# Patient Record
Sex: FEMALE | Race: WHITE | Employment: OTHER | URBAN - METROPOLITAN AREA
[De-identification: names, ages, dates, MRNs, and addresses within clinical notes are randomized per-mention and may not be internally consistent; named-entity substitution may affect disease eponyms.]

---

## 2018-01-02 ENCOUNTER — TELEPHONE (OUTPATIENT)
Dept: FAMILY MEDICINE CLINIC | Age: 68
End: 2018-01-02

## 2018-01-04 ENCOUNTER — OFFICE VISIT (OUTPATIENT)
Dept: FAMILY MEDICINE CLINIC | Age: 68
End: 2018-01-04

## 2018-01-04 VITALS
OXYGEN SATURATION: 97 % | BODY MASS INDEX: 29.66 KG/M2 | SYSTOLIC BLOOD PRESSURE: 148 MMHG | WEIGHT: 167.4 LBS | HEIGHT: 63 IN | TEMPERATURE: 98.1 F | DIASTOLIC BLOOD PRESSURE: 78 MMHG | RESPIRATION RATE: 18 BRPM | HEART RATE: 74 BPM

## 2018-01-04 DIAGNOSIS — L85.3 DRY SKIN DERMATITIS: ICD-10-CM

## 2018-01-04 DIAGNOSIS — H66.93 ACUTE OTITIS MEDIA, BILATERAL: ICD-10-CM

## 2018-01-04 DIAGNOSIS — M25.561 CHRONIC PAIN OF BOTH KNEES: ICD-10-CM

## 2018-01-04 DIAGNOSIS — E78.2 MIXED HYPERLIPIDEMIA: ICD-10-CM

## 2018-01-04 DIAGNOSIS — F41.9 ANXIETY: Primary | ICD-10-CM

## 2018-01-04 DIAGNOSIS — G47.30 SLEEP APNEA, UNSPECIFIED TYPE: ICD-10-CM

## 2018-01-04 DIAGNOSIS — G89.29 CHRONIC PAIN OF BOTH KNEES: ICD-10-CM

## 2018-01-04 DIAGNOSIS — M25.562 CHRONIC PAIN OF BOTH KNEES: ICD-10-CM

## 2018-01-04 PROCEDURE — 99203 OFFICE O/P NEW LOW 30 MIN: CPT | Performed by: NURSE PRACTITIONER

## 2018-01-04 RX ORDER — MELOXICAM 15 MG/1
TABLET ORAL DAILY
COMMUNITY
Start: 2017-12-04 | End: 2018-01-04 | Stop reason: SDUPTHER

## 2018-01-04 RX ORDER — MELOXICAM 15 MG/1
15 TABLET ORAL DAILY
Qty: 90 TABLET | Refills: 0 | Status: SHIPPED | OUTPATIENT
Start: 2018-01-04 | End: 2018-04-28 | Stop reason: SDUPTHER

## 2018-01-04 RX ORDER — ROSUVASTATIN CALCIUM 10 MG/1
10 TABLET, COATED ORAL DAILY
Qty: 90 TABLET | Refills: 0 | Status: SHIPPED | OUTPATIENT
Start: 2018-01-04 | End: 2018-04-20 | Stop reason: SDUPTHER

## 2018-01-04 RX ORDER — CITALOPRAM 20 MG/1
20 TABLET ORAL DAILY
Qty: 90 TABLET | Refills: 0 | Status: SHIPPED | OUTPATIENT
Start: 2018-01-04 | End: 2018-04-20 | Stop reason: SDUPTHER

## 2018-01-04 RX ORDER — AMOXICILLIN AND CLAVULANATE POTASSIUM 875; 125 MG/1; MG/1
1 TABLET, FILM COATED ORAL EVERY 12 HOURS
Qty: 20 TABLET | Refills: 0 | Status: SHIPPED | OUTPATIENT
Start: 2018-01-04 | End: 2018-01-05 | Stop reason: SDUPTHER

## 2018-01-04 RX ORDER — CITALOPRAM 20 MG/1
TABLET ORAL DAILY
COMMUNITY
Start: 2017-11-08 | End: 2018-01-04 | Stop reason: SDUPTHER

## 2018-01-04 RX ORDER — ROSUVASTATIN CALCIUM 10 MG/1
TABLET, COATED ORAL DAILY
COMMUNITY
Start: 2017-11-08 | End: 2018-01-04 | Stop reason: SDUPTHER

## 2018-01-04 ASSESSMENT — PATIENT HEALTH QUESTIONNAIRE - PHQ9
SUM OF ALL RESPONSES TO PHQ QUESTIONS 1-9: 0
2. FEELING DOWN, DEPRESSED OR HOPELESS: 0
SUM OF ALL RESPONSES TO PHQ9 QUESTIONS 1 & 2: 0
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
## 2018-01-05 RX ORDER — AMOXICILLIN AND CLAVULANATE POTASSIUM 875; 125 MG/1; MG/1
1 TABLET, FILM COATED ORAL EVERY 12 HOURS
Qty: 20 TABLET | Refills: 0 | Status: SHIPPED | OUTPATIENT
Start: 2018-01-05 | End: 2018-01-15

## 2018-02-02 ENCOUNTER — HOSPITAL ENCOUNTER (OUTPATIENT)
Dept: LAB | Age: 68
Discharge: HOME | End: 2018-02-02
Payer: MEDICARE

## 2018-02-02 DIAGNOSIS — E78.2 MIXED HYPERLIPIDEMIA: ICD-10-CM

## 2018-02-02 LAB
ALBUMIN SERPL-MCNC: 4.2 G/DL (ref 3.9–4.9)
ALP BLD-CCNC: 73 U/L (ref 40–130)
ALT SERPL-CCNC: 22 U/L (ref 0–33)
ANION GAP SERPL CALCULATED.3IONS-SCNC: 12 MEQ/L (ref 7–13)
AST SERPL-CCNC: 22 U/L (ref 0–35)
BASOPHILS ABSOLUTE: 0 K/UL (ref 0–0.2)
BASOPHILS RELATIVE PERCENT: 0.6 %
BILIRUB SERPL-MCNC: 0.3 MG/DL (ref 0–1.2)
BUN BLDV-MCNC: 13 MG/DL (ref 8–23)
CALCIUM SERPL-MCNC: 8.8 MG/DL (ref 8.6–10.2)
CHLORIDE BLD-SCNC: 102 MEQ/L (ref 98–107)
CHOLESTEROL, TOTAL: 139 MG/DL (ref 0–199)
CO2: 26 MEQ/L (ref 22–29)
CREAT SERPL-MCNC: 0.78 MG/DL (ref 0.5–0.9)
EOSINOPHILS ABSOLUTE: 0.2 K/UL (ref 0–0.7)
EOSINOPHILS RELATIVE PERCENT: 3.1 %
GFR AFRICAN AMERICAN: >60
GFR NON-AFRICAN AMERICAN: >60
GLOBULIN: 2.5 G/DL (ref 2.3–3.5)
GLUCOSE BLD-MCNC: 110 MG/DL (ref 74–109)
HCT VFR BLD CALC: 36.4 % (ref 37–47)
HDLC SERPL-MCNC: 52 MG/DL (ref 40–59)
HEMOGLOBIN: 12.4 G/DL (ref 12–16)
LDL CHOLESTEROL CALCULATED: 62 MG/DL (ref 0–129)
LYMPHOCYTES ABSOLUTE: 1.1 K/UL (ref 1–4.8)
LYMPHOCYTES RELATIVE PERCENT: 21.5 %
MCH RBC QN AUTO: 32.6 PG (ref 27–31.3)
MCHC RBC AUTO-ENTMCNC: 34.2 % (ref 33–37)
MCV RBC AUTO: 95.2 FL (ref 82–100)
MONOCYTES ABSOLUTE: 0.8 K/UL (ref 0.2–0.8)
MONOCYTES RELATIVE PERCENT: 15.3 %
NEUTROPHILS ABSOLUTE: 3.1 K/UL (ref 1.4–6.5)
NEUTROPHILS RELATIVE PERCENT: 59.5 %
PDW BLD-RTO: 14.1 % (ref 11.5–14.5)
PLATELET # BLD: 190 K/UL (ref 130–400)
POTASSIUM SERPL-SCNC: 4.4 MEQ/L (ref 3.5–5.1)
RBC # BLD: 3.82 M/UL (ref 4.2–5.4)
SODIUM BLD-SCNC: 140 MEQ/L (ref 132–144)
TOTAL PROTEIN: 6.7 G/DL (ref 6.4–8.1)
TRIGL SERPL-MCNC: 124 MG/DL (ref 0–200)
WBC # BLD: 5.2 K/UL (ref 4.8–10.8)

## 2018-02-02 PROCEDURE — 80053 COMPREHEN METABOLIC PANEL: CPT

## 2018-02-02 PROCEDURE — 36415 COLL VENOUS BLD VENIPUNCTURE: CPT

## 2018-02-02 PROCEDURE — 85025 COMPLETE CBC W/AUTO DIFF WBC: CPT

## 2018-02-02 PROCEDURE — 80061 LIPID PANEL: CPT

## 2018-02-13 ENCOUNTER — OFFICE VISIT (OUTPATIENT)
Dept: FAMILY MEDICINE CLINIC | Age: 68
End: 2018-02-13
Payer: MEDICARE

## 2018-02-13 VITALS
DIASTOLIC BLOOD PRESSURE: 74 MMHG | BODY MASS INDEX: 30.83 KG/M2 | SYSTOLIC BLOOD PRESSURE: 134 MMHG | TEMPERATURE: 97.9 F | RESPIRATION RATE: 14 BRPM | WEIGHT: 174 LBS | HEART RATE: 75 BPM | HEIGHT: 63 IN | OXYGEN SATURATION: 96 %

## 2018-02-13 DIAGNOSIS — Z83.3 FAMILY HISTORY OF DIABETES MELLITUS: ICD-10-CM

## 2018-02-13 DIAGNOSIS — Z72.0 CURRENT OCCASIONAL SMOKER: ICD-10-CM

## 2018-02-13 DIAGNOSIS — H66.93 BILATERAL OTITIS MEDIA, UNSPECIFIED OTITIS MEDIA TYPE: ICD-10-CM

## 2018-02-13 DIAGNOSIS — E78.2 MIXED HYPERLIPIDEMIA: Primary | ICD-10-CM

## 2018-02-13 DIAGNOSIS — R03.0 BLOOD PRESSURE ELEVATED WITHOUT HISTORY OF HTN: ICD-10-CM

## 2018-02-13 DIAGNOSIS — R73.09 ELEVATED GLUCOSE: ICD-10-CM

## 2018-02-13 PROCEDURE — 99214 OFFICE O/P EST MOD 30 MIN: CPT | Performed by: NURSE PRACTITIONER

## 2018-02-13 RX ORDER — DOXYCYCLINE HYCLATE 100 MG
100 TABLET ORAL 2 TIMES DAILY
Qty: 20 TABLET | Refills: 0 | Status: SHIPPED | OUTPATIENT
Start: 2018-02-13 | End: 2018-02-27

## 2018-02-13 RX ORDER — DOXYCYCLINE HYCLATE 100 MG
100 TABLET ORAL 2 TIMES DAILY
Qty: 20 TABLET | Refills: 0 | Status: SHIPPED | OUTPATIENT
Start: 2018-02-13 | End: 2018-02-13 | Stop reason: SDUPTHER

## 2018-02-13 ASSESSMENT — PATIENT HEALTH QUESTIONNAIRE - PHQ9
SUM OF ALL RESPONSES TO PHQ9 QUESTIONS 1 & 2: 0
2. FEELING DOWN, DEPRESSED OR HOPELESS: 0
SUM OF ALL RESPONSES TO PHQ QUESTIONS 1-9: 0
1. LITTLE INTEREST OR PLEASURE IN DOING THINGS: 0

## 2018-02-13 NOTE — PROGRESS NOTES
or sinus tenderness. Mouth/Throat:  Mucosa moist.  No lesions. Pharynx without erythema, edema or exudate. Neck:  neck- supple, no mass, non-tender and no bruits  Lungs:  Normal expansion. Clear to auscultation. No rales, rhonchi, or wheezing., No chest wall tenderness. Heart:  Heart sounds are normal.  Regular rate and rhythm without murmur, gallop or rub. DIAGNOSIS:   1. Mixed hyperlipidemia  CBC Auto Differential    Comprehensive Metabolic Panel    Lipid Panel   2. Current occasional smoker     3. Elevated glucose  Hemoglobin A1C   4. Blood pressure elevated without history of HTN     5. Family history of diabetes mellitus  Hemoglobin A1C   6. Bilateral otitis media, unspecified otitis media type  DISCONTINUED: doxycycline hyclate (VIBRA-TABS) 100 MG tablet       Plan:  Continue current medicines and dosages. Continue diet and exercise programs, improving where possible. Follow up in 4 months with lab work one week prior. For further details see orders placed. 1. Lipid panel is stable on statin. No side effects reported. Continue the same. 2. Not ready for smoking cessation. 3. 5.Will check hemoglobin A1c with next labs. She has a strong family history of diabetes. 4. Recommend decreased salt intake in diet. Will recheck at next follow up. Antibiotic ordered for ear infection. The patient is instructed to take Probiotic tablets twice a day for the duration of antibiotic therapy and for 4 days after completion of antibiotics. This will help restore the good bacteria to your colon and prevent side effects of antibiotic therapy such as cramping and diarrhea. Probiotic tablets can be found at your local pharmacy over the counter. Ask your pharmacist if you need help finding tablets.        Electronically signed by Arianna Parr, 5:50 PM 2/13/18

## 2018-02-27 ENCOUNTER — OFFICE VISIT (OUTPATIENT)
Dept: PULMONOLOGY | Age: 68
End: 2018-02-27
Payer: MEDICARE

## 2018-02-27 VITALS
WEIGHT: 172.4 LBS | TEMPERATURE: 97.4 F | HEIGHT: 62 IN | BODY MASS INDEX: 31.73 KG/M2 | DIASTOLIC BLOOD PRESSURE: 86 MMHG | SYSTOLIC BLOOD PRESSURE: 130 MMHG | OXYGEN SATURATION: 95 % | HEART RATE: 90 BPM

## 2018-02-27 DIAGNOSIS — J41.0 SIMPLE CHRONIC BRONCHITIS (HCC): ICD-10-CM

## 2018-02-27 DIAGNOSIS — Z99.89 OSA ON CPAP: Primary | ICD-10-CM

## 2018-02-27 DIAGNOSIS — G47.33 OSA ON CPAP: Primary | ICD-10-CM

## 2018-02-27 PROCEDURE — 99203 OFFICE O/P NEW LOW 30 MIN: CPT | Performed by: INTERNAL MEDICINE

## 2018-02-27 ASSESSMENT — ENCOUNTER SYMPTOMS
SHORTNESS OF BREATH: 0
CHEST TIGHTNESS: 0
ABDOMINAL PAIN: 0
VOMITING: 0
RHINORRHEA: 0
WHEEZING: 0
DIARRHEA: 0
NAUSEA: 0
COUGH: 1
SINUS PRESSURE: 0
SORE THROAT: 0

## 2018-02-27 NOTE — PROGRESS NOTES
She has a normal mood and affect. Assessment:     1. AMANDA on CPAP     2. Simple chronic bronchitis ([address]       Patient was advised to continue current treatment with current CPAP settings as she is doing well prescription for new supplies were sent. She was encouraged to use Mucinex if needed to help mobilizing secretions and observe for now as far as her cough. Patient has no evidence of obstructive lung disease otherwise, asthma, or lower respiratory tract infection at this point      Plan:     No orders of the defined types were placed in this encounter. Orders Placed This Encounter   Medications    Respiratory Therapy Supplies [facility]     Sig: New CPAP mask and supplies     Dispense:  1 Device     Refill:  0           Return in about 1 year (around 2/27/2019) for re-evaluation.       Rafiq Angel MD

## 2018-03-06 ENCOUNTER — TELEPHONE (OUTPATIENT)
Dept: PULMONOLOGY | Age: 68
End: 2018-03-06

## 2018-03-06 ENCOUNTER — OFFICE VISIT (OUTPATIENT)
Dept: FAMILY MEDICINE CLINIC | Age: 68
End: 2018-03-06
Payer: MEDICARE

## 2018-03-06 VITALS
HEART RATE: 73 BPM | SYSTOLIC BLOOD PRESSURE: 120 MMHG | DIASTOLIC BLOOD PRESSURE: 70 MMHG | OXYGEN SATURATION: 96 % | BODY MASS INDEX: 31.47 KG/M2 | WEIGHT: 171 LBS | RESPIRATION RATE: 14 BRPM | TEMPERATURE: 97.6 F | HEIGHT: 62 IN

## 2018-03-06 DIAGNOSIS — Z12.31 ENCOUNTER FOR SCREENING MAMMOGRAM FOR BREAST CANCER: ICD-10-CM

## 2018-03-06 DIAGNOSIS — Z01.419 ENCOUNTER FOR GYNECOLOGICAL EXAMINATION WITHOUT ABNORMAL FINDING: ICD-10-CM

## 2018-03-06 DIAGNOSIS — L85.3 DRY SKIN DERMATITIS: ICD-10-CM

## 2018-03-06 DIAGNOSIS — F17.200 CURRENT SMOKER: ICD-10-CM

## 2018-03-06 DIAGNOSIS — B36.9 FUNGAL DERMATOSIS: Primary | ICD-10-CM

## 2018-03-06 PROCEDURE — 99214 OFFICE O/P EST MOD 30 MIN: CPT | Performed by: NURSE PRACTITIONER

## 2018-03-06 RX ORDER — AMMONIUM LACTATE 12 G/100G
LOTION TOPICAL
Qty: 1 BOTTLE | Refills: 0 | Status: SHIPPED | OUTPATIENT
Start: 2018-03-06 | End: 2020-01-10

## 2018-03-06 NOTE — PROGRESS NOTES
Annual Physical    Luz Jacobsen  presents for annual physical exam.  She has no complaints today. Health Maintenance: PAP test done  Mammograms discussed  Colonoscopy  discussed  Tetanus shot discussed    Past Medical History:   Diagnosis Date    Allergic rhinitis     Anxiety     Hyperlipidemia     Osteoarthritis     Sleep apnea      Past Surgical History:   Procedure Laterality Date     SECTION      pt has had 3      family history includes Allergy (Severe) in her sister; Depression in her sister, sister, sister, and sister; Diabetes in her brother, sister, and sister; Heart Disease in her mother; High Blood Pressure in her mother; Obesity in her sister; Vision Loss in her mother. Social History   Substance Use Topics    Smoking status: Light Tobacco Smoker    Smokeless tobacco: Never Used    Alcohol use Yes       REVIEW OF SYSTEMS:  The patient reports no problems with hearing or headaches. She reports no problems with vision. She is advised to have a yearly eye examination. She has no chest pains or pressures, or shortness of breath. She has no indigestion, heartburn, nausea, or vomiting. She has no constipation, diarrhea, or  black, bloody, mucousy, or tarry stool. She has no trouble passing urine or losing urine. Libido seems to be normal.  She reports no musculoskeletal aches or pains. She states that [facility] would not cover the lac-hyrdin. She would still like to try it   Also, she noticed a small red spot on her left cheek yesterday. Not really itchy and not painful. She has not had a anne there before. Upon further questioning, she does where her CPAP mask over that area every night. PHYSICAL EXAMINATION:  EXAM:  Constitutional Blood pressure 120/70, pulse 73, temperature 97.6 °F (36.4 °C), temperature source Temporal, resp.  rate 14, height 5' 2\" (1.575 m), weight 171 lb (77.6 kg), last menstrual period 2008, SpO2 96 %, not currently

## 2018-03-08 ENCOUNTER — HOSPITAL ENCOUNTER (OUTPATIENT)
Dept: WOMENS IMAGING | Age: 68
Discharge: HOME | End: 2018-03-10
Payer: MEDICARE

## 2018-03-08 DIAGNOSIS — Z12.31 ENCOUNTER FOR SCREENING MAMMOGRAM FOR BREAST CANCER: ICD-10-CM

## 2018-03-08 PROCEDURE — 77067 SCR MAMMO BI INCL CAD: CPT

## 2018-04-20 DIAGNOSIS — E78.2 MIXED HYPERLIPIDEMIA: ICD-10-CM

## 2018-04-20 DIAGNOSIS — F41.9 ANXIETY: ICD-10-CM

## 2018-04-20 RX ORDER — ROSUVASTATIN CALCIUM 10 MG/1
TABLET, COATED ORAL
Qty: 90 TABLET | Refills: 1 | Status: SHIPPED | OUTPATIENT
Start: 2018-04-20 | End: 2018-10-17 | Stop reason: SDUPTHER

## 2018-04-20 RX ORDER — CITALOPRAM 20 MG/1
TABLET ORAL
Qty: 90 TABLET | Refills: 1 | Status: SHIPPED | OUTPATIENT
Start: 2018-04-20 | End: 2018-10-17 | Stop reason: SDUPTHER

## 2018-04-30 RX ORDER — MELOXICAM 15 MG/1
TABLET ORAL
Qty: 90 TABLET | Refills: 0 | Status: SHIPPED | OUTPATIENT
Start: 2018-04-30 | End: 2018-07-29 | Stop reason: SDUPTHER

## 2018-05-18 ENCOUNTER — HOSPITAL ENCOUNTER (EMERGENCY)
Age: 68
Discharge: HOME | End: 2018-05-18
Attending: EMERGENCY MEDICINE
Payer: MEDICARE

## 2018-05-18 ENCOUNTER — APPOINTMENT (OUTPATIENT)
Dept: GENERAL RADIOLOGY | Age: 68
End: 2018-05-18
Payer: MEDICARE

## 2018-05-18 VITALS
HEIGHT: 62 IN | SYSTOLIC BLOOD PRESSURE: 164 MMHG | OXYGEN SATURATION: 97 % | BODY MASS INDEX: 29.44 KG/M2 | WEIGHT: 160 LBS | RESPIRATION RATE: 18 BRPM | DIASTOLIC BLOOD PRESSURE: 77 MMHG | TEMPERATURE: 98.4 F | HEART RATE: 71 BPM

## 2018-05-18 DIAGNOSIS — S46.812A: ICD-10-CM

## 2018-05-18 DIAGNOSIS — S76.011A STRAIN OF GLUTEUS MEDIUS OF RIGHT LOWER EXTREMITY, INITIAL ENCOUNTER: ICD-10-CM

## 2018-05-18 DIAGNOSIS — S70.01XD CONTUSION OF RIGHT HIP, SUBSEQUENT ENCOUNTER: Primary | ICD-10-CM

## 2018-05-18 LAB
BACTERIA: NORMAL /HPF
BILIRUBIN URINE: NEGATIVE
BLOOD, URINE: ABNORMAL
CLARITY: CLEAR
COLOR: YELLOW
EPITHELIAL CELLS, UA: NORMAL /HPF
GLUCOSE URINE: NEGATIVE MG/DL
KETONES, URINE: NEGATIVE MG/DL
LEUKOCYTE ESTERASE, URINE: NEGATIVE
NITRITE, URINE: NEGATIVE
PH UA: 6 (ref 5–9)
PROTEIN UA: NEGATIVE MG/DL
RBC UA: NORMAL /HPF (ref 0–2)
SPECIFIC GRAVITY UA: 1.01 (ref 1–1.03)
URINE REFLEX TO CULTURE: YES
UROBILINOGEN, URINE: 0.2 E.U./DL
WBC UA: NORMAL /HPF (ref 0–5)

## 2018-05-18 PROCEDURE — 81001 URINALYSIS AUTO W/SCOPE: CPT

## 2018-05-18 PROCEDURE — 6360000002 HC RX W HCPCS: Performed by: EMERGENCY MEDICINE

## 2018-05-18 PROCEDURE — 6370000000 HC RX 637 (ALT 250 FOR IP): Performed by: EMERGENCY MEDICINE

## 2018-05-18 PROCEDURE — 87086 URINE CULTURE/COLONY COUNT: CPT

## 2018-05-18 PROCEDURE — 99283 EMERGENCY DEPT VISIT LOW MDM: CPT

## 2018-05-18 PROCEDURE — 73502 X-RAY EXAM HIP UNI 2-3 VIEWS: CPT

## 2018-05-18 RX ORDER — TRAMADOL HYDROCHLORIDE 50 MG/1
50 TABLET ORAL ONCE
Status: COMPLETED | OUTPATIENT
Start: 2018-05-18 | End: 2018-05-18

## 2018-05-18 RX ORDER — DEXAMETHASONE 4 MG/1
4 TABLET ORAL ONCE
Status: COMPLETED | OUTPATIENT
Start: 2018-05-18 | End: 2018-05-18

## 2018-05-18 RX ORDER — TRAMADOL HYDROCHLORIDE 50 MG/1
50 TABLET ORAL EVERY 8 HOURS PRN
Qty: 20 TABLET | Refills: 0 | Status: SHIPPED | OUTPATIENT
Start: 2018-05-18 | End: 2018-05-28

## 2018-05-18 RX ADMIN — DEXAMETHASONE 4 MG: 4 TABLET ORAL at 09:12

## 2018-05-18 RX ADMIN — TRAMADOL HYDROCHLORIDE 50 MG: 50 TABLET, FILM COATED ORAL at 09:12

## 2018-05-18 ASSESSMENT — ENCOUNTER SYMPTOMS
BLOOD IN STOOL: 0
ABDOMINAL PAIN: 0
EYE PAIN: 0
DIARRHEA: 0
STRIDOR: 0
COUGH: 0
VOICE CHANGE: 0
SHORTNESS OF BREATH: 0
CHEST TIGHTNESS: 0
CHOKING: 0
SINUS PRESSURE: 0
BACK PAIN: 0
SORE THROAT: 0
EYE REDNESS: 0
TROUBLE SWALLOWING: 0
CONSTIPATION: 0
WHEEZING: 0
EYE DISCHARGE: 0
FACIAL SWELLING: 0
VOMITING: 0

## 2018-05-18 ASSESSMENT — PAIN DESCRIPTION - DESCRIPTORS: DESCRIPTORS: ACHING

## 2018-05-18 ASSESSMENT — PAIN DESCRIPTION - LOCATION: LOCATION: BUTTOCKS

## 2018-05-18 ASSESSMENT — PAIN SCALES - GENERAL: PAINLEVEL_OUTOF10: 8

## 2018-05-18 ASSESSMENT — PAIN DESCRIPTION - ORIENTATION: ORIENTATION: RIGHT

## 2018-05-20 LAB — URINE CULTURE, ROUTINE: NORMAL

## 2018-05-31 ENCOUNTER — OFFICE VISIT (OUTPATIENT)
Dept: FAMILY MEDICINE CLINIC | Age: 68
End: 2018-05-31
Payer: MEDICARE

## 2018-05-31 VITALS
SYSTOLIC BLOOD PRESSURE: 138 MMHG | WEIGHT: 168 LBS | BODY MASS INDEX: 30.91 KG/M2 | DIASTOLIC BLOOD PRESSURE: 80 MMHG | RESPIRATION RATE: 12 BRPM | TEMPERATURE: 96.1 F | OXYGEN SATURATION: 97 % | HEIGHT: 62 IN | HEART RATE: 81 BPM

## 2018-05-31 DIAGNOSIS — T14.8XXA PUNCTURE WOUND: Primary | ICD-10-CM

## 2018-05-31 PROCEDURE — 90471 IMMUNIZATION ADMIN: CPT | Performed by: NURSE PRACTITIONER

## 2018-05-31 PROCEDURE — 99213 OFFICE O/P EST LOW 20 MIN: CPT | Performed by: NURSE PRACTITIONER

## 2018-05-31 PROCEDURE — 90715 TDAP VACCINE 7 YRS/> IM: CPT | Performed by: NURSE PRACTITIONER

## 2018-05-31 RX ORDER — DOXYCYCLINE HYCLATE 100 MG
100 TABLET ORAL 2 TIMES DAILY
Qty: 20 TABLET | Refills: 0 | Status: SHIPPED | OUTPATIENT
Start: 2018-05-31 | End: 2018-06-10

## 2018-05-31 RX ORDER — CEPHALEXIN 500 MG/1
500 CAPSULE ORAL 4 TIMES DAILY
Qty: 40 CAPSULE | Refills: 0 | Status: SHIPPED | OUTPATIENT
Start: 2018-05-31 | End: 2018-06-10

## 2018-05-31 ASSESSMENT — ENCOUNTER SYMPTOMS
NAUSEA: 0
VOMITING: 0
COLOR CHANGE: 1
SHORTNESS OF BREATH: 0

## 2018-07-30 RX ORDER — MELOXICAM 15 MG/1
TABLET ORAL
Qty: 90 TABLET | Refills: 0 | Status: SHIPPED | OUTPATIENT
Start: 2018-07-30 | End: 2018-10-28 | Stop reason: SDUPTHER

## 2018-08-29 ENCOUNTER — HOSPITAL ENCOUNTER (OUTPATIENT)
Dept: LAB | Age: 68
Discharge: HOME | End: 2018-08-29
Payer: MEDICARE

## 2018-08-29 DIAGNOSIS — Z83.3 FAMILY HISTORY OF DIABETES MELLITUS: ICD-10-CM

## 2018-08-29 DIAGNOSIS — R73.09 ELEVATED GLUCOSE: ICD-10-CM

## 2018-08-29 DIAGNOSIS — E78.2 MIXED HYPERLIPIDEMIA: ICD-10-CM

## 2018-08-29 LAB
ALBUMIN SERPL-MCNC: 4.4 G/DL (ref 3.9–4.9)
ALP BLD-CCNC: 81 U/L (ref 40–130)
ALT SERPL-CCNC: 20 U/L (ref 0–33)
ANION GAP SERPL CALCULATED.3IONS-SCNC: 15 MEQ/L (ref 7–13)
AST SERPL-CCNC: 22 U/L (ref 0–35)
BASOPHILS ABSOLUTE: 0.1 K/UL (ref 0–0.2)
BASOPHILS RELATIVE PERCENT: 0.7 %
BILIRUB SERPL-MCNC: 0.5 MG/DL (ref 0–1.2)
BUN BLDV-MCNC: 13 MG/DL (ref 8–23)
CALCIUM SERPL-MCNC: 9.1 MG/DL (ref 8.6–10.2)
CHLORIDE BLD-SCNC: 102 MEQ/L (ref 98–107)
CHOLESTEROL, TOTAL: 141 MG/DL (ref 0–199)
CO2: 23 MEQ/L (ref 22–29)
CREAT SERPL-MCNC: 0.78 MG/DL (ref 0.5–0.9)
EOSINOPHILS ABSOLUTE: 0.3 K/UL (ref 0–0.7)
EOSINOPHILS RELATIVE PERCENT: 4.1 %
GFR AFRICAN AMERICAN: >60
GFR NON-AFRICAN AMERICAN: >60
GLOBULIN: 3 G/DL (ref 2.3–3.5)
GLUCOSE BLD-MCNC: 108 MG/DL (ref 74–109)
HBA1C MFR BLD: 6 % (ref 4.8–5.9)
HCT VFR BLD CALC: 41 % (ref 37–47)
HDLC SERPL-MCNC: 51 MG/DL (ref 40–59)
HEMOGLOBIN: 13.9 G/DL (ref 12–16)
LDL CHOLESTEROL CALCULATED: 63 MG/DL (ref 0–129)
LYMPHOCYTES ABSOLUTE: 1.8 K/UL (ref 1–4.8)
LYMPHOCYTES RELATIVE PERCENT: 26.5 %
MCH RBC QN AUTO: 33 PG (ref 27–31.3)
MCHC RBC AUTO-ENTMCNC: 33.8 % (ref 33–37)
MCV RBC AUTO: 97.4 FL (ref 82–100)
MONOCYTES ABSOLUTE: 0.5 K/UL (ref 0.2–0.8)
MONOCYTES RELATIVE PERCENT: 7.5 %
NEUTROPHILS ABSOLUTE: 4.1 K/UL (ref 1.4–6.5)
NEUTROPHILS RELATIVE PERCENT: 61.2 %
PDW BLD-RTO: 14.6 % (ref 11.5–14.5)
PLATELET # BLD: 222 K/UL (ref 130–400)
POTASSIUM SERPL-SCNC: 4.6 MEQ/L (ref 3.5–5.1)
RBC # BLD: 4.21 M/UL (ref 4.2–5.4)
SODIUM BLD-SCNC: 140 MEQ/L (ref 132–144)
TOTAL PROTEIN: 7.4 G/DL (ref 6.4–8.1)
TRIGL SERPL-MCNC: 135 MG/DL (ref 0–200)
WBC # BLD: 6.8 K/UL (ref 4.8–10.8)

## 2018-08-29 PROCEDURE — 80061 LIPID PANEL: CPT

## 2018-08-29 PROCEDURE — 83036 HEMOGLOBIN GLYCOSYLATED A1C: CPT

## 2018-08-29 PROCEDURE — 85025 COMPLETE CBC W/AUTO DIFF WBC: CPT

## 2018-08-29 PROCEDURE — 36415 COLL VENOUS BLD VENIPUNCTURE: CPT

## 2018-08-29 PROCEDURE — 80053 COMPREHEN METABOLIC PANEL: CPT

## 2018-10-17 DIAGNOSIS — F41.9 ANXIETY: ICD-10-CM

## 2018-10-17 DIAGNOSIS — E78.2 MIXED HYPERLIPIDEMIA: ICD-10-CM

## 2018-10-18 RX ORDER — CITALOPRAM 20 MG/1
TABLET ORAL
Qty: 90 TABLET | Refills: 1 | Status: SHIPPED | OUTPATIENT
Start: 2018-10-18 | End: 2019-04-16 | Stop reason: SDUPTHER

## 2018-10-18 RX ORDER — ROSUVASTATIN CALCIUM 10 MG/1
TABLET, COATED ORAL
Qty: 90 TABLET | Refills: 1 | Status: SHIPPED | OUTPATIENT
Start: 2018-10-18 | End: 2019-04-16 | Stop reason: SDUPTHER

## 2018-10-28 NOTE — TELEPHONE ENCOUNTER
Pharmacy  requesting medication refill.  Please approve or deny this request.    Rx requested:  Requested Prescriptions     Pending Prescriptions Disp Refills    meloxicam (MOBIC) 15 MG tablet [Pharmacy Med Name: MELOXICAM TABS 15MG] 90 tablet 0     Sig: TAKE 1 TABLET DAILY       Last Office Visit:   3/6/18    Last Labs:  8/29/18    Next Visit Date:  Future Appointments  Date Time Provider Iain Medina   1/18/2019 1:00 PM [address], MD [place] [facility]   1/29/2019 9:00 AM SHANICE BARRIGA  [place] [address] [place] [address]

## 2018-10-29 RX ORDER — MELOXICAM 15 MG/1
TABLET ORAL
Qty: 90 TABLET | Refills: 0 | Status: SHIPPED | OUTPATIENT
Start: 2018-10-29 | End: 2019-01-27 | Stop reason: SDUPTHER

## 2019-01-22 ENCOUNTER — HOSPITAL ENCOUNTER (OUTPATIENT)
Dept: PREADMISSION TESTING | Age: 69
Discharge: HOME | End: 2019-01-26
Payer: MEDICARE

## 2019-01-22 VITALS
BODY MASS INDEX: 32.02 KG/M2 | HEART RATE: 80 BPM | HEIGHT: 62 IN | TEMPERATURE: 97.7 F | SYSTOLIC BLOOD PRESSURE: 154 MMHG | RESPIRATION RATE: 16 BRPM | WEIGHT: 174 LBS | DIASTOLIC BLOOD PRESSURE: 83 MMHG | OXYGEN SATURATION: 98 %

## 2019-01-22 DIAGNOSIS — R55 VAGAL REACTION: Chronic | ICD-10-CM

## 2019-01-22 PROBLEM — M17.12 LEFT KNEE DJD: Status: ACTIVE | Noted: 2019-01-22

## 2019-01-22 LAB
ANION GAP SERPL CALCULATED.3IONS-SCNC: 11 MEQ/L (ref 7–13)
BILIRUBIN URINE: NEGATIVE
BLOOD, URINE: NEGATIVE
BUN BLDV-MCNC: 11 MG/DL (ref 8–23)
CALCIUM SERPL-MCNC: 9.3 MG/DL (ref 8.6–10.2)
CHLORIDE BLD-SCNC: 102 MEQ/L (ref 98–107)
CLARITY: CLEAR
CO2: 26 MEQ/L (ref 22–29)
COLOR: YELLOW
CREAT SERPL-MCNC: 0.71 MG/DL (ref 0.5–0.9)
EKG ATRIAL RATE: 70 BPM
EKG P AXIS: 54 DEGREES
EKG P-R INTERVAL: 146 MS
EKG Q-T INTERVAL: 426 MS
EKG QRS DURATION: 78 MS
EKG QTC CALCULATION (BAZETT): 460 MS
EKG R AXIS: 13 DEGREES
EKG T AXIS: 20 DEGREES
EKG VENTRICULAR RATE: 70 BPM
GFR AFRICAN AMERICAN: >60
GFR NON-AFRICAN AMERICAN: >60
GLUCOSE BLD-MCNC: 111 MG/DL (ref 74–109)
GLUCOSE URINE: NEGATIVE MG/DL
HCT VFR BLD CALC: 34.5 % (ref 37–47)
HEMOGLOBIN: 12 G/DL (ref 12–16)
INR BLD: 1
KETONES, URINE: NEGATIVE MG/DL
LEUKOCYTE ESTERASE, URINE: NEGATIVE
MCH RBC QN AUTO: 33.3 PG (ref 27–31.3)
MCHC RBC AUTO-ENTMCNC: 34.8 % (ref 33–37)
MCV RBC AUTO: 95.9 FL (ref 82–100)
NITRITE, URINE: NEGATIVE
PDW BLD-RTO: 13.9 % (ref 11.5–14.5)
PH UA: 6.5 (ref 5–9)
PLATELET # BLD: 171 K/UL (ref 130–400)
POTASSIUM SERPL-SCNC: 4.1 MEQ/L (ref 3.5–5.1)
PROTEIN UA: NEGATIVE MG/DL
PROTHROMBIN TIME: 10.2 SEC (ref 9–11.5)
RBC # BLD: 3.6 M/UL (ref 4.2–5.4)
SODIUM BLD-SCNC: 139 MEQ/L (ref 132–144)
SPECIFIC GRAVITY UA: 1.01 (ref 1–1.03)
URINE REFLEX TO CULTURE: NORMAL
UROBILINOGEN, URINE: 0.2 E.U./DL
WBC # BLD: 4.7 K/UL (ref 4.8–10.8)

## 2019-01-22 PROCEDURE — 81003 URINALYSIS AUTO W/O SCOPE: CPT

## 2019-01-22 PROCEDURE — 85610 PROTHROMBIN TIME: CPT

## 2019-01-22 PROCEDURE — 85027 COMPLETE CBC AUTOMATED: CPT

## 2019-01-22 PROCEDURE — 80048 BASIC METABOLIC PNL TOTAL CA: CPT

## 2019-01-22 PROCEDURE — 93005 ELECTROCARDIOGRAM TRACING: CPT

## 2019-01-22 RX ORDER — FEXOFENADINE HCL 180 MG/1
180 TABLET ORAL DAILY
COMMUNITY

## 2019-01-22 RX ORDER — SODIUM CHLORIDE, SODIUM LACTATE, POTASSIUM CHLORIDE, CALCIUM CHLORIDE 600; 310; 30; 20 MG/100ML; MG/100ML; MG/100ML; MG/100ML
INJECTION, SOLUTION INTRAVENOUS CONTINUOUS
Status: CANCELLED | OUTPATIENT
Start: 2019-01-22

## 2019-01-22 RX ORDER — SODIUM CHLORIDE 0.9 % (FLUSH) 0.9 %
10 SYRINGE (ML) INJECTION EVERY 12 HOURS SCHEDULED
Status: CANCELLED | OUTPATIENT
Start: 2019-01-22

## 2019-01-22 RX ORDER — LIDOCAINE HYDROCHLORIDE 10 MG/ML
1 INJECTION, SOLUTION EPIDURAL; INFILTRATION; INTRACAUDAL; PERINEURAL
Status: CANCELLED | OUTPATIENT
Start: 2019-01-22 | End: 2019-01-22

## 2019-01-22 RX ORDER — ALBUTEROL SULFATE 2.5 MG/3ML
3 SOLUTION RESPIRATORY (INHALATION) PRN
COMMUNITY
End: 2019-01-22

## 2019-01-22 RX ORDER — SODIUM CHLORIDE 0.9 % (FLUSH) 0.9 %
10 SYRINGE (ML) INJECTION PRN
Status: CANCELLED | OUTPATIENT
Start: 2019-01-22

## 2019-01-22 RX ORDER — ALBUTEROL SULFATE 2.5 MG/3ML
2.5 SOLUTION RESPIRATORY (INHALATION) EVERY 6 HOURS PRN
COMMUNITY
End: 2019-01-23 | Stop reason: ALTCHOICE

## 2019-01-22 RX ORDER — CEFAZOLIN SODIUM 2 G/50ML
2 SOLUTION INTRAVENOUS ONCE
Status: CANCELLED | OUTPATIENT
Start: 2019-02-08

## 2019-01-23 ENCOUNTER — OFFICE VISIT (OUTPATIENT)
Dept: FAMILY MEDICINE CLINIC | Age: 69
End: 2019-01-23
Payer: MEDICARE

## 2019-01-23 VITALS
OXYGEN SATURATION: 98 % | RESPIRATION RATE: 14 BRPM | HEART RATE: 79 BPM | DIASTOLIC BLOOD PRESSURE: 64 MMHG | SYSTOLIC BLOOD PRESSURE: 122 MMHG | BODY MASS INDEX: 33.49 KG/M2 | WEIGHT: 182 LBS | TEMPERATURE: 97.7 F | HEIGHT: 62 IN

## 2019-01-23 DIAGNOSIS — M17.12 PRIMARY OSTEOARTHRITIS OF LEFT KNEE: ICD-10-CM

## 2019-01-23 DIAGNOSIS — Z01.818 ENCOUNTER FOR PREADMISSION TESTING: Primary | ICD-10-CM

## 2019-01-23 PROCEDURE — 99214 OFFICE O/P EST MOD 30 MIN: CPT | Performed by: FAMILY MEDICINE

## 2019-01-23 PROCEDURE — 93010 ELECTROCARDIOGRAM REPORT: CPT | Performed by: INTERNAL MEDICINE

## 2019-01-23 ASSESSMENT — ENCOUNTER SYMPTOMS
SINUS PRESSURE: 0
EYE REDNESS: 0
APNEA: 0
WHEEZING: 0
CONSTIPATION: 0
EYE PAIN: 0
COUGH: 0
CHEST TIGHTNESS: 0
PHOTOPHOBIA: 0
EYE ITCHING: 0
RHINORRHEA: 0
NAUSEA: 0
FACIAL SWELLING: 0
CHOKING: 0
EYE DISCHARGE: 0
DIARRHEA: 0
SHORTNESS OF BREATH: 0
COLOR CHANGE: 0
ANAL BLEEDING: 0
ABDOMINAL PAIN: 0
TROUBLE SWALLOWING: 0
BACK PAIN: 0
ABDOMINAL DISTENTION: 0
BLOOD IN STOOL: 0

## 2019-01-28 RX ORDER — MELOXICAM 15 MG/1
TABLET ORAL
Qty: 90 TABLET | Refills: 0 | Status: ON HOLD | OUTPATIENT
Start: 2019-01-28 | End: 2019-02-19 | Stop reason: HOSPADM

## 2019-02-07 ENCOUNTER — ANESTHESIA EVENT (OUTPATIENT)
Dept: OPERATING ROOM | Age: 69
DRG: 470 | End: 2019-02-07
Payer: MEDICARE

## 2019-02-08 ENCOUNTER — HOSPITAL ENCOUNTER (INPATIENT)
Age: 69
LOS: 4 days | Discharge: NURSING, REHAB OR LONG-TERM CARE | DRG: 470 | End: 2019-02-12
Attending: ORTHOPAEDIC SURGERY | Admitting: ORTHOPAEDIC SURGERY
Payer: MEDICARE

## 2019-02-08 ENCOUNTER — APPOINTMENT (OUTPATIENT)
Dept: GENERAL RADIOLOGY | Age: 69
DRG: 470 | End: 2019-02-08
Attending: ORTHOPAEDIC SURGERY
Payer: MEDICARE

## 2019-02-08 ENCOUNTER — ANESTHESIA (OUTPATIENT)
Dept: OPERATING ROOM | Age: 69
DRG: 470 | End: 2019-02-08
Payer: MEDICARE

## 2019-02-08 VITALS
TEMPERATURE: 96.6 F | OXYGEN SATURATION: 100 % | RESPIRATION RATE: 14 BRPM | SYSTOLIC BLOOD PRESSURE: 111 MMHG | DIASTOLIC BLOOD PRESSURE: 61 MMHG

## 2019-02-08 PROBLEM — Z96.652 STATUS POST TOTAL KNEE REPLACEMENT, LEFT: Status: ACTIVE | Noted: 2019-02-08

## 2019-02-08 LAB
ABO/RH: NORMAL
ANTIBODY SCREEN: NORMAL

## 2019-02-08 PROCEDURE — 6370000000 HC RX 637 (ALT 250 FOR IP): Performed by: INTERNAL MEDICINE

## 2019-02-08 PROCEDURE — C1776 JOINT DEVICE (IMPLANTABLE): HCPCS | Performed by: ORTHOPAEDIC SURGERY

## 2019-02-08 PROCEDURE — 3600000004 HC SURGERY LEVEL 4 BASE: Performed by: ORTHOPAEDIC SURGERY

## 2019-02-08 PROCEDURE — 99406 BEHAV CHNG SMOKING 3-10 MIN: CPT

## 2019-02-08 PROCEDURE — 64447 NJX AA&/STRD FEMORAL NRV IMG: CPT | Performed by: ANESTHESIOLOGY

## 2019-02-08 PROCEDURE — 6360000002 HC RX W HCPCS: Performed by: NURSE PRACTITIONER

## 2019-02-08 PROCEDURE — 3700000000 HC ANESTHESIA ATTENDED CARE: Performed by: ORTHOPAEDIC SURGERY

## 2019-02-08 PROCEDURE — 73560 X-RAY EXAM OF KNEE 1 OR 2: CPT

## 2019-02-08 PROCEDURE — 86900 BLOOD TYPING SEROLOGIC ABO: CPT

## 2019-02-08 PROCEDURE — 7100000001 HC PACU RECOVERY - ADDTL 15 MIN: Performed by: ORTHOPAEDIC SURGERY

## 2019-02-08 PROCEDURE — 6370000000 HC RX 637 (ALT 250 FOR IP): Performed by: ORTHOPAEDIC SURGERY

## 2019-02-08 PROCEDURE — 2709999900 HC NON-CHARGEABLE SUPPLY: Performed by: ORTHOPAEDIC SURGERY

## 2019-02-08 PROCEDURE — 97530 THERAPEUTIC ACTIVITIES: CPT

## 2019-02-08 PROCEDURE — 2580000003 HC RX 258: Performed by: ORTHOPAEDIC SURGERY

## 2019-02-08 PROCEDURE — 7100000000 HC PACU RECOVERY - FIRST 15 MIN: Performed by: ORTHOPAEDIC SURGERY

## 2019-02-08 PROCEDURE — 97162 PT EVAL MOD COMPLEX 30 MIN: CPT

## 2019-02-08 PROCEDURE — 6360000002 HC RX W HCPCS: Performed by: ORTHOPAEDIC SURGERY

## 2019-02-08 PROCEDURE — 3700000001 HC ADD 15 MINUTES (ANESTHESIA): Performed by: ORTHOPAEDIC SURGERY

## 2019-02-08 PROCEDURE — 97110 THERAPEUTIC EXERCISES: CPT

## 2019-02-08 PROCEDURE — 6360000002 HC RX W HCPCS: Performed by: NURSE ANESTHETIST, CERTIFIED REGISTERED

## 2019-02-08 PROCEDURE — 3600000014 HC SURGERY LEVEL 4 ADDTL 15MIN: Performed by: ORTHOPAEDIC SURGERY

## 2019-02-08 PROCEDURE — 2580000003 HC RX 258: Performed by: ANESTHESIOLOGY

## 2019-02-08 PROCEDURE — 2500000003 HC RX 250 WO HCPCS: Performed by: NURSE ANESTHETIST, CERTIFIED REGISTERED

## 2019-02-08 PROCEDURE — 86901 BLOOD TYPING SEROLOGIC RH(D): CPT

## 2019-02-08 PROCEDURE — 86850 RBC ANTIBODY SCREEN: CPT

## 2019-02-08 PROCEDURE — 2580000003 HC RX 258: Performed by: NURSE PRACTITIONER

## 2019-02-08 PROCEDURE — 2720000010 HC SURG SUPPLY STERILE: Performed by: ORTHOPAEDIC SURGERY

## 2019-02-08 PROCEDURE — 6360000002 HC RX W HCPCS: Performed by: ANESTHESIOLOGY

## 2019-02-08 PROCEDURE — 0SRD0J9 REPLACEMENT OF LEFT KNEE JOINT WITH SYNTHETIC SUBSTITUTE, CEMENTED, OPEN APPROACH: ICD-10-PCS | Performed by: ORTHOPAEDIC SURGERY

## 2019-02-08 PROCEDURE — 1210000000 HC MED SURG R&B

## 2019-02-08 DEVICE — COMPONENT ARTC SURF PS 6-9 EF 11 MM LT TIB FIX BEAR: Type: IMPLANTABLE DEVICE | Site: KNEE | Status: FUNCTIONAL

## 2019-02-08 DEVICE — SYSTEM TOT KNEE CEM FEM TIB COMP STD TIB INSRT STD PAT: Type: IMPLANTABLE DEVICE | Site: KNEE | Status: FUNCTIONAL

## 2019-02-08 DEVICE — CEMENT BNE RADIOPAQUE FAST SET ACRYL RESIN HI VISC SIMPLEXHV: Type: IMPLANTABLE DEVICE | Site: KNEE | Status: FUNCTIONAL

## 2019-02-08 DEVICE — COMPONENT FEM SZ 8 NAR L KNEE CO CHROM CEM POST STBL COR: Type: IMPLANTABLE DEVICE | Site: KNEE | Status: FUNCTIONAL

## 2019-02-08 DEVICE — Z DUP USE 2207257 PSN TIB STM 5 DEG SZ E L: Type: IMPLANTABLE DEVICE | Site: KNEE | Status: FUNCTIONAL

## 2019-02-08 DEVICE — COMPONENT PAT DIA35MM THK9MM KNEE POLY CEM CONVENTIONAL: Type: IMPLANTABLE DEVICE | Site: KNEE | Status: FUNCTIONAL

## 2019-02-08 RX ORDER — EPHEDRINE SULFATE 50 MG/ML
INJECTION, SOLUTION INTRAVENOUS PRN
Status: DISCONTINUED | OUTPATIENT
Start: 2019-02-08 | End: 2019-02-08 | Stop reason: SDUPTHER

## 2019-02-08 RX ORDER — TRANEXAMIC ACID 650 1/1
1300 TABLET ORAL EVERY 8 HOURS
Status: COMPLETED | OUTPATIENT
Start: 2019-02-08 | End: 2019-02-08

## 2019-02-08 RX ORDER — MEPERIDINE HYDROCHLORIDE 50 MG/ML
12.5 INJECTION INTRAMUSCULAR; INTRAVENOUS; SUBCUTANEOUS EVERY 5 MIN PRN
Status: DISCONTINUED | OUTPATIENT
Start: 2019-02-08 | End: 2019-02-08 | Stop reason: HOSPADM

## 2019-02-08 RX ORDER — SODIUM CHLORIDE 9 MG/ML
INJECTION, SOLUTION INTRAVENOUS CONTINUOUS
Status: DISCONTINUED | OUTPATIENT
Start: 2019-02-08 | End: 2019-02-09

## 2019-02-08 RX ORDER — SODIUM CHLORIDE 0.9 % (FLUSH) 0.9 %
10 SYRINGE (ML) INJECTION PRN
Status: DISCONTINUED | OUTPATIENT
Start: 2019-02-08 | End: 2019-02-08 | Stop reason: HOSPADM

## 2019-02-08 RX ORDER — SENNA AND DOCUSATE SODIUM 50; 8.6 MG/1; MG/1
2 TABLET, FILM COATED ORAL 2 TIMES DAILY
Status: DISCONTINUED | OUTPATIENT
Start: 2019-02-08 | End: 2019-02-12 | Stop reason: HOSPADM

## 2019-02-08 RX ORDER — OXYCODONE HYDROCHLORIDE AND ACETAMINOPHEN 5; 325 MG/1; MG/1
1 TABLET ORAL EVERY 4 HOURS PRN
Status: DISCONTINUED | OUTPATIENT
Start: 2019-02-08 | End: 2019-02-12 | Stop reason: HOSPADM

## 2019-02-08 RX ORDER — BUPIVACAINE HYDROCHLORIDE 5 MG/ML
INJECTION, SOLUTION EPIDURAL; INTRACAUDAL
Status: DISPENSED
Start: 2019-02-08 | End: 2019-02-08

## 2019-02-08 RX ORDER — BISACODYL 10 MG
10 SUPPOSITORY, RECTAL RECTAL DAILY PRN
Status: DISCONTINUED | OUTPATIENT
Start: 2019-02-08 | End: 2019-02-12 | Stop reason: HOSPADM

## 2019-02-08 RX ORDER — TRAMADOL HYDROCHLORIDE 50 MG/1
50 TABLET ORAL EVERY 6 HOURS PRN
Status: DISCONTINUED | OUTPATIENT
Start: 2019-02-08 | End: 2019-02-12 | Stop reason: HOSPADM

## 2019-02-08 RX ORDER — SODIUM CHLORIDE 0.9 % (FLUSH) 0.9 %
10 SYRINGE (ML) INJECTION PRN
Status: DISCONTINUED | OUTPATIENT
Start: 2019-02-08 | End: 2019-02-12 | Stop reason: HOSPADM

## 2019-02-08 RX ORDER — HYDROMORPHONE HCL 110MG/55ML
0.5 PATIENT CONTROLLED ANALGESIA SYRINGE INTRAVENOUS EVERY 10 MIN PRN
Status: DISCONTINUED | OUTPATIENT
Start: 2019-02-08 | End: 2019-02-08 | Stop reason: HOSPADM

## 2019-02-08 RX ORDER — GUAIFENESIN 400 MG/1
TABLET ORAL 2 TIMES DAILY
Status: ON HOLD | COMMUNITY
End: 2019-02-19 | Stop reason: HOSPADM

## 2019-02-08 RX ORDER — ALBUTEROL SULFATE 90 UG/1
2 AEROSOL, METERED RESPIRATORY (INHALATION) EVERY 6 HOURS PRN
COMMUNITY
End: 2020-01-10

## 2019-02-08 RX ORDER — SODIUM CHLORIDE 0.9 % (FLUSH) 0.9 %
10 SYRINGE (ML) INJECTION EVERY 12 HOURS SCHEDULED
Status: DISCONTINUED | OUTPATIENT
Start: 2019-02-08 | End: 2019-02-08 | Stop reason: HOSPADM

## 2019-02-08 RX ORDER — OXYCODONE HYDROCHLORIDE 5 MG/1
5 TABLET ORAL EVERY 4 HOURS PRN
Status: DISCONTINUED | OUTPATIENT
Start: 2019-02-08 | End: 2019-02-12 | Stop reason: HOSPADM

## 2019-02-08 RX ORDER — MORPHINE SULFATE 4 MG/ML
4 INJECTION, SOLUTION INTRAMUSCULAR; INTRAVENOUS
Status: DISCONTINUED | OUTPATIENT
Start: 2019-02-08 | End: 2019-02-12 | Stop reason: HOSPADM

## 2019-02-08 RX ORDER — CITALOPRAM 20 MG/1
20 TABLET ORAL DAILY
Status: DISCONTINUED | OUTPATIENT
Start: 2019-02-08 | End: 2019-02-12 | Stop reason: HOSPADM

## 2019-02-08 RX ORDER — HYDROCODONE BITARTRATE AND ACETAMINOPHEN 5; 325 MG/1; MG/1
2 TABLET ORAL PRN
Status: DISCONTINUED | OUTPATIENT
Start: 2019-02-08 | End: 2019-02-08 | Stop reason: HOSPADM

## 2019-02-08 RX ORDER — FENTANYL CITRATE 50 UG/ML
50 INJECTION, SOLUTION INTRAMUSCULAR; INTRAVENOUS EVERY 10 MIN PRN
Status: DISCONTINUED | OUTPATIENT
Start: 2019-02-08 | End: 2019-02-08 | Stop reason: HOSPADM

## 2019-02-08 RX ORDER — MIDAZOLAM HYDROCHLORIDE 1 MG/ML
INJECTION INTRAMUSCULAR; INTRAVENOUS
Status: COMPLETED
Start: 2019-02-08 | End: 2019-02-08

## 2019-02-08 RX ORDER — MORPHINE SULFATE 2 MG/ML
2 INJECTION, SOLUTION INTRAMUSCULAR; INTRAVENOUS
Status: DISCONTINUED | OUTPATIENT
Start: 2019-02-08 | End: 2019-02-12 | Stop reason: HOSPADM

## 2019-02-08 RX ORDER — SODIUM CHLORIDE, SODIUM LACTATE, POTASSIUM CHLORIDE, CALCIUM CHLORIDE 600; 310; 30; 20 MG/100ML; MG/100ML; MG/100ML; MG/100ML
INJECTION, SOLUTION INTRAVENOUS CONTINUOUS
Status: DISCONTINUED | OUTPATIENT
Start: 2019-02-08 | End: 2019-02-08

## 2019-02-08 RX ORDER — MIDAZOLAM HYDROCHLORIDE 1 MG/ML
INJECTION INTRAMUSCULAR; INTRAVENOUS PRN
Status: DISCONTINUED | OUTPATIENT
Start: 2019-02-08 | End: 2019-02-08 | Stop reason: SDUPTHER

## 2019-02-08 RX ORDER — FLUTICASONE PROPIONATE 50 MCG
1 SPRAY, SUSPENSION (ML) NASAL DAILY
Status: DISCONTINUED | OUTPATIENT
Start: 2019-02-08 | End: 2019-02-12 | Stop reason: HOSPADM

## 2019-02-08 RX ORDER — ONDANSETRON 2 MG/ML
INJECTION INTRAMUSCULAR; INTRAVENOUS PRN
Status: DISCONTINUED | OUTPATIENT
Start: 2019-02-08 | End: 2019-02-08 | Stop reason: SDUPTHER

## 2019-02-08 RX ORDER — DEXAMETHASONE SODIUM PHOSPHATE 10 MG/ML
INJECTION INTRAMUSCULAR; INTRAVENOUS PRN
Status: DISCONTINUED | OUTPATIENT
Start: 2019-02-08 | End: 2019-02-08 | Stop reason: SDUPTHER

## 2019-02-08 RX ORDER — ACETAMINOPHEN 325 MG/1
650 TABLET ORAL EVERY 6 HOURS
Status: DISCONTINUED | OUTPATIENT
Start: 2019-02-08 | End: 2019-02-10

## 2019-02-08 RX ORDER — MAGNESIUM HYDROXIDE 1200 MG/15ML
LIQUID ORAL CONTINUOUS PRN
Status: COMPLETED | OUTPATIENT
Start: 2019-02-08 | End: 2019-02-08

## 2019-02-08 RX ORDER — FEXOFENADINE HCL 180 MG/1
180 TABLET ORAL DAILY
Status: DISCONTINUED | OUTPATIENT
Start: 2019-02-08 | End: 2019-02-08

## 2019-02-08 RX ORDER — ROPIVACAINE HYDROCHLORIDE 5 MG/ML
INJECTION, SOLUTION EPIDURAL; INFILTRATION; PERINEURAL
Status: DISPENSED
Start: 2019-02-08 | End: 2019-02-08

## 2019-02-08 RX ORDER — PROPOFOL 10 MG/ML
INJECTION, EMULSION INTRAVENOUS PRN
Status: DISCONTINUED | OUTPATIENT
Start: 2019-02-08 | End: 2019-02-08 | Stop reason: SDUPTHER

## 2019-02-08 RX ORDER — ROSUVASTATIN CALCIUM 5 MG/1
10 TABLET, COATED ORAL DAILY
Status: DISCONTINUED | OUTPATIENT
Start: 2019-02-08 | End: 2019-02-12 | Stop reason: HOSPADM

## 2019-02-08 RX ORDER — ONDANSETRON 2 MG/ML
4 INJECTION INTRAMUSCULAR; INTRAVENOUS EVERY 6 HOURS PRN
Status: DISCONTINUED | OUTPATIENT
Start: 2019-02-08 | End: 2019-02-12 | Stop reason: HOSPADM

## 2019-02-08 RX ORDER — FENTANYL CITRATE 50 UG/ML
INJECTION, SOLUTION INTRAMUSCULAR; INTRAVENOUS
Status: COMPLETED
Start: 2019-02-08 | End: 2019-02-08

## 2019-02-08 RX ORDER — CEFAZOLIN SODIUM 2 G/50ML
2 SOLUTION INTRAVENOUS EVERY 8 HOURS
Status: COMPLETED | OUTPATIENT
Start: 2019-02-08 | End: 2019-02-08

## 2019-02-08 RX ORDER — SODIUM CHLORIDE 0.9 % (FLUSH) 0.9 %
10 SYRINGE (ML) INJECTION EVERY 12 HOURS SCHEDULED
Status: DISCONTINUED | OUTPATIENT
Start: 2019-02-08 | End: 2019-02-11

## 2019-02-08 RX ORDER — LIDOCAINE HYDROCHLORIDE 10 MG/ML
INJECTION, SOLUTION INFILTRATION; PERINEURAL PRN
Status: DISCONTINUED | OUTPATIENT
Start: 2019-02-08 | End: 2019-02-08 | Stop reason: SDUPTHER

## 2019-02-08 RX ORDER — FLUTICASONE PROPIONATE 50 MCG
1 SPRAY, SUSPENSION (ML) NASAL DAILY
COMMUNITY

## 2019-02-08 RX ORDER — LIDOCAINE HYDROCHLORIDE 10 MG/ML
1 INJECTION, SOLUTION EPIDURAL; INFILTRATION; INTRACAUDAL; PERINEURAL
Status: DISCONTINUED | OUTPATIENT
Start: 2019-02-08 | End: 2019-02-08 | Stop reason: HOSPADM

## 2019-02-08 RX ORDER — AMMONIUM LACTATE 12 G/100G
LOTION TOPICAL PRN
Status: DISCONTINUED | OUTPATIENT
Start: 2019-02-08 | End: 2019-02-12 | Stop reason: HOSPADM

## 2019-02-08 RX ORDER — KETOROLAC TROMETHAMINE 30 MG/ML
30 INJECTION, SOLUTION INTRAMUSCULAR; INTRAVENOUS EVERY 6 HOURS
Status: COMPLETED | OUTPATIENT
Start: 2019-02-08 | End: 2019-02-08

## 2019-02-08 RX ORDER — DIPHENHYDRAMINE HYDROCHLORIDE 50 MG/ML
12.5 INJECTION INTRAMUSCULAR; INTRAVENOUS
Status: DISCONTINUED | OUTPATIENT
Start: 2019-02-08 | End: 2019-02-08 | Stop reason: HOSPADM

## 2019-02-08 RX ORDER — HYDROCODONE BITARTRATE AND ACETAMINOPHEN 5; 325 MG/1; MG/1
1 TABLET ORAL PRN
Status: DISCONTINUED | OUTPATIENT
Start: 2019-02-08 | End: 2019-02-08 | Stop reason: HOSPADM

## 2019-02-08 RX ORDER — CEFAZOLIN SODIUM 2 G/50ML
2 SOLUTION INTRAVENOUS ONCE
Status: COMPLETED | OUTPATIENT
Start: 2019-02-08 | End: 2019-02-08

## 2019-02-08 RX ORDER — ROPIVACAINE HYDROCHLORIDE 5 MG/ML
INJECTION, SOLUTION EPIDURAL; INFILTRATION; PERINEURAL PRN
Status: DISCONTINUED | OUTPATIENT
Start: 2019-02-08 | End: 2019-02-08 | Stop reason: SDUPTHER

## 2019-02-08 RX ORDER — OXYCODONE HYDROCHLORIDE AND ACETAMINOPHEN 5; 325 MG/1; MG/1
2 TABLET ORAL EVERY 4 HOURS PRN
Status: DISCONTINUED | OUTPATIENT
Start: 2019-02-08 | End: 2019-02-12 | Stop reason: HOSPADM

## 2019-02-08 RX ORDER — PROPOFOL 10 MG/ML
INJECTION, EMULSION INTRAVENOUS CONTINUOUS PRN
Status: DISCONTINUED | OUTPATIENT
Start: 2019-02-08 | End: 2019-02-08 | Stop reason: SDUPTHER

## 2019-02-08 RX ORDER — FENTANYL CITRATE 50 UG/ML
INJECTION, SOLUTION INTRAMUSCULAR; INTRAVENOUS PRN
Status: DISCONTINUED | OUTPATIENT
Start: 2019-02-08 | End: 2019-02-08 | Stop reason: SDUPTHER

## 2019-02-08 RX ORDER — ONDANSETRON 2 MG/ML
4 INJECTION INTRAMUSCULAR; INTRAVENOUS
Status: DISCONTINUED | OUTPATIENT
Start: 2019-02-08 | End: 2019-02-08 | Stop reason: HOSPADM

## 2019-02-08 RX ORDER — CETIRIZINE HYDROCHLORIDE 10 MG/1
10 TABLET ORAL DAILY
Status: DISCONTINUED | OUTPATIENT
Start: 2019-02-08 | End: 2019-02-12 | Stop reason: HOSPADM

## 2019-02-08 RX ORDER — METOCLOPRAMIDE HYDROCHLORIDE 5 MG/ML
10 INJECTION INTRAMUSCULAR; INTRAVENOUS
Status: DISCONTINUED | OUTPATIENT
Start: 2019-02-08 | End: 2019-02-08 | Stop reason: HOSPADM

## 2019-02-08 RX ORDER — ASPIRIN 81 MG/1
81 TABLET ORAL 2 TIMES DAILY
Status: DISCONTINUED | OUTPATIENT
Start: 2019-02-09 | End: 2019-02-12 | Stop reason: HOSPADM

## 2019-02-08 RX ADMIN — PHENYLEPHRINE HYDROCHLORIDE 100 MCG: 10 INJECTION INTRAVENOUS at 07:56

## 2019-02-08 RX ADMIN — LIDOCAINE HYDROCHLORIDE 40 MG: 10 INJECTION, SOLUTION INFILTRATION; PERINEURAL at 07:44

## 2019-02-08 RX ADMIN — PHENYLEPHRINE HYDROCHLORIDE 100 MCG: 10 INJECTION INTRAVENOUS at 08:11

## 2019-02-08 RX ADMIN — CEFAZOLIN SODIUM 2 G: 2 SOLUTION INTRAVENOUS at 07:35

## 2019-02-08 RX ADMIN — FENTANYL CITRATE 100 MCG: 50 INJECTION, SOLUTION INTRAMUSCULAR; INTRAVENOUS at 07:00

## 2019-02-08 RX ADMIN — PHENYLEPHRINE HYDROCHLORIDE 100 MCG: 10 INJECTION INTRAVENOUS at 07:30

## 2019-02-08 RX ADMIN — PROPOFOL 40 MG: 10 INJECTION, EMULSION INTRAVENOUS at 07:42

## 2019-02-08 RX ADMIN — PHENYLEPHRINE HYDROCHLORIDE 100 MCG: 10 INJECTION INTRAVENOUS at 08:04

## 2019-02-08 RX ADMIN — ROSUVASTATIN CALCIUM 10 MG: 5 TABLET, FILM COATED ORAL at 14:53

## 2019-02-08 RX ADMIN — PHENYLEPHRINE HYDROCHLORIDE 100 MCG: 10 INJECTION INTRAVENOUS at 08:06

## 2019-02-08 RX ADMIN — SODIUM CHLORIDE, POTASSIUM CHLORIDE, SODIUM LACTATE AND CALCIUM CHLORIDE: 600; 310; 30; 20 INJECTION, SOLUTION INTRAVENOUS at 06:09

## 2019-02-08 RX ADMIN — SODIUM CHLORIDE: 9 INJECTION, SOLUTION INTRAVENOUS at 11:27

## 2019-02-08 RX ADMIN — CITALOPRAM HYDROBROMIDE 20 MG: 20 TABLET ORAL at 11:34

## 2019-02-08 RX ADMIN — EPHEDRINE SULFATE 5 MG: 50 INJECTION INTRAMUSCULAR; INTRAVENOUS; SUBCUTANEOUS at 09:34

## 2019-02-08 RX ADMIN — PROPOFOL 120 MCG/KG/MIN: 10 INJECTION, EMULSION INTRAVENOUS at 07:44

## 2019-02-08 RX ADMIN — TRANEXAMIC ACID 1300 MG: 650 TABLET ORAL at 06:46

## 2019-02-08 RX ADMIN — TRANEXAMIC ACID 1300 MG: 650 TABLET ORAL at 14:54

## 2019-02-08 RX ADMIN — ROPIVACAINE HYDROCHLORIDE 30 ML: 5 INJECTION, SOLUTION EPIDURAL; INFILTRATION; PERINEURAL at 07:10

## 2019-02-08 RX ADMIN — PHENYLEPHRINE HYDROCHLORIDE 100 MCG: 10 INJECTION INTRAVENOUS at 08:00

## 2019-02-08 RX ADMIN — ACETAMINOPHEN 650 MG: 325 TABLET ORAL at 22:59

## 2019-02-08 RX ADMIN — SENNOSIDES AND DOCUSATE SODIUM 2 TABLET: 8.6; 5 TABLET ORAL at 11:34

## 2019-02-08 RX ADMIN — PHENYLEPHRINE HYDROCHLORIDE 100 MCG: 10 INJECTION INTRAVENOUS at 08:07

## 2019-02-08 RX ADMIN — PHENYLEPHRINE HYDROCHLORIDE 100 MCG: 10 INJECTION INTRAVENOUS at 07:55

## 2019-02-08 RX ADMIN — PHENYLEPHRINE HYDROCHLORIDE 100 MCG: 10 INJECTION INTRAVENOUS at 07:50

## 2019-02-08 RX ADMIN — ACETAMINOPHEN 650 MG: 325 TABLET ORAL at 11:34

## 2019-02-08 RX ADMIN — MIDAZOLAM HYDROCHLORIDE 2 MG: 2 INJECTION, SOLUTION INTRAMUSCULAR; INTRAVENOUS at 07:00

## 2019-02-08 RX ADMIN — EPHEDRINE SULFATE 25 MG: 50 INJECTION INTRAMUSCULAR; INTRAVENOUS; SUBCUTANEOUS at 08:13

## 2019-02-08 RX ADMIN — OXYCODONE HYDROCHLORIDE 5 MG: 5 TABLET ORAL at 23:00

## 2019-02-08 RX ADMIN — CEFAZOLIN SODIUM 2 G: 2 SOLUTION INTRAVENOUS at 22:59

## 2019-02-08 RX ADMIN — Medication 10 ML: at 11:28

## 2019-02-08 RX ADMIN — ONDANSETRON 4 MG: 2 INJECTION INTRAMUSCULAR; INTRAVENOUS at 08:52

## 2019-02-08 RX ADMIN — PHENYLEPHRINE HYDROCHLORIDE 100 MCG: 10 INJECTION INTRAVENOUS at 08:10

## 2019-02-08 RX ADMIN — SODIUM CHLORIDE, POTASSIUM CHLORIDE, SODIUM LACTATE AND CALCIUM CHLORIDE: 600; 310; 30; 20 INJECTION, SOLUTION INTRAVENOUS at 07:05

## 2019-02-08 RX ADMIN — PHENYLEPHRINE HYDROCHLORIDE 200 MCG: 10 INJECTION INTRAVENOUS at 08:13

## 2019-02-08 RX ADMIN — KETOROLAC TROMETHAMINE 30 MG: 30 INJECTION, SOLUTION INTRAMUSCULAR at 17:03

## 2019-02-08 RX ADMIN — SODIUM CHLORIDE, POTASSIUM CHLORIDE, SODIUM LACTATE AND CALCIUM CHLORIDE: 600; 310; 30; 20 INJECTION, SOLUTION INTRAVENOUS at 09:08

## 2019-02-08 RX ADMIN — DEXAMETHASONE SODIUM PHOSPHATE 10 MG: 10 INJECTION INTRAMUSCULAR; INTRAVENOUS at 07:56

## 2019-02-08 RX ADMIN — SENNOSIDES AND DOCUSATE SODIUM 2 TABLET: 8.6; 5 TABLET ORAL at 22:59

## 2019-02-08 RX ADMIN — SODIUM CHLORIDE, POTASSIUM CHLORIDE, SODIUM LACTATE AND CALCIUM CHLORIDE: 600; 310; 30; 20 INJECTION, SOLUTION INTRAVENOUS at 07:44

## 2019-02-08 RX ADMIN — CEFAZOLIN SODIUM 2 G: 2 SOLUTION INTRAVENOUS at 15:00

## 2019-02-08 RX ADMIN — ACETAMINOPHEN 650 MG: 325 TABLET ORAL at 17:03

## 2019-02-08 RX ADMIN — KETOROLAC TROMETHAMINE 30 MG: 30 INJECTION, SOLUTION INTRAMUSCULAR at 11:33

## 2019-02-08 RX ADMIN — EPHEDRINE SULFATE 5 MG: 50 INJECTION INTRAMUSCULAR; INTRAVENOUS; SUBCUTANEOUS at 08:20

## 2019-02-08 RX ADMIN — PHENYLEPHRINE HYDROCHLORIDE 100 MCG: 10 INJECTION INTRAVENOUS at 08:03

## 2019-02-08 ASSESSMENT — PULMONARY FUNCTION TESTS
PIF_VALUE: 1
PIF_VALUE: 0
PIF_VALUE: 1
PIF_VALUE: 0
PIF_VALUE: 1
PIF_VALUE: 0
PIF_VALUE: 1
PIF_VALUE: 2
PIF_VALUE: 1
PIF_VALUE: 0
PIF_VALUE: 1

## 2019-02-08 ASSESSMENT — PAIN DESCRIPTION - DESCRIPTORS
DESCRIPTORS: NUMBNESS;ACHING
DESCRIPTORS: ACHING

## 2019-02-08 ASSESSMENT — PAIN DESCRIPTION - ORIENTATION: ORIENTATION: LEFT

## 2019-02-08 ASSESSMENT — PAIN SCALES - GENERAL
PAINLEVEL_OUTOF10: 0
PAINLEVEL_OUTOF10: 7
PAINLEVEL_OUTOF10: 0
PAINLEVEL_OUTOF10: 0
PAINLEVEL_OUTOF10: 1
PAINLEVEL_OUTOF10: 0

## 2019-02-08 ASSESSMENT — PAIN DESCRIPTION - LOCATION: LOCATION: KNEE

## 2019-02-08 ASSESSMENT — LIFESTYLE VARIABLES: SMOKING_STATUS: 1

## 2019-02-08 ASSESSMENT — PAIN - FUNCTIONAL ASSESSMENT: PAIN_FUNCTIONAL_ASSESSMENT: 0-10

## 2019-02-08 ASSESSMENT — PAIN DESCRIPTION - PAIN TYPE: TYPE: SURGICAL PAIN

## 2019-02-09 LAB
ANION GAP SERPL CALCULATED.3IONS-SCNC: 11 MEQ/L (ref 9–15)
BUN BLDV-MCNC: 20 MG/DL (ref 8–23)
CALCIUM SERPL-MCNC: 8.9 MG/DL (ref 8.5–9.9)
CHLORIDE BLD-SCNC: 101 MEQ/L (ref 95–107)
CO2: 26 MEQ/L (ref 20–31)
CREAT SERPL-MCNC: 0.94 MG/DL (ref 0.5–0.9)
GFR AFRICAN AMERICAN: >60
GFR NON-AFRICAN AMERICAN: 59.4
GLUCOSE BLD-MCNC: 151 MG/DL (ref 70–99)
HCT VFR BLD CALC: 29 % (ref 37–47)
HEMOGLOBIN: 9.6 G/DL (ref 12–16)
MCH RBC QN AUTO: 31.9 PG (ref 27–31.3)
MCHC RBC AUTO-ENTMCNC: 33.3 % (ref 33–37)
MCV RBC AUTO: 95.7 FL (ref 82–100)
PDW BLD-RTO: 13.6 % (ref 11.5–14.5)
PLATELET # BLD: 204 K/UL (ref 130–400)
POTASSIUM REFLEX MAGNESIUM: 4.6 MEQ/L (ref 3.4–4.9)
RBC # BLD: 3.03 M/UL (ref 4.2–5.4)
SODIUM BLD-SCNC: 138 MEQ/L (ref 135–144)
WBC # BLD: 10.1 K/UL (ref 4.8–10.8)

## 2019-02-09 PROCEDURE — 97116 GAIT TRAINING THERAPY: CPT | Performed by: PHYSICAL THERAPIST

## 2019-02-09 PROCEDURE — 6370000000 HC RX 637 (ALT 250 FOR IP): Performed by: ORTHOPAEDIC SURGERY

## 2019-02-09 PROCEDURE — 97165 OT EVAL LOW COMPLEX 30 MIN: CPT

## 2019-02-09 PROCEDURE — 80048 BASIC METABOLIC PNL TOTAL CA: CPT

## 2019-02-09 PROCEDURE — 97530 THERAPEUTIC ACTIVITIES: CPT | Performed by: PHYSICAL THERAPIST

## 2019-02-09 PROCEDURE — 97535 SELF CARE MNGMENT TRAINING: CPT

## 2019-02-09 PROCEDURE — 97110 THERAPEUTIC EXERCISES: CPT | Performed by: PHYSICAL THERAPIST

## 2019-02-09 PROCEDURE — 6370000000 HC RX 637 (ALT 250 FOR IP): Performed by: INTERNAL MEDICINE

## 2019-02-09 PROCEDURE — 85027 COMPLETE CBC AUTOMATED: CPT

## 2019-02-09 PROCEDURE — 36415 COLL VENOUS BLD VENIPUNCTURE: CPT

## 2019-02-09 PROCEDURE — 2580000003 HC RX 258: Performed by: ORTHOPAEDIC SURGERY

## 2019-02-09 PROCEDURE — 1210000000 HC MED SURG R&B

## 2019-02-09 RX ADMIN — Medication 10 ML: at 20:49

## 2019-02-09 RX ADMIN — ASPIRIN 81 MG: 81 TABLET ORAL at 20:49

## 2019-02-09 RX ADMIN — ACETAMINOPHEN 650 MG: 325 TABLET ORAL at 16:51

## 2019-02-09 RX ADMIN — OXYCODONE HYDROCHLORIDE 5 MG: 5 TABLET ORAL at 11:36

## 2019-02-09 RX ADMIN — CITALOPRAM HYDROBROMIDE 20 MG: 20 TABLET ORAL at 08:18

## 2019-02-09 RX ADMIN — ACETAMINOPHEN 650 MG: 325 TABLET ORAL at 11:36

## 2019-02-09 RX ADMIN — ACETAMINOPHEN 650 MG: 325 TABLET ORAL at 22:44

## 2019-02-09 RX ADMIN — OXYCODONE HYDROCHLORIDE AND ACETAMINOPHEN 2 TABLET: 5; 325 TABLET ORAL at 06:26

## 2019-02-09 RX ADMIN — ROSUVASTATIN CALCIUM 10 MG: 5 TABLET, FILM COATED ORAL at 08:18

## 2019-02-09 RX ADMIN — OXYCODONE HYDROCHLORIDE 5 MG: 5 TABLET ORAL at 16:50

## 2019-02-09 RX ADMIN — OXYCODONE HYDROCHLORIDE AND ACETAMINOPHEN 2 TABLET: 5; 325 TABLET ORAL at 20:49

## 2019-02-09 RX ADMIN — SENNOSIDES AND DOCUSATE SODIUM 2 TABLET: 8.6; 5 TABLET ORAL at 20:49

## 2019-02-09 RX ADMIN — ASPIRIN 81 MG: 81 TABLET ORAL at 08:17

## 2019-02-09 RX ADMIN — FLUTICASONE PROPIONATE 1 SPRAY: 50 SPRAY, METERED NASAL at 08:18

## 2019-02-09 RX ADMIN — SENNOSIDES AND DOCUSATE SODIUM 2 TABLET: 8.6; 5 TABLET ORAL at 08:18

## 2019-02-09 ASSESSMENT — ENCOUNTER SYMPTOMS
APNEA: 0
BACK PAIN: 0
ABDOMINAL DISTENTION: 0
EYE DISCHARGE: 0

## 2019-02-09 ASSESSMENT — PAIN DESCRIPTION - ORIENTATION
ORIENTATION: LEFT
ORIENTATION: INNER;LEFT
ORIENTATION: LEFT
ORIENTATION: LEFT

## 2019-02-09 ASSESSMENT — PAIN SCALES - GENERAL
PAINLEVEL_OUTOF10: 0
PAINLEVEL_OUTOF10: 6
PAINLEVEL_OUTOF10: 8
PAINLEVEL_OUTOF10: 7
PAINLEVEL_OUTOF10: 3
PAINLEVEL_OUTOF10: 3
PAINLEVEL_OUTOF10: 7
PAINLEVEL_OUTOF10: 3
PAINLEVEL_OUTOF10: 7
PAINLEVEL_OUTOF10: 6
PAINLEVEL_OUTOF10: 7

## 2019-02-09 ASSESSMENT — PAIN DESCRIPTION - LOCATION
LOCATION: KNEE

## 2019-02-09 ASSESSMENT — PAIN DESCRIPTION - PAIN TYPE
TYPE: SURGICAL PAIN

## 2019-02-10 LAB
HCT VFR BLD CALC: 28.1 % (ref 37–47)
HEMOGLOBIN: 9.6 G/DL (ref 12–16)
MCH RBC QN AUTO: 32.5 PG (ref 27–31.3)
MCHC RBC AUTO-ENTMCNC: 34.1 % (ref 33–37)
MCV RBC AUTO: 95.2 FL (ref 82–100)
PDW BLD-RTO: 13.7 % (ref 11.5–14.5)
PLATELET # BLD: 237 K/UL (ref 130–400)
RBC # BLD: 2.95 M/UL (ref 4.2–5.4)
WBC # BLD: 9.4 K/UL (ref 4.8–10.8)

## 2019-02-10 PROCEDURE — 6370000000 HC RX 637 (ALT 250 FOR IP): Performed by: INTERNAL MEDICINE

## 2019-02-10 PROCEDURE — 85027 COMPLETE CBC AUTOMATED: CPT

## 2019-02-10 PROCEDURE — 36415 COLL VENOUS BLD VENIPUNCTURE: CPT

## 2019-02-10 PROCEDURE — 97530 THERAPEUTIC ACTIVITIES: CPT

## 2019-02-10 PROCEDURE — 94761 N-INVAS EAR/PLS OXIMETRY MLT: CPT

## 2019-02-10 PROCEDURE — 6370000000 HC RX 637 (ALT 250 FOR IP): Performed by: ORTHOPAEDIC SURGERY

## 2019-02-10 PROCEDURE — 97110 THERAPEUTIC EXERCISES: CPT

## 2019-02-10 PROCEDURE — 2700000000 HC OXYGEN THERAPY PER DAY

## 2019-02-10 PROCEDURE — 1210000000 HC MED SURG R&B

## 2019-02-10 PROCEDURE — 97116 GAIT TRAINING THERAPY: CPT

## 2019-02-10 RX ORDER — ACETAMINOPHEN 325 MG/1
650 TABLET ORAL EVERY 6 HOURS PRN
Status: DISCONTINUED | OUTPATIENT
Start: 2019-02-10 | End: 2019-02-12 | Stop reason: HOSPADM

## 2019-02-10 RX ADMIN — ACETAMINOPHEN 325 MG: 325 TABLET ORAL at 10:51

## 2019-02-10 RX ADMIN — OXYCODONE HYDROCHLORIDE AND ACETAMINOPHEN 2 TABLET: 5; 325 TABLET ORAL at 21:03

## 2019-02-10 RX ADMIN — OXYCODONE HYDROCHLORIDE AND ACETAMINOPHEN 2 TABLET: 5; 325 TABLET ORAL at 01:08

## 2019-02-10 RX ADMIN — ACETAMINOPHEN 650 MG: 325 TABLET ORAL at 04:45

## 2019-02-10 RX ADMIN — ROSUVASTATIN CALCIUM 10 MG: 5 TABLET, FILM COATED ORAL at 08:50

## 2019-02-10 RX ADMIN — ASPIRIN 81 MG: 81 TABLET ORAL at 08:50

## 2019-02-10 RX ADMIN — OXYCODONE HYDROCHLORIDE 5 MG: 5 TABLET ORAL at 08:50

## 2019-02-10 RX ADMIN — FLUTICASONE PROPIONATE 1 SPRAY: 50 SPRAY, METERED NASAL at 08:51

## 2019-02-10 RX ADMIN — OXYCODONE HYDROCHLORIDE 5 MG: 5 TABLET ORAL at 00:11

## 2019-02-10 RX ADMIN — TRAMADOL HYDROCHLORIDE 50 MG: 50 TABLET, FILM COATED ORAL at 12:48

## 2019-02-10 RX ADMIN — ASPIRIN 81 MG: 81 TABLET ORAL at 21:04

## 2019-02-10 RX ADMIN — CITALOPRAM HYDROBROMIDE 20 MG: 20 TABLET ORAL at 08:50

## 2019-02-10 RX ADMIN — OXYCODONE HYDROCHLORIDE 5 MG: 5 TABLET ORAL at 04:45

## 2019-02-10 ASSESSMENT — PAIN SCALES - GENERAL
PAINLEVEL_OUTOF10: 9
PAINLEVEL_OUTOF10: 7
PAINLEVEL_OUTOF10: 8
PAINLEVEL_OUTOF10: 8
PAINLEVEL_OUTOF10: 6
PAINLEVEL_OUTOF10: 7
PAINLEVEL_OUTOF10: 0
PAINLEVEL_OUTOF10: 6
PAINLEVEL_OUTOF10: 4

## 2019-02-10 ASSESSMENT — PAIN DESCRIPTION - PAIN TYPE
TYPE: SURGICAL PAIN

## 2019-02-10 ASSESSMENT — ENCOUNTER SYMPTOMS
COUGH: 0
VOMITING: 0
SHORTNESS OF BREATH: 0
DIARRHEA: 0
NAUSEA: 0

## 2019-02-10 ASSESSMENT — PAIN DESCRIPTION - LOCATION
LOCATION: KNEE

## 2019-02-10 ASSESSMENT — PAIN DESCRIPTION - ORIENTATION
ORIENTATION: LEFT

## 2019-02-11 LAB
HCT VFR BLD CALC: 30.6 % (ref 37–47)
HEMOGLOBIN: 10.1 G/DL (ref 12–16)
MCH RBC QN AUTO: 31.3 PG (ref 27–31.3)
MCHC RBC AUTO-ENTMCNC: 33.2 % (ref 33–37)
MCV RBC AUTO: 94.4 FL (ref 82–100)
PDW BLD-RTO: 13.8 % (ref 11.5–14.5)
PLATELET # BLD: 262 K/UL (ref 130–400)
RBC # BLD: 3.24 M/UL (ref 4.2–5.4)
WBC # BLD: 9.2 K/UL (ref 4.8–10.8)

## 2019-02-11 PROCEDURE — 6370000000 HC RX 637 (ALT 250 FOR IP): Performed by: INTERNAL MEDICINE

## 2019-02-11 PROCEDURE — 97116 GAIT TRAINING THERAPY: CPT

## 2019-02-11 PROCEDURE — 36415 COLL VENOUS BLD VENIPUNCTURE: CPT

## 2019-02-11 PROCEDURE — 97535 SELF CARE MNGMENT TRAINING: CPT

## 2019-02-11 PROCEDURE — 1210000000 HC MED SURG R&B

## 2019-02-11 PROCEDURE — 6370000000 HC RX 637 (ALT 250 FOR IP): Performed by: ORTHOPAEDIC SURGERY

## 2019-02-11 PROCEDURE — 85027 COMPLETE CBC AUTOMATED: CPT

## 2019-02-11 PROCEDURE — 97530 THERAPEUTIC ACTIVITIES: CPT

## 2019-02-11 PROCEDURE — 97110 THERAPEUTIC EXERCISES: CPT

## 2019-02-11 RX ORDER — FLUTICASONE PROPIONATE 50 MCG
1 SPRAY, SUSPENSION (ML) NASAL DAILY
Status: CANCELLED | OUTPATIENT
Start: 2019-02-12

## 2019-02-11 RX ORDER — OXYCODONE HYDROCHLORIDE AND ACETAMINOPHEN 5; 325 MG/1; MG/1
1 TABLET ORAL EVERY 4 HOURS PRN
Status: CANCELLED | OUTPATIENT
Start: 2019-02-11

## 2019-02-11 RX ORDER — CETIRIZINE HYDROCHLORIDE 10 MG/1
10 TABLET ORAL DAILY
Status: CANCELLED | OUTPATIENT
Start: 2019-02-12

## 2019-02-11 RX ORDER — SODIUM CHLORIDE 0.9 % (FLUSH) 0.9 %
10 SYRINGE (ML) INJECTION PRN
Status: CANCELLED | OUTPATIENT
Start: 2019-02-11

## 2019-02-11 RX ORDER — CITALOPRAM 20 MG/1
20 TABLET ORAL DAILY
Status: CANCELLED | OUTPATIENT
Start: 2019-02-12

## 2019-02-11 RX ORDER — ROSUVASTATIN CALCIUM 5 MG/1
10 TABLET, COATED ORAL DAILY
Status: CANCELLED | OUTPATIENT
Start: 2019-02-12

## 2019-02-11 RX ORDER — ACETAMINOPHEN 325 MG/1
650 TABLET ORAL EVERY 6 HOURS PRN
Status: CANCELLED | OUTPATIENT
Start: 2019-02-11

## 2019-02-11 RX ORDER — AMMONIUM LACTATE 12 G/100G
LOTION TOPICAL PRN
Status: CANCELLED | OUTPATIENT
Start: 2019-02-11

## 2019-02-11 RX ORDER — BISACODYL 10 MG
10 SUPPOSITORY, RECTAL RECTAL DAILY PRN
Status: CANCELLED | OUTPATIENT
Start: 2019-02-11

## 2019-02-11 RX ORDER — OXYCODONE HYDROCHLORIDE 5 MG/1
5 TABLET ORAL EVERY 4 HOURS PRN
Status: CANCELLED | OUTPATIENT
Start: 2019-02-11

## 2019-02-11 RX ORDER — SENNA AND DOCUSATE SODIUM 50; 8.6 MG/1; MG/1
2 TABLET, FILM COATED ORAL 2 TIMES DAILY
Status: CANCELLED | OUTPATIENT
Start: 2019-02-11

## 2019-02-11 RX ORDER — OXYCODONE HYDROCHLORIDE AND ACETAMINOPHEN 5; 325 MG/1; MG/1
2 TABLET ORAL EVERY 4 HOURS PRN
Status: CANCELLED | OUTPATIENT
Start: 2019-02-11

## 2019-02-11 RX ORDER — ASPIRIN 81 MG/1
81 TABLET ORAL 2 TIMES DAILY
Status: CANCELLED | OUTPATIENT
Start: 2019-02-11

## 2019-02-11 RX ORDER — TRAMADOL HYDROCHLORIDE 50 MG/1
50 TABLET ORAL EVERY 6 HOURS PRN
Status: CANCELLED | OUTPATIENT
Start: 2019-02-11

## 2019-02-11 RX ADMIN — OXYCODONE HYDROCHLORIDE AND ACETAMINOPHEN 2 TABLET: 5; 325 TABLET ORAL at 02:44

## 2019-02-11 RX ADMIN — TRAMADOL HYDROCHLORIDE 50 MG: 50 TABLET, FILM COATED ORAL at 20:58

## 2019-02-11 RX ADMIN — FLUTICASONE PROPIONATE 1 SPRAY: 50 SPRAY, METERED NASAL at 08:42

## 2019-02-11 RX ADMIN — CITALOPRAM HYDROBROMIDE 20 MG: 20 TABLET ORAL at 08:40

## 2019-02-11 RX ADMIN — OXYCODONE HYDROCHLORIDE AND ACETAMINOPHEN 2 TABLET: 5; 325 TABLET ORAL at 07:11

## 2019-02-11 RX ADMIN — ASPIRIN 81 MG: 81 TABLET ORAL at 08:40

## 2019-02-11 RX ADMIN — OXYCODONE HYDROCHLORIDE AND ACETAMINOPHEN 2 TABLET: 5; 325 TABLET ORAL at 18:08

## 2019-02-11 RX ADMIN — OXYCODONE HYDROCHLORIDE 5 MG: 5 TABLET ORAL at 13:05

## 2019-02-11 RX ADMIN — OXYCODONE HYDROCHLORIDE AND ACETAMINOPHEN 2 TABLET: 5; 325 TABLET ORAL at 22:19

## 2019-02-11 RX ADMIN — OXYCODONE HYDROCHLORIDE 5 MG: 5 TABLET ORAL at 09:04

## 2019-02-11 RX ADMIN — ASPIRIN 81 MG: 81 TABLET ORAL at 20:58

## 2019-02-11 RX ADMIN — ROSUVASTATIN CALCIUM 10 MG: 5 TABLET, FILM COATED ORAL at 08:40

## 2019-02-11 ASSESSMENT — PAIN SCALES - GENERAL
PAINLEVEL_OUTOF10: 0
PAINLEVEL_OUTOF10: 6
PAINLEVEL_OUTOF10: 6
PAINLEVEL_OUTOF10: 7
PAINLEVEL_OUTOF10: 7
PAINLEVEL_OUTOF10: 6

## 2019-02-11 ASSESSMENT — PAIN DESCRIPTION - ORIENTATION
ORIENTATION: LEFT
ORIENTATION: LEFT

## 2019-02-11 ASSESSMENT — PAIN DESCRIPTION - LOCATION
LOCATION: KNEE
LOCATION: KNEE

## 2019-02-11 ASSESSMENT — PAIN DESCRIPTION - PAIN TYPE
TYPE: SURGICAL PAIN
TYPE: ACUTE PAIN;SURGICAL PAIN

## 2019-02-12 ENCOUNTER — HOSPITAL ENCOUNTER (INPATIENT)
Age: 69
LOS: 7 days | Discharge: HOME HEALTH | DRG: 561 | End: 2019-02-19
Attending: INTERNAL MEDICINE | Admitting: INTERNAL MEDICINE
Payer: MEDICARE

## 2019-02-12 VITALS
TEMPERATURE: 97.9 F | DIASTOLIC BLOOD PRESSURE: 60 MMHG | HEART RATE: 77 BPM | WEIGHT: 173.4 LBS | SYSTOLIC BLOOD PRESSURE: 132 MMHG | BODY MASS INDEX: 31.91 KG/M2 | OXYGEN SATURATION: 95 % | HEIGHT: 62 IN | RESPIRATION RATE: 17 BRPM

## 2019-02-12 PROCEDURE — 6370000000 HC RX 637 (ALT 250 FOR IP): Performed by: INTERNAL MEDICINE

## 2019-02-12 PROCEDURE — 97110 THERAPEUTIC EXERCISES: CPT

## 2019-02-12 PROCEDURE — 6370000000 HC RX 637 (ALT 250 FOR IP): Performed by: ORTHOPAEDIC SURGERY

## 2019-02-12 PROCEDURE — 97116 GAIT TRAINING THERAPY: CPT

## 2019-02-12 PROCEDURE — 97530 THERAPEUTIC ACTIVITIES: CPT

## 2019-02-12 PROCEDURE — 1200000000 HC SEMI PRIVATE

## 2019-02-12 PROCEDURE — 94761 N-INVAS EAR/PLS OXIMETRY MLT: CPT

## 2019-02-12 RX ORDER — SENNA AND DOCUSATE SODIUM 50; 8.6 MG/1; MG/1
2 TABLET, FILM COATED ORAL 2 TIMES DAILY
Status: DISCONTINUED | OUTPATIENT
Start: 2019-02-12 | End: 2019-02-19 | Stop reason: HOSPADM

## 2019-02-12 RX ORDER — AMMONIUM LACTATE 12 G/100G
LOTION TOPICAL PRN
Status: DISCONTINUED | OUTPATIENT
Start: 2019-02-12 | End: 2019-02-19 | Stop reason: HOSPADM

## 2019-02-12 RX ORDER — OXYCODONE HYDROCHLORIDE 5 MG/1
5 TABLET ORAL EVERY 4 HOURS PRN
Status: DISCONTINUED | OUTPATIENT
Start: 2019-02-12 | End: 2019-02-19 | Stop reason: HOSPADM

## 2019-02-12 RX ORDER — ROSUVASTATIN CALCIUM 5 MG/1
10 TABLET, COATED ORAL DAILY
Status: DISCONTINUED | OUTPATIENT
Start: 2019-02-12 | End: 2019-02-19 | Stop reason: HOSPADM

## 2019-02-12 RX ORDER — FLUTICASONE PROPIONATE 50 MCG
1 SPRAY, SUSPENSION (ML) NASAL DAILY
Status: DISCONTINUED | OUTPATIENT
Start: 2019-02-12 | End: 2019-02-19 | Stop reason: HOSPADM

## 2019-02-12 RX ORDER — CITALOPRAM 20 MG/1
20 TABLET ORAL DAILY
Status: DISCONTINUED | OUTPATIENT
Start: 2019-02-12 | End: 2019-02-19 | Stop reason: HOSPADM

## 2019-02-12 RX ORDER — SODIUM CHLORIDE 0.9 % (FLUSH) 0.9 %
10 SYRINGE (ML) INJECTION PRN
Status: DISCONTINUED | OUTPATIENT
Start: 2019-02-12 | End: 2019-02-19 | Stop reason: HOSPADM

## 2019-02-12 RX ORDER — CETIRIZINE HYDROCHLORIDE 10 MG/1
10 TABLET ORAL DAILY
Status: DISCONTINUED | OUTPATIENT
Start: 2019-02-12 | End: 2019-02-13

## 2019-02-12 RX ORDER — OXYCODONE HYDROCHLORIDE AND ACETAMINOPHEN 5; 325 MG/1; MG/1
1 TABLET ORAL EVERY 4 HOURS PRN
Status: DISCONTINUED | OUTPATIENT
Start: 2019-02-12 | End: 2019-02-19 | Stop reason: HOSPADM

## 2019-02-12 RX ORDER — OXYCODONE HYDROCHLORIDE AND ACETAMINOPHEN 5; 325 MG/1; MG/1
2 TABLET ORAL EVERY 4 HOURS PRN
Status: DISCONTINUED | OUTPATIENT
Start: 2019-02-12 | End: 2019-02-19 | Stop reason: HOSPADM

## 2019-02-12 RX ORDER — BISACODYL 10 MG
10 SUPPOSITORY, RECTAL RECTAL DAILY PRN
Status: DISCONTINUED | OUTPATIENT
Start: 2019-02-12 | End: 2019-02-19 | Stop reason: HOSPADM

## 2019-02-12 RX ORDER — ACETAMINOPHEN 325 MG/1
650 TABLET ORAL EVERY 6 HOURS PRN
Status: DISCONTINUED | OUTPATIENT
Start: 2019-02-12 | End: 2019-02-19 | Stop reason: HOSPADM

## 2019-02-12 RX ORDER — TRAMADOL HYDROCHLORIDE 50 MG/1
50 TABLET ORAL EVERY 6 HOURS PRN
Status: DISCONTINUED | OUTPATIENT
Start: 2019-02-12 | End: 2019-02-19 | Stop reason: HOSPADM

## 2019-02-12 RX ORDER — ASPIRIN 81 MG/1
81 TABLET ORAL 2 TIMES DAILY
Status: DISCONTINUED | OUTPATIENT
Start: 2019-02-12 | End: 2019-02-19 | Stop reason: HOSPADM

## 2019-02-12 RX ADMIN — SENNOSIDES AND DOCUSATE SODIUM 2 TABLET: 8.6; 5 TABLET ORAL at 21:35

## 2019-02-12 RX ADMIN — OXYCODONE HYDROCHLORIDE AND ACETAMINOPHEN 2 TABLET: 5; 325 TABLET ORAL at 12:49

## 2019-02-12 RX ADMIN — CITALOPRAM HYDROBROMIDE 20 MG: 20 TABLET ORAL at 07:51

## 2019-02-12 RX ADMIN — TRAMADOL HYDROCHLORIDE 50 MG: 50 TABLET, FILM COATED ORAL at 07:50

## 2019-02-12 RX ADMIN — ROSUVASTATIN CALCIUM 10 MG: 5 TABLET, FILM COATED ORAL at 07:50

## 2019-02-12 RX ADMIN — OXYCODONE AND ACETAMINOPHEN 2 TABLET: 5; 325 TABLET ORAL at 19:06

## 2019-02-12 RX ADMIN — TRAMADOL HYDROCHLORIDE 50 MG: 50 TABLET, FILM COATED ORAL at 15:26

## 2019-02-12 RX ADMIN — ASPIRIN 81 MG: 81 TABLET ORAL at 21:39

## 2019-02-12 RX ADMIN — FLUTICASONE PROPIONATE 1 SPRAY: 50 SPRAY, METERED NASAL at 07:52

## 2019-02-12 RX ADMIN — ASPIRIN 81 MG: 81 TABLET ORAL at 07:51

## 2019-02-12 RX ADMIN — OXYCODONE HYDROCHLORIDE AND ACETAMINOPHEN 2 TABLET: 5; 325 TABLET ORAL at 04:07

## 2019-02-12 ASSESSMENT — PAIN DESCRIPTION - PAIN TYPE
TYPE: SURGICAL PAIN
TYPE: SURGICAL PAIN;ACUTE PAIN

## 2019-02-12 ASSESSMENT — PAIN DESCRIPTION - DESCRIPTORS
DESCRIPTORS: ACHING
DESCRIPTORS: SPASM
DESCRIPTORS: ACHING;SPASM
DESCRIPTORS: ACHING
DESCRIPTORS: ACHING

## 2019-02-12 ASSESSMENT — PAIN - FUNCTIONAL ASSESSMENT
PAIN_FUNCTIONAL_ASSESSMENT: ACTIVITIES ARE NOT PREVENTED
PAIN_FUNCTIONAL_ASSESSMENT: ACTIVITIES ARE NOT PREVENTED
PAIN_FUNCTIONAL_ASSESSMENT: PREVENTS OR INTERFERES SOME ACTIVE ACTIVITIES AND ADLS
PAIN_FUNCTIONAL_ASSESSMENT: ACTIVITIES ARE NOT PREVENTED

## 2019-02-12 ASSESSMENT — PAIN SCALES - GENERAL
PAINLEVEL_OUTOF10: 4
PAINLEVEL_OUTOF10: 3
PAINLEVEL_OUTOF10: 6
PAINLEVEL_OUTOF10: 4
PAINLEVEL_OUTOF10: 7
PAINLEVEL_OUTOF10: 4
PAINLEVEL_OUTOF10: 5
PAINLEVEL_OUTOF10: 4

## 2019-02-12 ASSESSMENT — PAIN DESCRIPTION - PROGRESSION
CLINICAL_PROGRESSION: GRADUALLY IMPROVING
CLINICAL_PROGRESSION: GRADUALLY IMPROVING

## 2019-02-12 ASSESSMENT — PAIN DESCRIPTION - ONSET: ONSET: ON-GOING

## 2019-02-12 ASSESSMENT — PAIN DESCRIPTION - ORIENTATION
ORIENTATION: LEFT

## 2019-02-12 ASSESSMENT — PAIN DESCRIPTION - LOCATION
LOCATION: KNEE

## 2019-02-12 ASSESSMENT — PAIN DESCRIPTION - FREQUENCY: FREQUENCY: CONTINUOUS

## 2019-02-13 PROBLEM — Z96.652 STATUS POST LEFT KNEE REPLACEMENT: Status: ACTIVE | Noted: 2019-02-13

## 2019-02-13 PROCEDURE — 97116 GAIT TRAINING THERAPY: CPT

## 2019-02-13 PROCEDURE — 1200000000 HC SEMI PRIVATE

## 2019-02-13 PROCEDURE — 97166 OT EVAL MOD COMPLEX 45 MIN: CPT

## 2019-02-13 PROCEDURE — 6370000000 HC RX 637 (ALT 250 FOR IP): Performed by: INTERNAL MEDICINE

## 2019-02-13 PROCEDURE — 97530 THERAPEUTIC ACTIVITIES: CPT

## 2019-02-13 PROCEDURE — 97110 THERAPEUTIC EXERCISES: CPT

## 2019-02-13 PROCEDURE — 97162 PT EVAL MOD COMPLEX 30 MIN: CPT

## 2019-02-13 RX ADMIN — OXYCODONE AND ACETAMINOPHEN 2 TABLET: 5; 325 TABLET ORAL at 16:11

## 2019-02-13 RX ADMIN — OXYCODONE AND ACETAMINOPHEN 2 TABLET: 5; 325 TABLET ORAL at 04:31

## 2019-02-13 RX ADMIN — ASPIRIN 81 MG: 81 TABLET ORAL at 09:20

## 2019-02-13 RX ADMIN — OXYCODONE AND ACETAMINOPHEN 2 TABLET: 5; 325 TABLET ORAL at 00:40

## 2019-02-13 RX ADMIN — OXYCODONE AND ACETAMINOPHEN 2 TABLET: 5; 325 TABLET ORAL at 09:18

## 2019-02-13 RX ADMIN — SENNOSIDES AND DOCUSATE SODIUM 2 TABLET: 8.6; 5 TABLET ORAL at 09:22

## 2019-02-13 RX ADMIN — SENNOSIDES AND DOCUSATE SODIUM 2 TABLET: 8.6; 5 TABLET ORAL at 20:21

## 2019-02-13 RX ADMIN — ASPIRIN 81 MG: 81 TABLET ORAL at 20:21

## 2019-02-13 RX ADMIN — FLUTICASONE PROPIONATE 1 SPRAY: 50 SPRAY, METERED NASAL at 09:21

## 2019-02-13 RX ADMIN — ROSUVASTATIN CALCIUM 10 MG: 5 TABLET, FILM COATED ORAL at 09:21

## 2019-02-13 RX ADMIN — OXYCODONE AND ACETAMINOPHEN 2 TABLET: 5; 325 TABLET ORAL at 20:20

## 2019-02-13 RX ADMIN — TRAMADOL HYDROCHLORIDE 50 MG: 50 TABLET, FILM COATED ORAL at 16:12

## 2019-02-13 RX ADMIN — CITALOPRAM HYDROBROMIDE 20 MG: 20 TABLET ORAL at 09:20

## 2019-02-13 RX ADMIN — TRAMADOL HYDROCHLORIDE 50 MG: 50 TABLET, FILM COATED ORAL at 09:18

## 2019-02-13 ASSESSMENT — PAIN DESCRIPTION - DESCRIPTORS
DESCRIPTORS: SHARP;ACHING;SORE
DESCRIPTORS: SHARP;THROBBING;STABBING
DESCRIPTORS: SHARP;SHOOTING
DESCRIPTORS: ACHING;SORE
DESCRIPTORS: SHARP;SHOOTING
DESCRIPTORS: SHARP;THROBBING;STABBING

## 2019-02-13 ASSESSMENT — PAIN DESCRIPTION - LOCATION
LOCATION: KNEE
LOCATION: KNEE;BACK
LOCATION: KNEE

## 2019-02-13 ASSESSMENT — PAIN SCALES - GENERAL
PAINLEVEL_OUTOF10: 6
PAINLEVEL_OUTOF10: 2
PAINLEVEL_OUTOF10: 7
PAINLEVEL_OUTOF10: 6
PAINLEVEL_OUTOF10: 4
PAINLEVEL_OUTOF10: 7
PAINLEVEL_OUTOF10: 7
PAINLEVEL_OUTOF10: 6
PAINLEVEL_OUTOF10: 4
PAINLEVEL_OUTOF10: 8

## 2019-02-13 ASSESSMENT — PAIN DESCRIPTION - ORIENTATION
ORIENTATION: LEFT

## 2019-02-13 ASSESSMENT — PAIN DESCRIPTION - FREQUENCY
FREQUENCY: CONTINUOUS

## 2019-02-13 ASSESSMENT — PAIN DESCRIPTION - PAIN TYPE
TYPE: SURGICAL PAIN

## 2019-02-13 ASSESSMENT — PAIN DESCRIPTION - ONSET
ONSET: ON-GOING

## 2019-02-13 ASSESSMENT — PAIN DESCRIPTION - PROGRESSION
CLINICAL_PROGRESSION: GRADUALLY IMPROVING
CLINICAL_PROGRESSION: GRADUALLY IMPROVING
CLINICAL_PROGRESSION: GRADUALLY WORSENING
CLINICAL_PROGRESSION: GRADUALLY IMPROVING

## 2019-02-13 ASSESSMENT — PAIN - FUNCTIONAL ASSESSMENT
PAIN_FUNCTIONAL_ASSESSMENT: ACTIVITIES ARE NOT PREVENTED

## 2019-02-14 ENCOUNTER — TELEPHONE (OUTPATIENT)
Dept: FAMILY MEDICINE CLINIC | Age: 69
End: 2019-02-14

## 2019-02-14 LAB
ANION GAP SERPL CALCULATED.3IONS-SCNC: 9 MEQ/L (ref 9–15)
BASOPHILS ABSOLUTE: 0 K/UL (ref 0–0.2)
BASOPHILS RELATIVE PERCENT: 0.5 %
BUN BLDV-MCNC: 14 MG/DL (ref 8–23)
CALCIUM SERPL-MCNC: 9.1 MG/DL (ref 8.5–9.9)
CHLORIDE BLD-SCNC: 99 MEQ/L (ref 95–107)
CO2: 31 MEQ/L (ref 20–31)
CREAT SERPL-MCNC: 0.75 MG/DL (ref 0.5–0.9)
EOSINOPHILS ABSOLUTE: 0.3 K/UL (ref 0–0.7)
EOSINOPHILS RELATIVE PERCENT: 3.6 %
GFR AFRICAN AMERICAN: >60
GFR NON-AFRICAN AMERICAN: >60
GLUCOSE BLD-MCNC: 112 MG/DL (ref 70–99)
HCT VFR BLD CALC: 29.1 % (ref 37–47)
HEMOGLOBIN: 10 G/DL (ref 12–16)
LYMPHOCYTES ABSOLUTE: 1.6 K/UL (ref 1–4.8)
LYMPHOCYTES RELATIVE PERCENT: 19.2 %
MCH RBC QN AUTO: 32.2 PG (ref 27–31.3)
MCHC RBC AUTO-ENTMCNC: 34.4 % (ref 33–37)
MCV RBC AUTO: 93.5 FL (ref 82–100)
MONOCYTES ABSOLUTE: 0.7 K/UL (ref 0.2–0.8)
MONOCYTES RELATIVE PERCENT: 8.4 %
NEUTROPHILS ABSOLUTE: 5.7 K/UL (ref 1.4–6.5)
NEUTROPHILS RELATIVE PERCENT: 68.3 %
PDW BLD-RTO: 13.4 % (ref 11.5–14.5)
PLATELET # BLD: 326 K/UL (ref 130–400)
POTASSIUM SERPL-SCNC: 4.7 MEQ/L (ref 3.4–4.9)
RBC # BLD: 3.11 M/UL (ref 4.2–5.4)
SODIUM BLD-SCNC: 139 MEQ/L (ref 135–144)
WBC # BLD: 8.4 K/UL (ref 4.8–10.8)

## 2019-02-14 PROCEDURE — 97116 GAIT TRAINING THERAPY: CPT

## 2019-02-14 PROCEDURE — 97530 THERAPEUTIC ACTIVITIES: CPT

## 2019-02-14 PROCEDURE — 97535 SELF CARE MNGMENT TRAINING: CPT

## 2019-02-14 PROCEDURE — 97110 THERAPEUTIC EXERCISES: CPT

## 2019-02-14 PROCEDURE — 36415 COLL VENOUS BLD VENIPUNCTURE: CPT

## 2019-02-14 PROCEDURE — 85025 COMPLETE CBC W/AUTO DIFF WBC: CPT

## 2019-02-14 PROCEDURE — 1200000000 HC SEMI PRIVATE

## 2019-02-14 PROCEDURE — 80048 BASIC METABOLIC PNL TOTAL CA: CPT

## 2019-02-14 PROCEDURE — 6370000000 HC RX 637 (ALT 250 FOR IP): Performed by: INTERNAL MEDICINE

## 2019-02-14 PROCEDURE — 94761 N-INVAS EAR/PLS OXIMETRY MLT: CPT

## 2019-02-14 RX ADMIN — TRAMADOL HYDROCHLORIDE 50 MG: 50 TABLET, FILM COATED ORAL at 18:37

## 2019-02-14 RX ADMIN — TRAMADOL HYDROCHLORIDE 50 MG: 50 TABLET, FILM COATED ORAL at 06:49

## 2019-02-14 RX ADMIN — OXYCODONE AND ACETAMINOPHEN 2 TABLET: 5; 325 TABLET ORAL at 04:18

## 2019-02-14 RX ADMIN — ASPIRIN 81 MG: 81 TABLET ORAL at 20:44

## 2019-02-14 RX ADMIN — OXYCODONE AND ACETAMINOPHEN 2 TABLET: 5; 325 TABLET ORAL at 00:24

## 2019-02-14 RX ADMIN — OXYCODONE AND ACETAMINOPHEN 2 TABLET: 5; 325 TABLET ORAL at 13:22

## 2019-02-14 RX ADMIN — CITALOPRAM HYDROBROMIDE 20 MG: 20 TABLET ORAL at 09:02

## 2019-02-14 RX ADMIN — ASPIRIN 81 MG: 81 TABLET ORAL at 09:01

## 2019-02-14 RX ADMIN — FLUTICASONE PROPIONATE 1 SPRAY: 50 SPRAY, METERED NASAL at 09:02

## 2019-02-14 RX ADMIN — OXYCODONE AND ACETAMINOPHEN 2 TABLET: 5; 325 TABLET ORAL at 09:01

## 2019-02-14 RX ADMIN — ROSUVASTATIN CALCIUM 10 MG: 5 TABLET, FILM COATED ORAL at 09:01

## 2019-02-14 RX ADMIN — SENNOSIDES AND DOCUSATE SODIUM 2 TABLET: 8.6; 5 TABLET ORAL at 09:03

## 2019-02-14 RX ADMIN — OXYCODONE AND ACETAMINOPHEN 2 TABLET: 5; 325 TABLET ORAL at 20:44

## 2019-02-14 ASSESSMENT — PAIN DESCRIPTION - LOCATION
LOCATION: KNEE

## 2019-02-14 ASSESSMENT — PAIN SCALES - GENERAL
PAINLEVEL_OUTOF10: 6
PAINLEVEL_OUTOF10: 8
PAINLEVEL_OUTOF10: 2
PAINLEVEL_OUTOF10: 7
PAINLEVEL_OUTOF10: 7
PAINLEVEL_OUTOF10: 4
PAINLEVEL_OUTOF10: 8
PAINLEVEL_OUTOF10: 2
PAINLEVEL_OUTOF10: 6
PAINLEVEL_OUTOF10: 8
PAINLEVEL_OUTOF10: 6

## 2019-02-14 ASSESSMENT — PAIN DESCRIPTION - ONSET
ONSET: ON-GOING
ONSET: ON-GOING

## 2019-02-14 ASSESSMENT — PAIN DESCRIPTION - PROGRESSION
CLINICAL_PROGRESSION: GRADUALLY IMPROVING
CLINICAL_PROGRESSION: GRADUALLY WORSENING
CLINICAL_PROGRESSION: GRADUALLY WORSENING

## 2019-02-14 ASSESSMENT — PAIN DESCRIPTION - ORIENTATION
ORIENTATION: LEFT

## 2019-02-14 ASSESSMENT — PAIN DESCRIPTION - FREQUENCY
FREQUENCY: INTERMITTENT
FREQUENCY: CONTINUOUS

## 2019-02-14 ASSESSMENT — PAIN - FUNCTIONAL ASSESSMENT: PAIN_FUNCTIONAL_ASSESSMENT: ACTIVITIES ARE NOT PREVENTED

## 2019-02-14 ASSESSMENT — PAIN DESCRIPTION - PAIN TYPE
TYPE: ACUTE PAIN
TYPE: SURGICAL PAIN

## 2019-02-14 ASSESSMENT — PAIN DESCRIPTION - DESCRIPTORS
DESCRIPTORS: ACHING;SHARP
DESCRIPTORS: ACHING;SHARP;SHOOTING

## 2019-02-15 PROCEDURE — 6370000000 HC RX 637 (ALT 250 FOR IP): Performed by: INTERNAL MEDICINE

## 2019-02-15 PROCEDURE — 97530 THERAPEUTIC ACTIVITIES: CPT

## 2019-02-15 PROCEDURE — 97110 THERAPEUTIC EXERCISES: CPT

## 2019-02-15 PROCEDURE — 1200000000 HC SEMI PRIVATE

## 2019-02-15 PROCEDURE — 94761 N-INVAS EAR/PLS OXIMETRY MLT: CPT

## 2019-02-15 PROCEDURE — 97116 GAIT TRAINING THERAPY: CPT

## 2019-02-15 RX ADMIN — TRAMADOL HYDROCHLORIDE 50 MG: 50 TABLET, FILM COATED ORAL at 18:05

## 2019-02-15 RX ADMIN — ROSUVASTATIN CALCIUM 10 MG: 5 TABLET, FILM COATED ORAL at 07:58

## 2019-02-15 RX ADMIN — CITALOPRAM HYDROBROMIDE 20 MG: 20 TABLET ORAL at 07:58

## 2019-02-15 RX ADMIN — ASPIRIN 81 MG: 81 TABLET ORAL at 21:02

## 2019-02-15 RX ADMIN — OXYCODONE AND ACETAMINOPHEN 2 TABLET: 5; 325 TABLET ORAL at 00:49

## 2019-02-15 RX ADMIN — TRAMADOL HYDROCHLORIDE 50 MG: 50 TABLET, FILM COATED ORAL at 04:20

## 2019-02-15 RX ADMIN — OXYCODONE AND ACETAMINOPHEN 2 TABLET: 5; 325 TABLET ORAL at 15:43

## 2019-02-15 RX ADMIN — SENNOSIDES AND DOCUSATE SODIUM 2 TABLET: 8.6; 5 TABLET ORAL at 21:02

## 2019-02-15 RX ADMIN — OXYCODONE AND ACETAMINOPHEN 2 TABLET: 5; 325 TABLET ORAL at 21:04

## 2019-02-15 RX ADMIN — ASPIRIN 81 MG: 81 TABLET ORAL at 07:58

## 2019-02-15 RX ADMIN — OXYCODONE AND ACETAMINOPHEN 2 TABLET: 5; 325 TABLET ORAL at 07:58

## 2019-02-15 RX ADMIN — TRAMADOL HYDROCHLORIDE 50 MG: 50 TABLET, FILM COATED ORAL at 11:21

## 2019-02-15 ASSESSMENT — PAIN SCALES - GENERAL
PAINLEVEL_OUTOF10: 7
PAINLEVEL_OUTOF10: 6
PAINLEVEL_OUTOF10: 4
PAINLEVEL_OUTOF10: 4
PAINLEVEL_OUTOF10: 6
PAINLEVEL_OUTOF10: 6
PAINLEVEL_OUTOF10: 8
PAINLEVEL_OUTOF10: 7
PAINLEVEL_OUTOF10: 4

## 2019-02-15 ASSESSMENT — PAIN DESCRIPTION - PAIN TYPE
TYPE: ACUTE PAIN
TYPE: SURGICAL PAIN

## 2019-02-15 ASSESSMENT — PAIN DESCRIPTION - ONSET
ONSET: ON-GOING
ONSET: ON-GOING

## 2019-02-15 ASSESSMENT — PAIN DESCRIPTION - LOCATION
LOCATION: KNEE
LOCATION: KNEE

## 2019-02-15 ASSESSMENT — PAIN DESCRIPTION - ORIENTATION
ORIENTATION: LEFT
ORIENTATION: LEFT

## 2019-02-15 ASSESSMENT — PAIN DESCRIPTION - FREQUENCY: FREQUENCY: INTERMITTENT

## 2019-02-15 ASSESSMENT — PAIN DESCRIPTION - DESCRIPTORS
DESCRIPTORS: ACHING;SHARP
DESCRIPTORS: ACHING

## 2019-02-15 ASSESSMENT — PAIN DESCRIPTION - PROGRESSION: CLINICAL_PROGRESSION: GRADUALLY IMPROVING

## 2019-02-16 PROCEDURE — 6370000000 HC RX 637 (ALT 250 FOR IP): Performed by: INTERNAL MEDICINE

## 2019-02-16 PROCEDURE — 97110 THERAPEUTIC EXERCISES: CPT

## 2019-02-16 PROCEDURE — 97112 NEUROMUSCULAR REEDUCATION: CPT

## 2019-02-16 PROCEDURE — 1200000000 HC SEMI PRIVATE

## 2019-02-16 PROCEDURE — 97116 GAIT TRAINING THERAPY: CPT

## 2019-02-16 RX ORDER — POLYETHYLENE GLYCOL 3350 17 G/17G
17 POWDER, FOR SOLUTION ORAL DAILY
Status: DISCONTINUED | OUTPATIENT
Start: 2019-02-16 | End: 2019-02-19 | Stop reason: HOSPADM

## 2019-02-16 RX ADMIN — CITALOPRAM HYDROBROMIDE 20 MG: 20 TABLET ORAL at 09:24

## 2019-02-16 RX ADMIN — POLYETHYLENE GLYCOL 3350 17 G: 17 POWDER, FOR SOLUTION ORAL at 12:40

## 2019-02-16 RX ADMIN — OXYCODONE AND ACETAMINOPHEN 2 TABLET: 5; 325 TABLET ORAL at 05:21

## 2019-02-16 RX ADMIN — ASPIRIN 81 MG: 81 TABLET ORAL at 20:26

## 2019-02-16 RX ADMIN — ROSUVASTATIN CALCIUM 10 MG: 5 TABLET, FILM COATED ORAL at 09:25

## 2019-02-16 RX ADMIN — ASPIRIN 81 MG: 81 TABLET ORAL at 09:24

## 2019-02-16 RX ADMIN — OXYCODONE AND ACETAMINOPHEN 2 TABLET: 5; 325 TABLET ORAL at 17:30

## 2019-02-16 RX ADMIN — SENNOSIDES AND DOCUSATE SODIUM 2 TABLET: 8.6; 5 TABLET ORAL at 09:28

## 2019-02-16 RX ADMIN — TRAMADOL HYDROCHLORIDE 50 MG: 50 TABLET, FILM COATED ORAL at 12:40

## 2019-02-16 RX ADMIN — OXYCODONE AND ACETAMINOPHEN 2 TABLET: 5; 325 TABLET ORAL at 09:24

## 2019-02-16 RX ADMIN — SENNOSIDES AND DOCUSATE SODIUM 2 TABLET: 8.6; 5 TABLET ORAL at 20:26

## 2019-02-16 RX ADMIN — FLUTICASONE PROPIONATE 1 SPRAY: 50 SPRAY, METERED NASAL at 09:26

## 2019-02-16 RX ADMIN — TRAMADOL HYDROCHLORIDE 50 MG: 50 TABLET, FILM COATED ORAL at 03:17

## 2019-02-16 RX ADMIN — TRAMADOL HYDROCHLORIDE 50 MG: 50 TABLET, FILM COATED ORAL at 20:30

## 2019-02-16 RX ADMIN — OXYCODONE AND ACETAMINOPHEN 2 TABLET: 5; 325 TABLET ORAL at 01:12

## 2019-02-16 RX ADMIN — OXYCODONE AND ACETAMINOPHEN 2 TABLET: 5; 325 TABLET ORAL at 22:45

## 2019-02-16 ASSESSMENT — PAIN SCALES - GENERAL
PAINLEVEL_OUTOF10: 3
PAINLEVEL_OUTOF10: 6
PAINLEVEL_OUTOF10: 4
PAINLEVEL_OUTOF10: 6
PAINLEVEL_OUTOF10: 6
PAINLEVEL_OUTOF10: 5
PAINLEVEL_OUTOF10: 6
PAINLEVEL_OUTOF10: 6
PAINLEVEL_OUTOF10: 8
PAINLEVEL_OUTOF10: 6
PAINLEVEL_OUTOF10: 6
PAINLEVEL_OUTOF10: 3
PAINLEVEL_OUTOF10: 5
PAINLEVEL_OUTOF10: 6
PAINLEVEL_OUTOF10: 6

## 2019-02-16 ASSESSMENT — PAIN DESCRIPTION - PAIN TYPE
TYPE: SURGICAL PAIN
TYPE: ACUTE PAIN

## 2019-02-16 ASSESSMENT — PAIN DESCRIPTION - DESCRIPTORS
DESCRIPTORS: CONSTANT
DESCRIPTORS: ACHING;SHARP

## 2019-02-16 ASSESSMENT — PAIN DESCRIPTION - FREQUENCY: FREQUENCY: CONTINUOUS

## 2019-02-16 ASSESSMENT — PAIN DESCRIPTION - LOCATION
LOCATION: KNEE
LOCATION: KNEE

## 2019-02-16 ASSESSMENT — PAIN DESCRIPTION - ORIENTATION: ORIENTATION: LEFT

## 2019-02-16 ASSESSMENT — PAIN DESCRIPTION - ONSET: ONSET: ON-GOING

## 2019-02-16 ASSESSMENT — PAIN DESCRIPTION - PROGRESSION: CLINICAL_PROGRESSION: GRADUALLY IMPROVING

## 2019-02-17 PROCEDURE — 97116 GAIT TRAINING THERAPY: CPT

## 2019-02-17 PROCEDURE — 1200000000 HC SEMI PRIVATE

## 2019-02-17 PROCEDURE — 97530 THERAPEUTIC ACTIVITIES: CPT

## 2019-02-17 PROCEDURE — 6370000000 HC RX 637 (ALT 250 FOR IP): Performed by: INTERNAL MEDICINE

## 2019-02-17 PROCEDURE — 97110 THERAPEUTIC EXERCISES: CPT

## 2019-02-17 RX ADMIN — ROSUVASTATIN CALCIUM 10 MG: 5 TABLET, FILM COATED ORAL at 07:59

## 2019-02-17 RX ADMIN — OXYCODONE AND ACETAMINOPHEN 2 TABLET: 5; 325 TABLET ORAL at 14:24

## 2019-02-17 RX ADMIN — ASPIRIN 81 MG: 81 TABLET ORAL at 07:59

## 2019-02-17 RX ADMIN — FLUTICASONE PROPIONATE 1 SPRAY: 50 SPRAY, METERED NASAL at 08:00

## 2019-02-17 RX ADMIN — SENNOSIDES AND DOCUSATE SODIUM 2 TABLET: 8.6; 5 TABLET ORAL at 07:59

## 2019-02-17 RX ADMIN — OXYCODONE AND ACETAMINOPHEN 2 TABLET: 5; 325 TABLET ORAL at 03:31

## 2019-02-17 RX ADMIN — OXYCODONE AND ACETAMINOPHEN 2 TABLET: 5; 325 TABLET ORAL at 07:59

## 2019-02-17 RX ADMIN — TRAMADOL HYDROCHLORIDE 50 MG: 50 TABLET, FILM COATED ORAL at 18:31

## 2019-02-17 RX ADMIN — CITALOPRAM HYDROBROMIDE 20 MG: 20 TABLET ORAL at 07:59

## 2019-02-17 RX ADMIN — POLYETHYLENE GLYCOL 3350 17 G: 17 POWDER, FOR SOLUTION ORAL at 07:58

## 2019-02-17 RX ADMIN — ASPIRIN 81 MG: 81 TABLET ORAL at 21:02

## 2019-02-17 RX ADMIN — OXYCODONE AND ACETAMINOPHEN 2 TABLET: 5; 325 TABLET ORAL at 21:03

## 2019-02-17 RX ADMIN — SENNOSIDES AND DOCUSATE SODIUM 2 TABLET: 8.6; 5 TABLET ORAL at 21:02

## 2019-02-17 ASSESSMENT — PAIN DESCRIPTION - PROGRESSION: CLINICAL_PROGRESSION: GRADUALLY IMPROVING

## 2019-02-17 ASSESSMENT — PAIN DESCRIPTION - ONSET: ONSET: ON-GOING

## 2019-02-17 ASSESSMENT — PAIN SCALES - GENERAL
PAINLEVEL_OUTOF10: 6
PAINLEVEL_OUTOF10: 5
PAINLEVEL_OUTOF10: 6
PAINLEVEL_OUTOF10: 6
PAINLEVEL_OUTOF10: 4
PAINLEVEL_OUTOF10: 7
PAINLEVEL_OUTOF10: 4
PAINLEVEL_OUTOF10: 4
PAINLEVEL_OUTOF10: 6

## 2019-02-17 ASSESSMENT — PAIN DESCRIPTION - PAIN TYPE: TYPE: SURGICAL PAIN

## 2019-02-17 ASSESSMENT — PAIN DESCRIPTION - DESCRIPTORS: DESCRIPTORS: ACHING

## 2019-02-17 ASSESSMENT — PAIN DESCRIPTION - ORIENTATION: ORIENTATION: LEFT

## 2019-02-17 ASSESSMENT — PAIN DESCRIPTION - LOCATION: LOCATION: KNEE

## 2019-02-17 ASSESSMENT — PAIN DESCRIPTION - FREQUENCY: FREQUENCY: CONTINUOUS

## 2019-02-18 LAB
ANION GAP SERPL CALCULATED.3IONS-SCNC: 11 MEQ/L (ref 9–15)
BASOPHILS ABSOLUTE: 0 K/UL (ref 0–0.2)
BASOPHILS RELATIVE PERCENT: 0.4 %
BUN BLDV-MCNC: 10 MG/DL (ref 8–23)
CALCIUM SERPL-MCNC: 9 MG/DL (ref 8.5–9.9)
CHLORIDE BLD-SCNC: 100 MEQ/L (ref 95–107)
CO2: 28 MEQ/L (ref 20–31)
CREAT SERPL-MCNC: 0.68 MG/DL (ref 0.5–0.9)
EOSINOPHILS ABSOLUTE: 0.2 K/UL (ref 0–0.7)
EOSINOPHILS RELATIVE PERCENT: 2.8 %
GFR AFRICAN AMERICAN: >60
GFR NON-AFRICAN AMERICAN: >60
GLUCOSE BLD-MCNC: 120 MG/DL (ref 70–99)
HCT VFR BLD CALC: 28.6 % (ref 37–47)
HEMOGLOBIN: 9.6 G/DL (ref 12–16)
LYMPHOCYTES ABSOLUTE: 1.4 K/UL (ref 1–4.8)
LYMPHOCYTES RELATIVE PERCENT: 19.6 %
MCH RBC QN AUTO: 31.7 PG (ref 27–31.3)
MCHC RBC AUTO-ENTMCNC: 33.6 % (ref 33–37)
MCV RBC AUTO: 94.2 FL (ref 82–100)
MONOCYTES ABSOLUTE: 0.7 K/UL (ref 0.2–0.8)
MONOCYTES RELATIVE PERCENT: 9.7 %
NEUTROPHILS ABSOLUTE: 4.8 K/UL (ref 1.4–6.5)
NEUTROPHILS RELATIVE PERCENT: 67.5 %
PDW BLD-RTO: 14.3 % (ref 11.5–14.5)
PLATELET # BLD: 397 K/UL (ref 130–400)
POTASSIUM SERPL-SCNC: 4.4 MEQ/L (ref 3.4–4.9)
RBC # BLD: 3.04 M/UL (ref 4.2–5.4)
SODIUM BLD-SCNC: 139 MEQ/L (ref 135–144)
WBC # BLD: 7.2 K/UL (ref 4.8–10.8)

## 2019-02-18 PROCEDURE — 36415 COLL VENOUS BLD VENIPUNCTURE: CPT

## 2019-02-18 PROCEDURE — 97530 THERAPEUTIC ACTIVITIES: CPT

## 2019-02-18 PROCEDURE — 85025 COMPLETE CBC W/AUTO DIFF WBC: CPT

## 2019-02-18 PROCEDURE — 6370000000 HC RX 637 (ALT 250 FOR IP): Performed by: INTERNAL MEDICINE

## 2019-02-18 PROCEDURE — 97110 THERAPEUTIC EXERCISES: CPT

## 2019-02-18 PROCEDURE — 80048 BASIC METABOLIC PNL TOTAL CA: CPT

## 2019-02-18 PROCEDURE — 97116 GAIT TRAINING THERAPY: CPT

## 2019-02-18 PROCEDURE — 1200000000 HC SEMI PRIVATE

## 2019-02-18 PROCEDURE — 97535 SELF CARE MNGMENT TRAINING: CPT

## 2019-02-18 RX ADMIN — SENNOSIDES AND DOCUSATE SODIUM 2 TABLET: 8.6; 5 TABLET ORAL at 23:03

## 2019-02-18 RX ADMIN — OXYCODONE AND ACETAMINOPHEN 2 TABLET: 5; 325 TABLET ORAL at 18:49

## 2019-02-18 RX ADMIN — TRAMADOL HYDROCHLORIDE 50 MG: 50 TABLET, FILM COATED ORAL at 00:33

## 2019-02-18 RX ADMIN — POLYETHYLENE GLYCOL 3350 17 G: 17 POWDER, FOR SOLUTION ORAL at 08:36

## 2019-02-18 RX ADMIN — ASPIRIN 81 MG: 81 TABLET ORAL at 08:37

## 2019-02-18 RX ADMIN — MAGNESIUM HYDROXIDE 30 ML: 400 SUSPENSION ORAL at 16:05

## 2019-02-18 RX ADMIN — OXYCODONE AND ACETAMINOPHEN 2 TABLET: 5; 325 TABLET ORAL at 03:33

## 2019-02-18 RX ADMIN — OXYCODONE AND ACETAMINOPHEN 2 TABLET: 5; 325 TABLET ORAL at 23:03

## 2019-02-18 RX ADMIN — FLUTICASONE PROPIONATE 1 SPRAY: 50 SPRAY, METERED NASAL at 08:38

## 2019-02-18 RX ADMIN — ASPIRIN 81 MG: 81 TABLET ORAL at 23:03

## 2019-02-18 RX ADMIN — OXYCODONE AND ACETAMINOPHEN 2 TABLET: 5; 325 TABLET ORAL at 08:37

## 2019-02-18 RX ADMIN — SENNOSIDES AND DOCUSATE SODIUM 2 TABLET: 8.6; 5 TABLET ORAL at 08:36

## 2019-02-18 RX ADMIN — OXYCODONE AND ACETAMINOPHEN 2 TABLET: 5; 325 TABLET ORAL at 12:58

## 2019-02-18 RX ADMIN — CITALOPRAM HYDROBROMIDE 20 MG: 20 TABLET ORAL at 08:37

## 2019-02-18 RX ADMIN — TRAMADOL HYDROCHLORIDE 50 MG: 50 TABLET, FILM COATED ORAL at 15:53

## 2019-02-18 RX ADMIN — TRAMADOL HYDROCHLORIDE 50 MG: 50 TABLET, FILM COATED ORAL at 08:37

## 2019-02-18 RX ADMIN — ROSUVASTATIN CALCIUM 10 MG: 5 TABLET, FILM COATED ORAL at 08:36

## 2019-02-18 ASSESSMENT — PAIN DESCRIPTION - LOCATION
LOCATION: KNEE
LOCATION: KNEE

## 2019-02-18 ASSESSMENT — PAIN DESCRIPTION - PAIN TYPE
TYPE: SURGICAL PAIN
TYPE: SURGICAL PAIN

## 2019-02-18 ASSESSMENT — PAIN SCALES - GENERAL
PAINLEVEL_OUTOF10: 6
PAINLEVEL_OUTOF10: 5
PAINLEVEL_OUTOF10: 6

## 2019-02-18 ASSESSMENT — PAIN SCALES - WONG BAKER
WONGBAKER_NUMERICALRESPONSE: 2

## 2019-02-18 ASSESSMENT — PAIN DESCRIPTION - FREQUENCY: FREQUENCY: CONTINUOUS

## 2019-02-18 ASSESSMENT — PAIN DESCRIPTION - ORIENTATION
ORIENTATION: LEFT
ORIENTATION: LEFT

## 2019-02-18 ASSESSMENT — PAIN DESCRIPTION - PROGRESSION: CLINICAL_PROGRESSION: GRADUALLY IMPROVING

## 2019-02-18 ASSESSMENT — PAIN DESCRIPTION - ONSET
ONSET: ON-GOING
ONSET: ON-GOING

## 2019-02-18 ASSESSMENT — PAIN DESCRIPTION - DESCRIPTORS: DESCRIPTORS: ACHING

## 2019-02-19 VITALS
TEMPERATURE: 97.9 F | WEIGHT: 172 LBS | HEART RATE: 70 BPM | HEIGHT: 62 IN | OXYGEN SATURATION: 96 % | RESPIRATION RATE: 20 BRPM | SYSTOLIC BLOOD PRESSURE: 112 MMHG | BODY MASS INDEX: 31.65 KG/M2 | DIASTOLIC BLOOD PRESSURE: 60 MMHG

## 2019-02-19 PROCEDURE — 6370000000 HC RX 637 (ALT 250 FOR IP): Performed by: INTERNAL MEDICINE

## 2019-02-19 PROCEDURE — 97535 SELF CARE MNGMENT TRAINING: CPT

## 2019-02-19 PROCEDURE — 97110 THERAPEUTIC EXERCISES: CPT

## 2019-02-19 PROCEDURE — 97116 GAIT TRAINING THERAPY: CPT

## 2019-02-19 PROCEDURE — 97530 THERAPEUTIC ACTIVITIES: CPT

## 2019-02-19 RX ORDER — ASPIRIN 81 MG/1
81 TABLET ORAL 2 TIMES DAILY
Qty: 60 TABLET | Refills: 0 | Status: SHIPPED | OUTPATIENT
Start: 2019-02-19 | End: 2020-01-10 | Stop reason: ALTCHOICE

## 2019-02-19 RX ADMIN — OXYCODONE AND ACETAMINOPHEN 2 TABLET: 5; 325 TABLET ORAL at 03:04

## 2019-02-19 RX ADMIN — ASPIRIN 81 MG: 81 TABLET ORAL at 08:14

## 2019-02-19 RX ADMIN — FLUTICASONE PROPIONATE 1 SPRAY: 50 SPRAY, METERED NASAL at 08:14

## 2019-02-19 RX ADMIN — POLYETHYLENE GLYCOL 3350 17 G: 17 POWDER, FOR SOLUTION ORAL at 08:13

## 2019-02-19 RX ADMIN — ROSUVASTATIN CALCIUM 10 MG: 5 TABLET, FILM COATED ORAL at 08:14

## 2019-02-19 RX ADMIN — CITALOPRAM HYDROBROMIDE 20 MG: 20 TABLET ORAL at 08:14

## 2019-02-19 RX ADMIN — SENNOSIDES AND DOCUSATE SODIUM 2 TABLET: 8.6; 5 TABLET ORAL at 08:15

## 2019-02-19 RX ADMIN — OXYCODONE AND ACETAMINOPHEN 2 TABLET: 5; 325 TABLET ORAL at 12:31

## 2019-02-19 RX ADMIN — OXYCODONE AND ACETAMINOPHEN 2 TABLET: 5; 325 TABLET ORAL at 08:14

## 2019-02-19 ASSESSMENT — PAIN SCALES - GENERAL
PAINLEVEL_OUTOF10: 6
PAINLEVEL_OUTOF10: 7
PAINLEVEL_OUTOF10: 0
PAINLEVEL_OUTOF10: 8
PAINLEVEL_OUTOF10: 0

## 2019-02-20 ENCOUNTER — HOSPITAL ENCOUNTER (OUTPATIENT)
Dept: ORTHOPEDIC SURGERY | Age: 69
Discharge: HOME | End: 2019-02-22
Payer: MEDICARE

## 2019-02-20 DIAGNOSIS — Z96.659 STATUS POST KNEE REPLACEMENT, UNSPECIFIED LATERALITY: ICD-10-CM

## 2019-02-20 PROCEDURE — 73560 X-RAY EXAM OF KNEE 1 OR 2: CPT

## 2019-02-21 ENCOUNTER — TELEPHONE (OUTPATIENT)
Dept: PHARMACY | Facility: CLINIC | Age: 69
End: 2019-02-21

## 2019-02-21 DIAGNOSIS — Z96.652 STATUS POST LEFT KNEE REPLACEMENT: Primary | ICD-10-CM

## 2019-02-21 PROCEDURE — 1111F DSCHRG MED/CURRENT MED MERGE: CPT | Performed by: PHARMACIST

## 2019-02-21 RX ORDER — OXYCODONE HYDROCHLORIDE AND ACETAMINOPHEN 5; 325 MG/1; MG/1
1 TABLET ORAL EVERY 6 HOURS PRN
COMMUNITY
Start: 2019-02-06 | End: 2019-02-26

## 2019-02-21 RX ORDER — DOCUSATE SODIUM 100 MG/1
100 CAPSULE, LIQUID FILLED ORAL 2 TIMES DAILY PRN
COMMUNITY
End: 2020-01-10 | Stop reason: ALTCHOICE

## 2019-02-26 ENCOUNTER — OFFICE VISIT (OUTPATIENT)
Dept: FAMILY MEDICINE CLINIC | Age: 69
End: 2019-02-26
Payer: MEDICARE

## 2019-02-26 VITALS
BODY MASS INDEX: 31.68 KG/M2 | HEART RATE: 83 BPM | TEMPERATURE: 97.7 F | OXYGEN SATURATION: 99 % | HEIGHT: 62 IN | SYSTOLIC BLOOD PRESSURE: 130 MMHG | DIASTOLIC BLOOD PRESSURE: 72 MMHG | WEIGHT: 172.19 LBS | RESPIRATION RATE: 16 BRPM

## 2019-02-26 DIAGNOSIS — Z96.652 S/P TOTAL KNEE ARTHROPLASTY, LEFT: Primary | ICD-10-CM

## 2019-02-26 PROCEDURE — 99495 TRANSJ CARE MGMT MOD F2F 14D: CPT | Performed by: FAMILY MEDICINE

## 2019-02-26 PROCEDURE — 1111F DSCHRG MED/CURRENT MED MERGE: CPT | Performed by: FAMILY MEDICINE

## 2019-02-26 ASSESSMENT — PATIENT HEALTH QUESTIONNAIRE - PHQ9
SUM OF ALL RESPONSES TO PHQ QUESTIONS 1-9: 0
1. LITTLE INTEREST OR PLEASURE IN DOING THINGS: 0
SUM OF ALL RESPONSES TO PHQ QUESTIONS 1-9: 0
2. FEELING DOWN, DEPRESSED OR HOPELESS: 0
SUM OF ALL RESPONSES TO PHQ9 QUESTIONS 1 & 2: 0

## 2019-03-06 ASSESSMENT — ENCOUNTER SYMPTOMS
APNEA: 0
COUGH: 0
DIARRHEA: 0
SHORTNESS OF BREATH: 0
ABDOMINAL PAIN: 0
NAUSEA: 0
VOMITING: 0
CHEST TIGHTNESS: 0
BLOOD IN STOOL: 0
CONSTIPATION: 0

## 2019-03-12 ENCOUNTER — HOSPITAL ENCOUNTER (OUTPATIENT)
Dept: PHYSICAL THERAPY | Age: 69
Setting detail: THERAPIES SERIES
Discharge: HOME | End: 2019-03-12
Payer: MEDICARE

## 2019-03-12 PROCEDURE — 97110 THERAPEUTIC EXERCISES: CPT

## 2019-03-12 PROCEDURE — 97162 PT EVAL MOD COMPLEX 30 MIN: CPT

## 2019-03-12 ASSESSMENT — PAIN DESCRIPTION - LOCATION: LOCATION: KNEE

## 2019-03-12 ASSESSMENT — PAIN DESCRIPTION - ONSET: ONSET: GRADUAL

## 2019-03-12 ASSESSMENT — PAIN DESCRIPTION - DESCRIPTORS: DESCRIPTORS: ACHING;DULL;TIGHTNESS

## 2019-03-12 ASSESSMENT — PAIN DESCRIPTION - ORIENTATION: ORIENTATION: LEFT;PROXIMAL

## 2019-03-12 ASSESSMENT — PAIN DESCRIPTION - PROGRESSION: CLINICAL_PROGRESSION: GRADUALLY IMPROVING

## 2019-03-12 ASSESSMENT — PAIN DESCRIPTION - PAIN TYPE: TYPE: SURGICAL PAIN

## 2019-03-12 ASSESSMENT — PAIN DESCRIPTION - FREQUENCY: FREQUENCY: INTERMITTENT

## 2019-03-12 ASSESSMENT — PAIN SCALES - GENERAL: PAINLEVEL_OUTOF10: 2

## 2019-03-14 ENCOUNTER — HOSPITAL ENCOUNTER (OUTPATIENT)
Dept: PHYSICAL THERAPY | Age: 69
Setting detail: THERAPIES SERIES
Discharge: HOME | End: 2019-03-14
Payer: MEDICARE

## 2019-03-14 PROCEDURE — 97140 MANUAL THERAPY 1/> REGIONS: CPT

## 2019-03-14 PROCEDURE — 97110 THERAPEUTIC EXERCISES: CPT

## 2019-03-14 PROCEDURE — 97530 THERAPEUTIC ACTIVITIES: CPT

## 2019-03-14 ASSESSMENT — PAIN SCALES - GENERAL: PAINLEVEL_OUTOF10: 2

## 2019-03-14 ASSESSMENT — PAIN DESCRIPTION - ORIENTATION: ORIENTATION: LEFT

## 2019-03-14 ASSESSMENT — PAIN DESCRIPTION - LOCATION: LOCATION: KNEE

## 2019-03-19 ENCOUNTER — HOSPITAL ENCOUNTER (OUTPATIENT)
Dept: PHYSICAL THERAPY | Age: 69
Setting detail: THERAPIES SERIES
Discharge: HOME | End: 2019-03-19
Payer: MEDICARE

## 2019-03-19 PROCEDURE — 97140 MANUAL THERAPY 1/> REGIONS: CPT

## 2019-03-19 PROCEDURE — 97110 THERAPEUTIC EXERCISES: CPT

## 2019-03-20 ENCOUNTER — HOSPITAL ENCOUNTER (OUTPATIENT)
Dept: GENERAL RADIOLOGY | Age: 69
Discharge: HOME | End: 2019-03-22
Payer: MEDICARE

## 2019-03-20 DIAGNOSIS — M17.12 ARTHRITIS OF KNEE, LEFT: ICD-10-CM

## 2019-03-20 PROCEDURE — 73560 X-RAY EXAM OF KNEE 1 OR 2: CPT

## 2019-03-21 ENCOUNTER — HOSPITAL ENCOUNTER (OUTPATIENT)
Dept: PHYSICAL THERAPY | Age: 69
Setting detail: THERAPIES SERIES
Discharge: HOME | End: 2019-03-21
Payer: MEDICARE

## 2019-03-21 PROCEDURE — 97140 MANUAL THERAPY 1/> REGIONS: CPT

## 2019-03-21 PROCEDURE — 97110 THERAPEUTIC EXERCISES: CPT

## 2019-03-21 ASSESSMENT — PAIN DESCRIPTION - DESCRIPTORS: DESCRIPTORS: TIGHTNESS

## 2019-03-21 ASSESSMENT — PAIN DESCRIPTION - ORIENTATION: ORIENTATION: LEFT

## 2019-03-21 ASSESSMENT — PAIN DESCRIPTION - LOCATION: LOCATION: KNEE

## 2019-03-21 ASSESSMENT — PAIN SCALES - GENERAL: PAINLEVEL_OUTOF10: 3

## 2019-03-26 ENCOUNTER — HOSPITAL ENCOUNTER (OUTPATIENT)
Dept: PHYSICAL THERAPY | Age: 69
Setting detail: THERAPIES SERIES
Discharge: HOME | End: 2019-03-26
Payer: MEDICARE

## 2019-03-26 PROCEDURE — 97110 THERAPEUTIC EXERCISES: CPT

## 2019-03-26 PROCEDURE — 97140 MANUAL THERAPY 1/> REGIONS: CPT

## 2019-03-26 ASSESSMENT — PAIN SCALES - GENERAL: PAINLEVEL_OUTOF10: 2

## 2019-03-26 ASSESSMENT — PAIN DESCRIPTION - DESCRIPTORS: DESCRIPTORS: TIGHTNESS

## 2019-03-26 ASSESSMENT — PAIN DESCRIPTION - LOCATION: LOCATION: KNEE

## 2019-03-26 ASSESSMENT — PAIN DESCRIPTION - ORIENTATION: ORIENTATION: LEFT

## 2019-03-28 ENCOUNTER — HOSPITAL ENCOUNTER (OUTPATIENT)
Dept: PHYSICAL THERAPY | Age: 69
Setting detail: THERAPIES SERIES
Discharge: HOME | End: 2019-03-28
Payer: MEDICARE

## 2019-03-28 PROCEDURE — 97140 MANUAL THERAPY 1/> REGIONS: CPT

## 2019-03-28 PROCEDURE — 97110 THERAPEUTIC EXERCISES: CPT

## 2019-03-28 ASSESSMENT — PAIN DESCRIPTION - ORIENTATION: ORIENTATION: LEFT

## 2019-03-28 ASSESSMENT — PAIN DESCRIPTION - LOCATION: LOCATION: KNEE

## 2019-03-28 ASSESSMENT — PAIN DESCRIPTION - DESCRIPTORS: DESCRIPTORS: TIGHTNESS

## 2019-03-28 ASSESSMENT — PAIN SCALES - GENERAL: PAINLEVEL_OUTOF10: 2

## 2019-04-02 ENCOUNTER — HOSPITAL ENCOUNTER (OUTPATIENT)
Dept: PHYSICAL THERAPY | Age: 69
Setting detail: THERAPIES SERIES
Discharge: HOME | End: 2019-04-02
Payer: MEDICARE

## 2019-04-02 PROCEDURE — 97110 THERAPEUTIC EXERCISES: CPT

## 2019-04-02 PROCEDURE — 97140 MANUAL THERAPY 1/> REGIONS: CPT

## 2019-04-02 ASSESSMENT — PAIN DESCRIPTION - ORIENTATION: ORIENTATION: LEFT

## 2019-04-02 ASSESSMENT — PAIN DESCRIPTION - LOCATION: LOCATION: KNEE

## 2019-04-02 ASSESSMENT — PAIN DESCRIPTION - DESCRIPTORS: DESCRIPTORS: TIGHTNESS

## 2019-04-02 ASSESSMENT — PAIN SCALES - GENERAL: PAINLEVEL_OUTOF10: 2

## 2019-04-02 NOTE — PROGRESS NOTES
Physical Therapy  Daily Treatment Note  Date: 2019  Patient Name: Talia Wade  MRN: [id number]     :   1950    Subjective:   General  Chart Reviewed: Yes  Family / Caregiver Present: No  Referring Practitioner: Everton Hernández MD  PT Visit Information  Onset Date: 19  Total # of Visits Approved: 15  Total # of Visits to Date: 7  Plan of Care/Certification Expiration Date: 19  No Show: 0  Canceled Appointment: 0  Subjective  Subjective: Pt states her current HEP is going well. She states her knee felt much better after last session. Pain Screening  Patient Currently in Pain: Yes  Pain Assessment  Pain Assessment: 0-10  Pain Level: 2  Pain Location: Knee  Pain Orientation: Left  Pain Descriptors: Tightness  Vital Signs  Patient Currently in Pain: Yes       Treatment Activities:   Manual therapy  Soft Tissue Mobalization: STM/MFR to L quads to dec tightness/pain              Ambulation  Ambulation?: Yes  Ambulation 1  Surface: level tile  Device: No Device  Assistance: Independent  Quality of Gait: larger stride, equal step length, no c/o pain  Distance: 440'           AROM LLE (degrees)  LLE General AROM: 0-122 deg        Exercises  Exercise 5: 90/90 HS stretch; H45'' x 3   Exercise 6: supine hip flexor stretch; H60'' x 3   Exercise 10: SLR BLE's 2 x 10 H5'' 1#   Exercise 11: 6inch step up and over; x 20 slow and controlled with 1 UE assist   Exercise 12: sidelying B hip adduction; H5'' 2 x 10   Exercise 15: L VMO SLR; H5'' x 10   Exercise 16: wall squat; H5 x 10   Exercise 20: Recumbent bike x10' for ROM. Modalities  Cryotherapy (Minutes\Location): ice to L knee after session to dec pain and prevent muscle fatigue. Manual therapy  Soft Tissue Mobalization: STM/MFR to L quads to dec tightness/pain                          Assessment:   Conditions Requiring Skilled Therapeutic Intervention  Body structures, Functions, Activity limitations: Decreased functional mobility ; Decreased strength;Decreased ROM; Decreased balance;Decreased endurance; Increased Pain  Assessment: Pt continues to progress with strengthening tolerating inc reps of ther ex with  no inc in pain. Performed STM/MFR to L quads to dec tightness post ther ex. Donned CP post session for pain relief and to prevent muscle fatigue. Continue to progress as mary ellen. Pain level 0/10 after session.    Treatment Diagnosis: L knee pain, weakness s/p L TKR 2/8/19  REQUIRES PT FOLLOW UP: Yes  Activity Tolerance  Activity Tolerance: Patient Tolerated treatment well      G-Code:     OutComes Score                                                  Goals:  Short term goals  Time Frame for Short term goals: 5 visits  Short term goal 1: Improve L knee ext to 0 deg and flex >120 deg actively-GOAL MET  Short term goal 2: independent with HEP for LE stretching and strengthening  Short term goal 3: decrease max pain to 2/10  Long term goals  Time Frame for Long term goals : 10 visits  Long term goal 1: L knee ROM WNL to perform transfers, gait and stairs with good symmetry and no limp  Long term goal 2: improve LLE strength to 5/5 throughout to improve gait and function  Long term goal 3: Pt. demo ability to perform stairs wtih reciprocal pattern with good control and no pain  Long term goal 4: Pt. report no pain >75 %of the time and indeepndent with modalities for pain control  Long term goal 5: Improve LEFS to <8% disabiltiy to demonstrate improved function  Patient Goals   Patient goals : No pain, walk normally    Plan:    Plan  Times per week: 2  Plan weeks: 5  Specific instructions for Next Treatment: continue LE ROM, add work on 2 or 4\" steps for closed chain strengthening  Current Treatment Recommendations: Strengthening, Gait Training, Manual Therapy - Joint Manipulation, ROM, Stair training, Equipment Evaluation, Education, & procurement, Balance Training, Neuromuscular Re-education, Pain Management, Modalities, Functional Mobility Training,

## 2019-04-04 ENCOUNTER — HOSPITAL ENCOUNTER (OUTPATIENT)
Dept: PHYSICAL THERAPY | Age: 69
Setting detail: THERAPIES SERIES
Discharge: HOME | End: 2019-04-04
Payer: MEDICARE

## 2019-04-04 PROCEDURE — 97110 THERAPEUTIC EXERCISES: CPT

## 2019-04-04 PROCEDURE — 97140 MANUAL THERAPY 1/> REGIONS: CPT

## 2019-04-04 ASSESSMENT — PAIN DESCRIPTION - DESCRIPTORS: DESCRIPTORS: TIGHTNESS

## 2019-04-04 ASSESSMENT — PAIN DESCRIPTION - LOCATION: LOCATION: KNEE

## 2019-04-04 ASSESSMENT — PAIN DESCRIPTION - ORIENTATION: ORIENTATION: LEFT

## 2019-04-04 ASSESSMENT — PAIN SCALES - GENERAL: PAINLEVEL_OUTOF10: 1

## 2019-04-04 NOTE — PROGRESS NOTES
Physical Therapy  Daily Treatment Note  Date: 2019  Patient Name: Bryce Louis  MRN: [id number]     :   1950    Subjective:   General  Chart Reviewed: Yes  Family / Caregiver Present: No  Referring Practitioner: Osiris Escoto MD  PT Visit Information  Onset Date: 19  Total # of Visits Approved: 15  Total # of Visits to Date: 8  Plan of Care/Certification Expiration Date: 19  No Show: 0  Canceled Appointment: 0  Subjective  Subjective: Pt. states her knee is doing pretty well. Reports 1/10 pain. \"I just know it's there\". Pain Screening  Patient Currently in Pain: Yes  Pain Assessment  Pain Assessment: 0-10  Pain Level: 1  Pain Location: Knee  Pain Orientation: Left  Pain Descriptors: Tightness  Vital Signs  Patient Currently in Pain: Yes       Treatment Activities:   Manual therapy  Joint mobilization: Patellar mobs to decrease tightness   Soft Tissue Mobalization: STM/MFR to L quads to dec tightness/pain              Ambulation  Ambulation?: Yes  Ambulation 1  Surface: level tile  Device: No Device  Assistance: Independent  Quality of Gait: larger stride, equal step length, no c/o pain  Distance: 440'           AROM LLE (degrees)  LLE General AROM: 0-118 supine L knee AROM        Exercises  Exercise 5: 90/90 HS stretch; H45'' x 3   Exercise 6: supine hip flexor stretch; H60'' x 2  Exercise 7: heelslides x10 hold 5 sec due to stiffness post standing therex. Exercise 10: SLR BLE's 2 x 10 H5'' 1#   Exercise 15: L VMO SLR; H5'' x 10 1#  Exercise 16: wall squat; H5 x 10   Exercise 17: standing heel/toe raises x10 hold 3 sec   Exercise 18: Standing march hold 3 sec   Exercise 19: Standing Alternating hip flex/ext/abd x10 hold 3 sec ea alternating   Exercise 20: Recumbent bike x10' for ROM.        Modalities  Cryotherapy (Minutes\Location): Pt. to ice at home   Manual therapy  Joint mobilization: Patellar mobs to decrease tightness   Soft Tissue Mobalization: STM/MFR to L quads to dec tightness/pain                          Assessment:   Conditions Requiring Skilled Therapeutic Intervention  Body structures, Functions, Activity limitations: Decreased functional mobility ; Decreased strength;Decreased ROM; Decreased balance;Decreased endurance; Increased Pain  Assessment: Performed standing therex this date to improve endurance and stability. Alternating legs for now with good form. Pt. tolerates well pain 2/10 post standing therex. Continued with MFR to quads post therex to decrease tightness. Pt. with 2/10 post.  Pt. will ice at home.   Treatment Diagnosis: L knee pain, weakness s/p L TKR 2/8/19  REQUIRES PT FOLLOW UP: Yes  Activity Tolerance  Activity Tolerance: Patient Tolerated treatment well      G-Code:     OutComes Score                                                  Goals:  Short term goals  Time Frame for Short term goals: 5 visits  Short term goal 1: Improve L knee ext to 0 deg and flex >120 deg actively-GOAL MET  Short term goal 2: independent with HEP for LE stretching and strengthening  Short term goal 3: decrease max pain to 2/10  Long term goals  Time Frame for Long term goals : 10 visits  Long term goal 1: L knee ROM WNL to perform transfers, gait and stairs with good symmetry and no limp  Long term goal 2: improve LLE strength to 5/5 throughout to improve gait and function  Long term goal 3: Pt. demo ability to perform stairs wtih reciprocal pattern with good control and no pain  Long term goal 4: Pt. report no pain >75 %of the time and indeepndent with modalities for pain control  Long term goal 5: Improve LEFS to <8% disabiltiy to demonstrate improved function  Patient Goals   Patient goals : No pain, walk normally    Plan:       Frequency and duration of tx  Days: 2  Weeks: 5     Therapy Time   Individual Concurrent Group Co-treatment   Time In  1000         Time Out  1100         Minutes  [address] [place] [address]  License and Documentation [address] Number: [id number]

## 2019-04-09 ENCOUNTER — HOSPITAL ENCOUNTER (OUTPATIENT)
Dept: PHYSICAL THERAPY | Age: 69
Setting detail: THERAPIES SERIES
Discharge: HOME | End: 2019-04-09
Payer: MEDICARE

## 2019-04-09 PROCEDURE — 97112 NEUROMUSCULAR REEDUCATION: CPT

## 2019-04-09 PROCEDURE — 97110 THERAPEUTIC EXERCISES: CPT

## 2019-04-09 PROCEDURE — 97530 THERAPEUTIC ACTIVITIES: CPT

## 2019-04-09 ASSESSMENT — PAIN SCALES - GENERAL: PAINLEVEL_OUTOF10: 1

## 2019-04-09 NOTE — PROGRESS NOTES
Assessment:   Conditions Requiring Skilled Therapeutic Intervention  Body structures, Functions, Activity limitations: Decreased functional mobility ; Decreased strength;Decreased ROM; Decreased balance;Decreased endurance; Increased Pain  Assessment: Pt. able to increase distance with gait training and showing improved control on stairs, able to ascend and descend without the rail. pain more isolated to medial knee, but improved with ice massage. Highly mtoivated and making good progress.   Treatment Diagnosis: L knee pain, weakness s/p L TKR 2/8/19  Patient Education: how to make ice cups, perfomr ice massage  REQUIRES PT FOLLOW UP: Yes  Activity Tolerance  Activity Tolerance: Patient Tolerated treatment well  Comments: reports improved pain after session      G-Code:     OutComes Score                                                  Goals:  Short term goals  Time Frame for Short term goals: 5 visits  Short term goal 1: Improve L knee ext to 0 deg and flex >120 deg actively-GOAL MET  Short term goal 2: independent with HEP for LE stretching and strengthening  Short term goal 3: decrease max pain to 2/10  Long term goals  Time Frame for Long term goals : 10 visits  Long term goal 1: L knee ROM WNL to perform transfers, gait and stairs with good symmetry and no limp  Long term goal 2: improve LLE strength to 5/5 throughout to improve gait and function  Long term goal 3: Pt. demo ability to perform stairs wtih reciprocal pattern with good control and no pain  Long term goal 4: Pt. report no pain >75 %of the time and indeepndent with modalities for pain control  Long term goal 5: Improve LEFS to <8% disabiltiy to demonstrate improved function  Patient Goals   Patient goals : No pain, walk normally    Plan:  conitnue per plan; reassess next viist           Therapy Time   Individual Concurrent Group Co-treatment   Time In 1000         Time Out 1105         Minutes [address], PT

## 2019-04-11 ENCOUNTER — HOSPITAL ENCOUNTER (OUTPATIENT)
Dept: PHYSICAL THERAPY | Age: 69
Setting detail: THERAPIES SERIES
Discharge: HOME | End: 2019-04-11
Payer: MEDICARE

## 2019-04-11 PROCEDURE — 97110 THERAPEUTIC EXERCISES: CPT

## 2019-04-11 PROCEDURE — 97140 MANUAL THERAPY 1/> REGIONS: CPT

## 2019-04-11 ASSESSMENT — PAIN DESCRIPTION - ORIENTATION: ORIENTATION: LEFT

## 2019-04-11 ASSESSMENT — PAIN SCALES - GENERAL: PAINLEVEL_OUTOF10: 1

## 2019-04-11 ASSESSMENT — PAIN DESCRIPTION - LOCATION: LOCATION: KNEE

## 2019-04-11 ASSESSMENT — PAIN DESCRIPTION - DESCRIPTORS: DESCRIPTORS: TIGHTNESS

## 2019-04-11 NOTE — PROGRESS NOTES
Physical Therapy  Daily Treatment Note- Reassessment  Date: 2019  Patient Name: Betsy Oneill  MRN: [id number]     :   1950    Subjective:   General  Chart Reviewed: Yes  Family / Caregiver Present: No  Referring Practitioner: Fidelia Olivo MD  PT Visit Information  Onset Date: 19  Total # of Visits Approved: 15  Total # of Visits to Date: 10  Plan of Care/Certification Expiration Date: 05/10/19  No Show: 0  Subjective  Subjective: Pt. states she feels she is doing much better since onset. \"It's slow process, but I can basically do everything now! \" Doing her exercises, tried the ice massage and gives good, temporary pain relief. General Comment  Comments: still has stiffness before she gets moving. Denies any aggravation from the new activities last visit. Pain Screening  Patient Currently in Pain: Yes  Pain Assessment  Pain Assessment: 0-10  Pain Level: 1  Pain Location: Knee  Pain Orientation: Left(medial)  Pain Descriptors: Tightness  Vital Signs  Patient Currently in Pain: Yes       Treatment Activities:                              PROM LLE (degrees)  L Knee Flexion 0-145: 0-125  L Knee Extension 0: -5 from neutral  L Ankle Dorsiflexion 0-20: +12  Strength LLE  L Hip Flexion: 5/5  L Hip Extension: 5/5  L Hip ABduction: 5/5  L Hip ADduction: 5/5  L Knee Flexion: 4+/5  L Knee Extension: 5/5  L Ankle Dorsiflexion: 5/5    Exercises  Exercise 1: 6\" step ups L stance x 15  Exercise 2: 6\" lateral step ups L stance x 15  Exercise 3: 6\" tap downs, L stance x 16  Exercise 4: 6\" step up and overs LLE stance x 20  Exercise 7: standing 4 way SLR with green band, 15x LLE (flex, ext, abd, add)  Exercise 8: seated ham curls, green band x 15  Exercise 16: wall squat; H3 x 10   Exercise 18: Resisted walking at [employer] column, 40#, 10 ft. distances 5 laps ea- facing in, out and L and R sidestepping, unsupported, CG to CS  Exercise 20: Recumbent bike x10' for ROM. Assessment:   Conditions Requiring Skilled Therapeutic Intervention  Body structures, Functions, Activity limitations: Decreased functional mobility ; Decreased strength;Decreased ROM; Decreased balance;Decreased endurance; Increased Pain  Assessment: Pt. is a 80 yo female 2 months s/p L TKR due to OA. Has completed 10 outpatient PT and showing good irmpovements in ROM, strength, pain and moblity. CLose to meeting all goals, but still has mild weakness primarily in the hamstring and mild pain in the medial knee. Will benefit from 2-3 additoinal visits to focus on updating and progrssing HEP, LLE strength and pain. in order to maximize funciton and mobility.    Treatment Diagnosis: L knee pain, weakness s/p L TKR 2/8/19  Patient Education: Updated HEP, issued handout and green band: 4-way SLR standing with band, seated ham curls with band, wall slides; continue stretches (supine heelslide and prone quad wtih strap)  REQUIRES PT FOLLOW UP: Yes  Activity Tolerance  Activity Tolerance: Patient Tolerated treatment well      G-Code:  PT G-Codes  Functional Assessment Tool Used: LEFS  Score: 51/64= 80%= 20% impairment  OutComes Score                                                  Goals:  Short term goals  Time Frame for Short term goals: 5 visits  Short term goal 1: Improve L knee ext to 0 deg and flex >120 deg actively-GOAL MET  Short term goal 2: independent with HEP for LE stretching and strengthening-MET  Short term goal 3: decrease max pain to 2/10-MET  Long term goals  Time Frame for Long term goals : 10 visits- REVISE to 12-13 visits  Long term goal 1: L knee ROM WNL to perform transfers, gait and stairs with good symmetry and no limp-partially  met, extension still limited  Long term goal 2: improve LLE strength to 5/5 throughout to improve gait and function-partially met, progressing  Long term goal 3: Pt. demo ability to perform stairs wtih reciprocal pattern with good control and no pain-MET  Long term goal 4: Pt. report no pain >75 %of the time and indeepndent with modalities for pain control  Long term goal 5: Improve LEFS to <8% disabiltiy to demonstrate improved function  Patient Goals   Patient goals : No pain, walk normally    Plan:             Therapy Time   Individual Concurrent Group Co-treatment   Time In 1150         Time Out 1250         Minutes [address], PT

## 2019-04-16 DIAGNOSIS — E78.2 MIXED HYPERLIPIDEMIA: ICD-10-CM

## 2019-04-16 DIAGNOSIS — F41.9 ANXIETY: ICD-10-CM

## 2019-04-16 RX ORDER — ROSUVASTATIN CALCIUM 10 MG/1
TABLET, COATED ORAL
Qty: 90 TABLET | Refills: 1 | Status: SHIPPED | OUTPATIENT
Start: 2019-04-16 | End: 2019-10-13 | Stop reason: SDUPTHER

## 2019-04-16 RX ORDER — CITALOPRAM 20 MG/1
TABLET ORAL
Qty: 90 TABLET | Refills: 1 | Status: SHIPPED | OUTPATIENT
Start: 2019-04-16 | End: 2019-10-13 | Stop reason: SDUPTHER

## 2019-04-23 ENCOUNTER — HOSPITAL ENCOUNTER (OUTPATIENT)
Dept: PHYSICAL THERAPY | Age: 69
Setting detail: THERAPIES SERIES
Discharge: HOME | End: 2019-04-23
Payer: MEDICARE

## 2019-04-23 PROCEDURE — 97110 THERAPEUTIC EXERCISES: CPT

## 2019-04-23 NOTE — PROGRESS NOTES
Physical Therapy  Discharge   Date: 2019  Patient Name: Lisa Barraza  MRN: [id number]     :   1950    Subjective:   General  Chart Reviewed: Yes  Family / Caregiver Present: No  Referring Practitioner: Nash Lujan MD  PT Visit Information  Onset Date: 19  Total # of Visits Approved: 13  Total # of Visits to Date: 10  Plan of Care/Certification Expiration Date: 05/10/19  No Show: 0  Canceled Appointment: 0  Subjective  Subjective: Pt. denies pain at the moment but reports feels tight. Pt. states she would like today to be her last session. Pain Screening  Patient Currently in Pain: Denies  Vital Signs  Patient Currently in Pain: Denies       Treatment Activities:       PROM LLE (degrees)  L Knee Flexion 0-145: 0-123  L Knee Extension 0: -2 from neutral post prone knee hang        Exercises  Exercise 3: 6\" tap downs, L stance x 15  Exercise 4: 6\" step up and overs LLE stance x 20  Exercise 7: standing 4 way SLR with green band, 2 x10 hold 3 sec BLE  (flex, ext, abd, add)  Exercise 11: prone knee hang to improve extension  x 3 min 4#   Exercise 16: wall squat; H3 x 20   Exercise 18: Resisted walking at [employer] column, 40#, 10 ft. distances 5 laps ea- facing in, out and L and R sidestepping, unsupported   Exercise 20: Recumbent bike x10'             Assessment:   Conditions Requiring Skilled Therapeutic Intervention  Body structures, Functions, Activity limitations: Decreased functional mobility ; Decreased strength;Decreased ROM; Decreased balance;Decreased endurance; Increased Pain  Assessment: PtCarey jain good knowledge of HEP. Reviewed HEP for questions educated set up [employer] for resisted walking, as patient signed up for wellness program.   Added Prone knee hang to improve ext. Post patient lacking only 2 deg from neutral.  Pt. would like this  session to be D/C from PT to HEP. Treatment Diagnosis: L knee pain, weakness s/p L TKR 19  Patient Education: Added prone Knee hang. Discussed progrssion of HEP to advance strength and 3 x per week for maintenance. REQUIRES PT FOLLOW UP: No  Activity Tolerance  Activity Tolerance: Patient Tolerated treatment well      Goals:  Short term goals  Time Frame for Short term goals: 5 visits  Short term goal 1: Improve L knee ext to 0 deg and flex >120 deg actively-GOAL MET  Short term goal 2: independent with HEP for LE stretching and strengthening-MET  Short term goal 3: decrease max pain to 2/10-MET  Long term goals  Time Frame for Long term goals : 10 visits  Long term goal 1: L knee ROM WNL to perform transfers, gait and stairs with good symmetry and no limp-partially  met, extension still limited, Met 4/23/19  Long term goal 2: improve LLE strength to 5/5 throughout to improve gait and function-partially met, not formally tested for discharge as pt did not have formal recheck/ discharge with PT  Long term goal 3: Pt. demo ability to perform stairs wtih reciprocal pattern with good control and no pain-MET  Long term goal 4: Pt. report no pain >75 %of the time and indeepndent with modalities for pain control- Met per pt report 4/23/19  Long term goal 5: Improve LEFS to <8% disabiltiy to demonstrate improved function, not tested as pt did not have formal discharge with PT  Patient Goals   Patient goals : No pain, walk normally    Plan:     Discharge to [facility] , no formal discharge with PT as pt request today be last session.         Therapy Time   Individual Concurrent Group Co-treatment   Time In  1000         Time Out  124 NCarey Piedra PTA  License and [place] [address] Number: [id number]

## 2019-04-25 ENCOUNTER — TELEPHONE (OUTPATIENT)
Dept: ADMINISTRATIVE | Age: 69
End: 2019-04-25

## 2019-04-25 ENCOUNTER — APPOINTMENT (OUTPATIENT)
Dept: PHYSICAL THERAPY | Age: 69
End: 2019-04-25
Payer: MEDICARE

## 2019-04-28 NOTE — TELEPHONE ENCOUNTER
pharmacy requesting medication refill. Please approve or deny this request.    Rx requested:  Requested Prescriptions     Pending Prescriptions Disp Refills    meloxicam (MOBIC) 15 MG tablet [Pharmacy Med Name: MELOXICAM TABS 15MG] 90 tablet 0     Sig: TAKE 1 TABLET DAILY         Last Office Visit:   3/6/2018      Next Visit Date:  No future appointments.

## 2019-04-29 RX ORDER — MELOXICAM 15 MG/1
TABLET ORAL
Qty: 90 TABLET | Refills: 0 | Status: SHIPPED | OUTPATIENT
Start: 2019-04-29 | End: 2019-07-28 | Stop reason: SDUPTHER

## 2019-05-01 ENCOUNTER — HOSPITAL ENCOUNTER (OUTPATIENT)
Dept: GENERAL RADIOLOGY | Age: 69
Discharge: HOME | End: 2019-05-03
Payer: MEDICARE

## 2019-05-01 DIAGNOSIS — M17.12 PRIMARY LOCALIZED OSTEOARTHROSIS OF LEFT LOWER LEG: ICD-10-CM

## 2019-05-01 DIAGNOSIS — M17.11 PRIMARY LOCALIZED OSTEOARTHROSIS OF RIGHT LOWER LEG: ICD-10-CM

## 2019-05-01 PROCEDURE — 73560 X-RAY EXAM OF KNEE 1 OR 2: CPT

## 2019-07-28 NOTE — TELEPHONE ENCOUNTER
Pharmacy is requesting medication refill. Please approve or deny this request.    Rx requested:  Requested Prescriptions     Pending Prescriptions Disp Refills    meloxicam (MOBIC) 15 MG tablet [Pharmacy Med Name: MELOXICAM TABS 15MG] 90 tablet 0     Sig: Take 1 tablet by mouth daily         Last Office Visit:   3/6/2018      Next Visit Date:  No future appointments.

## 2019-07-29 RX ORDER — MELOXICAM 15 MG/1
15 TABLET ORAL DAILY
Qty: 90 TABLET | Refills: 0 | Status: SHIPPED | OUTPATIENT
Start: 2019-07-29 | End: 2019-10-27 | Stop reason: SDUPTHER

## 2019-08-30 ENCOUNTER — HOSPITAL ENCOUNTER (EMERGENCY)
Age: 69
Discharge: HOME | End: 2019-08-30
Attending: EMERGENCY MEDICINE
Payer: MEDICARE

## 2019-08-30 VITALS
OXYGEN SATURATION: 97 % | HEIGHT: 62 IN | HEART RATE: 86 BPM | RESPIRATION RATE: 18 BRPM | WEIGHT: 165 LBS | BODY MASS INDEX: 30.36 KG/M2 | SYSTOLIC BLOOD PRESSURE: 161 MMHG | DIASTOLIC BLOOD PRESSURE: 87 MMHG | TEMPERATURE: 98.7 F

## 2019-08-30 DIAGNOSIS — S61.512A WRIST LACERATION, LEFT, INITIAL ENCOUNTER: Primary | ICD-10-CM

## 2019-08-30 PROCEDURE — 99282 EMERGENCY DEPT VISIT SF MDM: CPT

## 2019-08-30 PROCEDURE — 12002 RPR S/N/AX/GEN/TRNK2.6-7.5CM: CPT

## 2019-08-30 PROCEDURE — 2580000003 HC RX 258: Performed by: EMERGENCY MEDICINE

## 2019-08-30 PROCEDURE — 6370000000 HC RX 637 (ALT 250 FOR IP): Performed by: EMERGENCY MEDICINE

## 2019-08-30 RX ORDER — DIAPER,BRIEF,INFANT-TODD,DISP
EACH MISCELLANEOUS 2 TIMES DAILY
Status: DISCONTINUED | OUTPATIENT
Start: 2019-08-30 | End: 2019-08-30 | Stop reason: HOSPADM

## 2019-08-30 RX ORDER — MAGNESIUM HYDROXIDE 1200 MG/15ML
250 LIQUID ORAL CONTINUOUS
Status: DISCONTINUED | OUTPATIENT
Start: 2019-08-30 | End: 2019-08-30 | Stop reason: HOSPADM

## 2019-08-30 RX ADMIN — SODIUM CHLORIDE 250 ML: 900 IRRIGANT IRRIGATION at 12:03

## 2019-08-30 RX ADMIN — BACITRACIN: 500 OINTMENT TOPICAL at 12:03

## 2019-08-30 ASSESSMENT — PAIN DESCRIPTION - LOCATION: LOCATION: WRIST

## 2019-08-30 ASSESSMENT — ENCOUNTER SYMPTOMS
EYE REDNESS: 0
FACIAL SWELLING: 0
COUGH: 0
VOMITING: 0
CONSTIPATION: 0
BLOOD IN STOOL: 0
EYE DISCHARGE: 0
SINUS PRESSURE: 0
CHOKING: 0
DIARRHEA: 0
STRIDOR: 0
VOICE CHANGE: 0
BACK PAIN: 0
SHORTNESS OF BREATH: 0
SORE THROAT: 0
EYE PAIN: 0
CHEST TIGHTNESS: 0
TROUBLE SWALLOWING: 0
ABDOMINAL PAIN: 0
WHEEZING: 0

## 2019-08-30 ASSESSMENT — PAIN DESCRIPTION - FREQUENCY: FREQUENCY: CONTINUOUS

## 2019-08-30 ASSESSMENT — PAIN - FUNCTIONAL ASSESSMENT: PAIN_FUNCTIONAL_ASSESSMENT: ACTIVITIES ARE NOT PREVENTED

## 2019-08-30 ASSESSMENT — PAIN DESCRIPTION - ORIENTATION: ORIENTATION: LEFT

## 2019-08-30 ASSESSMENT — PAIN DESCRIPTION - PROGRESSION: CLINICAL_PROGRESSION: NOT CHANGED

## 2019-08-30 ASSESSMENT — PAIN DESCRIPTION - PAIN TYPE: TYPE: ACUTE PAIN

## 2019-08-30 ASSESSMENT — PAIN SCALES - GENERAL
PAINLEVEL_OUTOF10: 3
PAINLEVEL_OUTOF10: 0

## 2019-08-30 ASSESSMENT — PAIN DESCRIPTION - ONSET: ONSET: SUDDEN

## 2019-08-30 NOTE — ED NOTES
Cleansed laceration and applied bacitrican, covered with band aide.       Maria Isabel Ricketts, NILA  08/30/19 7410

## 2019-08-30 NOTE — ED PROVIDER NOTES
[address] [facility] [address] ED  eMERGENCY dEPARTMENT eNCOUnter      Pt Name: Severo Plump  MRN: [id number]  [place] 1950  Date of evaluation: 8/30/2019  Provider: Rajesh Singh MD    [address] [place] [address]       Chief Complaint   Patient presents with    Laceration     laceration to the left wrist, cut herself on her car. Bleeding controlled. TD upto date       HISTORY OF PRESENT ILLNESS   (Location/Symptom, Timing/Onset,Context/Setting, Quality, Duration, Modifying Factors, Severity)  Note limiting factors. Severo Plump is a 81 y.o. female who presents to the emergency department patient extremely cut her left wrist, sharp knife of bleeding controlled and here for further evaluation no numbness tingling to the fingertips patient no other injuries last tetanus immunization one year ago    HPI    NursingNotes were reviewed. REVIEW OF SYSTEMS    (2-9 systems for level 4, 10 or more for level 5)     Review of Systems   Constitutional: Negative. Negative for activity change and fever. HENT: Negative for congestion, drooling, facial swelling, mouth sores, nosebleeds, sinus pressure, sore throat, trouble swallowing and voice change. Eyes: Negative for pain, discharge, redness and visual disturbance. Respiratory: Negative for cough, choking, chest tightness, shortness of breath, wheezing and stridor. Cardiovascular: Negative for chest pain, palpitations and leg swelling. Gastrointestinal: Negative for abdominal pain, blood in stool, constipation, diarrhea and vomiting. Endocrine: Negative for cold intolerance, polyphagia and polyuria. Genitourinary: Negative for dysuria, flank pain, frequency, genital sores and urgency. Musculoskeletal: Negative for back pain, joint swelling, neck pain and neck stiffness. Skin: Positive for wound. Negative for pallor and rash. Neurological: Negative for tremors, seizures, syncope, weakness, numbness and headaches. Hematological: Negative for adenopathy. of Onset    Heart Disease Mother     High Blood Pressure Mother     Vision Loss Mother     Other Mother         leiden factor 5    Diabetes Father     Heart Disease Father     Depression Sister     Diabetes Sister     Obesity Sister     Diabetes Brother     Arthritis Brother     Other Brother         leiden factor 5    Allergy (Severe) Sister     Arthritis Sister     Depression Sister     Arthritis Sister     Depression Sister     Diabetes Sister     Arthritis Sister     Depression Sister     Arthritis Sister     Arthritis Sister     Arthritis Sister     Arthritis Brother     Arthritis Brother     Arthritis Brother     Thyroid Disease Daughter     No Known Problems Son           SOCIAL HISTORY       Social History     Socioeconomic History    Marital status: Single     Spouse name: None    Number of children: None    Years of education: None    Highest education level: None   Occupational History    None   Social Needs    Financial resource strain: None    Food insecurity:     Worry: None     Inability: None    Transportation needs:     Medical: None     Non-medical: None   Tobacco Use    Smoking status: Heavy Tobacco Smoker     Packs/day: 0.50     Years: 30.00     Pack years: 15.00    Smokeless tobacco: Never Used   Substance and Sexual Activity    Alcohol use:  Yes    Drug use: No    Sexual activity: Never   Lifestyle    Physical activity:     Days per week: None     Minutes per session: None    Stress: None   Relationships    Social connections:     Talks on phone: None     Gets together: None     Attends Sikh service: None     Active member of club or organization: None     Attends meetings of clubs or organizations: None     Relationship status: None    Intimate partner violence:     Fear of current or ex partner: None     Emotionally abused: None     Physically abused: None     Forced sexual activity: None   Other Topics Concern    None   Social History

## 2019-10-13 DIAGNOSIS — F41.9 ANXIETY: ICD-10-CM

## 2019-10-13 DIAGNOSIS — E78.2 MIXED HYPERLIPIDEMIA: ICD-10-CM

## 2019-10-14 RX ORDER — CITALOPRAM 20 MG/1
TABLET ORAL
Qty: 90 TABLET | Refills: 4 | Status: SHIPPED | OUTPATIENT
Start: 2019-10-14 | End: 2021-10-11 | Stop reason: SDUPTHER

## 2019-10-14 RX ORDER — ROSUVASTATIN CALCIUM 10 MG/1
TABLET, COATED ORAL
Qty: 90 TABLET | Refills: 4 | Status: SHIPPED | OUTPATIENT
Start: 2019-10-14 | End: 2021-06-23 | Stop reason: SDUPTHER

## 2019-10-30 ENCOUNTER — OFFICE VISIT (OUTPATIENT)
Dept: PULMONOLOGY | Age: 69
End: 2019-10-30
Payer: MEDICARE

## 2019-10-30 VITALS
BODY MASS INDEX: 30.84 KG/M2 | SYSTOLIC BLOOD PRESSURE: 138 MMHG | HEIGHT: 62 IN | HEART RATE: 73 BPM | RESPIRATION RATE: 16 BRPM | DIASTOLIC BLOOD PRESSURE: 70 MMHG | OXYGEN SATURATION: 100 % | TEMPERATURE: 97.5 F | WEIGHT: 167.6 LBS

## 2019-10-30 DIAGNOSIS — G47.33 OSA ON CPAP: Primary | ICD-10-CM

## 2019-10-30 DIAGNOSIS — Z72.0 TOBACCO USE: ICD-10-CM

## 2019-10-30 DIAGNOSIS — Z99.89 OSA ON CPAP: Primary | ICD-10-CM

## 2019-10-30 PROCEDURE — 99213 OFFICE O/P EST LOW 20 MIN: CPT | Performed by: INTERNAL MEDICINE

## 2019-10-30 ASSESSMENT — ENCOUNTER SYMPTOMS
SHORTNESS OF BREATH: 0
ABDOMINAL PAIN: 0
SINUS PRESSURE: 0
WHEEZING: 0
CHEST TIGHTNESS: 0
DIARRHEA: 0
SORE THROAT: 0
VOMITING: 0
NAUSEA: 0
RHINORRHEA: 0
COUGH: 0

## 2019-12-24 ENCOUNTER — CARE COORDINATION (OUTPATIENT)
Dept: CARE COORDINATION | Age: 69
End: 2019-12-24

## 2019-12-31 ENCOUNTER — TELEPHONE (OUTPATIENT)
Dept: PHARMACY | Facility: CLINIC | Age: 69
End: 2019-12-31

## 2019-12-31 ENCOUNTER — CARE COORDINATION (OUTPATIENT)
Dept: CARE COORDINATION | Age: 69
End: 2019-12-31

## 2019-12-31 NOTE — LETTER
[id number] [address] [place] [address]           01/02/20     Dear Allie Steward are eligible for a complete medication therapy review performed by a [address] [place] [address],[address] licensed clinical pharmacist. This review helps ensure that you are getting the most benefit from the medications you receive and includes the following:  ? Review of your medications, including over-the-counter and herbal medications. ? Answering questions about your medications and how to get the most benefit from them. ? Identifying and helping to prevent potential drug interactions or side effects. ? Possibly identifying less costly alternatives that are equally effective. Under this program, [employer] will work with you and your doctor to manage your drug therapy. Please contact the [address] [place] [address] office to set up a time for your medication review with one of our clinical pharmacists. To contact us call [phone] or [phone], and select Option 7. This will be a phone consult and therefore will not require a trip to the medical office. Please note: This is an optional program.  It is a free service provided to help ensure that your medicines are safe, necessary, and effective. Your participation is encouraged, but not required.     Sincerely,  Johnie R DAJUAN Nuñez   Department, toll free: [phone], option 7

## 2019-12-31 NOTE — TELEPHONE ENCOUNTER
Received a referral:  from Care Coordinator to review patients medications. Called patient to schedule a time to speak with a pharmacist over the telephone. No answer left VM: Please contact us at [phone] Option #7 to schedule this appointment. Pharmacists are available Monday thru Friday 7:30 AM till 5:30 PM.    Susana Christianson CP.   [facility]  Department, toll free: [phone], option 7

## 2020-01-07 ENCOUNTER — CARE COORDINATION (OUTPATIENT)
Dept: CARE COORDINATION | Age: 70
End: 2020-01-07

## 2020-01-09 NOTE — TELEPHONE ENCOUNTER
=======================================================    CLINICAL PHARMACY NOTE - Medication Review  Patient outreach to review medications - Spoke with patient. SUBJECTIVE/OBJECTIVE:   Meron Moreno is a 81 y.o. female referred to a clinical pharmacy specialist by care coordination for a medication review. Patient's goal for this appointment is to go through her medications to assure that she is taking what she is supposed to and to talk about drug interactions. Medications:  Current Outpatient Medications   Medication Sig Dispense Refill    meloxicam (MOBIC) 15 MG tablet Take 1 tablet by mouth daily 90 tablet 0    rosuvastatin (CRESTOR) 10 MG tablet TAKE 1 TABLET DAILY 90 tablet 4    citalopram (CELEXA) 20 MG tablet TAKE 1 TABLET DAILY 90 tablet 4    docusate sodium (COLACE) 100 MG capsule    ** Patient not taking**   Take 100 mg by mouth 2 times daily as needed for Constipation      aspirin 81 MG EC tablet    **Patient not taking** Take 1 tablet by mouth 2 times daily (Patient not taking: Reported on 10/30/2019) 60 tablet 0    fluticasone (FLONASE) 50 MCG/ACT nasal spray    **Patient not taking**   1 spray by Each Nare route daily      albuterol sulfate  (90 Base) MCG/ACT inhaler    **Patient not taking**   Inhale 2 puffs into the lungs every 6 hours as needed for Wheezing      CPAP Machine MISC by Does not apply route      fexofenadine (ALLEGRA) 180 MG tablet Take 180 mg by mouth daily      ammonium lactate (LAC-HYDRIN) 12 % lotion    **Patient not taking**   Apply topically daily.  (Patient not taking: Reported on 10/30/2019) 1 Bottle 0    Respiratory Therapy Supplies [facility] New CPAP mask and supplies 1 Device 0       Medications to be added:  · Aller-García 50 (fluticasone propionate) mcg/spray - use once every morning in both nostrils  · Women's multivitamin - take 1 tablet by mouth daily  · Clotrimazole 1% antifungal cream - apply a thin layer over affected area twice daily (morning and night)    Other:  · Patient has received Kenalog and Lidocaine injections from [facility] in the past. Since she is undergoing right total knee arthroplasty in March 2020, she will not be getting any more of these injections    Allergies:    Allergies   Allergen Reactions    Influenza Vaccines Other (See Comments)     Severe migraine    Seasonal Other (See Comments)     Tree pollen, mold, dander causes sinus congestion       Pertinent Labs/Vitals:  BP Readings from Last 3 Encounters:   10/30/19 138/70   08/30/19 (!) 161/87   02/26/19 130/72     No results found for: Carmen Osorio [id number]  Lab Results   Component Value Date    LABA1C 6.0 (H) 08/29/2018     Lab Results   Component Value Date    CHOL 141 08/29/2018    TRIG 135 08/29/2018    HDL 51 08/29/2018    LDLCALC 63 08/29/2018     ALT   Date Value Ref Range Status   08/29/2018 20 0 - 33 U/L Final     AST   Date Value Ref Range Status   08/29/2018 22 0 - 35 U/L Final     The 10-year ASCVD risk score (Severo Kea., et al., 2013) is: 12.1%    Values used to calculate the score:      Age: 81 years      Sex: Female      Is Non- : No      Diabetic: No      Tobacco smoker: Yes      Systolic Blood Pressure: 223 mmHg      Is BP treated: No      HDL Cholesterol: 51 mg/dL      Total Cholesterol: 141 mg/dL     Lab Results   Component Value Date    CREATININE 0.68 02/18/2019       CrCL ~76.8 mL/min (calculated using sCr 0.68 mg/dL, Ht 62\", AdjBW 60.6 kg, using C-G equation)     Social History:   Social History     Tobacco Use    Smoking status: Heavy Tobacco Smoker     Packs/day: 0.50     Years: 30.00     Pack years: 15.00    Smokeless tobacco: Never Used   Substance Use Topics    Alcohol use: Yes       Immunizations:   Most Recent Immunizations   Administered Date(s) Administered    Tdap (Boostrix, Adacel) 05/31/2018         ASSESSMENT/PLAN:   - General Assessment:   · Adherence: no issues - uses pill boxes and puts near her coffee machine in the morning to remind her  · Cost: no issues  · Drug interactions: The following clinically significant interactions were identified via [employer] Interaction Analysis as category D or higher:  · Meloxicam/aspirin 81 mg/citalopram - increased risk of bleeding  · Not taking the aspirin  · Counseled patient regarding this interaction - patient said that she has not had any issues with this  · Patient is undergoing right total knee arthroplasty in March 2020 and has undergone left total knee arthroplasty ~ 1 year ago   · Immunizations: Counseled regarding receiving Shingrix and PPSV23 - Patient is hesitant to receive additional vaccinations due to getting a bad migraine after receiving the flu vaccine. Counseled the patient on the importance of receiving the vaccines, especially since she is a current smoker. · Smoking status: Current smoker - as of right now, she is not ready to quit. Advised that she can call us or her doctor's office when she is ready.   · Cholesterol: (8/29/19) - TC = 141, TG = 135, HDL = 51, LDL = 63      - Upcoming appointments:   Future Appointments   Date Time Provider Iain Medina   1/10/2020  7:30 AM SCHEDULE, S CLINICAL PHARMACY S Clin Rx None   2/19/2020  8:00 AM [address] [place] [address] 140MD Chasidy Taye 94   2/27/2020 10:00 AM MLOZ PAT RM 3 MLOZ PAT 10 [facility]   11/4/2020 10:00 AM Amarjit Martinez MD [address] [place] [address], PharmD Candidate [id number]  [facility]  Dept: [phone], option 7

## 2020-01-10 ENCOUNTER — SCHEDULED TELEPHONE ENCOUNTER (OUTPATIENT)
Dept: PHARMACY | Facility: CLINIC | Age: 70
End: 2020-01-10

## 2020-01-10 RX ORDER — CLOTRIMAZOLE 1 %
CREAM (GRAM) TOPICAL
Status: ON HOLD | COMMUNITY
End: 2020-03-13 | Stop reason: ALTCHOICE

## 2020-01-10 NOTE — TELEPHONE ENCOUNTER
Reviewed and agree with PharmD candidate below. · Medication list updated (removed: aspirin, docusate, albuterol, ammonium lactate; added: multivitamin, clotrimazole)    Leticia Taylor PharmD, [facility]  Direct: [phone]  Department, toll free: [phone], option 7     =======================================================   For Pharmacy Admin Tracking Only    PHSO: Yes  Total # of Interventions Recommended: 3  - Updated Order #: 1 Updated Order Reason(s):  Other  Recommended intervention potential cost savings: 1  Total Interventions Accepted: 1  Time Spent (min): 30

## 2020-01-14 ENCOUNTER — CARE COORDINATION (OUTPATIENT)
Dept: CARE COORDINATION | Age: 70
End: 2020-01-14

## 2020-01-14 SDOH — HEALTH STABILITY: MENTAL HEALTH
STRESS IS WHEN SOMEONE FEELS TENSE, NERVOUS, ANXIOUS, OR CAN'T SLEEP AT NIGHT BECAUSE THEIR MIND IS TROUBLED. HOW STRESSED ARE YOU?: ONLY A LITTLE

## 2020-01-14 SDOH — ECONOMIC STABILITY: FOOD INSECURITY: WITHIN THE PAST 12 MONTHS, THE FOOD YOU BOUGHT JUST DIDN'T LAST AND YOU DIDN'T HAVE MONEY TO GET MORE.: NEVER TRUE

## 2020-01-14 SDOH — ECONOMIC STABILITY: TRANSPORTATION INSECURITY
IN THE PAST 12 MONTHS, HAS THE LACK OF TRANSPORTATION KEPT YOU FROM MEDICAL APPOINTMENTS OR FROM GETTING MEDICATIONS?: NO

## 2020-01-14 SDOH — ECONOMIC STABILITY: INCOME INSECURITY: HOW HARD IS IT FOR YOU TO PAY FOR THE VERY BASICS LIKE FOOD, HOUSING, MEDICAL CARE, AND HEATING?: NOT HARD AT ALL

## 2020-01-14 SDOH — SOCIAL STABILITY: SOCIAL NETWORK
DO YOU BELONG TO ANY CLUBS OR ORGANIZATIONS SUCH AS CHURCH GROUPS UNIONS, FRATERNAL OR ATHLETIC GROUPS, OR SCHOOL GROUPS?: NO

## 2020-01-14 SDOH — SOCIAL STABILITY: SOCIAL NETWORK: HOW OFTEN DO YOU ATTEND CHURCH OR RELIGIOUS SERVICES?: MORE THAN 4 TIMES PER YEAR

## 2020-01-14 SDOH — SOCIAL STABILITY: SOCIAL NETWORK: HOW OFTEN DO YOU GET TOGETHER WITH FRIENDS OR RELATIVES?: TWICE A WEEK

## 2020-01-14 SDOH — HEALTH STABILITY: PHYSICAL HEALTH: ON AVERAGE, HOW MANY MINUTES DO YOU ENGAGE IN EXERCISE AT THIS LEVEL?: 0 MIN

## 2020-01-14 SDOH — ECONOMIC STABILITY: FOOD INSECURITY: WITHIN THE PAST 12 MONTHS, YOU WORRIED THAT YOUR FOOD WOULD RUN OUT BEFORE YOU GOT MONEY TO BUY MORE.: NEVER TRUE

## 2020-01-14 SDOH — SOCIAL STABILITY: SOCIAL NETWORK: HOW OFTEN DO YOU ATTENT MEETINGS OF THE CLUB OR ORGANIZATION YOU BELONG TO?: NEVER

## 2020-01-14 SDOH — SOCIAL STABILITY: SOCIAL NETWORK: ARE YOU MARRIED, WIDOWED, DIVORCED, SEPARATED, NEVER MARRIED, OR LIVING WITH A PARTNER?: DIVORCED

## 2020-01-14 SDOH — SOCIAL STABILITY: SOCIAL NETWORK
IN A TYPICAL WEEK, HOW MANY TIMES DO YOU TALK ON THE PHONE WITH FAMILY, FRIENDS, OR NEIGHBORS?: MORE THAN THREE TIMES A WEEK

## 2020-01-14 SDOH — ECONOMIC STABILITY: TRANSPORTATION INSECURITY
IN THE PAST 12 MONTHS, HAS LACK OF TRANSPORTATION KEPT YOU FROM MEETINGS, WORK, OR FROM GETTING THINGS NEEDED FOR DAILY LIVING?: NO

## 2020-01-14 SDOH — HEALTH STABILITY: PHYSICAL HEALTH: ON AVERAGE, HOW MANY DAYS PER WEEK DO YOU ENGAGE IN MODERATE TO STRENUOUS EXERCISE (LIKE A BRISK WALK)?: 0 DAYS

## 2020-01-14 ASSESSMENT — PATIENT HEALTH QUESTIONNAIRE - PHQ9: SUM OF ALL RESPONSES TO PHQ QUESTIONS 1-9: 2

## 2020-01-14 NOTE — CARE COORDINATION
Ambulatory Care Coordination Note  1/14/2020  CM Risk Score: 4  Charlson 10 Year Mortality Risk Score: 10%     ACC: Boni Brannon, RN    CC Week # 2    Summary Note:    Jess Essex and I continue to work through care coordination assessment. We reviewed CC protocol, depression screening, SDOH screening. Jess Essex tells me that she has a goal and she would like to go for a long hike. She knows that her knee is a barrier for long walks especially with various elevations  She will be following up with Dr Oniel Edwards to see if he is able to help with her mobility  She states that she continues to add walking distance to her daily routine, she will park her car as far away as possible. She does add weight to her walking routine. She also walks short distances in the [place]    Program Enrollment:  Complex Care  Referral from Primary Care Provider:  No  Suggested Interventions and [address]: In Process  Pharmacist:  In Process  Other Services or Interventions:  pharmacy referral initiated, Walk in Clinic usage and hours discussed. Behavioral health services discussed         Goals Addressed                 This Visit's Progress     Wellness Goal        Patient Self-Management Goal for Health Maintenance  Goal: I will increase my physical activity level, as follows: I will work on increasing my strength related to my arthritis through regular exercise. Long term plan is to hike regularly through the [place]. I will exercise for 30 minutes 3-5 days per week. I will follow a healthy diet as discussed by my provider. I will schedule a yearly preventative care visit. Barriers: fear of failure, lack of support and Lack of knowledge regarding area, programs, and exercise groups.   Plan for overcoming my barriers: I will work with my ACM to increase my knowledge and improve my self management skills   Confidence: 8/10  Anticipated Goal Completion

## 2020-01-21 ENCOUNTER — CARE COORDINATION (OUTPATIENT)
Dept: CARE COORDINATION | Age: 70
End: 2020-01-21

## 2020-01-27 RX ORDER — MELOXICAM 15 MG/1
15 TABLET ORAL DAILY
Qty: 90 TABLET | Refills: 0 | Status: ON HOLD | OUTPATIENT
Start: 2020-01-27 | End: 2020-03-27 | Stop reason: HOSPADM

## 2020-01-28 ENCOUNTER — CARE COORDINATION (OUTPATIENT)
Dept: CARE COORDINATION | Age: 70
End: 2020-01-28

## 2020-02-03 ENCOUNTER — OFFICE VISIT (OUTPATIENT)
Dept: FAMILY MEDICINE CLINIC | Age: 70
End: 2020-02-03
Payer: MEDICARE

## 2020-02-03 VITALS
RESPIRATION RATE: 14 BRPM | OXYGEN SATURATION: 97 % | DIASTOLIC BLOOD PRESSURE: 70 MMHG | HEIGHT: 62 IN | TEMPERATURE: 98.1 F | HEART RATE: 111 BPM | SYSTOLIC BLOOD PRESSURE: 126 MMHG | BODY MASS INDEX: 31.1 KG/M2 | WEIGHT: 169 LBS

## 2020-02-03 PROCEDURE — 99213 OFFICE O/P EST LOW 20 MIN: CPT | Performed by: FAMILY MEDICINE

## 2020-02-03 RX ORDER — PREDNISONE 10 MG/1
TABLET ORAL
Qty: 45 TABLET | Refills: 0 | Status: SHIPPED | OUTPATIENT
Start: 2020-02-03 | End: 2020-02-19 | Stop reason: ALTCHOICE

## 2020-02-03 RX ORDER — AMOXICILLIN 500 MG/1
CAPSULE ORAL
COMMUNITY
Start: 2019-10-28 | End: 2020-02-19 | Stop reason: ALTCHOICE

## 2020-02-03 ASSESSMENT — ENCOUNTER SYMPTOMS
WHEEZING: 0
SINUS PAIN: 0
SINUS PRESSURE: 0
VOMITING: 0
COUGH: 0
VOICE CHANGE: 0
SHORTNESS OF BREATH: 0
CHEST TIGHTNESS: 0
FACIAL SWELLING: 0
DIARRHEA: 0
TROUBLE SWALLOWING: 0
ABDOMINAL PAIN: 0
RHINORRHEA: 0
SORE THROAT: 0
NAUSEA: 0

## 2020-02-03 NOTE — PROGRESS NOTES
Subjective:      Patient ID: Letty Coburn is a 81 y.o. female who presents today for:     Chief Complaint   Patient presents with    Rash     Presents today for evaluation of a rash that came on Friday. It is all over her body. The site is red, itchy and raised. Assoicated SX include chills and fatigue. Reports her brother has stage 4 mantacell lymphoma and he started off with a rash like this so she is concerned       HPI     Patient presents for acute visit regarding rash. She reports a 3-day history of erythematous, pruritic rash that developed over chest, abdomen, and back/buttock. She denies any weeping/bleeding areas, she denies any pain over affected areas. She denies any new soaps, lotions, detergents, perfumes, foods, or recent travel. She does report taking a hiking trip 3-4 days ago prior to the rash developing. She denies any similar rash in the past and denies any close contacts with similar rash. She denies any associated facial swelling, dysphagia/difficulty swallowing, throat fullness, cough, wheezing, dyspnea, chest tightness, F/C/S, abdominal pain, N/V, or diarrhea. She has not tried any relieving measures at home.      Past Medical History:   Diagnosis Date    Allergic rhinitis     Anxiety     History of blood transfusion     blood transfusions with c section x 3    Hyperlipidemia     meds > 2 yrs    Osteoarthritis     Sleep apnea      Past Surgical History:   Procedure Laterality Date    ANKLE SURGERY Right     tendon repair   [facility]    pt has had 3     COLONOSCOPY      ENDOSCOPY, COLON, DIAGNOSTIC      TOTAL KNEE ARTHROPLASTY Left 2019    LEFT TOTAL KNEE ARTHROPLASTY, SUPINE, 23 HR OBS, IRAJ CEMENTED PERSONA performed by Vero Morejon MD at [facility]     Family History   Problem Relation Age of Onset    Heart Disease Mother     High Blood Pressure Mother     Vision Loss Mother     Other Mother         leiden factor 5    Diabetes Father     Heart Disease Father     Depression Sister     Diabetes Sister     Obesity Sister     Diabetes Brother     Arthritis Brother     Other Brother         leiden factor 5    Allergy (Severe) Sister     Arthritis Sister     Depression Sister     Arthritis Sister     Depression Sister     Diabetes Sister     Arthritis Sister     Depression Sister     Arthritis Sister     Arthritis Sister     Arthritis Sister     Arthritis Brother     Arthritis Brother     Arthritis Brother     Thyroid Disease Daughter     No Known Problems Son      Social History     Socioeconomic History    Marital status:      Spouse name: Not on file    Number of children: 3    Years of education: 25    Highest education level: Master's degree (e.g., MA, MS, Sybil, MEd, MSW, MIRTA)   Occupational History    Occupation: Teacher    Social Needs    Financial resource strain: Not hard at all   Knottykart insecurity:     Worry: Never true     Inability: Never true    Transportation needs:     Medical: No     Non-medical: No   Tobacco Use    Smoking status: Heavy Tobacco Smoker     Packs/day: 0.50     Years: 30.00     Pack years: 15.00    Smokeless tobacco: Never Used   Substance and Sexual Activity    Alcohol use: Yes     Alcohol/week: 1.0 standard drinks     Types: 1 Shots of liquor per week     Comment: Once per month    Drug use: No    Sexual activity: Not Currently     Partners: Male   Lifestyle    Physical activity:     Days per week: 0 days     Minutes per session: 0 min    Stress:  Only a little   Relationships    Social connections:     Talks on phone: More than three times a week     Gets together: Twice a week     Attends Sabianist service: More than 4 times per year     Active member of club or organization: No     Attends meetings of clubs or organizations: Never     Relationship status:     Intimate partner violence:     Fear of current or ex partner: Not on file     Emotionally headaches. Objective:     /70 (Site: Left Upper Arm, Position: Sitting, Cuff Size: Small Adult)   Pulse 111   Temp 98.1 °F (36.7 °C) (Temporal)   Resp 14   Ht 5' 2\" (1.575 m)   Wt 169 lb (76.7 kg)   LMP 01/16/2008   SpO2 97%   Breastfeeding No   BMI 30.91 kg/m²     Physical Exam  Vitals signs reviewed. Constitutional:       General: She is not in acute distress. Appearance: She is not ill-appearing, toxic-appearing or diaphoretic. HENT:      Head: Normocephalic and atraumatic. Right Ear: External ear normal.      Left Ear: External ear normal.      Nose: No nasal deformity, congestion or rhinorrhea. Mouth/Throat:      Lips: Pink. No lesions. Mouth: Mucous membranes are moist. No oral lesions. Tongue: No lesions. Palate: No lesions. Pharynx: Oropharynx is clear. Uvula midline. No pharyngeal swelling, oropharyngeal exudate, posterior oropharyngeal erythema or uvula swelling. Eyes:      General:         Right eye: No discharge. Left eye: No discharge. Conjunctiva/sclera: Conjunctivae normal.   Neck:      Musculoskeletal: Neck supple. Cardiovascular:      Rate and Rhythm: Normal rate and regular rhythm. Pulmonary:      Effort: Pulmonary effort is normal. No respiratory distress. Breath sounds: Normal breath sounds. No wheezing, rhonchi or rales. Abdominal:      General: There is no distension. Palpations: Abdomen is soft. Tenderness: There is no abdominal tenderness. Musculoskeletal:         General: No swelling. Right lower leg: No edema. Left lower leg: No edema. Lymphadenopathy:      Cervical: No cervical adenopathy. Skin:     Findings: Erythema and rash present. Rash is not macular or urticarial.             Comments: Erythematous, macular rash with scattered urticaria over chest, abdomen, back, and buttocks. Blanchable with noted scattered excoriations. No weeping or bleeding areas, no scabbed areas. Neurological:      Mental Status: She is alert. Ortho Exam (If Applicable)      Assessment & Plan:      1. Rash and nonspecific skin eruption  Patient with rash that appears to be dermatitis/allergic reaction based on appearance. We will treat empirically with a tapered course of prednisone due to size of skin area affected. Patient was instructed to take her fexofenadine daily. She was instructed to return to the office if rash and symptoms were not resolved over the next 2 weeks despite management. The next up would be referral to a specialist should rash persist.  She was instructed to go to the emergency room/call 911 immediately for any dyspnea, throat fullness, difficulty swallowing, chest discomfort, or persistent abdominal pain with intractable nausea/vomiting.    - predniSONE (DELTASONE) 10 MG tablet; Take 5 tabs daily x 3 days, 4 tabs daily x 3 days, 3 tabs daily x 3 days, 2 tabs daily x 3 days, 1 tab daily x 3 days  Dispense: 45 tablet; Refill: 0      Modified Medications    No medications on file          New Prescriptions    PREDNISONE (DELTASONE) 10 MG TABLET    Take 5 tabs daily x 3 days, 4 tabs daily x 3 days, 3 tabs daily x 3 days, 2 tabs daily x 3 days, 1 tab daily x 3 days        There are no discontinued medications. Return if symptoms worsen or fail to improve.     Chantal Chase MD

## 2020-02-04 ENCOUNTER — CARE COORDINATION (OUTPATIENT)
Dept: CARE COORDINATION | Age: 70
End: 2020-02-04

## 2020-02-04 NOTE — CARE COORDINATION
I had enrolled Peg into care coordination 12/3119. I have had only one other phone encounter since that time. Several calls with messages left without a return call. I have left message regarding discharge from the program.  I have also asked Peg to call me with any questions or concerns.

## 2020-02-06 ENCOUNTER — OFFICE VISIT (OUTPATIENT)
Dept: FAMILY MEDICINE CLINIC | Age: 70
End: 2020-02-06
Payer: MEDICARE

## 2020-02-06 VITALS
HEIGHT: 62 IN | HEART RATE: 72 BPM | TEMPERATURE: 97.8 F | DIASTOLIC BLOOD PRESSURE: 60 MMHG | BODY MASS INDEX: 31.65 KG/M2 | WEIGHT: 172 LBS | OXYGEN SATURATION: 96 % | SYSTOLIC BLOOD PRESSURE: 128 MMHG | RESPIRATION RATE: 16 BRPM

## 2020-02-06 PROCEDURE — 99213 OFFICE O/P EST LOW 20 MIN: CPT | Performed by: NURSE PRACTITIONER

## 2020-02-06 RX ORDER — AMOXICILLIN AND CLAVULANATE POTASSIUM 875; 125 MG/1; MG/1
1 TABLET, FILM COATED ORAL 2 TIMES DAILY
Qty: 20 TABLET | Refills: 0 | Status: SHIPPED | OUTPATIENT
Start: 2020-02-06 | End: 2020-02-16

## 2020-02-06 ASSESSMENT — ENCOUNTER SYMPTOMS
WHEEZING: 0
SINUS PAIN: 1
COUGH: 1
SINUS PRESSURE: 1
RHINORRHEA: 1
SHORTNESS OF BREATH: 0

## 2020-02-06 NOTE — PROGRESS NOTES
Subjective  Joon Stevens, 81 y.o. female presents today with:  Chief Complaint   Patient presents with    URI     C/O cough, runny nose x few days. tried robitussin w/ some relief     HPI   Sinus Pain  Gold Morales presents to [facility] for evaluation of sinus pain. Onset was within the last 7 days--> gradually worsening. Symptoms include: sinus pressure, headaches, nasal congestion vs purulent drainage, post nasal drainage, frequent throat clearing. Associated symptoms include: productive cough w/o hemoptysis. Pt denies: fever, chills, sweats, ear pain, shortness of breath, dizziness/ lightheadedness, N/V/D. Care prior to arrival consisted of: hot tea, Robitussin, daily allegra and flonase w/ some relief. Pt was seen by another provider 3d ago for hypersensitivity reaction to unknown trigger, for which she is currently taking prednisone 50 mg/ day w/ a 2- week taper. Hx of chronic sinusitis, chronic bronchitis, allergic rhinitis reported. Current daily smoker. No flu vaccination 2/2 allergy. Patient's medications, allergies, past medical, surgical, social and family histories were reviewed and updated as appropriate. Review of Systems   Constitutional: Positive for fatigue. Negative for chills and fever. HENT: Positive for congestion, postnasal drip, rhinorrhea, sinus pressure and sinus pain. Negative for ear pain and sore throat. Eyes: Negative for photophobia. Respiratory: Positive for cough. Negative for chest tightness, shortness of breath and wheezing. Cardiovascular: Negative for chest pain, palpitations and leg swelling. Gastrointestinal: Negative for abdominal pain, diarrhea, nausea and vomiting. Musculoskeletal: Negative for myalgias. Skin: Negative for rash (resolved). Neurological: Positive for headaches. Negative for dizziness and light-headedness.      Objective  Vitals:    02/06/20 1331   BP: 128/60   Site: Right Upper Arm   Position: Sitting   Cuff Size: Large Adult   Pulse: 72   Resp: 16   Temp: 97.8 °F (36.6 °C)   TempSrc: Oral   SpO2: 96%   Weight: 172 lb (78 kg)   Height: 5' 2\" (1.575 m)     Physical Exam  Vitals signs reviewed. Constitutional:       Appearance: Normal appearance. She is well-groomed. She is not toxic-appearing. Comments: + hyponasal voice   HENT:      Head: Normocephalic and atraumatic. Jaw: There is normal jaw occlusion. Right Ear: Ear canal and external ear normal. No tenderness. A middle ear effusion is present. Tympanic membrane is not erythematous or bulging. Left Ear: Tympanic membrane, ear canal and external ear normal. No tenderness. Nose: Mucosal edema and congestion present. Right Sinus: Maxillary sinus tenderness and frontal sinus tenderness present. Left Sinus: Frontal sinus tenderness present. Mouth/Throat:      Lips: Pink. Mouth: Mucous membranes are moist.      Pharynx: Uvula midline. Posterior oropharyngeal erythema (+ cobblestoned) present. No oropharyngeal exudate. Tonsils: No tonsillar exudate. Swellin+ on the right. 1+ on the left. Eyes:      General: Lids are normal. Gaze aligned appropriately. Extraocular Movements: Extraocular movements intact. Conjunctiva/sclera: Conjunctivae normal.      Pupils: Pupils are equal, round, and reactive to light. Comments: + glasses   Neck:      Musculoskeletal: Normal range of motion and neck supple. Vascular: No JVD. Trachea: Trachea and phonation normal.   Cardiovascular:      Rate and Rhythm: Normal rate and regular rhythm. Pulses: Normal pulses. Heart sounds: S1 normal and S2 normal. No friction rub. No gallop. Pulmonary:      Effort: Pulmonary effort is normal. No tachypnea. Breath sounds: Normal air entry. Examination of the right-lower field reveals decreased breath sounds. Examination of the left-lower field reveals decreased breath sounds.  Decreased breath sounds and rhonchi (scattered bilateral upper lobes, posteriorly) present. No wheezing or rales. Musculoskeletal: Normal range of motion. Right lower leg: No edema. Left lower leg: No edema. Lymphadenopathy:      Cervical: No cervical adenopathy. Skin:     General: Skin is warm and dry. Capillary Refill: Capillary refill takes less than 2 seconds. Findings: No rash. Neurological:      General: No focal deficit present. Mental Status: She is alert and oriented to person, place, and time. Mental status is at baseline. Motor: Motor function is intact. Gait: Gait is intact. Psychiatric:         Mood and Affect: Mood and affect normal.         Speech: Speech normal.         Behavior: Behavior normal. Behavior is cooperative. POC Testing Today: None    Assessment & Plan   81 y.o. female w/ hx of smoking and chronic bronchitis presenting w/ one week of sinus tenderness w/ cough and congestion. Pt is generally well-appearing, w/o dyspnea or significant hypoxia. Antibiotic prescribed. Advised close follow-up w/ PCP if symptoms do not improve or worsen. Diagnoses and all orders for this visit:    Acute rhinosinusitis  -     amoxicillin-clavulanate (AUGMENTIN) 875-125 MG per tablet; Take 1 tablet by mouth 2 times daily for 10 days  - OTC analgesics/ antipyretics prn  - Push fluids and rest, humidification, saline nasal spray, heat application to sinuses prn  - Continue prednisone, Flonase, allegra  - Return/ follow-up w/ PCP if symptoms do not improve in the next 3 to 4 days as anticipated or worsen in any way    Antibiotic Instructions: Complete the full course of antibiotics as ordered. Take each dose with a small snack or meal to lessen potential GI upset. To prevent antibiotic resistance, please take medication as ordered and for the full duration even if you start to feel better.  Consider intake of yogurt or probiotic during antibiotic use and for a few days after to help reduce the risk of developing a secondary infection. Separate the yogurt and antibiotic by at least 1 hour. Avoid alcohol while taking antibiotics. Return for follow-up with your Primary Care Provider. Side effects and adverse effects of any medication prescribed today, as well as treatment plan/rationale, follow-up care, and result expectations have been discussed with the patient. Roslyn Delgadosson expresses understanding and wishes to proceed with the plan. Discussed signs and symptoms which require immediate follow-up in ED/call to 911. Understanding verbalized. I have reviewed and updated the electronic medical record.     Edin Howard, APRN - CNP

## 2020-02-06 NOTE — PATIENT INSTRUCTIONS
Antibiotic Instructions: Complete the full course of antibiotics as ordered. Take each dose with a small snack or meal to lessen potential GI upset. To prevent antibiotic resistance, please take medication as ordered and for the full duration even if you start to feel better. Consider intake of yogurt or probiotic during antibiotic use and for a few days after to help reduce the risk of developing a secondary infection. Separate the yogurt and antibiotic by at least 1 hour. Avoid alcohol while taking antibiotics. Push fluids and rest  Continue prednisone  Continue flonase and allegra  Heat application to sinuses and/or saline nasal spray may be beneficial for congestion    Return or follow-up w/ primary care provider if symptoms do not improve in the next 3 to 4 days as anticipated or worsen in any way    Excessive mucus is often the source of your misery, particularly the pain and pressure, so most recommendations will focus on keeping mucus fluid so you can remove it. Try some of these treatments:  · Breathing warm, moist (wet) air helps loosen the sticky mucus that may make you feel like you are choking  · Fill a humidifier with warm water and breathe in the warm mist  · Take a couple of deep breaths 2 or 3 times every hour.  Deep breaths will help open up your lungs  · Saline nose spray helps keep nasal passages moist and helps remove mucus  · Over the counter pain medications --> ibuprofen (aka Motrin/ Advil) or acetaminophen (aka Tylenol) as needed for pain or fever  · Guaifenesin (in Robitussin and others) can thin secretions and help things clear more quickly    Drink plenty of liquids to help keep mucus thin  · Try to drink at least 6 to 10 cups (1.5 to 2.5 liters) a day  · Water is best. May also consider juice diluted w/ water, or warm tea w/ honey to soothe sore throat  · Avoid orange juice, grapefruit juice, lemonade, or other acidic beverages, which can irritate a sore throat    Prevention measures grocery store or drugstore. Or you can make your own at home by adding 1 teaspoon of salt and 1 teaspoon of baking soda to 2 cups of distilled water. If you make your own, fill a bulb syringe with the solution, insert the tip into your nostril, and squeeze gently. Therisa Crate your nose. · Put a hot, wet towel or a warm gel pack on your face 3 or 4 times a day for 5 to 10 minutes each time. · Try a decongestant nasal spray like oxymetazoline (Afrin). Do not use it for more than 3 days in a row. Using it for more than 3 days can make your congestion worse. When should you call for help? Call your doctor now or seek immediate medical care if:    · You have new or worse swelling or redness in your face or around your eyes. · You have a new or higher fever. Watch closely for changes in your health, and be sure to contact your doctor if:    · You have new or worse facial pain. · The mucus from your nose becomes thicker (like pus) or has new blood in it. · You are not getting better as expected. Where can you learn more? Go to https://[employer]. org and sign in to your appssavvy account. Enter B271 in the [facility] box to learn more about \"Sinusitis: Care Instructions. \"     If you do not have an account, please click on the \"Sign Up Now\" link. Current as of: October 21, 2018  Content Version: 12.1  © 4800-5691 [employer]. Care instructions adapted under license by [facility] ([facility]). If you have questions about a medical condition or this instruction, always ask your healthcare professional. [place] [address] any warranty or liability for your use of this information.

## 2020-02-11 ENCOUNTER — OFFICE VISIT (OUTPATIENT)
Dept: FAMILY MEDICINE CLINIC | Age: 70
End: 2020-02-11
Payer: MEDICARE

## 2020-02-11 VITALS
SYSTOLIC BLOOD PRESSURE: 134 MMHG | WEIGHT: 167 LBS | HEIGHT: 62 IN | HEART RATE: 73 BPM | RESPIRATION RATE: 18 BRPM | BODY MASS INDEX: 30.73 KG/M2 | DIASTOLIC BLOOD PRESSURE: 70 MMHG | TEMPERATURE: 97.4 F | OXYGEN SATURATION: 97 %

## 2020-02-11 PROBLEM — J41.0 SIMPLE CHRONIC BRONCHITIS (HCC): Status: ACTIVE | Noted: 2020-02-11

## 2020-02-11 PROCEDURE — 99214 OFFICE O/P EST MOD 30 MIN: CPT | Performed by: NURSE PRACTITIONER

## 2020-02-11 RX ORDER — AZITHROMYCIN 250 MG/1
TABLET, FILM COATED ORAL
Qty: 6 TABLET | Refills: 0 | Status: SHIPPED | OUTPATIENT
Start: 2020-02-11 | End: 2020-02-19 | Stop reason: ALTCHOICE

## 2020-02-11 ASSESSMENT — PATIENT HEALTH QUESTIONNAIRE - PHQ9
SUM OF ALL RESPONSES TO PHQ9 QUESTIONS 1 & 2: 0
SUM OF ALL RESPONSES TO PHQ QUESTIONS 1-9: 0
SUM OF ALL RESPONSES TO PHQ QUESTIONS 1-9: 0
1. LITTLE INTEREST OR PLEASURE IN DOING THINGS: 0
2. FEELING DOWN, DEPRESSED OR HOPELESS: 0

## 2020-02-11 NOTE — PROGRESS NOTES
tolerating Crestor with no side effects. 7.  Mood has been stable on Celexa with no side effects reported. Continue the same. 8.  Recommend bone density scan. Order given. 9.  We will check for hemoglobin A1c with next lab as well. She does have a strong family history of diabetes. 10.  Order given for CT cardiac calcium scoring to be done at [facility]. She has a strong family history of coronary artery disease and would like to know a better assessment of her risk factors and would be interested in seeing an interventional cardiologist if her risk is high. 11.  12. She is scheduled for total knee replacement of the right side next month. She had a left total knee replacement last year and surgery went well. 13.  Discussed recommendation for colorectal cancer screening and order given for gastroenterology. Instructions: The patient is instructed to take Probiotic tablets twice a day for the duration of antibiotic therapy and for 4 days after completion of antibiotics. This will help restore the good bacteria to your colon and prevent side effects of antibiotic therapy such as cramping and diarrhea. Probiotic tablets can be found at your local pharmacy over the counter. Ask your pharmacist if you need help finding tablets. Please note this report has been partially produced using speech recognition software and may cause contain errors related to that system including grammar, punctuation and spelling as well as words and phrases that may seem inappropriate. If there are questions or concerns please feel free to contact me to clarify.         Electronically signed by Flavia Falk, 5:39 PM 2/11/20

## 2020-02-17 ASSESSMENT — ENCOUNTER SYMPTOMS
CHEST TIGHTNESS: 0
SORE THROAT: 0
VOMITING: 0
PHOTOPHOBIA: 0
DIARRHEA: 0
NAUSEA: 0
ABDOMINAL PAIN: 0

## 2020-02-18 ENCOUNTER — HOSPITAL ENCOUNTER (OUTPATIENT)
Dept: LAB | Age: 70
Discharge: HOME | End: 2020-02-18
Payer: MEDICARE

## 2020-02-18 LAB
ALBUMIN SERPL-MCNC: 4 G/DL (ref 3.5–4.6)
ALP BLD-CCNC: 60 U/L (ref 40–130)
ALT SERPL-CCNC: 18 U/L (ref 0–33)
ANION GAP SERPL CALCULATED.3IONS-SCNC: 14 MEQ/L (ref 9–15)
AST SERPL-CCNC: 18 U/L (ref 0–35)
BASOPHILS ABSOLUTE: 0 K/UL (ref 0–0.2)
BASOPHILS RELATIVE PERCENT: 0.5 %
BILIRUB SERPL-MCNC: 0.5 MG/DL (ref 0.2–0.7)
BUN BLDV-MCNC: 11 MG/DL (ref 8–23)
CALCIUM SERPL-MCNC: 9.2 MG/DL (ref 8.5–9.9)
CHLORIDE BLD-SCNC: 102 MEQ/L (ref 95–107)
CHOLESTEROL, TOTAL: 133 MG/DL (ref 0–199)
CO2: 24 MEQ/L (ref 20–31)
CREAT SERPL-MCNC: 0.86 MG/DL (ref 0.5–0.9)
EOSINOPHILS ABSOLUTE: 0.1 K/UL (ref 0–0.7)
EOSINOPHILS RELATIVE PERCENT: 1.2 %
GFR AFRICAN AMERICAN: >60
GFR NON-AFRICAN AMERICAN: >60
GLOBULIN: 2.8 G/DL (ref 2.3–3.5)
GLUCOSE BLD-MCNC: 125 MG/DL (ref 70–99)
HBA1C MFR BLD: 6.7 % (ref 4.8–5.9)
HCT VFR BLD CALC: 40.1 % (ref 37–47)
HDLC SERPL-MCNC: 53 MG/DL (ref 40–59)
HEMOGLOBIN: 13.4 G/DL (ref 12–16)
LDL CHOLESTEROL CALCULATED: 50 MG/DL (ref 0–129)
LYMPHOCYTES ABSOLUTE: 1.7 K/UL (ref 1–4.8)
LYMPHOCYTES RELATIVE PERCENT: 25.4 %
MCH RBC QN AUTO: 32.5 PG (ref 27–31.3)
MCHC RBC AUTO-ENTMCNC: 33.5 % (ref 33–37)
MCV RBC AUTO: 97 FL (ref 82–100)
MONOCYTES ABSOLUTE: 0.6 K/UL (ref 0.2–0.8)
MONOCYTES RELATIVE PERCENT: 8.4 %
NEUTROPHILS ABSOLUTE: 4.4 K/UL (ref 1.4–6.5)
NEUTROPHILS RELATIVE PERCENT: 64.5 %
PDW BLD-RTO: 14.1 % (ref 11.5–14.5)
PLATELET # BLD: 239 K/UL (ref 130–400)
POTASSIUM SERPL-SCNC: 4.5 MEQ/L (ref 3.4–4.9)
RBC # BLD: 4.13 M/UL (ref 4.2–5.4)
SODIUM BLD-SCNC: 140 MEQ/L (ref 135–144)
TOTAL PROTEIN: 6.8 G/DL (ref 6.3–8)
TRIGL SERPL-MCNC: 152 MG/DL (ref 0–150)
WBC # BLD: 6.8 K/UL (ref 4.8–10.8)

## 2020-02-18 PROCEDURE — 80061 LIPID PANEL: CPT

## 2020-02-18 PROCEDURE — 36415 COLL VENOUS BLD VENIPUNCTURE: CPT

## 2020-02-18 PROCEDURE — 83036 HEMOGLOBIN GLYCOSYLATED A1C: CPT

## 2020-02-18 PROCEDURE — 80053 COMPREHEN METABOLIC PANEL: CPT

## 2020-02-18 PROCEDURE — 85025 COMPLETE CBC W/AUTO DIFF WBC: CPT

## 2020-02-18 NOTE — RESULT ENCOUNTER NOTE
Please notify Irma Shirley that lab results are normal other than more elevation in her glucose levels. Fasting glucose is 125 and hemoglobin A1c is 6.7. Glyceride levels are elevated as well which typically occurs with elevated glucose. This is likely secondary to recent prednisone. With her family history of diabetes, this level should be repeated after surgery and I recommend a follow-up to discuss recommended dietary and lifestyle changes to help reduce glucose levels in the future. For now I recommend reducing simple sugars and starches in the diet and increasing physical activity as tolerated and we can discuss further at her next appointment.

## 2020-02-19 ENCOUNTER — OFFICE VISIT (OUTPATIENT)
Dept: FAMILY MEDICINE CLINIC | Age: 70
End: 2020-02-19
Payer: MEDICARE

## 2020-02-19 VITALS
HEART RATE: 86 BPM | WEIGHT: 165.2 LBS | HEIGHT: 62 IN | BODY MASS INDEX: 30.4 KG/M2 | SYSTOLIC BLOOD PRESSURE: 126 MMHG | TEMPERATURE: 98.6 F | DIASTOLIC BLOOD PRESSURE: 70 MMHG | OXYGEN SATURATION: 97 % | RESPIRATION RATE: 16 BRPM

## 2020-02-19 PROCEDURE — 99214 OFFICE O/P EST MOD 30 MIN: CPT | Performed by: FAMILY MEDICINE

## 2020-02-19 ASSESSMENT — ENCOUNTER SYMPTOMS
BLOOD IN STOOL: 0
VOMITING: 0
COUGH: 0
SHORTNESS OF BREATH: 0
NAUSEA: 0
CONSTIPATION: 0
CHEST TIGHTNESS: 0
APNEA: 0
DIARRHEA: 0
ABDOMINAL PAIN: 0
BACK PAIN: 0

## 2020-02-19 NOTE — PROGRESS NOTES
Subjective:      Patient ID: Dori Emmanuel is a 81 y.o. female who presents today for:     Chief Complaint   Patient presents with    Pre-op Exam     Patient presents with medical clearance for right knee replacement with Dr. Shira Denise on 2020 @ [address] [facility]. Patient states she had lab testing done on 2020 and will have testing done next week as well. HPI  Patient 79-year-old female presents today to follow-up for pre-operative assessment for an upcoming right knee total arthroplasty on 3/13/2020. Preadmission testing has yet to be performed the patient had lab work done yesterday. Patient has failed conservative management including anti-inflammatories, steroid injections and wishes to proceed with surgery. She had a left total knee arthroplasty last year and tolerated the procedure well with out any complications or anesthesia concerns. She remains active and walks 3-4 times per week at the [facility] and is able to tolerate between 4 and 10 METs without any significant difficulty; her only limitation is pain from her right knee. She denies any chest pain, shortness of breath or lower extremity edema. Of note, recent lab work showed a hemoglobin A1c of 6.7 which confirms a new diagnosis of diabetes. Patient denies any polyuria polydipsia or polyphagia, numbness or tingling.   She Wishes to proceed with dietary modification instead of medication at this time  Past Medical History:   Diagnosis Date    Allergic rhinitis     Anxiety     History of blood transfusion     blood transfusions with c section x 3    Hyperlipidemia     meds > 2 yrs    Osteoarthritis     Sleep apnea      Past Surgical History:   Procedure Laterality Date    ANKLE SURGERY Right 2000    tendon repair     [address]    pt has had 3     COLONOSCOPY      ENDOSCOPY, COLON, DIAGNOSTIC      TOTAL KNEE ARTHROPLASTY Left 2019    LEFT TOTAL KNEE ARTHROPLASTY, SUPINE, 23 HR OBS, Abhi Edmar 126/70 (Site: Right Upper Arm, Position: Sitting, Cuff Size: Large Adult)   Pulse 86   Temp 98.6 °F (37 °C) (Temporal)   Resp 16   Ht 5' 2\" (1.575 m)   Wt 165 lb 3.2 oz (74.9 kg)   LMP 01/16/2008   SpO2 97%   BMI 30.22 kg/m²     Physical Exam  Vitals signs reviewed. Constitutional:       General: She is not in acute distress. Appearance: Normal appearance. She is well-developed and normal weight. She is not diaphoretic. HENT:      Head: Normocephalic and atraumatic. Right Ear: Tympanic membrane, ear canal and external ear normal.      Left Ear: Tympanic membrane, ear canal and external ear normal.      Nose: Nose normal.      Mouth/Throat:      Mouth: Mucous membranes are moist.      Pharynx: Oropharynx is clear. No oropharyngeal exudate. Eyes:      General: No scleral icterus. Right eye: No discharge. Left eye: No discharge. Extraocular Movements: Extraocular movements intact. Conjunctiva/sclera: Conjunctivae normal.      Pupils: Pupils are equal, round, and reactive to light. Neck:      Musculoskeletal: Normal range of motion and neck supple. Thyroid: No thyromegaly. Vascular: No carotid bruit or JVD. Cardiovascular:      Rate and Rhythm: Normal rate and regular rhythm. Pulses: Normal pulses. Heart sounds: Normal heart sounds. No murmur. No friction rub. No gallop. Pulmonary:      Effort: Pulmonary effort is normal. No respiratory distress. Breath sounds: Normal breath sounds. No stridor. No wheezing or rales. Chest:      Chest wall: No tenderness. Abdominal:      General: Abdomen is flat. Bowel sounds are normal. There is no distension. Palpations: Abdomen is soft. There is no mass. Tenderness: There is no abdominal tenderness. There is no right CVA tenderness, left CVA tenderness, guarding or rebound. Musculoskeletal:         General: No swelling or deformity. Right knee: She exhibits decreased range of motion. She exhibits no swelling, no effusion, no ecchymosis, no laceration and no erythema. Tenderness found. Medial joint line tenderness noted. Lymphadenopathy:      Cervical: No cervical adenopathy. Skin:     General: Skin is warm and dry. Coloration: Skin is not pale. Findings: No erythema or rash. Neurological:      General: No focal deficit present. Mental Status: She is alert and oriented to person, place, and time. Mental status is at baseline. Cranial Nerves: No cranial nerve deficit. Motor: No abnormal muscle tone. Coordination: Coordination normal.      Deep Tendon Reflexes: Reflexes are normal and symmetric. Reflexes normal.   Psychiatric:         Behavior: Behavior normal.         Thought Content: Thought content normal.         Judgment: Judgment normal.         Assessment & Plan:     1. Preoperative examination  The review of lab work and EKG. Patient is an acceptable risk     2. Type 2 diabetes mellitus without complication, without long-term current use of insulin ([address]  Patient would like to try dietary modification. Given resources to diabetes. org as well as a handout  reCommend follow-up in 3 months for repeat hemoglobin A1c      Return in about 3 months (around 5/19/2020), or if symptoms worsen or fail to improve.     Samuel Up MD

## 2020-02-20 ENCOUNTER — TELEPHONE (OUTPATIENT)
Dept: FAMILY MEDICINE CLINIC | Age: 70
End: 2020-02-20

## 2020-02-20 NOTE — TELEPHONE ENCOUNTER
----- Message from Nikki Siu sent at 2/19/2020 11:42 AM EST -----  Patient seen [facility] today. . stated no fever has been present. Cough has gotten better but she would still like x-ray.

## 2020-02-24 ENCOUNTER — HOSPITAL ENCOUNTER (OUTPATIENT)
Dept: GENERAL RADIOLOGY | Age: 70
Discharge: HOME | End: 2020-02-26
Payer: MEDICARE

## 2020-02-24 ENCOUNTER — HOSPITAL ENCOUNTER (OUTPATIENT)
Age: 70
Discharge: HOME | End: 2020-02-26
Payer: MEDICARE

## 2020-02-24 PROCEDURE — 71046 X-RAY EXAM CHEST 2 VIEWS: CPT

## 2020-02-26 ENCOUNTER — HOSPITAL ENCOUNTER (OUTPATIENT)
Dept: WOMENS IMAGING | Age: 70
Discharge: HOME | End: 2020-02-28
Payer: MEDICARE

## 2020-02-26 PROCEDURE — 77080 DXA BONE DENSITY AXIAL: CPT

## 2020-02-27 ENCOUNTER — HOSPITAL ENCOUNTER (OUTPATIENT)
Dept: PREADMISSION TESTING | Age: 70
Discharge: HOME | End: 2020-03-02
Payer: MEDICARE

## 2020-02-27 ENCOUNTER — TELEPHONE (OUTPATIENT)
Dept: FAMILY MEDICINE CLINIC | Age: 70
End: 2020-02-27

## 2020-02-27 VITALS
BODY MASS INDEX: 29.38 KG/M2 | WEIGHT: 165.8 LBS | DIASTOLIC BLOOD PRESSURE: 78 MMHG | SYSTOLIC BLOOD PRESSURE: 143 MMHG | TEMPERATURE: 97.1 F | HEART RATE: 73 BPM | OXYGEN SATURATION: 99 % | RESPIRATION RATE: 16 BRPM | HEIGHT: 63 IN

## 2020-02-27 LAB
BACTERIA: NEGATIVE /HPF
BILIRUBIN URINE: NEGATIVE
BLOOD, URINE: ABNORMAL
CLARITY: CLEAR
COLOR: YELLOW
EKG ATRIAL RATE: 72 BPM
EKG P AXIS: 33 DEGREES
EKG P-R INTERVAL: 130 MS
EKG Q-T INTERVAL: 386 MS
EKG QRS DURATION: 76 MS
EKG QTC CALCULATION (BAZETT): 422 MS
EKG R AXIS: 24 DEGREES
EKG T AXIS: 14 DEGREES
EKG VENTRICULAR RATE: 72 BPM
EPITHELIAL CELLS, UA: NORMAL /HPF (ref 0–5)
GLUCOSE URINE: NEGATIVE MG/DL
HYALINE CASTS: NORMAL /HPF (ref 0–5)
KETONES, URINE: NEGATIVE MG/DL
LEUKOCYTE ESTERASE, URINE: NEGATIVE
NITRITE, URINE: NEGATIVE
PH UA: 6.5 (ref 5–9)
PROTEIN UA: NEGATIVE MG/DL
RBC UA: NORMAL /HPF (ref 0–5)
SPECIFIC GRAVITY UA: 1.01 (ref 1–1.03)
URINE REFLEX TO CULTURE: YES
UROBILINOGEN, URINE: 0.2 E.U./DL
WBC UA: NORMAL /HPF (ref 0–5)

## 2020-02-27 PROCEDURE — 81001 URINALYSIS AUTO W/SCOPE: CPT

## 2020-02-27 PROCEDURE — 93005 ELECTROCARDIOGRAM TRACING: CPT | Performed by: ORTHOPAEDIC SURGERY

## 2020-02-27 PROCEDURE — 87086 URINE CULTURE/COLONY COUNT: CPT

## 2020-02-27 RX ORDER — CEFAZOLIN SODIUM 2 G/50ML
2 SOLUTION INTRAVENOUS ONCE
Status: CANCELLED | OUTPATIENT
Start: 2020-03-13

## 2020-02-27 NOTE — TELEPHONE ENCOUNTER
1st attempt to reach patient. Called patient @ [phone]    and left message on machine for patient to return call during normal business hours of 8:30 AM and 5 PM @ [phone] option 2.

## 2020-02-27 NOTE — RESULT ENCOUNTER NOTE
Please notify Winifred Mercado that bone density scan results are normal. Follow up as scheduled and as needed.

## 2020-02-27 NOTE — TELEPHONE ENCOUNTER
Generally it is recommended to stop taking her anti-inflammatory medication, Mobic at least 7 days prior to surgery but I would recommend that she clarify this with her orthopedic surgeon's office. She should also avoid any other known blood thinning medication like fish oil or vitamin E supplement for at least 7 days.

## 2020-02-27 NOTE — TELEPHONE ENCOUNTER
Patient called and wanted to know when she needed to stop taking her medications for her knee surgery on 3/13/2020.

## 2020-02-28 PROCEDURE — 93010 ELECTROCARDIOGRAM REPORT: CPT | Performed by: INTERNAL MEDICINE

## 2020-02-29 LAB — URINE CULTURE, ROUTINE: NORMAL

## 2020-02-29 RX ORDER — SODIUM CHLORIDE 0.9 % (FLUSH) 0.9 %
10 SYRINGE (ML) INJECTION PRN
Status: CANCELLED | OUTPATIENT
Start: 2020-03-13

## 2020-02-29 RX ORDER — SODIUM CHLORIDE, SODIUM LACTATE, POTASSIUM CHLORIDE, CALCIUM CHLORIDE 600; 310; 30; 20 MG/100ML; MG/100ML; MG/100ML; MG/100ML
INJECTION, SOLUTION INTRAVENOUS CONTINUOUS
Status: CANCELLED | OUTPATIENT
Start: 2020-03-13

## 2020-02-29 RX ORDER — LIDOCAINE HYDROCHLORIDE 10 MG/ML
1 INJECTION, SOLUTION EPIDURAL; INFILTRATION; INTRACAUDAL; PERINEURAL
Status: CANCELLED | OUTPATIENT
Start: 2020-03-13 | End: 2020-03-13

## 2020-02-29 RX ORDER — SODIUM CHLORIDE 0.9 % (FLUSH) 0.9 %
10 SYRINGE (ML) INJECTION EVERY 12 HOURS SCHEDULED
Status: CANCELLED | OUTPATIENT
Start: 2020-03-13

## 2020-03-10 ENCOUNTER — OFFICE VISIT (OUTPATIENT)
Dept: FAMILY MEDICINE CLINIC | Age: 70
End: 2020-03-10
Payer: MEDICARE

## 2020-03-10 VITALS
WEIGHT: 161.6 LBS | RESPIRATION RATE: 14 BRPM | HEART RATE: 68 BPM | TEMPERATURE: 96.5 F | HEIGHT: 63 IN | BODY MASS INDEX: 28.63 KG/M2 | SYSTOLIC BLOOD PRESSURE: 124 MMHG | DIASTOLIC BLOOD PRESSURE: 68 MMHG | OXYGEN SATURATION: 96 %

## 2020-03-10 PROCEDURE — 99214 OFFICE O/P EST MOD 30 MIN: CPT | Performed by: FAMILY MEDICINE

## 2020-03-10 RX ORDER — METFORMIN HYDROCHLORIDE 500 MG/1
500 TABLET, EXTENDED RELEASE ORAL
Qty: 90 TABLET | Refills: 0 | Status: SHIPPED | OUTPATIENT
Start: 2020-03-10 | End: 2020-07-10 | Stop reason: SDUPTHER

## 2020-03-10 RX ORDER — GLUCOSAMINE HCL/CHONDROITIN SU 500-400 MG
CAPSULE ORAL
Qty: 100 STRIP | Refills: 5 | Status: SHIPPED | OUTPATIENT
Start: 2020-03-10 | End: 2021-08-05 | Stop reason: SDUPTHER

## 2020-03-10 RX ORDER — BLOOD PRESSURE TEST KIT
KIT MISCELLANEOUS
Qty: 1 EACH | Refills: 5 | Status: SHIPPED | OUTPATIENT
Start: 2020-03-10 | End: 2021-08-05 | Stop reason: SDUPTHER

## 2020-03-10 RX ORDER — LANCETS 30 GAUGE
EACH MISCELLANEOUS
Qty: 1 EACH | Refills: 5 | Status: SHIPPED | OUTPATIENT
Start: 2020-03-10 | End: 2021-08-05 | Stop reason: SDUPTHER

## 2020-03-12 ENCOUNTER — ANESTHESIA EVENT (OUTPATIENT)
Dept: OPERATING ROOM | Age: 70
DRG: 470 | End: 2020-03-12
Payer: MEDICARE

## 2020-03-12 ASSESSMENT — LIFESTYLE VARIABLES: SMOKING_STATUS: 1

## 2020-03-12 NOTE — ANESTHESIA PRE PROCEDURE
Department of Anesthesiology  Preprocedure Note       Name:  Partha More   Age:  81 y.o.  :  1950                                          MRN:  [id number]         Date:  3/12/2020      Surgeon: Vanessa Ruano):  Tigre Clark MD    Procedure: RIGHT TOTAL KNEE  ARTHROPLASTY, SUPINE, IRAJ 8N-[id number] POLY-35 (Right )    Medications prior to admission:   Prior to Admission medications    Medication Sig Start Date End Date Taking? Authorizing Provider   metFORMIN (GLUCOPHAGE-XR) 500 MG extended release tablet Take 1 tablet by mouth daily (with breakfast) 3/10/20   Vernon Rodriguez MD   Alcohol Swabs PADS DX: diabetes mellitus. Test 1-3 time(s) daily - Ok to substitute per insurance (1 each = 1 box) 3/10/20   Vernon Rodriguez MD   blood glucose monitor kit and supplies DX: diabetes mellitus. Test 1-3 time(s) daily - Ok to substitute per insurance 3/10/20   Vernon Rodriguez MD   blood glucose monitor strips DX: diabetes mellitus. Use 1-3 time(s) daily - Ok to substitute per insurance 3/10/20   Vernon Rodriguez MD   Lancets MISC DX: diabetes mellitus. Use 1-3 time(s) daily - Ok to substitute per insurance (1 each = 1 box) 3/10/20   Vernon Rodriguez MD   meloxicam (MOBIC) 15 MG tablet Take 1 tablet by mouth daily 20   RAOUL Rutledge CNP   Multiple Vitamins-Minerals (WOMENS MULTIVITAMIN PO) Take 1 tablet by mouth daily    Historical Provider, MD   clotrimazole (LOTRIMIN) 1 % cream Apply thin layer topically 2 times daily.     Historical Provider, MD   rosuvastatin (CRESTOR) 10 MG tablet TAKE 1 TABLET DAILY 10/14/19   RAOUL Rutledge CNP   citalopram (CELEXA) 20 MG tablet TAKE 1 TABLET DAILY 10/14/19   RAOUL Rutledge CNP   fluticasone (FLONASE) 50 MCG/ACT nasal spray 1 spray by Each Nare route daily    Historical Provider, MD   CPAP Machine MISC by Does not apply route    Historical Provider, MD   fexofenadine (ALLEGRA) 180 MG tablet Take 180 mg by mouth daily    Historical Provider, MD   Respiratory Therapy Supplies [facility] New CPAP mask and supplies 2/27/18   Aimee Ruano MD       Current medications:    No current facility-administered medications for this encounter. Current Outpatient Medications   Medication Sig Dispense Refill    metFORMIN (GLUCOPHAGE-XR) 500 MG extended release tablet Take 1 tablet by mouth daily (with breakfast) 90 tablet 0    Alcohol Swabs PADS DX: diabetes mellitus. Test 1-3 time(s) daily - Ok to substitute per insurance (1 each = 1 box) 1 each 5    blood glucose monitor kit and supplies DX: diabetes mellitus. Test 1-3 time(s) daily - Ok to substitute per insurance 1 kit 0    blood glucose monitor strips DX: diabetes mellitus. Use 1-3 time(s) daily - Ok to substitute per insurance 100 strip 5    Lancets MISC DX: diabetes mellitus. Use 1-3 time(s) daily - Ok to substitute per insurance (1 each = 1 box) 1 each 5    meloxicam (MOBIC) 15 MG tablet Take 1 tablet by mouth daily 90 tablet 0    Multiple Vitamins-Minerals (WOMENS MULTIVITAMIN PO) Take 1 tablet by mouth daily      clotrimazole (LOTRIMIN) 1 % cream Apply thin layer topically 2 times daily.  rosuvastatin (CRESTOR) 10 MG tablet TAKE 1 TABLET DAILY 90 tablet 4    citalopram (CELEXA) 20 MG tablet TAKE 1 TABLET DAILY 90 tablet 4    fluticasone (FLONASE) 50 MCG/ACT nasal spray 1 spray by Each Nare route daily      CPAP Machine MISC by Does not apply route      fexofenadine (ALLEGRA) 180 MG tablet Take 180 mg by mouth daily      Respiratory Therapy Supplies [facility] New CPAP mask and supplies 1 Device 0       Allergies:     Allergies   Allergen Reactions    Other Itching and Swelling     [employer] laundry detergent    Influenza Vaccines Other (See Comments)     Severe migraine    Seasonal Other (See Comments)     Tree pollen, mold, dander causes sinus congestion       Problem List:    Patient Active Problem List   Diagnosis Code    Hyperlipidemia E78.5    Sleep apnea G47.30    Anxiety F41.9    Chronic pain of both

## 2020-03-12 NOTE — H&P
and rhythm. No murmurs, rubs, or gallops  appreciated. ABDOMEN:  Soft, nontender. Normoactive bowel sounds x4 quadrants. EXTREMITIES:  Right knee, the patient has positive Ladi, medial  joint line tenderness, slight varus deformity. Current range of motion  is from 0 to 135 degrees. NEUROLOGIC:  CN II through XII grossly intact. ASSESSMENT:  Right knee degenerative joint disease. PLAN:  Right total knee replacement. This will be performed on  03/13/2020 at [facility].         Jemma Sotelo    D: 03/12/2020 10:24:31       T: 03/12/2020 11:00:35     [email or web address]  Job#: [id number]     Doc#: [id number]    CC:

## 2020-03-13 ENCOUNTER — APPOINTMENT (OUTPATIENT)
Dept: GENERAL RADIOLOGY | Age: 70
DRG: 470 | End: 2020-03-13
Attending: ORTHOPAEDIC SURGERY
Payer: MEDICARE

## 2020-03-13 ENCOUNTER — HOSPITAL ENCOUNTER (INPATIENT)
Age: 70
LOS: 2 days | Discharge: NURSING, REHAB OR LONG-TERM CARE | DRG: 470 | End: 2020-03-15
Attending: ORTHOPAEDIC SURGERY | Admitting: ORTHOPAEDIC SURGERY
Payer: MEDICARE

## 2020-03-13 ENCOUNTER — ANESTHESIA (OUTPATIENT)
Dept: OPERATING ROOM | Age: 70
DRG: 470 | End: 2020-03-13
Payer: MEDICARE

## 2020-03-13 VITALS — SYSTOLIC BLOOD PRESSURE: 80 MMHG | TEMPERATURE: 95.5 F | DIASTOLIC BLOOD PRESSURE: 48 MMHG | OXYGEN SATURATION: 97 %

## 2020-03-13 PROBLEM — Z96.651 STATUS POST TOTAL KNEE REPLACEMENT, RIGHT: Status: ACTIVE | Noted: 2020-03-13

## 2020-03-13 LAB
ABO/RH: NORMAL
ABO/RH: NORMAL
ANTIBODY SCREEN: NORMAL
ANTIBODY SCREEN: NORMAL
GLUCOSE BLD-MCNC: 129 MG/DL (ref 60–115)
GLUCOSE BLD-MCNC: 99 MG/DL (ref 60–115)
PERFORMED ON: ABNORMAL
PERFORMED ON: NORMAL

## 2020-03-13 PROCEDURE — 6360000002 HC RX W HCPCS: Performed by: ORTHOPAEDIC SURGERY

## 2020-03-13 PROCEDURE — 97162 PT EVAL MOD COMPLEX 30 MIN: CPT

## 2020-03-13 PROCEDURE — 6370000000 HC RX 637 (ALT 250 FOR IP): Performed by: ORTHOPAEDIC SURGERY

## 2020-03-13 PROCEDURE — 3700000000 HC ANESTHESIA ATTENDED CARE: Performed by: ORTHOPAEDIC SURGERY

## 2020-03-13 PROCEDURE — 3600000014 HC SURGERY LEVEL 4 ADDTL 15MIN: Performed by: ORTHOPAEDIC SURGERY

## 2020-03-13 PROCEDURE — 97166 OT EVAL MOD COMPLEX 45 MIN: CPT

## 2020-03-13 PROCEDURE — 2580000003 HC RX 258: Performed by: ORTHOPAEDIC SURGERY

## 2020-03-13 PROCEDURE — 6360000002 HC RX W HCPCS: Performed by: ANESTHESIOLOGY

## 2020-03-13 PROCEDURE — 7100000000 HC PACU RECOVERY - FIRST 15 MIN: Performed by: ORTHOPAEDIC SURGERY

## 2020-03-13 PROCEDURE — 97535 SELF CARE MNGMENT TRAINING: CPT

## 2020-03-13 PROCEDURE — 64447 NJX AA&/STRD FEMORAL NRV IMG: CPT | Performed by: NURSE ANESTHETIST, CERTIFIED REGISTERED

## 2020-03-13 PROCEDURE — 3700000001 HC ADD 15 MINUTES (ANESTHESIA): Performed by: ORTHOPAEDIC SURGERY

## 2020-03-13 PROCEDURE — 6360000002 HC RX W HCPCS: Performed by: NURSE ANESTHETIST, CERTIFIED REGISTERED

## 2020-03-13 PROCEDURE — 86850 RBC ANTIBODY SCREEN: CPT

## 2020-03-13 PROCEDURE — 1210000000 HC MED SURG R&B

## 2020-03-13 PROCEDURE — 2500000003 HC RX 250 WO HCPCS: Performed by: NURSE ANESTHETIST, CERTIFIED REGISTERED

## 2020-03-13 PROCEDURE — 0SRC0J9 REPLACEMENT OF RIGHT KNEE JOINT WITH SYNTHETIC SUBSTITUTE, CEMENTED, OPEN APPROACH: ICD-10-PCS | Performed by: ORTHOPAEDIC SURGERY

## 2020-03-13 PROCEDURE — 3600000004 HC SURGERY LEVEL 4 BASE: Performed by: ORTHOPAEDIC SURGERY

## 2020-03-13 PROCEDURE — C1776 JOINT DEVICE (IMPLANTABLE): HCPCS | Performed by: ORTHOPAEDIC SURGERY

## 2020-03-13 PROCEDURE — 97530 THERAPEUTIC ACTIVITIES: CPT

## 2020-03-13 PROCEDURE — C1713 ANCHOR/SCREW BN/BN,TIS/BN: HCPCS | Performed by: ORTHOPAEDIC SURGERY

## 2020-03-13 PROCEDURE — 2580000003 HC RX 258: Performed by: NURSE PRACTITIONER

## 2020-03-13 PROCEDURE — 86900 BLOOD TYPING SEROLOGIC ABO: CPT

## 2020-03-13 PROCEDURE — 2709999900 HC NON-CHARGEABLE SUPPLY: Performed by: ORTHOPAEDIC SURGERY

## 2020-03-13 PROCEDURE — 36415 COLL VENOUS BLD VENIPUNCTURE: CPT

## 2020-03-13 PROCEDURE — 73560 X-RAY EXAM OF KNEE 1 OR 2: CPT

## 2020-03-13 PROCEDURE — 86901 BLOOD TYPING SEROLOGIC RH(D): CPT

## 2020-03-13 PROCEDURE — 7100000001 HC PACU RECOVERY - ADDTL 15 MIN: Performed by: ORTHOPAEDIC SURGERY

## 2020-03-13 DEVICE — COMPONENT FEM SZ 7 R KNEE CO CHROM NAR POST STBL CEM: Type: IMPLANTABLE DEVICE | Site: KNEE | Status: FUNCTIONAL

## 2020-03-13 DEVICE — IMPLANTABLE DEVICE: Type: IMPLANTABLE DEVICE | Site: KNEE | Status: FUNCTIONAL

## 2020-03-13 DEVICE — PSN TIB STM 5 DEG SZ E R: Type: IMPLANTABLE DEVICE | Site: KNEE | Status: FUNCTIONAL

## 2020-03-13 DEVICE — SYSTEM TOT KNEE CEM FEM TIB COMP STD TIB INSRT STD PAT: Type: IMPLANTABLE DEVICE | Site: KNEE | Status: FUNCTIONAL

## 2020-03-13 DEVICE — CEMENT BNE 40GM HI VISC RADPQ FOR REV SURG: Type: IMPLANTABLE DEVICE | Site: KNEE | Status: FUNCTIONAL

## 2020-03-13 DEVICE — DUP USE 333543 IMPL KNEE PSN ALL POLY PAT PLY 35MM: Type: IMPLANTABLE DEVICE | Site: KNEE | Status: FUNCTIONAL

## 2020-03-13 RX ORDER — HYDROCODONE BITARTRATE AND ACETAMINOPHEN 5; 325 MG/1; MG/1
1 TABLET ORAL PRN
Status: DISCONTINUED | OUTPATIENT
Start: 2020-03-13 | End: 2020-03-13 | Stop reason: HOSPADM

## 2020-03-13 RX ORDER — PROPOFOL 10 MG/ML
INJECTION, EMULSION INTRAVENOUS CONTINUOUS PRN
Status: DISCONTINUED | OUTPATIENT
Start: 2020-03-13 | End: 2020-03-13 | Stop reason: SDUPTHER

## 2020-03-13 RX ORDER — TRANEXAMIC ACID 650 1/1
1950 TABLET ORAL ONCE
Status: COMPLETED | OUTPATIENT
Start: 2020-03-14 | End: 2020-03-14

## 2020-03-13 RX ORDER — ONDANSETRON 4 MG/1
4 TABLET, ORALLY DISINTEGRATING ORAL EVERY 8 HOURS PRN
Status: DISCONTINUED | OUTPATIENT
Start: 2020-03-13 | End: 2020-03-15 | Stop reason: HOSPADM

## 2020-03-13 RX ORDER — TRANEXAMIC ACID 650 1/1
1950 TABLET ORAL EVERY 8 HOURS
Status: COMPLETED | OUTPATIENT
Start: 2020-03-13 | End: 2020-03-13

## 2020-03-13 RX ORDER — LIDOCAINE HYDROCHLORIDE 10 MG/ML
1 INJECTION, SOLUTION EPIDURAL; INFILTRATION; INTRACAUDAL; PERINEURAL
Status: DISCONTINUED | OUTPATIENT
Start: 2020-03-13 | End: 2020-03-13 | Stop reason: HOSPADM

## 2020-03-13 RX ORDER — ACETAMINOPHEN 325 MG/1
650 TABLET ORAL EVERY 6 HOURS
Status: DISCONTINUED | OUTPATIENT
Start: 2020-03-13 | End: 2020-03-15 | Stop reason: HOSPADM

## 2020-03-13 RX ORDER — HYDROMORPHONE HCL 110MG/55ML
0.5 PATIENT CONTROLLED ANALGESIA SYRINGE INTRAVENOUS EVERY 10 MIN PRN
Status: DISCONTINUED | OUTPATIENT
Start: 2020-03-13 | End: 2020-03-13 | Stop reason: HOSPADM

## 2020-03-13 RX ORDER — MIDAZOLAM HYDROCHLORIDE 1 MG/ML
INJECTION INTRAMUSCULAR; INTRAVENOUS PRN
Status: DISCONTINUED | OUTPATIENT
Start: 2020-03-13 | End: 2020-03-13 | Stop reason: SDUPTHER

## 2020-03-13 RX ORDER — METOCLOPRAMIDE HYDROCHLORIDE 5 MG/ML
10 INJECTION INTRAMUSCULAR; INTRAVENOUS
Status: DISCONTINUED | OUTPATIENT
Start: 2020-03-13 | End: 2020-03-13 | Stop reason: HOSPADM

## 2020-03-13 RX ORDER — SODIUM CHLORIDE 0.9 % (FLUSH) 0.9 %
10 SYRINGE (ML) INJECTION PRN
Status: DISCONTINUED | OUTPATIENT
Start: 2020-03-13 | End: 2020-03-15 | Stop reason: HOSPADM

## 2020-03-13 RX ORDER — MORPHINE SULFATE 2 MG/ML
2 INJECTION, SOLUTION INTRAMUSCULAR; INTRAVENOUS
Status: DISCONTINUED | OUTPATIENT
Start: 2020-03-13 | End: 2020-03-15 | Stop reason: HOSPADM

## 2020-03-13 RX ORDER — MEPERIDINE HYDROCHLORIDE 50 MG/ML
12.5 INJECTION INTRAMUSCULAR; INTRAVENOUS; SUBCUTANEOUS EVERY 5 MIN PRN
Status: DISCONTINUED | OUTPATIENT
Start: 2020-03-13 | End: 2020-03-13 | Stop reason: HOSPADM

## 2020-03-13 RX ORDER — DEXAMETHASONE SODIUM PHOSPHATE 10 MG/ML
INJECTION INTRAMUSCULAR; INTRAVENOUS PRN
Status: DISCONTINUED | OUTPATIENT
Start: 2020-03-13 | End: 2020-03-13 | Stop reason: SDUPTHER

## 2020-03-13 RX ORDER — ONDANSETRON 2 MG/ML
INJECTION INTRAMUSCULAR; INTRAVENOUS PRN
Status: DISCONTINUED | OUTPATIENT
Start: 2020-03-13 | End: 2020-03-13 | Stop reason: SDUPTHER

## 2020-03-13 RX ORDER — ROPIVACAINE HYDROCHLORIDE 5 MG/ML
INJECTION, SOLUTION EPIDURAL; INFILTRATION; PERINEURAL PRN
Status: DISCONTINUED | OUTPATIENT
Start: 2020-03-13 | End: 2020-03-13 | Stop reason: SDUPTHER

## 2020-03-13 RX ORDER — ONDANSETRON 2 MG/ML
4 INJECTION INTRAMUSCULAR; INTRAVENOUS
Status: DISCONTINUED | OUTPATIENT
Start: 2020-03-13 | End: 2020-03-13 | Stop reason: HOSPADM

## 2020-03-13 RX ORDER — ASPIRIN 81 MG/1
81 TABLET ORAL 2 TIMES DAILY
Status: DISCONTINUED | OUTPATIENT
Start: 2020-03-13 | End: 2020-03-15 | Stop reason: HOSPADM

## 2020-03-13 RX ORDER — SODIUM CHLORIDE 0.9 % (FLUSH) 0.9 %
10 SYRINGE (ML) INJECTION PRN
Status: DISCONTINUED | OUTPATIENT
Start: 2020-03-13 | End: 2020-03-13 | Stop reason: HOSPADM

## 2020-03-13 RX ORDER — MAGNESIUM HYDROXIDE 1200 MG/15ML
LIQUID ORAL CONTINUOUS PRN
Status: COMPLETED | OUTPATIENT
Start: 2020-03-13 | End: 2020-03-13

## 2020-03-13 RX ORDER — MIDAZOLAM HYDROCHLORIDE 5 MG/ML
INJECTION, SOLUTION INTRAMUSCULAR; INTRAVENOUS
Status: DISPENSED
Start: 2020-03-13 | End: 2020-03-13

## 2020-03-13 RX ORDER — SODIUM CHLORIDE, SODIUM LACTATE, POTASSIUM CHLORIDE, CALCIUM CHLORIDE 600; 310; 30; 20 MG/100ML; MG/100ML; MG/100ML; MG/100ML
INJECTION, SOLUTION INTRAVENOUS CONTINUOUS
Status: DISCONTINUED | OUTPATIENT
Start: 2020-03-13 | End: 2020-03-14

## 2020-03-13 RX ORDER — OXYCODONE HYDROCHLORIDE 5 MG/1
5 TABLET ORAL EVERY 4 HOURS PRN
Status: DISCONTINUED | OUTPATIENT
Start: 2020-03-13 | End: 2020-03-15 | Stop reason: HOSPADM

## 2020-03-13 RX ORDER — ONDANSETRON 2 MG/ML
4 INJECTION INTRAMUSCULAR; INTRAVENOUS EVERY 6 HOURS PRN
Status: DISCONTINUED | OUTPATIENT
Start: 2020-03-13 | End: 2020-03-15 | Stop reason: HOSPADM

## 2020-03-13 RX ORDER — FENTANYL CITRATE 50 UG/ML
50 INJECTION, SOLUTION INTRAMUSCULAR; INTRAVENOUS EVERY 10 MIN PRN
Status: DISCONTINUED | OUTPATIENT
Start: 2020-03-13 | End: 2020-03-13 | Stop reason: HOSPADM

## 2020-03-13 RX ORDER — SODIUM CHLORIDE 0.9 % (FLUSH) 0.9 %
10 SYRINGE (ML) INJECTION EVERY 12 HOURS SCHEDULED
Status: DISCONTINUED | OUTPATIENT
Start: 2020-03-13 | End: 2020-03-15 | Stop reason: HOSPADM

## 2020-03-13 RX ORDER — BUPIVACAINE HYDROCHLORIDE 7.5 MG/ML
INJECTION, SOLUTION INTRASPINAL PRN
Status: DISCONTINUED | OUTPATIENT
Start: 2020-03-13 | End: 2020-03-13 | Stop reason: SDUPTHER

## 2020-03-13 RX ORDER — OXYCODONE HYDROCHLORIDE 5 MG/1
10 TABLET ORAL EVERY 4 HOURS PRN
Status: DISCONTINUED | OUTPATIENT
Start: 2020-03-13 | End: 2020-03-15 | Stop reason: HOSPADM

## 2020-03-13 RX ORDER — SODIUM CHLORIDE, SODIUM LACTATE, POTASSIUM CHLORIDE, CALCIUM CHLORIDE 600; 310; 30; 20 MG/100ML; MG/100ML; MG/100ML; MG/100ML
INJECTION, SOLUTION INTRAVENOUS CONTINUOUS
Status: DISCONTINUED | OUTPATIENT
Start: 2020-03-13 | End: 2020-03-13

## 2020-03-13 RX ORDER — SODIUM CHLORIDE 0.9 % (FLUSH) 0.9 %
10 SYRINGE (ML) INJECTION EVERY 12 HOURS SCHEDULED
Status: DISCONTINUED | OUTPATIENT
Start: 2020-03-13 | End: 2020-03-13 | Stop reason: HOSPADM

## 2020-03-13 RX ORDER — CEFAZOLIN SODIUM 2 G/50ML
2 SOLUTION INTRAVENOUS ONCE
Status: COMPLETED | OUTPATIENT
Start: 2020-03-13 | End: 2020-03-13

## 2020-03-13 RX ORDER — DIPHENHYDRAMINE HYDROCHLORIDE 50 MG/ML
12.5 INJECTION INTRAMUSCULAR; INTRAVENOUS
Status: DISCONTINUED | OUTPATIENT
Start: 2020-03-13 | End: 2020-03-13 | Stop reason: HOSPADM

## 2020-03-13 RX ORDER — SENNA AND DOCUSATE SODIUM 50; 8.6 MG/1; MG/1
1 TABLET, FILM COATED ORAL 2 TIMES DAILY
Status: DISCONTINUED | OUTPATIENT
Start: 2020-03-13 | End: 2020-03-15 | Stop reason: HOSPADM

## 2020-03-13 RX ORDER — CEFAZOLIN SODIUM 2 G/50ML
2 SOLUTION INTRAVENOUS EVERY 8 HOURS
Status: COMPLETED | OUTPATIENT
Start: 2020-03-13 | End: 2020-03-14

## 2020-03-13 RX ORDER — DEXAMETHASONE SODIUM PHOSPHATE 10 MG/ML
INJECTION, SOLUTION INTRAMUSCULAR; INTRAVENOUS
Status: DISPENSED
Start: 2020-03-13 | End: 2020-03-13

## 2020-03-13 RX ORDER — HYDROCODONE BITARTRATE AND ACETAMINOPHEN 5; 325 MG/1; MG/1
2 TABLET ORAL PRN
Status: DISCONTINUED | OUTPATIENT
Start: 2020-03-13 | End: 2020-03-13 | Stop reason: HOSPADM

## 2020-03-13 RX ORDER — ROPIVACAINE HYDROCHLORIDE 5 MG/ML
INJECTION, SOLUTION EPIDURAL; INFILTRATION; PERINEURAL
Status: COMPLETED
Start: 2020-03-13 | End: 2020-03-13

## 2020-03-13 RX ORDER — TRANEXAMIC ACID 650 1/1
1950 TABLET ORAL ONCE
Status: COMPLETED | OUTPATIENT
Start: 2020-03-13 | End: 2020-03-13

## 2020-03-13 RX ORDER — LIDOCAINE HYDROCHLORIDE 20 MG/ML
INJECTION, SOLUTION EPIDURAL; INFILTRATION; INTRACAUDAL; PERINEURAL PRN
Status: DISCONTINUED | OUTPATIENT
Start: 2020-03-13 | End: 2020-03-13 | Stop reason: SDUPTHER

## 2020-03-13 RX ADMIN — ACETAMINOPHEN 650 MG: 325 TABLET ORAL at 10:51

## 2020-03-13 RX ADMIN — PROPOFOL 25 MCG/KG/MIN: 10 INJECTION, EMULSION INTRAVENOUS at 07:43

## 2020-03-13 RX ADMIN — CEFAZOLIN SODIUM 2 G: 2 SOLUTION INTRAVENOUS at 15:56

## 2020-03-13 RX ADMIN — SODIUM CHLORIDE, POTASSIUM CHLORIDE, SODIUM LACTATE AND CALCIUM CHLORIDE: 600; 310; 30; 20 INJECTION, SOLUTION INTRAVENOUS at 10:34

## 2020-03-13 RX ADMIN — ASPIRIN 81 MG: 81 TABLET, COATED ORAL at 19:51

## 2020-03-13 RX ADMIN — TRANEXAMIC ACID 1950 MG: 650 TABLET ORAL at 06:33

## 2020-03-13 RX ADMIN — ONDANSETRON 4 MG: 2 INJECTION INTRAMUSCULAR; INTRAVENOUS at 08:26

## 2020-03-13 RX ADMIN — ASPIRIN 81 MG: 81 TABLET, COATED ORAL at 10:51

## 2020-03-13 RX ADMIN — TRANEXAMIC ACID 1950 MG: 650 TABLET ORAL at 14:23

## 2020-03-13 RX ADMIN — MIDAZOLAM HYDROCHLORIDE 5 MG: 2 INJECTION, SOLUTION INTRAMUSCULAR; INTRAVENOUS at 07:17

## 2020-03-13 RX ADMIN — SENNOSIDES AND DOCUSATE SODIUM 1 TABLET: 8.6; 5 TABLET ORAL at 19:50

## 2020-03-13 RX ADMIN — LIDOCAINE HYDROCHLORIDE 2.5 ML: 20 INJECTION, SOLUTION EPIDURAL; INFILTRATION; INTRACAUDAL; PERINEURAL at 07:43

## 2020-03-13 RX ADMIN — SODIUM CHLORIDE, POTASSIUM CHLORIDE, SODIUM LACTATE AND CALCIUM CHLORIDE: 600; 310; 30; 20 INJECTION, SOLUTION INTRAVENOUS at 08:47

## 2020-03-13 RX ADMIN — DEXAMETHASONE SODIUM PHOSPHATE 10 MG: 10 INJECTION INTRAMUSCULAR; INTRAVENOUS at 07:25

## 2020-03-13 RX ADMIN — ACETAMINOPHEN 650 MG: 325 TABLET ORAL at 23:01

## 2020-03-13 RX ADMIN — ROPIVACAINE HYDROCHLORIDE 20 ML: 5 INJECTION, SOLUTION EPIDURAL; INFILTRATION; PERINEURAL at 07:25

## 2020-03-13 RX ADMIN — ACETAMINOPHEN 650 MG: 325 TABLET ORAL at 17:06

## 2020-03-13 RX ADMIN — CEFAZOLIN SODIUM 2 G: 2 SOLUTION INTRAVENOUS at 07:30

## 2020-03-13 RX ADMIN — SODIUM CHLORIDE, POTASSIUM CHLORIDE, SODIUM LACTATE AND CALCIUM CHLORIDE: 600; 310; 30; 20 INJECTION, SOLUTION INTRAVENOUS at 07:03

## 2020-03-13 RX ADMIN — SENNOSIDES AND DOCUSATE SODIUM 1 TABLET: 8.6; 5 TABLET ORAL at 10:51

## 2020-03-13 RX ADMIN — BUPIVACAINE HYDROCHLORIDE IN DEXTROSE 1.6 ML: 7.5 INJECTION, SOLUTION SUBARACHNOID at 07:40

## 2020-03-13 RX ADMIN — SODIUM CHLORIDE, POTASSIUM CHLORIDE, SODIUM LACTATE AND CALCIUM CHLORIDE: 600; 310; 30; 20 INJECTION, SOLUTION INTRAVENOUS at 08:11

## 2020-03-13 RX ADMIN — SODIUM CHLORIDE, POTASSIUM CHLORIDE, SODIUM LACTATE AND CALCIUM CHLORIDE: 600; 310; 30; 20 INJECTION, SOLUTION INTRAVENOUS at 20:25

## 2020-03-13 ASSESSMENT — PULMONARY FUNCTION TESTS
PIF_VALUE: 15
PIF_VALUE: 16
PIF_VALUE: 0
PIF_VALUE: 16
PIF_VALUE: 16
PIF_VALUE: 15
PIF_VALUE: 16
PIF_VALUE: 16
PIF_VALUE: 1
PIF_VALUE: 2
PIF_VALUE: 16
PIF_VALUE: 16
PIF_VALUE: 15
PIF_VALUE: 16
PIF_VALUE: 15
PIF_VALUE: 15
PIF_VALUE: 16
PIF_VALUE: 16
PIF_VALUE: 17
PIF_VALUE: 16
PIF_VALUE: 0
PIF_VALUE: 0
PIF_VALUE: 15
PIF_VALUE: 15
PIF_VALUE: 16
PIF_VALUE: 16
PIF_VALUE: 1
PIF_VALUE: 15
PIF_VALUE: 15
PIF_VALUE: 16
PIF_VALUE: 15
PIF_VALUE: 16
PIF_VALUE: 15
PIF_VALUE: 16
PIF_VALUE: 15
PIF_VALUE: 0
PIF_VALUE: 15
PIF_VALUE: 16
PIF_VALUE: 15
PIF_VALUE: 18
PIF_VALUE: 16
PIF_VALUE: 15
PIF_VALUE: 15
PIF_VALUE: 16
PIF_VALUE: 16
PIF_VALUE: 17
PIF_VALUE: 15
PIF_VALUE: 16
PIF_VALUE: 15
PIF_VALUE: 16
PIF_VALUE: 15
PIF_VALUE: 1
PIF_VALUE: 2
PIF_VALUE: 1
PIF_VALUE: 16
PIF_VALUE: 15
PIF_VALUE: 16
PIF_VALUE: 15
PIF_VALUE: 16
PIF_VALUE: 9
PIF_VALUE: 0
PIF_VALUE: 0
PIF_VALUE: 16
PIF_VALUE: 16
PIF_VALUE: 15
PIF_VALUE: 16
PIF_VALUE: 15
PIF_VALUE: 16
PIF_VALUE: 0
PIF_VALUE: 16
PIF_VALUE: 1
PIF_VALUE: 15
PIF_VALUE: 1
PIF_VALUE: 17
PIF_VALUE: 16
PIF_VALUE: 15
PIF_VALUE: 0
PIF_VALUE: 16
PIF_VALUE: 15
PIF_VALUE: 16
PIF_VALUE: 0
PIF_VALUE: 15
PIF_VALUE: 16
PIF_VALUE: 2
PIF_VALUE: 15
PIF_VALUE: 16
PIF_VALUE: 0
PIF_VALUE: 16

## 2020-03-13 ASSESSMENT — PAIN - FUNCTIONAL ASSESSMENT
PAIN_FUNCTIONAL_ASSESSMENT: 0-10
PAIN_FUNCTIONAL_ASSESSMENT: PREVENTS OR INTERFERES SOME ACTIVE ACTIVITIES AND ADLS

## 2020-03-13 ASSESSMENT — PAIN SCALES - GENERAL
PAINLEVEL_OUTOF10: 0
PAINLEVEL_OUTOF10: 2
PAINLEVEL_OUTOF10: 1
PAINLEVEL_OUTOF10: 0
PAINLEVEL_OUTOF10: 1

## 2020-03-13 ASSESSMENT — PAIN DESCRIPTION - PAIN TYPE: TYPE: SURGICAL PAIN

## 2020-03-13 ASSESSMENT — PAIN DESCRIPTION - LOCATION
LOCATION: KNEE
LOCATION: KNEE

## 2020-03-13 ASSESSMENT — PAIN DESCRIPTION - ORIENTATION
ORIENTATION: RIGHT
ORIENTATION: RIGHT

## 2020-03-13 ASSESSMENT — PAIN DESCRIPTION - DESCRIPTORS: DESCRIPTORS: ACHING;CONSTANT

## 2020-03-13 NOTE — PROGRESS NOTES
Messi Williamson Initial Social Service Discharge Assessment    Met with Patient to discuss discharge plan. PCP: RAOUL Dillard CNP                                Date of Last Visit: 03/2020    If no PCP, list provided? N/A    Discharge Planning    Living Arrangements: independently at home    Who do you live with? Patient reports residing at home alone    Who helps you with your care:  self    If lives at home:     Do you have any barriers navigating in your home? yes - \" I have 20 stairs to get to my apartment. There is no elevator. \"    Patient can perform ADL? Patient reports to have been independent for ADLS prior to surgery. Current Services (outpatient and in home) :  None    Dialysis: No    Is transportation available to get to your appointments? Yes. Patient reports that she was driving self prior to surgery however family plans to assist with transportation needs upon DC     DME Equipment:  yes - walker, cane, grab bars    Respiratory equipment: C-pap    Respiratory provider:  yes      Pharmacy:  yes - [facility] in [place] and [facility] via mail     Able to afford cost of medication: Yes    Patient agreeable to [place] ? Yes, [address]      Patient agreeable to SNF/Rehab? Yes, Company Patient reports desire to be admitted to [address] [facility] unit for inpatient skilled therapies    Other discharge needs identified? N/A    Was Freedom of Choice Provided? Yes    Initial Discharge Plan? (Note: please see concurrent daily documentation for any updates after initial note). Patient reports that she has 20 stairs to get in apartment and reports desire to be admitted to [address] [facility] unit for inpatient therapy upon DC from inpatient. This  rounded with Dr. Jose Reyes whom is agreeable with patient's plan. This  notified RN House Supervisor whom reports that pre-cert was started.   It is worth noting that patient's insurance requires prior authorization before patient can receive skilled therapies.   SS to continue to follow and assist with smooth transition to [facility] unit pending insurance approval.      Electronically signed by YOSELIN Potter on 3/13/2020 at 3:00 PM

## 2020-03-13 NOTE — ADT AUTH CERT
Knee Arthroplasty, Total - Care Day 1 (3/13/2020) by Agnes Brumfield RN         Review Entered Review Status   3/13/2020 10:47 Completed       Criteria Review      Care Day: 1 Care Date: 3/13/2020 Level of Care: Inpatient Floor    Guideline Day 1    Level Of Care    (X) OR to floor    Clinical Status    (X) * Clinical Indications met [C]    3/13/2020 10:47 AM EDT by Marilyn Sin      closed suction drain    Activity    ( ) Bed rest    3/13/2020 10:47 AM EDT by Felicia Jacinto full weight bearing as tolerated  knee immobilizer  may shower  pt/ot eval and treat    Routes    ( ) IV fluids, medications    3/13/2020 10:47 AM EDT by Marilyn Sin      LR @ 50 ml/hr continuous    (X) Diet as tolerated postoperatively    3/13/2020 10:47 AM EDT by Marilyn Sin      general diet    Interventions    (X) Exercises [D]    (X) No pillow under operative knee    (X) Limb x-ray postoperatively    (X) DVT prophylaxis [E]    3/13/2020 10:47 AM EDT by Marilyn Sin      asa 81 mg po bid  radha hose    Medications    (X) Prophylactic antibiotics    3/13/2020 10:47 AM EDT by Delia Duggan 2 gm IVPB q 8hrs x 2 doses    * Milestone   Additional Notes   3/13 pre-op h&P:      CHIEF COMPLAINT:  Right knee pain.       HISTORY OF PRESENT ILLNESS:  The patient has been dealing with right   knee osteoarthritis for the last several years.  Conservative therapy   has been exhausted. Roger Jerry is no longer helping.  After risks, benefits,   and alternatives were discussed, the patient elected to proceed with   right total knee replacement.  This will be performed on 03/13/2020 at   [facility]:  Significant for osteoarthritis, elevated   cholesterol, depression.       ASSESSMENT:  Right knee degenerative joint disease.       PLAN:  Right total knee replacement.  This will be performed on   03/13/2020 at [facility].     =======================================================================   Scheduled Meds:   · dexamethasone (PF)    · sodium chloride flush 10 mL Intravenous 2 times per day   · ceFAZolin (ANCEF) IVPB 2 g Intravenous Q8H   · acetaminophen 650 mg Oral Q6H   · sennosides-docusate sodium 1 tablet Oral BID   · aspirin 81 mg Oral BID   · midazolam    · tranexamic acid 1,950 mg Oral Q8H   · [START ON 3/14/2020] tranexamic acid 1,950 mg Oral Once      Continuous Infusions:   · lactated ringers 50 mL/hr at 03/13/20 1034      PRN Meds:.sodium chloride flush, oxyCODONE **OR** oxyCODONE, morphine, magnesium hydroxide, ondansetron **OR** ondansetron, famotidine (PEPCID) injection      ===============================================================   3/13  Brief Postoperative Note      Patient: Kim Resendiz   YOB: 1950   MRN: [id number]   Date of Procedure: 3/13/2020       Pre-Op Diagnosis: RIGHT DJD KNEE       Post-Op Diagnosis: Same         Procedure(s):   RIGHT TOTAL KNEE  ARTHROPLASTY       Anesthesia: Anesthesia type not filed in the log.       Surgeon(s):   Alok Harrell MD       Assistant: Inna Johnson       Estimated Blood Loss (mL): less than 50        Complications: None       Specimens:    * No specimens in log *       Implants:   Implant Name Type Inv.  Item Serial No.  Lot No. LRB No. Used   CEMENT BONE R 1X40 US Cement CEMENT BONE R 1X40 US   [employer] [id number] Right 2   IMPL KNEE PSN ASF PS 13MM PLY R 6 9EF Knee IMPL KNEE PSN ASF PS 13MM PLY R 6 9EF   [employer] [id number] Right 1   DUP USE [id number] IMPL KNEE PSN ALL POLY PAT PLY 35MM Knee DUP USE [id number] IMPL KNEE PSN ALL POLY PAT PLY 35MM   [employer] [id number] Right 1   IMPL KNEE PSN FEM CMT SZ7 R Knee IMPL KNEE PSN FEM CMT SZ7 R   [employer] [id number] Right 1   IMPL KNEE PSN STIB STM 5DEG YOANDY R Knee IMPL KNEE PSN STIB STM 5DEG YOANDY R   [employer] [id number] Right 1            Drains: * No LDAs found *       Findings: djd rt knee

## 2020-03-13 NOTE — PROGRESS NOTES
Independent  Grooming: Independent  UE Bathing: Setup;Supervision  LE Bathing: Moderate assistance  UE Dressing: Setup;Supervision  LE Dressing: Maximum assistance  Toileting: Contact guard assistance;Minimal assistance(for clothing mgmt)  Tone RUE  RUE Tone: Normotonic  Tone LUE  LUE Tone: Normotonic  Coordination  Movements Are Fluid And Coordinated: Yes     Bed mobility  Supine to Sit: Stand by assistance(HOB elevated, use of bed rail Simultaneous filing. User may not have seen previous data.)  Transfers  Stand Step Transfers: Contact guard assistance(with RW)  Sit to stand: Contact guard assistance  Stand to sit: Contact guard assistance                       LUE AROM (degrees)  LUE AROM : WFL  Left Hand AROM (degrees)  Left Hand AROM: WFL  RUE AROM (degrees)  RUE AROM : WFL  Right Hand AROM (degrees)  Right Hand AROM: WFL  LUE Strength  Gross LUE Strength: Exceptions to [facility]  L Shoulder Flex: 4/5  RUE Strength  Gross RUE Strength: Exceptions to [facility]  R Shoulder Flex: 4/5     Hand Dominance  Hand Dominance: Right             Plan   Plan  Times per week: 3-6x/wk  Times per day: Daily  Plan weeks: <1- postop pt  Current Treatment Recommendations: Strengthening, Balance Training, Functional Mobility Training, Endurance Training, Stair training, Pain Management, Safety Education & Training, Patient/Caregiver Education & Training, Self-Care / ADL, Home Management Training         Goals  Short term goals  Time Frame for Short term goals: 3-5 days- postop pt  Short term goal 1: Tolerate 25-30min BUE ther ex/act to increase strength/end for ADL  Short term goal 2: Increase to John LB dressing and bathing  Short term goal 3: Increase to SBA toileting  Long term goals  Time Frame for Long term goals : Same as STGs  Long term goal 1: Same as STGs  Long term goal 2: Same as STGs  Patient Goals   Patient goals :  \"To strengthen my knee so I can go back to doing what I was doing\"       Therapy Time   Individual Concurrent Group Co-treatment   Time In  12:30         Time Out  1:15         Minutes  [address] [place], [place]

## 2020-03-13 NOTE — PROGRESS NOTES
Patient positioned for Right saphenous nerve block, right knee region per Dr Alexey Ramos, anesthesia. 02 via N/C maintained at 3 L, tolerated procedure well.

## 2020-03-13 NOTE — CARE COORDINATION
The beneficiary may reasonably be expected to be discharged or transferred to a hospital within 96 hours after admission.      Estimated length of stay 2

## 2020-03-13 NOTE — ADT AUTH CERT
Knee Arthroplasty, Total - Care Day 1 (3/13/2020) by Magdaleno Pringle RN         Review Status Review Entered   Completed 3/13/2020 10:47       Criteria Review      Care Day: 1 Care Date: 3/13/2020 Level of Care: Inpatient Floor    Guideline Day 1    Level Of Care    (X) OR to floor    Clinical Status    (X) * Clinical Indications met [C]    3/13/2020 10:47 AM EDT by Matthew Barton      closed suction drain    Activity    ( ) Bed rest    3/13/2020 10:47 AM EDT by Matthew Barton      full weight bearing as tolerated  knee immobilizer  may shower  pt/ot eval and treat    Routes    ( ) IV fluids, medications    3/13/2020 10:47 AM EDT by Matthew Barton      LR @ 50 ml/hr continuous    (X) Diet as tolerated postoperatively    3/13/2020 10:47 AM EDT by Matthew Barton      general diet    Interventions    (X) Exercises [D]    (X) No pillow under operative knee    (X) Limb x-ray postoperatively    (X) DVT prophylaxis [E]    3/13/2020 10:47 AM EDT by Mtathew Barton      asa 81 mg po bid  radha hose    Medications    (X) Prophylactic antibiotics    3/13/2020 10:47 AM EDT by Delia Diamond 2 gm IVPB q 8hrs x 2 doses    * Milestone   Additional Notes   3/13 pre-op h&P:      CHIEF COMPLAINT:  Right knee pain.       HISTORY OF PRESENT ILLNESS:  The patient has been dealing with right   knee osteoarthritis for the last several years.  Conservative therapy   has been exhausted. Gaby Jackson is no longer helping.  After risks, benefits,   and alternatives were discussed, the patient elected to proceed with   right total knee replacement.  This will be performed on 03/13/2020 at   [address] [place] [address]:  Significant for osteoarthritis, elevated   cholesterol, depression.       ASSESSMENT:  Right knee degenerative joint disease.       PLAN:  Right total knee replacement.  This will be performed on   03/13/2020 at [facility].     =======================================================================   Scheduled Meds:   · dexamethasone (PF)    · sodium chloride flush 10 mL Intravenous 2 times per day   · ceFAZolin (ANCEF) IVPB 2 g Intravenous Q8H   · acetaminophen 650 mg Oral Q6H   · sennosides-docusate sodium 1 tablet Oral BID   · aspirin 81 mg Oral BID   · midazolam    · tranexamic acid 1,950 mg Oral Q8H   · [START ON 3/14/2020] tranexamic acid 1,950 mg Oral Once      Continuous Infusions:   · lactated ringers 50 mL/hr at 03/13/20 1034      PRN Meds:.sodium chloride flush, oxyCODONE **OR** oxyCODONE, morphine, magnesium hydroxide, ondansetron **OR** ondansetron, famotidine (PEPCID) injection      ===============================================================   3/13  Brief Postoperative Note      Patient: Vicki Price   YOB: 1950   MRN: [id number]   Date of Procedure: 3/13/2020       Pre-Op Diagnosis: RIGHT DJD KNEE       Post-Op Diagnosis: Same         Procedure(s):   RIGHT TOTAL KNEE  ARTHROPLASTY       Anesthesia: Anesthesia type not filed in the log.       Surgeon(s):   Cherylene Mc, MD       Assistant: Josephine Atkinson       Estimated Blood Loss (mL): less than 50        Complications: None       Specimens:    * No specimens in log *       Implants:   Implant Name Type Inv.  Item Serial No.  Lot No. LRB No. Used   CEMENT BONE R 1X40 US Cement CEMENT BONE R 1X40 US   [employer] [id number] Right 2   IMPL KNEE PSN ASF PS 13MM PLY R 6 9EF Knee IMPL KNEE PSN ASF PS 13MM PLY R 6 9EF   [employer] [id number] Right 1   DUP USE [id number] IMPL KNEE PSN ALL POLY PAT PLY 35MM Knee DUP USE [id number] IMPL KNEE PSN ALL POLY PAT PLY 35MM   [employer] [id number] Right 1   IMPL KNEE PSN FEM CMT SZ7 R Knee IMPL KNEE PSN FEM CMT SZ7 R   [employer] [id number] Right 1   IMPL KNEE PSN STIB STM 5DEG YOANDY R Knee IMPL KNEE PSN STIB STM 5DEG YOANDY R   [employer] [id number] Right 1            Drains: * No LDAs found *       Findings: djd rt knee     Vero Morejon MD   Date: 3/13/2020  Time: 8:58 AM             Knee Arthroplasty, Total - Clinical Indications for Procedure by Silvia Gonsalves RN         Review Status Review Entered   Completed 3/13/2020 10:42       Criteria Review      Clinical Indications for Procedure    Most Recent : Tomás Chu Most Recent Date: 3/13/2020 10:42 AM EDT    (X) Procedure is indicated for  1 or more  of the following  (1) (2) (3):       (X) Degenerative joint disease as indicated by  ALL  of the following  (6):          (X) Presence of significant radiographic findings, including knee joint destruction, angular deformity,          or severe narrowing          (X) Optimal medical management has been tried and failed.          (X) Patient has failed or is not candidate for more conservative measures (eg, osteotomy).           (X) Treatment indicated due to  1 or more  of the following :             (X) Disabling pain

## 2020-03-13 NOTE — OP NOTE
center of the distal femur and the  intramedullary guide inserted. The cutting jig was then inserted with 3  degrees of external rotation and the distal femoral cut was made. The  jigs and katlyn were removed and the appropriate sized template inserted. The anterior and posterior condyles were cut with an oscillating saw and  chamfer cuts were made. The patella was then identified and a reamer  was used to make the appropriate cut on the patella. The proximal tibia  was sized with a heartsizing plate and a punch used to make a hole for  the prosthesis in the tibia. A drill hole was placed in the center of  the tibia. Tibial resection was performed with a 7-degree posterior slope. The  tibial trial was applied and reaming and broaching performed. Extramedullary alignment was checked. Trials were inserted, approximate  sizes were determined and the ligament balancing was performed. A batch  of cement was mixed and after lavage and careful bone drying, the  prostheses were inserted and held in full extension until the cement  hardened with a patella clamp in place. The ligaments were checked and  were stable. The wound was then closed in layers using #0 Vicryl for the fascia, #3-0  Monocryl for the subcutaneous tissues and staples for the skin. A dry  sterile bulky dressing applied. The patient tolerated the procedure  well and returned to the PACU in satisfactory condition. A postoperative x-ray was reviewed and the findings at surgery were  discussed with the family.         Antoine Frost MD    D: 03/13/2020 8:54:55       T: 03/13/2020 9:31:18     [email or web address]  Job#: [id number]     Doc#: [id number]    CC:

## 2020-03-13 NOTE — BRIEF OP NOTE
Brief Postoperative Note  ______________________________________________________________    Patient: Partha More  YOB: 1950  MRN: [id number]  Date of Procedure: 3/13/2020    Pre-Op Diagnosis: RIGHT DJD KNEE    Post-Op Diagnosis: Same       Procedure(s):  RIGHT TOTAL KNEE  ARTHROPLASTY    Anesthesia: Anesthesia type not filed in the log. Surgeon(s):  Tigre Clark MD    Assistant: Mirna Rothman    Estimated Blood Loss (mL): less than 50     Complications: None    Specimens:   * No specimens in log *    Implants:  Implant Name Type Inv.  Item Serial No.  Lot No. LRB No. Used   CEMENT BONE R 1X40 US Cement CEMENT BONE R 1X40 US  [employer] [id number] Right 2   IMPL KNEE PSN ASF PS 13MM PLY R 6 9EF Knee IMPL KNEE PSN ASF PS 13MM PLY R 6 9EF  [employer] [id number] Right 1   DUP USE 317160 IMPL KNEE PSN ALL POLY PAT PLY 35MM Knee DUP USE 148530 IMPL KNEE PSN ALL POLY PAT PLY 35MM  [employer] [id number] Right 1   IMPL KNEE PSN FEM CMT SZ7 R Knee IMPL KNEE PSN FEM CMT SZ7 R  [employer] [id number] Right 1   IMPL KNEE PSN STIB STM 5DEG YOANDY R Knee IMPL KNEE PSN STIB STM 5DEG Murtis Dice  [employer] [id number] Right 1         Drains: * No LDAs found *    Findings: djd rt knee    Tigre Clark MD  Date: 3/13/2020  Time: 8:58 AM

## 2020-03-13 NOTE — DISCHARGE INSTR - COC
replacement Z96.652    Simple chronic bronchitis (HCC) J41.0    Status post total knee replacement, right Z96.651       Isolation/Infection:   Isolation          No Isolation        Patient Infection Status     None to display          Nurse Assessment:  Last Vital Signs: BP (!) 123/59   Pulse 58   Temp 98.4 °F (36.9 °C) (Oral)   Resp 16   Ht 5' 2\" (1.575 m)   Wt 164 lb (74.4 kg)   LMP 01/16/2008   SpO2 92%   BMI 30.00 kg/m²     Last documented pain score (0-10 scale): Pain Level: 1  Last Weight:   Wt Readings from Last 1 Encounters:   03/13/20 164 lb (74.4 kg)     Mental Status:  {IP PT MENTAL STATUS:20030}    IV Access:  { MOSES IV ACCESS:645729027}    Nursing Mobility/ADLs:  Walking   {CHP DME AFTK:910116544}  Transfer  {P DME SOZZ:124586615}  Bathing  {P DME PLIY:004741006}  Dressing  {P DME USQH:671930452}  Toileting  {P DME QZJW:773539101}  Feeding  {[facility] DME TFHA:635236303}  Med Admin  {[facility] DME ITJT:967957444}  Med Delivery   { MOSES MED Delivery:412159625}    Wound Care Documentation and Therapy:        Elimination:  Continence:   · Bowel: {YES / TJ:00688}  · Bladder: {YES / VH:72296}  Urinary Catheter: {Urinary Catheter:473580860}   Colostomy/Ileostomy/Ileal Conduit: {YES / LP:90582}       Date of Last BM: ***    Intake/Output Summary (Last 24 hours) at 3/13/2020 1639  Last data filed at 3/13/2020 1638  Gross per 24 hour   Intake 2737.2 ml   Output 275 ml   Net 2462.2 ml     I/O last 3 completed shifts: In: 2737.2 [I.V.:2687.2;  IV Piggyback:50]  Out: 25 [Blood:25]    Safety Concerns:     [address] MOSES Safety Concerns:121388911}    Impairments/Disabilities:      [address] Impairments/Disabilities:144858042}    Nutrition Therapy:  Current Nutrition Therapy:   [address] Diet List:968714516}    Routes of Feeding: {CHP DME Other Feedings:223868509}  Liquids: {Slp liquid thickness:30507}  Daily Fluid Restriction: {CHP DME Yes amt example:464781681}  Last Modified Barium Swallow with Video (Video Swallowing Test): {Done Not Done [facility]:099504635}    Treatments at the Time of Hospital Discharge:   Respiratory Treatments: ***  Oxygen Therapy:  {Therapy; copd oxygen:09949}  Ventilator:    { CC Vent CUW}    Rehab Therapies: {THERAPEUTIC INTERVENTION:8691699685}  Weight Bearing Status/Restrictions: { CC Weight Bearin}  Other Medical Equipment (for information only, NOT a DME order):  {EQUIPMENT:069155945}  Other Treatments: ***    Patient's personal belongings (please select all that are sent with patient):  {P DME Belongings:803463793}    RN SIGNATURE:  {Esignature:218116844}    CASE MANAGEMENT/SOCIAL WORK SECTION    Inpatient Status Date: 3/13/20    Readmission Risk Assessment Score:  Readmission Risk              Risk of Unplanned Readmission:        5           Discharging to Facility/ Agency   · Name: [facility]  · Address: [address] [place] [address]  · Phone: [phone]    / signature: Electronically signed by YOSELIN Perdomo on 3/13/2020 at 4:43 PM      PHYSICIAN SECTION    Prognosis: {Prognosis:4316066538}    Condition at Discharge: [address] Patient Condition:824359425}    Rehab Potential (if transferring to Rehab): {Prognosis:7843797979}    Recommended Labs or Other Treatments After Discharge: ***    Physician Certification: I certify the above information and transfer of Rupal Todd  is necessary for the continuing treatment of the diagnosis listed and that she requires {Admit to Appropriate Level of Care:45640} for {GREATER/LESS:211075522} 30 days.      Update Admission H&P: {CHP DME Changes in OXGUL:065419306}    PHYSICIAN SIGNATURE:  {Esignature:929825390}

## 2020-03-13 NOTE — PROGRESS NOTES
Functional Mobility Training, Safety Education & Training, Home Exercise Program, Transfer Training, Gait Training, Manual Therapy - Soft Tissue Mobilization, Neuromuscular Re-education, Patient/Caregiver Education & Training(TKA protocol)  Safety Devices  Type of devices: Call light within reach, Chair alarm in place, Patient at risk for falls, Gait belt, Left in chair          Goals  Short term goals  Time Frame for Short term goals: 3 days  Short term goal 1: Transfers with SBA  Short term goal 2: Pt amb 70 feet with ww CGA  Short term goal 3: Incrase AROM R knee  10-65 deg  Short term goal 4:  Increase strength R LE 3-/5   Long term goals  Long term goal 1: Determine D/C plan-probable short [facility] stay  Patient Goals   Patient goals : Strengthen knee be able to walk without pain       Therapy Time   Individual Concurrent Group Co-treatment   Time In  1230         Time Out  1315         Minutes  [place] , [address] [facility], [place]

## 2020-03-14 LAB
ANION GAP SERPL CALCULATED.3IONS-SCNC: 10 MEQ/L (ref 9–15)
BUN BLDV-MCNC: 15 MG/DL (ref 8–23)
CALCIUM SERPL-MCNC: 9 MG/DL (ref 8.5–9.9)
CHLORIDE BLD-SCNC: 104 MEQ/L (ref 95–107)
CO2: 25 MEQ/L (ref 20–31)
CREAT SERPL-MCNC: 0.78 MG/DL (ref 0.5–0.9)
GFR AFRICAN AMERICAN: >60
GFR NON-AFRICAN AMERICAN: >60
GLUCOSE BLD-MCNC: 120 MG/DL (ref 60–115)
GLUCOSE BLD-MCNC: 136 MG/DL (ref 70–99)
GLUCOSE BLD-MCNC: 97 MG/DL (ref 60–115)
HCT VFR BLD CALC: 29.5 % (ref 37–47)
HEMOGLOBIN: 9.8 G/DL (ref 12–16)
MCH RBC QN AUTO: 32.2 PG (ref 27–31.3)
MCHC RBC AUTO-ENTMCNC: 33.3 % (ref 33–37)
MCV RBC AUTO: 96.7 FL (ref 82–100)
PDW BLD-RTO: 13.8 % (ref 11.5–14.5)
PERFORMED ON: ABNORMAL
PERFORMED ON: NORMAL
PLATELET # BLD: 174 K/UL (ref 130–400)
POTASSIUM REFLEX MAGNESIUM: 4.2 MEQ/L (ref 3.4–4.9)
RBC # BLD: 3.05 M/UL (ref 4.2–5.4)
SODIUM BLD-SCNC: 139 MEQ/L (ref 135–144)
WBC # BLD: 8 K/UL (ref 4.8–10.8)

## 2020-03-14 PROCEDURE — 97116 GAIT TRAINING THERAPY: CPT

## 2020-03-14 PROCEDURE — 36415 COLL VENOUS BLD VENIPUNCTURE: CPT

## 2020-03-14 PROCEDURE — 6370000000 HC RX 637 (ALT 250 FOR IP): Performed by: ORTHOPAEDIC SURGERY

## 2020-03-14 PROCEDURE — 80048 BASIC METABOLIC PNL TOTAL CA: CPT

## 2020-03-14 PROCEDURE — 85027 COMPLETE CBC AUTOMATED: CPT

## 2020-03-14 PROCEDURE — 97140 MANUAL THERAPY 1/> REGIONS: CPT

## 2020-03-14 PROCEDURE — 97110 THERAPEUTIC EXERCISES: CPT

## 2020-03-14 PROCEDURE — 6370000000 HC RX 637 (ALT 250 FOR IP): Performed by: INTERNAL MEDICINE

## 2020-03-14 PROCEDURE — 97530 THERAPEUTIC ACTIVITIES: CPT

## 2020-03-14 PROCEDURE — 97535 SELF CARE MNGMENT TRAINING: CPT

## 2020-03-14 PROCEDURE — 6360000002 HC RX W HCPCS: Performed by: ORTHOPAEDIC SURGERY

## 2020-03-14 PROCEDURE — 1210000000 HC MED SURG R&B

## 2020-03-14 RX ORDER — NICOTINE POLACRILEX 4 MG
15 LOZENGE BUCCAL PRN
Status: DISCONTINUED | OUTPATIENT
Start: 2020-03-14 | End: 2020-03-15 | Stop reason: HOSPADM

## 2020-03-14 RX ORDER — CITALOPRAM 20 MG/1
20 TABLET ORAL DAILY
Status: DISCONTINUED | OUTPATIENT
Start: 2020-03-14 | End: 2020-03-15 | Stop reason: HOSPADM

## 2020-03-14 RX ORDER — DEXTROSE MONOHYDRATE 25 G/50ML
12.5 INJECTION, SOLUTION INTRAVENOUS PRN
Status: DISCONTINUED | OUTPATIENT
Start: 2020-03-14 | End: 2020-03-15 | Stop reason: HOSPADM

## 2020-03-14 RX ORDER — METFORMIN HYDROCHLORIDE 500 MG/1
500 TABLET, EXTENDED RELEASE ORAL
Status: DISCONTINUED | OUTPATIENT
Start: 2020-03-14 | End: 2020-03-14

## 2020-03-14 RX ORDER — DEXTROSE MONOHYDRATE 50 MG/ML
100 INJECTION, SOLUTION INTRAVENOUS PRN
Status: DISCONTINUED | OUTPATIENT
Start: 2020-03-14 | End: 2020-03-15 | Stop reason: HOSPADM

## 2020-03-14 RX ORDER — ROSUVASTATIN CALCIUM 5 MG/1
10 TABLET, COATED ORAL NIGHTLY
Status: DISCONTINUED | OUTPATIENT
Start: 2020-03-14 | End: 2020-03-15 | Stop reason: HOSPADM

## 2020-03-14 RX ADMIN — SENNOSIDES AND DOCUSATE SODIUM 1 TABLET: 8.6; 5 TABLET ORAL at 19:52

## 2020-03-14 RX ADMIN — ACETAMINOPHEN 650 MG: 325 TABLET ORAL at 05:02

## 2020-03-14 RX ADMIN — OXYCODONE HYDROCHLORIDE 10 MG: 5 TABLET ORAL at 19:09

## 2020-03-14 RX ADMIN — SENNOSIDES AND DOCUSATE SODIUM 1 TABLET: 8.6; 5 TABLET ORAL at 08:23

## 2020-03-14 RX ADMIN — ACETAMINOPHEN 650 MG: 325 TABLET ORAL at 23:19

## 2020-03-14 RX ADMIN — ACETAMINOPHEN 650 MG: 325 TABLET ORAL at 17:15

## 2020-03-14 RX ADMIN — ROSUVASTATIN CALCIUM 10 MG: 5 TABLET, COATED ORAL at 19:51

## 2020-03-14 RX ADMIN — ASPIRIN 81 MG: 81 TABLET, COATED ORAL at 08:23

## 2020-03-14 RX ADMIN — OXYCODONE HYDROCHLORIDE 10 MG: 5 TABLET ORAL at 05:26

## 2020-03-14 RX ADMIN — CEFAZOLIN SODIUM 2 G: 2 SOLUTION INTRAVENOUS at 00:28

## 2020-03-14 RX ADMIN — ACETAMINOPHEN 650 MG: 325 TABLET ORAL at 09:30

## 2020-03-14 RX ADMIN — OXYCODONE HYDROCHLORIDE 10 MG: 5 TABLET ORAL at 09:30

## 2020-03-14 RX ADMIN — OXYCODONE HYDROCHLORIDE 10 MG: 5 TABLET ORAL at 14:50

## 2020-03-14 RX ADMIN — TRANEXAMIC ACID 1950 MG: 650 TABLET ORAL at 05:01

## 2020-03-14 RX ADMIN — OXYCODONE HYDROCHLORIDE 5 MG: 5 TABLET ORAL at 00:55

## 2020-03-14 RX ADMIN — ASPIRIN 81 MG: 81 TABLET, COATED ORAL at 19:51

## 2020-03-14 RX ADMIN — OXYCODONE HYDROCHLORIDE 10 MG: 5 TABLET ORAL at 23:19

## 2020-03-14 RX ADMIN — CITALOPRAM HYDROBROMIDE 20 MG: 20 TABLET ORAL at 08:22

## 2020-03-14 ASSESSMENT — PAIN SCALES - GENERAL
PAINLEVEL_OUTOF10: 7
PAINLEVEL_OUTOF10: 5
PAINLEVEL_OUTOF10: 3
PAINLEVEL_OUTOF10: 0
PAINLEVEL_OUTOF10: 7
PAINLEVEL_OUTOF10: 7
PAINLEVEL_OUTOF10: 5
PAINLEVEL_OUTOF10: 3
PAINLEVEL_OUTOF10: 7
PAINLEVEL_OUTOF10: 7
PAINLEVEL_OUTOF10: 3
PAINLEVEL_OUTOF10: 7

## 2020-03-14 ASSESSMENT — PAIN DESCRIPTION - ORIENTATION: ORIENTATION: RIGHT

## 2020-03-14 ASSESSMENT — PAIN DESCRIPTION - FREQUENCY: FREQUENCY: INTERMITTENT

## 2020-03-14 ASSESSMENT — PAIN DESCRIPTION - LOCATION: LOCATION: KNEE

## 2020-03-14 ASSESSMENT — PAIN DESCRIPTION - PAIN TYPE: TYPE: SURGICAL PAIN

## 2020-03-14 NOTE — CONSULTS
Disease Daughter     No Known Problems Son        REVIEW OF SYSTEMS:  Ten systems reviewed and negative except for stated in HPI    Physical Exam:    Vitals: /68   Pulse 68   Temp 98.4 °F (36.9 °C) (Oral)   Resp 18   Ht 5' 2\" (1.575 m)   Wt 164 lb (74.4 kg)   LMP 01/16/2008   SpO2 95%   BMI 30.00 kg/m²   General appearance: alert, appears stated age and cooperative, no acute distress  Skin: Skin color, texture, turgor normal. No rashes or lesions  HEENT: Head: Normocephalic, no lesions, without obvious abnormality. Neck: no adenopathy, no carotid bruit, no JVD, supple, symmetrical, trachea midline and thyroid not enlarged, symmetric, no tenderness/mass/nodules  Lungs: clear to auscultation bilaterally  Heart: regular rate and rhythm, S1, S2 normal, no murmur, click, rub or gallop  Abdomen: soft, non-tender; bowel sounds normal; no masses,  no organomegaly  Extremities: Evidence of and orthopedic surgical intervention on the right knee which is addressed. No bleed, erythema or bruise. Neurologic: Mental status: Alert, oriented, thought content appropriate     Recent Labs     03/14/20  0447   WBC 8.0   HGB 9.8*        Recent Labs     03/14/20  0447      K 4.2      CO2 25   BUN 15   CREATININE 0.78   GLUCOSE 136*     Troponin T: No results for input(s): TROPONINI in the last 72 hours. ABGs: No results found for: PHART, PO2ART, OSF0YLT  INR: No results for input(s): INR in the last 72 hours. URINALYSIS:No results for input(s): NITRITE, COLORU, PHUR, LABCAST, WBCUA, RBCUA, MUCUS, TRICHOMONAS, YEAST, BACTERIA, CLARITYU, SPECGRAV, LEUKOCYTESUR, UROBILINOGEN, BILIRUBINUR, BLOODU, GLUCOSEU, AMORPHOUS in the last 72 hours. Invalid input(s): Trent Luque  -----------------------------------------------------------------   No results found. Assessment and Plan     *Status post right knee replacement.   Wound care, ambulation recommendations, pain management, DVT prophylaxis to be addressed by orthopedic team.  Currently she is on aspirin twice a day for DVT prophylaxis. *Diabetes, type II. Contrary to what is listed patient is not on metformin. The plan as per her primary care doctor is to check her blood sugar 2 weeks after surgery then decide if she needs to be on metformin or any other medication. Patient is requesting to take her off metformin and change her Accu-Chek to once daily only. *Anemia, mild blood loss. No need for blood transfusion. *Tobacco addiction, counseling. Plan:  As listed above. Thank you Dr. Shelva Leyden for this consultation. Patient Active Problem List   Diagnosis Code    Hyperlipidemia E78.5    Sleep apnea G47.30    Anxiety F41.9    Chronic pain of both knees M25.561, M25.562, G89.29    Dry skin dermatitis L85.3    Current occasional smoker Z72.0    Elevated glucose R73.09    Blood pressure elevated without history of HTN R03.0    Family history of diabetes mellitus Z83.3    Left knee DJD M17.12    Vagal reaction R55    Status post total knee replacement, left B63.277    Status post left knee replacement Z96.652    Simple chronic bronchitis (HCC) J41.0    Status post total knee replacement, right Z96.651       Sai Chandler MD  Admitting Hospitalist    TTS: 85mins where I focused more than 75% of my attention on rendering care, and planning treatment course for this patient, in addition to talking to RN team, mid levels, consulting with other physicians and following up on labs and imaging. High Risk Readmission Screening Tool Score Noted.      Emergency Contact:

## 2020-03-14 NOTE — PROGRESS NOTES
Progress Note   TKA    Subjective:     Post-Operative Day: 1 Status Post right Total Knee Arthroplasty  Systemic or Specific Complaints:No Complaints and Fever    Objective:     CURRENT VITALS:  /68   Pulse 68   Temp 98.4 °F (36.9 °C) (Oral)   Resp 18   Ht 5' 2\" (1.575 m)   Wt 164 lb (74.4 kg)   LMP 01/16/2008   SpO2 95%   BMI 30.00 kg/m²     General: alert, appears stated age and cooperative   Wound: Wound clean and dry no evidence of infection. Motion: Painless Range of Motion   DVT Exam: No evidence of DVT seen on physical exam.       Knee swollen but thigh soft to palpation. Moving foot and ankle. Good distal pulses. Data Review  Recent Labs     03/14/20  0447   WBC 8.0   RBC 3.05*   HGB 9.8*   HCT 29.5*   MCV 96.7   MCH 32.2*   MCHC 33.3   RDW 13.8            Assessment:     Status Post right Total Knee Arthroplasty. Doing well postoperatively.      Plan:      1: Continues current post-op course :  2:  Continue Deep venous thrombosis prophylaxis  3:  Continue physical therapy  4:  Continue Pain Control

## 2020-03-14 NOTE — PROGRESS NOTES
Occupational Therapy  Facility/Department: [facility] MED SURG UNIT  Daily Treatment Note  NAME: Merari Miguel  : 1950  MRN: [id number]    Date of Service: 3/14/2020    Discharge Recommendations:  Subacute/Skilled Nursing Facility(Pt wishing to stay for [facility] rehab)       Assessment      REQUIRES OT FOLLOW UP: Yes     Pt. Is agreeable to a shower with OT. Pt. Tolerated shower well. Pt. Needs Min assistance for LB. Pt. Is a little impulsive and needs vc to slow down and for safety. Pt. Stated her pain is 3/10 pain when sitting and when standing a 5/10 pain in right knee. Pt. Is polite and cooperative during OT. Trained and educated pt. In AE. Patient Diagnosis(es): There were no encounter diagnoses. has a past medical history of Allergic rhinitis, Anxiety, History of blood transfusion, Hyperlipidemia, Osteoarthritis, and Sleep apnea. has a past surgical history that includes  section ( &  & ); Ankle surgery (Right, ); Colonoscopy; Endoscopy, colon, diagnostic; and Total knee arthroplasty (Left, 2019). Restrictions  Restrictions/Precautions  Restrictions/Precautions: Weight Bearing  Required Braces or Orthoses?: Yes  Implants present? : Metal implants(Prior R TKA, L TKA 3/13/2020)  Lower Extremity Weight Bearing Restrictions  Right Lower Extremity Weight Bearing: Weight Bearing As Tolerated  Required Braces or Orthoses  Right Lower Extremity Brace: Knee Immobilizer(until +SLR)  Position Activity Restriction  Other position/activity restrictions: . Subjective   General  Chart Reviewed: Yes  Patient assessed for rehabilitation services?: Yes  Family / Caregiver Present: No  Referring Practitioner: Dr. Kristofer Strange  Diagnosis: R TKA  Subjective  Subjective: Pt. is agreeable to a shower with OT.       Orientation     Objective    ADL  Grooming: Independent washing hair and face  UE Bathing: Setup;Supervision  LE Bathing: Stand by assistance;Contact guard assistance  UE Dressing: Setup;Supervision changing shirt  LE Dressing: Stand by assistance pulling up underwear and shorts ;Minimal assistance donning socks , start underwear  Toileting: Stand by assistance;Contact guard assistance  Toilet transfer-SBA/CGA with vc  Additional Comments: (shower transfer-CGA with vc)        Standing Balance  Time: (4-5 minutes with ww 2 x)            Pt. Is imulpsive and needs vc for safety and to slow down                                                           Plan   Plan  Times per week: 3-6x/wk  Times per day: Daily  Plan weeks: <1- postop pt  Current Treatment Recommendations: Strengthening, Balance Training, Functional Mobility Training, Endurance Training, Stair training, Pain Management, Safety Education & Training, Patient/Caregiver Education & Training, Self-Care / ADL, Home Management Training  G-Code     OutComes Score                                                  AM-PAC Score             Goals  Short term goals  Time Frame for Short term goals: 3-5 days- postop pt  Short term goal 1: Tolerate 25-30min BUE ther ex/act to increase strength/end for ADL  Short term goal 2: Increase to John LB dressing and bathing  Short term goal 3: Increase to SBA toileting  Long term goals  Time Frame for Long term goals : Same as STGs  Long term goal 1: Same as STGs  Long term goal 2: Same as STGs  Patient Goals   Patient goals :  \"To strengthen my knee so I can go back to doing what I was doing\"       Therapy Time   Individual Concurrent Group Co-treatment   Time In  10:00am         Time Out  11:00am         Minutes  [address] [address] Number: [id number]

## 2020-03-14 NOTE — PROGRESS NOTES
occasionally lifts walker vs. pushing rolling walker. Gait Deviations: Decreased step length;Decreased step height  Distance: 40'x2     Balance  Sitting - Static: Good  Sitting - Dynamic: Good  Standing - Static: Fair;+  Standing - Dynamic: Fair;+  Exercises  Straight Leg Raise: x20  Quad Sets: x20  Gluteal Sets: x20  Hip Abduction: x20  Ankle Pumps: x20  Comments: +SLR                        G-Code     OutComes Score                                                     AM-PAC Score             Goals  Short term goals  Time Frame for Short term goals: 3 days  Short term goal 1: Transfers with SBA  Short term goal 2: Pt amb 70 feet with ww CGA  Short term goal 3: Incrase AROM R knee  10-65 deg  Short term goal 4:  Increase strength R LE 3-/5   Long term goals  Long term goal 1: Determine D/C plan-probable short [facility] stay  Patient Goals   Patient goals : Strengthen knee be able to walk without pain    Plan    Plan  Current Treatment Recommendations: Strengthening, ROM, Endurance Training, Functional Mobility Training, Safety Education & Training, Home Exercise Program, Transfer Training, Gait Training, Manual Therapy - Soft Tissue Mobilization, Neuromuscular Re-education, Patient/Caregiver Education & Training  Safety Devices  Type of devices: Call light within reach, Chair alarm in place, Patient at risk for falls, Gait belt, Left in chair     Therapy Time   Individual Concurrent Group Co-treatment   Time In  1100         Time Out  1155         Minutes  [address], [facility]  License and [address] Number: [id number]

## 2020-03-14 NOTE — PROGRESS NOTES
Physical Therapy  Facility/Department: [facility] MED SURG UNIT  Daily Treatment Note -PM tx   NAME: Partha More  : 1950  MRN: [id number]    Date of Service: 3/14/2020    Discharge Recommendations:  Subacute/Skilled Nursing Facility        Assessment   Body structures, Functions, Activity limitations: Decreased functional mobility ; Decreased ROM; Decreased strength  Assessment: Pt did well with her transfers and her mobility. Pt reports having increased right knee pain during gait and nsg called for pain medicine. Pt able to tolerate both seated and standing ther ex but not able to tolerate standing on R LE in standing to exercise her L LE. Pt worked on her knee flexion and knee extension in sitting. Discussed pts concern about the many steps to enter her home and that she lives alone. Pt would benefit from a rehab stay so these issues can be addressed. Treatment Diagnosis: difficulty walking, weakness, S/P R TKA  Prognosis: Good  REQUIRES PT FOLLOW UP: Yes     Patient Diagnosis(es): s/p R Total knee replacement   has a past medical history of Allergic rhinitis, Anxiety, History of blood transfusion, Hyperlipidemia, Osteoarthritis, and Sleep apnea. has a past surgical history that includes  section ( &  & ); Ankle surgery (Right, ); Colonoscopy; Endoscopy, colon, diagnostic; and Total knee arthroplasty (Left, 2019). Restrictions  Restrictions/Precautions  Restrictions/Precautions: Weight Bearing  Required Braces or Orthoses?: Yes  Implants present? : Metal implants  Lower Extremity Weight Bearing Restrictions  Right Lower Extremity Weight Bearing: Weight Bearing As Tolerated  Required Braces or Orthoses  Right Lower Extremity Brace: Knee Immobilizer  Position Activity Restriction  Other position/activity restrictions: .   Subjective   General  Chart Reviewed: Yes  Family / Caregiver Present: No  Subjective  Subjective: Pt reports pain as   Pain Screening  Patient Currently in Pain: Yes  Pain Assessment  Pain Assessment: 0-10  Pain Level: 5(1-2/10 at rest and 5/10 with gait )  Pain Type: Surgical pain  Pain Location: Knee  Pain Orientation: Right  Pain Frequency: Intermittent  Vital Signs  Patient Currently in Pain: Yes       Orientation     Cognition      Objective      Transfers  Sit to Stand: Contact guard assistance  Stand to sit: Contact guard assistance  Ambulation  Ambulation?: Yes  Ambulation 1  Surface: level tile  Device: Rolling Walker  Assistance: Contact guard assistance  Quality of Gait: Pt ambulated WBAT R LE with decreased step length R LE needing vqs to bend her knee during swing phase and then focus on her heel strike. Gait Deviations: Slow Ivelisse;Decreased step length  Distance: 45' x 1; 55; x 1  Comments: Pt demonstrated a safe gait pattern and albe to follow vqs  Stairs/Curb  Stairs?: No        Exercises  Straight Leg Raise: x20 R LE sitting edge of chair   Quad Sets: x20 BLE   Heelslides: x 10 H5 R LE   Hip Abduction: x 10 BLE in sitting   Other exercises  Other exercises 1: heel raises x 10 BLE   Other exercises 2: Standing SLR x 10 R LE  Other exercises 3: Standing hip abd x 10 RLE   Other exercises 4: Standing hip adduciton x 10 RL E  Other exercises 5: Standing hip ext x 10 RLE   Exercises  Exercise 1: heel slide x 10 R LE   Exercise 2: Seated SLR x 10 BLE   Exercise 3: Seated hip abd x 10 BLE   Exercise 4: QS x 10 BLE   AROM RLE (degrees)  R Knee Flexion 0-145: AROM 87 degrees in sitting   R Knee Extension 0: AROM 10 degrees from 0 in sitting   Strength RLE  Comment: Able to do SLR + (able to do 10 reps)                  G-Code     OutComes Score                                                     AM-PAC Score             Goals  Short term goals  Time Frame for Short term goals: 3 days  Short term goal 1: Transfers with SBA  Short term goal 2: Pt amb 70 feet with ww CGA  Short term goal 3: Incrase AROM R knee  10-65 deg  Short term goal 4:  Increase strength R

## 2020-03-15 ENCOUNTER — HOSPITAL ENCOUNTER (INPATIENT)
Age: 70
LOS: 12 days | Discharge: HOME HEALTH | DRG: 561 | End: 2020-03-27
Attending: INTERNAL MEDICINE | Admitting: INTERNAL MEDICINE
Payer: MEDICARE

## 2020-03-15 VITALS
HEART RATE: 56 BPM | DIASTOLIC BLOOD PRESSURE: 68 MMHG | BODY MASS INDEX: 30.18 KG/M2 | WEIGHT: 164 LBS | HEIGHT: 62 IN | OXYGEN SATURATION: 93 % | RESPIRATION RATE: 18 BRPM | SYSTOLIC BLOOD PRESSURE: 167 MMHG | TEMPERATURE: 98.1 F

## 2020-03-15 LAB
GLUCOSE BLD-MCNC: 127 MG/DL (ref 60–115)
HCT VFR BLD CALC: 34.1 % (ref 37–47)
HEMOGLOBIN: 11.3 G/DL (ref 12–16)
MCH RBC QN AUTO: 32.1 PG (ref 27–31.3)
MCHC RBC AUTO-ENTMCNC: 33.3 % (ref 33–37)
MCV RBC AUTO: 96.5 FL (ref 82–100)
PDW BLD-RTO: 14.7 % (ref 11.5–14.5)
PERFORMED ON: ABNORMAL
PLATELET # BLD: 204 K/UL (ref 130–400)
RBC # BLD: 3.53 M/UL (ref 4.2–5.4)
WBC # BLD: 8.8 K/UL (ref 4.8–10.8)

## 2020-03-15 PROCEDURE — 97162 PT EVAL MOD COMPLEX 30 MIN: CPT

## 2020-03-15 PROCEDURE — 85027 COMPLETE CBC AUTOMATED: CPT

## 2020-03-15 PROCEDURE — 6370000000 HC RX 637 (ALT 250 FOR IP): Performed by: INTERNAL MEDICINE

## 2020-03-15 PROCEDURE — 97116 GAIT TRAINING THERAPY: CPT

## 2020-03-15 PROCEDURE — 97110 THERAPEUTIC EXERCISES: CPT

## 2020-03-15 PROCEDURE — 36415 COLL VENOUS BLD VENIPUNCTURE: CPT

## 2020-03-15 PROCEDURE — 97530 THERAPEUTIC ACTIVITIES: CPT

## 2020-03-15 PROCEDURE — 1200000002 HC SEMI PRIVATE SWING BED

## 2020-03-15 PROCEDURE — 6370000000 HC RX 637 (ALT 250 FOR IP): Performed by: ORTHOPAEDIC SURGERY

## 2020-03-15 RX ORDER — ACETAMINOPHEN 325 MG/1
650 TABLET ORAL EVERY 6 HOURS PRN
Status: DISCONTINUED | OUTPATIENT
Start: 2020-03-15 | End: 2020-03-27 | Stop reason: HOSPADM

## 2020-03-15 RX ORDER — CITALOPRAM 20 MG/1
20 TABLET ORAL DAILY
Status: CANCELLED | OUTPATIENT
Start: 2020-03-16

## 2020-03-15 RX ORDER — LISINOPRIL 10 MG/1
10 TABLET ORAL DAILY
Status: CANCELLED | OUTPATIENT
Start: 2020-03-15

## 2020-03-15 RX ORDER — ASPIRIN 81 MG/1
81 TABLET ORAL 2 TIMES DAILY
Status: CANCELLED | OUTPATIENT
Start: 2020-03-15 | End: 2020-04-12

## 2020-03-15 RX ORDER — NICOTINE POLACRILEX 4 MG
15 LOZENGE BUCCAL PRN
Status: DISCONTINUED | OUTPATIENT
Start: 2020-03-15 | End: 2020-03-27 | Stop reason: HOSPADM

## 2020-03-15 RX ORDER — CITALOPRAM 20 MG/1
20 TABLET ORAL DAILY
Status: DISCONTINUED | OUTPATIENT
Start: 2020-03-16 | End: 2020-03-27 | Stop reason: HOSPADM

## 2020-03-15 RX ORDER — ONDANSETRON 4 MG/1
4 TABLET, ORALLY DISINTEGRATING ORAL EVERY 8 HOURS PRN
Status: DISCONTINUED | OUTPATIENT
Start: 2020-03-15 | End: 2020-03-27 | Stop reason: HOSPADM

## 2020-03-15 RX ORDER — OXYCODONE HYDROCHLORIDE 5 MG/1
5 TABLET ORAL EVERY 4 HOURS PRN
Status: DISCONTINUED | OUTPATIENT
Start: 2020-03-15 | End: 2020-03-18

## 2020-03-15 RX ORDER — SENNA AND DOCUSATE SODIUM 50; 8.6 MG/1; MG/1
1 TABLET, FILM COATED ORAL 2 TIMES DAILY
Status: DISCONTINUED | OUTPATIENT
Start: 2020-03-15 | End: 2020-03-27 | Stop reason: HOSPADM

## 2020-03-15 RX ORDER — ROSUVASTATIN CALCIUM 5 MG/1
10 TABLET, COATED ORAL NIGHTLY
Status: DISCONTINUED | OUTPATIENT
Start: 2020-03-15 | End: 2020-03-27 | Stop reason: HOSPADM

## 2020-03-15 RX ORDER — ASPIRIN 81 MG/1
81 TABLET ORAL 2 TIMES DAILY
Status: DISCONTINUED | OUTPATIENT
Start: 2020-03-15 | End: 2020-03-27 | Stop reason: HOSPADM

## 2020-03-15 RX ORDER — SENNA AND DOCUSATE SODIUM 50; 8.6 MG/1; MG/1
1 TABLET, FILM COATED ORAL 2 TIMES DAILY
Status: CANCELLED | OUTPATIENT
Start: 2020-03-15

## 2020-03-15 RX ORDER — ACETAMINOPHEN 325 MG/1
650 TABLET ORAL EVERY 6 HOURS PRN
Status: CANCELLED | OUTPATIENT
Start: 2020-03-15

## 2020-03-15 RX ORDER — LISINOPRIL 10 MG/1
10 TABLET ORAL DAILY
Status: DISCONTINUED | OUTPATIENT
Start: 2020-03-15 | End: 2020-03-15 | Stop reason: HOSPADM

## 2020-03-15 RX ORDER — ROSUVASTATIN CALCIUM 5 MG/1
10 TABLET, COATED ORAL NIGHTLY
Status: CANCELLED | OUTPATIENT
Start: 2020-03-15

## 2020-03-15 RX ORDER — ONDANSETRON 4 MG/1
4 TABLET, ORALLY DISINTEGRATING ORAL EVERY 8 HOURS PRN
Status: CANCELLED | OUTPATIENT
Start: 2020-03-15

## 2020-03-15 RX ORDER — NICOTINE POLACRILEX 4 MG
15 LOZENGE BUCCAL PRN
Status: CANCELLED | OUTPATIENT
Start: 2020-03-15

## 2020-03-15 RX ORDER — LISINOPRIL 10 MG/1
10 TABLET ORAL DAILY
Status: DISCONTINUED | OUTPATIENT
Start: 2020-03-16 | End: 2020-03-16

## 2020-03-15 RX ORDER — OXYCODONE HYDROCHLORIDE 5 MG/1
5 TABLET ORAL EVERY 4 HOURS PRN
Status: CANCELLED | OUTPATIENT
Start: 2020-03-15

## 2020-03-15 RX ADMIN — ACETAMINOPHEN 650 MG: 325 TABLET ORAL at 03:14

## 2020-03-15 RX ADMIN — SENNOSIDES AND DOCUSATE SODIUM 1 TABLET: 8.6; 5 TABLET ORAL at 21:48

## 2020-03-15 RX ADMIN — ACETAMINOPHEN 650 MG: 325 TABLET ORAL at 17:12

## 2020-03-15 RX ADMIN — MAGNESIUM HYDROXIDE 30 ML: 400 SUSPENSION ORAL at 08:01

## 2020-03-15 RX ADMIN — ASPIRIN 81 MG: 81 TABLET, COATED ORAL at 07:57

## 2020-03-15 RX ADMIN — OXYCODONE 5 MG: 5 TABLET ORAL at 17:12

## 2020-03-15 RX ADMIN — ASPIRIN 81 MG: 81 TABLET, COATED ORAL at 21:48

## 2020-03-15 RX ADMIN — OXYCODONE 5 MG: 5 TABLET ORAL at 21:48

## 2020-03-15 RX ADMIN — LISINOPRIL 10 MG: 10 TABLET ORAL at 09:58

## 2020-03-15 RX ADMIN — OXYCODONE HYDROCHLORIDE 10 MG: 5 TABLET ORAL at 07:57

## 2020-03-15 RX ADMIN — SENNOSIDES AND DOCUSATE SODIUM 1 TABLET: 8.6; 5 TABLET ORAL at 07:57

## 2020-03-15 RX ADMIN — CITALOPRAM HYDROBROMIDE 20 MG: 20 TABLET ORAL at 07:57

## 2020-03-15 RX ADMIN — OXYCODONE HYDROCHLORIDE 10 MG: 5 TABLET ORAL at 03:14

## 2020-03-15 RX ADMIN — ROSUVASTATIN CALCIUM 10 MG: 5 TABLET ORAL at 21:49

## 2020-03-15 RX ADMIN — OXYCODONE HYDROCHLORIDE 10 MG: 5 TABLET ORAL at 12:23

## 2020-03-15 RX ADMIN — ACETAMINOPHEN 650 MG: 325 TABLET ORAL at 09:59

## 2020-03-15 ASSESSMENT — PAIN DESCRIPTION - PAIN TYPE
TYPE: SURGICAL PAIN

## 2020-03-15 ASSESSMENT — ENCOUNTER SYMPTOMS
CHEST TIGHTNESS: 0
ABDOMINAL PAIN: 0
BLOOD IN STOOL: 0
NAUSEA: 0
DIARRHEA: 0
APNEA: 0
SHORTNESS OF BREATH: 0
COUGH: 0
VOMITING: 0
CONSTIPATION: 0

## 2020-03-15 ASSESSMENT — PAIN DESCRIPTION - DESCRIPTORS
DESCRIPTORS: ACHING;SORE
DESCRIPTORS: ACHING
DESCRIPTORS: ACHING;SORE
DESCRIPTORS: ACHING;SORE

## 2020-03-15 ASSESSMENT — PAIN SCALES - GENERAL
PAINLEVEL_OUTOF10: 7
PAINLEVEL_OUTOF10: 4
PAINLEVEL_OUTOF10: 7
PAINLEVEL_OUTOF10: 3
PAINLEVEL_OUTOF10: 3
PAINLEVEL_OUTOF10: 7
PAINLEVEL_OUTOF10: 0
PAINLEVEL_OUTOF10: 6
PAINLEVEL_OUTOF10: 7
PAINLEVEL_OUTOF10: 0
PAINLEVEL_OUTOF10: 7
PAINLEVEL_OUTOF10: 7
PAINLEVEL_OUTOF10: 3

## 2020-03-15 ASSESSMENT — PAIN DESCRIPTION - PROGRESSION
CLINICAL_PROGRESSION: NOT CHANGED
CLINICAL_PROGRESSION: GRADUALLY IMPROVING
CLINICAL_PROGRESSION: GRADUALLY IMPROVING
CLINICAL_PROGRESSION: GRADUALLY WORSENING
CLINICAL_PROGRESSION: NOT CHANGED

## 2020-03-15 ASSESSMENT — PAIN - FUNCTIONAL ASSESSMENT
PAIN_FUNCTIONAL_ASSESSMENT: PREVENTS OR INTERFERES SOME ACTIVE ACTIVITIES AND ADLS

## 2020-03-15 ASSESSMENT — PAIN DESCRIPTION - FREQUENCY
FREQUENCY: CONTINUOUS

## 2020-03-15 ASSESSMENT — PAIN DESCRIPTION - ORIENTATION
ORIENTATION: RIGHT

## 2020-03-15 ASSESSMENT — PAIN DESCRIPTION - LOCATION
LOCATION: KNEE

## 2020-03-15 ASSESSMENT — PAIN DESCRIPTION - ONSET
ONSET: ON-GOING

## 2020-03-15 NOTE — PROGRESS NOTES
Physical Therapy    Facility/Department: [facility] MED SURG UNIT  Initial Assessment -SUSANA evaluation     NAME: Letty Coburn  : 1950  MRN: [id number]    Date of Service: 3/15/2020    Discharge Recommendations:  Home with Home health PT, Outpatient PT, Home with assist PRN        Assessment   Body structures, Functions, Activity limitations: Decreased functional mobility ; Decreased ROM; Decreased strength;Decreased endurance; Increased pain  Treatment Diagnosis: R TKA  Assessment: PT completed SUSANA evaluation this afternoon. Pt reports having more pain today and not able to do a +SLR today vs yesterday. Pt has a negative Frantz's sign R LE and was still doing well with her mobility but having more pain with WBing and with exercises. Pt was able to peformed seated ther ex and ambulation with immobilizer donned this afternoon. PT encouraged pt to continue APs, ice and elevation and to keep her pain meds on a regular schedule to keep her pain at a tolerable level. Held steps today due to pts increased pain. Discussed POC and recommending pt in SUSANA for 1-2 weeks due to pt living alone and has a full flight of stairs for entry into her home. Pt agreeable with POC. Prognosis: Good  Decision Making: Medium Complexity  PT Education: Goals;PT Role;Plan of Care;Home Exercise Program;Transfer Training;Precautions;Gait Training;Weight-bearing Education  REQUIRES PT FOLLOW UP: Yes  Activity Tolerance  Activity Tolerance: Patient limited by pain; Patient limited by endurance       Patient Diagnosis(es):s/p Right Total Knee Replacement      has a past medical history of Allergic rhinitis, Anxiety, History of blood transfusion, Hyperlipidemia, Osteoarthritis, and Sleep apnea. has a past surgical history that includes  section ( &  & ); Ankle surgery (Right, );  Colonoscopy; Endoscopy, colon, diagnostic; and Total knee arthroplasty (Left, 2/8/2019). Restrictions  Restrictions/Precautions  Restrictions/Precautions: Weight Bearing  Required Braces or Orthoses?: Yes(Right knee immobilizer with gait until +SLR)  Implants present? : Metal implants  Lower Extremity Weight Bearing Restrictions  Right Lower Extremity Weight Bearing: Weight Bearing As Tolerated  Required Braces or Orthoses  Right Lower Extremity Brace: Knee Immobilizer  Vision/Hearing        Subjective  General  Chart Reviewed: Yes  Additional Pertinent Hx: Pt has a Right Total Knee Replacement 3/13/20; Now here for [facility]   Family / Caregiver Present: No  Referring Practitioner: Dr Looney/ Dr Kathleen Robins (surgeon)  Referral Date : 03/15/20  Diagnosis: s/p Right Total Knee replacement on 3/13/20  General Comment  Comments: Pt reports more pain today vs yesterday and not able to do SLR today vs yesterday. Subjective  Subjective: Pt reports pain 3/10 at rest and 7/10 with heel slides.     Pain Screening  Patient Currently in Pain: Yes  Pain Assessment  Pain Assessment: 0-10  Pain Level: 7  Pain Type: Surgical pain  Pain Location: Knee  Pain Orientation: Right  Pain Descriptors: Aching  Vital Signs  Patient Currently in Pain: Yes       Orientation     Social/Functional History  Social/Functional History  Lives With: Alone  Type of Home: Apartment  Home Layout: One level  Home Access: Stairs to enter with rails  Entrance Stairs - Number of Steps: men[place]aca car in basement then navigates up 20 steps in order to get to the elevator  Entrance Stairs - Rails: Both  Bathroom Shower/Tub: Tub/Shower unit  Bathroom Toilet: Standard  Bathroom Equipment: Grab bars in shower  Bathroom Accessibility: Walker accessible  Home Equipment: Rolling walker, Cane  Receives Help From: Family  ADL Assistance: Independent  Homemaking Assistance: Independent  Homemaking Responsibilities: Yes  Ambulation Assistance: Independent  Transfer Assistance: Independent  Active : Yes  Mode of Transportation: Car  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[place] [address] Street: Hiking, quilting, reading, walking around town  Cognition        Objective          AROM RLE (degrees)  R Knee Flexion 0-145: in sitting AROM 78   R Knee Extension 0: AROM 9 degrees from full extension   Spine  Special Tests: Negative Frantz's sign R LE  Strength RLE  R Hip Flexion: 2+/5  R Hip ABduction: 4/5  R Hip ADduction: 4/5  R Knee Flexion: 3-/5;3/5  R Knee Extension: 2/5;2+/5  R Ankle Dorsiflexion: 4+/5  R Ankle Plantar flexion: 4+/5  Strength LLE  Comment: Pt has a left TKR Feb 2019  L Hip Flexion: 4-/5  L Hip ABduction: 4/5  L Hip ADduction: 4/5  L Knee Flexion: 4/5  L Knee Extension: 4/5  L Ankle Dorsiflexion: 4+/5  L Ankle Plantar Flexion: 4+/5           Transfers  Sit to Stand: Contact guard assistance  Stand to sit: Contact guard assistance  Ambulation  Ambulation?: Yes  Ambulation 1  Surface: level tile  Device: Rolling Walker  Other Apparatus: Right;Knee Immobilizer(Unable to do SLR this afternoon so used immobilizer with gait )  Assistance: Stand by assistance;Contact guard assistance  Quality of Gait: Pt ambulated with an antalgic gait pattern with decreased step length and stance time on R LE needing vqs to do heel-toe gait pattern. Pt tended to keep R LE slightly flexed to avoid WBAT R LE. Gait Deviations: Slow Ivelisse;Decreased step length  Distance: 65' x 2  Comments: Pt demonstated a more antalgic gait pattern to day and was more painful and swollen along the medial joint line.         Exercises  Straight Leg Raise: x 5 with assistance (pain increase this visit)  Quad Sets: x 10 B LE with 10 sec hold   Heelslides: x 10 R LE with 10 sec hold   Ankle Pumps: x 20  Comments: - SLR during her afternoon SUSANA evaluation; Provided pt with gentle edema massage of R LE; Pt reports \"That feels great\"      Plan   Plan  Times per week: 5-7 days per week   Times per day: Daily(QD to BID )  Plan weeks: Recommend 1-2 weeks of

## 2020-03-15 NOTE — PROGRESS NOTES
Physical Therapy  Facility/Department: [facility] MED SURG UNIT  Daily Treatment Note  NAME: Vicki Price  : 1950  MRN: [id number]    Date of Service: 3/15/2020    Discharge Recommendations:  Subacute/Skilled Nursing Facility        Assessment   Body structures, Functions, Activity limitations: Decreased functional mobility ; Decreased ROM; Decreased strength  Assessment: Patient demonstrated ability to bend knee in sitting similar range to yesterday ; extension slightly improved. Treatment Diagnosis: difficulty walking, weakness, S/P R TKA  Prognosis: Good  REQUIRES PT FOLLOW UP: Yes  Activity Tolerance  Activity Tolerance: Patient limited by pain; Patient limited by endurance     Patient Diagnosis(es): There were no encounter diagnoses. has a past medical history of Allergic rhinitis, Anxiety, History of blood transfusion, Hyperlipidemia, Osteoarthritis, and Sleep apnea. has a past surgical history that includes  section ( &  & ); Ankle surgery (Right, ); Colonoscopy; Endoscopy, colon, diagnostic; and Total knee arthroplasty (Left, 2019). Restrictions  Restrictions/Precautions  Restrictions/Precautions: Weight Bearing  Required Braces or Orthoses?: Yes  Implants present? : Metal implants  Lower Extremity Weight Bearing Restrictions  Right Lower Extremity Weight Bearing: Weight Bearing As Tolerated  Required Braces or Orthoses  Right Lower Extremity Brace: Knee Immobilizer  Position Activity Restriction  Other position/activity restrictions: .   Subjective   General  Chart Reviewed: Yes  Family / Caregiver Present: No  Subjective  Subjective: Patient reports that it's a marathon   Pain Assessment  Pain Assessment: 0-10  Pain Level: 7       Orientation     Cognition      Objective         Ambulation  Ambulation?: Yes  Ambulation 1  Surface: level tile  Device: Rolling Walker  Assistance: Stand by assistance;Contact guard assistance  Quality of Gait: Antalgic (R), non-fluid gait  Gait Deviations: Slow Ivelisse;Decreased step length;Decreased step height;Decreased arm swing  Distance: 50' x 2     Balance  Sitting - Static: Good  Sitting - Dynamic: Good  Standing - Static: Fair;+  Standing - Dynamic: Fair;+  Exercises  Straight Leg Raise: x 10 with light assistance (pain increase this visit)  Quad Sets: x 20  Heelslides: x 20   Hip Flexion: x 10 (bent knee tuck in seated)  Hip Abduction: x 10  Knee Long Arc Quad: x 10  Ankle Pumps: x 20   PROM RLE (degrees)  RLE General PROM: 5  AROM RLE (degrees)  R Knee Flexion 0-145: 85                    G-Code     OutComes Score                                                     AM-PAC Score             Goals  Short term goals  Time Frame for Short term goals: 3 days  Short term goal 1: Transfers with SBA  Short term goal 2: Pt amb 70 feet with ww CGA  Short term goal 3: Incrase AROM R knee  10-65 deg  Short term goal 4:  Increase strength R LE 3-/5   Long term goals  Long term goal 1: Determine D/C plan-probable short [facility] [place] [place] stay  Patient Goals   Patient goals : Strengthen knee be able to walk without pain    Plan    Plan  Current Treatment Recommendations: Strengthening, ROM, Endurance Training, Functional Mobility Training, Safety Education & Training, Home Exercise Program, Transfer Training, Gait Training, Manual Therapy - Soft Tissue Mobilization, Neuromuscular Re-education, Patient/Caregiver Education & Training  Safety Devices  Type of devices: Call light within reach, Chair alarm in place, Left in chair   Ice following session    Therapy Time   Individual Concurrent Group Co-treatment   Time In  840         Time Out  930         Minutes  Ronnie Henderson PTA  License and [address] Number: [phone]

## 2020-03-15 NOTE — PROGRESS NOTES
Progress Note   TKA    Subjective:     Post-Operative Day: 2 Status Post right Total Knee Arthroplasty  Systemic or Specific Complaints:No Complaints    Objective:     CURRENT VITALS:  BP (!) 167/68   Pulse 56   Temp 98.1 °F (36.7 °C) (Oral)   Resp 18   Ht 5' 2\" (1.575 m)   Wt 164 lb (74.4 kg)   LMP 01/16/2008   SpO2 93%   BMI 30.00 kg/m²     General: alert, appears stated age and cooperative   Wound: Wound clean and dry no evidence of infection. Motion: Painless Range of Motion   DVT Exam: No evidence of DVT seen on physical exam.       Knee swollen but thigh soft to palpation. Moving foot and ankle. Good distal pulses. Data Review  Recent Labs     03/14/20  0447 03/15/20  0640   WBC 8.0 8.8   RBC 3.05* 3.53*   HGB 9.8* 11.3*   HCT 29.5* 34.1*   MCV 96.7 96.5   MCH 32.2* 32.1*   MCHC 33.3 33.3   RDW 13.8 14.7*    204         Assessment:     Status Post right Total Knee Arthroplasty. Doing well postoperatively.      Plan:      1: Continues current post-op course :  2:  Continue Deep venous thrombosis prophylaxis  3:  Continue physical therapy  4:  Continue Pain Control

## 2020-03-16 PROBLEM — Z96.651 HISTORY OF TOTAL RIGHT KNEE REPLACEMENT: Status: ACTIVE | Noted: 2020-03-16

## 2020-03-16 LAB
ANION GAP SERPL CALCULATED.3IONS-SCNC: 13 MEQ/L (ref 9–15)
BUN BLDV-MCNC: 12 MG/DL (ref 8–23)
CALCIUM SERPL-MCNC: 9.8 MG/DL (ref 8.5–9.9)
CHLORIDE BLD-SCNC: 96 MEQ/L (ref 95–107)
CO2: 28 MEQ/L (ref 20–31)
CREAT SERPL-MCNC: 0.74 MG/DL (ref 0.5–0.9)
GFR AFRICAN AMERICAN: >60
GFR NON-AFRICAN AMERICAN: >60
GLUCOSE BLD-MCNC: 108 MG/DL (ref 60–115)
GLUCOSE BLD-MCNC: 112 MG/DL (ref 60–115)
GLUCOSE BLD-MCNC: 127 MG/DL (ref 60–115)
GLUCOSE BLD-MCNC: 145 MG/DL (ref 70–99)
HCT VFR BLD CALC: 36.3 % (ref 37–47)
HEMOGLOBIN: 11.6 G/DL (ref 12–16)
MCH RBC QN AUTO: 31.4 PG (ref 27–31.3)
MCHC RBC AUTO-ENTMCNC: 32.1 % (ref 33–37)
MCV RBC AUTO: 97.9 FL (ref 82–100)
PDW BLD-RTO: 14.4 % (ref 11.5–14.5)
PERFORMED ON: ABNORMAL
PERFORMED ON: NORMAL
PERFORMED ON: NORMAL
PLATELET # BLD: 217 K/UL (ref 130–400)
POTASSIUM SERPL-SCNC: 4.1 MEQ/L (ref 3.4–4.9)
RBC # BLD: 3.7 M/UL (ref 4.2–5.4)
SODIUM BLD-SCNC: 137 MEQ/L (ref 135–144)
WBC # BLD: 7.5 K/UL (ref 4.8–10.8)

## 2020-03-16 PROCEDURE — 1200000002 HC SEMI PRIVATE SWING BED

## 2020-03-16 PROCEDURE — 80048 BASIC METABOLIC PNL TOTAL CA: CPT

## 2020-03-16 PROCEDURE — 36415 COLL VENOUS BLD VENIPUNCTURE: CPT

## 2020-03-16 PROCEDURE — 97166 OT EVAL MOD COMPLEX 45 MIN: CPT

## 2020-03-16 PROCEDURE — 97530 THERAPEUTIC ACTIVITIES: CPT

## 2020-03-16 PROCEDURE — 97116 GAIT TRAINING THERAPY: CPT

## 2020-03-16 PROCEDURE — 97110 THERAPEUTIC EXERCISES: CPT

## 2020-03-16 PROCEDURE — 6370000000 HC RX 637 (ALT 250 FOR IP): Performed by: INTERNAL MEDICINE

## 2020-03-16 PROCEDURE — 85027 COMPLETE CBC AUTOMATED: CPT

## 2020-03-16 PROCEDURE — 97535 SELF CARE MNGMENT TRAINING: CPT

## 2020-03-16 RX ORDER — LISINOPRIL 20 MG/1
20 TABLET ORAL DAILY
Status: DISCONTINUED | OUTPATIENT
Start: 2020-03-17 | End: 2020-03-17

## 2020-03-16 RX ORDER — POLYETHYLENE GLYCOL 3350 17 G/17G
17 POWDER, FOR SOLUTION ORAL DAILY
Status: DISCONTINUED | OUTPATIENT
Start: 2020-03-16 | End: 2020-03-27 | Stop reason: HOSPADM

## 2020-03-16 RX ADMIN — SENNOSIDES AND DOCUSATE SODIUM 1 TABLET: 8.6; 5 TABLET ORAL at 08:32

## 2020-03-16 RX ADMIN — OXYCODONE 5 MG: 5 TABLET ORAL at 13:50

## 2020-03-16 RX ADMIN — CITALOPRAM HYDROBROMIDE 20 MG: 20 TABLET, FILM COATED ORAL at 08:32

## 2020-03-16 RX ADMIN — OXYCODONE 5 MG: 5 TABLET ORAL at 06:50

## 2020-03-16 RX ADMIN — SENNOSIDES AND DOCUSATE SODIUM 1 TABLET: 8.6; 5 TABLET ORAL at 20:22

## 2020-03-16 RX ADMIN — MAGNESIUM HYDROXIDE 30 ML: 400 SUSPENSION ORAL at 08:34

## 2020-03-16 RX ADMIN — ACETAMINOPHEN 650 MG: 325 TABLET ORAL at 01:04

## 2020-03-16 RX ADMIN — POLYETHYLENE GLYCOL 3350 17 G: 17 POWDER, FOR SOLUTION ORAL at 11:39

## 2020-03-16 RX ADMIN — OXYCODONE 5 MG: 5 TABLET ORAL at 01:55

## 2020-03-16 RX ADMIN — OXYCODONE 5 MG: 5 TABLET ORAL at 19:02

## 2020-03-16 RX ADMIN — ROSUVASTATIN CALCIUM 10 MG: 5 TABLET ORAL at 20:22

## 2020-03-16 RX ADMIN — LISINOPRIL 10 MG: 10 TABLET ORAL at 08:32

## 2020-03-16 RX ADMIN — ASPIRIN 81 MG: 81 TABLET, COATED ORAL at 08:32

## 2020-03-16 RX ADMIN — ASPIRIN 81 MG: 81 TABLET, COATED ORAL at 20:22

## 2020-03-16 ASSESSMENT — PAIN DESCRIPTION - PROGRESSION
CLINICAL_PROGRESSION: NOT CHANGED
CLINICAL_PROGRESSION: NOT CHANGED
CLINICAL_PROGRESSION: GRADUALLY IMPROVING
CLINICAL_PROGRESSION: NOT CHANGED
CLINICAL_PROGRESSION: GRADUALLY IMPROVING
CLINICAL_PROGRESSION: NOT CHANGED
CLINICAL_PROGRESSION: GRADUALLY WORSENING
CLINICAL_PROGRESSION: NOT CHANGED

## 2020-03-16 ASSESSMENT — PAIN DESCRIPTION - LOCATION
LOCATION: KNEE

## 2020-03-16 ASSESSMENT — PAIN DESCRIPTION - DESCRIPTORS
DESCRIPTORS: ACHING;SORE

## 2020-03-16 ASSESSMENT — PAIN SCALES - GENERAL
PAINLEVEL_OUTOF10: 0
PAINLEVEL_OUTOF10: 3
PAINLEVEL_OUTOF10: 3
PAINLEVEL_OUTOF10: 6
PAINLEVEL_OUTOF10: 7
PAINLEVEL_OUTOF10: 0
PAINLEVEL_OUTOF10: 6

## 2020-03-16 ASSESSMENT — PAIN DESCRIPTION - PAIN TYPE
TYPE: SURGICAL PAIN

## 2020-03-16 ASSESSMENT — PAIN - FUNCTIONAL ASSESSMENT
PAIN_FUNCTIONAL_ASSESSMENT: PREVENTS OR INTERFERES SOME ACTIVE ACTIVITIES AND ADLS
PAIN_FUNCTIONAL_ASSESSMENT: PREVENTS OR INTERFERES SOME ACTIVE ACTIVITIES AND ADLS

## 2020-03-16 ASSESSMENT — PAIN DESCRIPTION - ONSET
ONSET: ON-GOING
ONSET: ON-GOING

## 2020-03-16 ASSESSMENT — PAIN DESCRIPTION - FREQUENCY
FREQUENCY: CONTINUOUS
FREQUENCY: CONTINUOUS

## 2020-03-16 ASSESSMENT — PAIN DESCRIPTION - ORIENTATION
ORIENTATION: RIGHT

## 2020-03-16 NOTE — PROGRESS NOTES
Reviewed: (P) Yes  Additional Pertinent Hx: (P) Pt has a Right Total Knee Replacement 3/13/20; Now here for SUSANA   Family / Caregiver Present: (P) No  Referring Practitioner: (P) Dr Looney/ Dr Sherri Stovall (surgeon)  Subjective  Subjective: (P) Pt reports 7/10 R knee pain at rest.   General Comment  Comments: (P) \"I'm so glad it's not the stabbing pain, I finally feel like I can put some weight on it\". Pain Screening  Patient Currently in Pain: (P) Yes  Vital Signs  Patient Currently in Pain: (P) Yes       Orientation     Cognition      Objective   Bed mobility  Bridging: Contact guard assistance  Transfers  Sit to Stand: (P) Contact guard assistance;Stand by assistance  Stand to sit: (P) Stand by assistance  Bed to Chair: Contact guard assistance  Stand Pivot Transfers: (P) Stand by assistance  Comment: (P) AD for support  Ambulation  Ambulation?: (P) Yes  Ambulation 1  Surface: (P) level tile  Device: (P) Rolling Walker  Other Apparatus: (P) Right;Knee Immobilizer  Assistance: (P) Stand by assistance;Contact guard assistance  Quality of Gait: Pt ambulated with an antalgic gait pattern with decreased step length and stance time on R LE needing vqs to do heel-toe gait pattern. Pt tended to keep R LE slightly flexed to avoid WBAT R LE.   Gait Deviations: (P) Slow Ivelisse;Decreased step length  Distance: (P) 10' x 2, 55- 65' x 3 trials  Comments: (P) hiked shoulders, slow pace, even steps  Stairs/Curb  Stairs?: (P) No     Balance  Sitting - Static: (P) Good  Sitting - Dynamic: (P) Good  Standing - Static: (P) Fair;+  Standing - Dynamic: (P) Fair;+  Exercises  Straight Leg Raise: (P) x 10 Andrew, AAROM R  Quad Sets: (P) x 15 H3  Heelslides: (P) x 10 Andrew, AAROM R ( bag around R foot, pt utilizes Tband to assist)  Gluteal Sets: (P) x 15 H3    Hip Abduction: (P) 2 x 10 Andrew, AAROM R  Ankle Pumps: (P) x 20           Other Activities: (P) Other (see comment)  Comment: (P) Assisted pt with toilet transfer, SBA for safety- using FWW, grab bars for support, pt able to manage hygiene and clothing.                G-Code     OutComes Score                                                     AM-PAC Score             Goals  Short term goals  Time Frame for Short term goals: (P) 1 week SUSANA   Short term goal 1: (P) Transfers with SBA  Short term goal 2: (P) Bed mobility with supervison   Short term goal 3: (P) Pt able to ambulate with AD for 125'x 1 with improved step length   Short term goal 4: (P) Increase strength R LE 3-/5   Short term goal 5: (P) Pt able to ambulate up/down 5 steps with CGA and B HR  Long term goals  Time Frame for Long term goals : (P) 2 weeks of SUSANA   Long term goal 1: (P) Indep with bed mobility   Long term goal 2: (P) Indep with transfers   Long term goal 3: (P) Pt able to ambulate 250' x 1 or > with mod indep with AD without a rest break due to fatigue   Long term goal 4: (P) Pt able to ambulate up/down a full flight of stairs indep/safely with AD and one HR  Long term goal 5: (P) Indep with HEP   Patient Goals   Patient goals : (P) To be able to return home safely and ambulate up/down a flight of stair (to enter/exit her apartment)     Plan    Plan  Times per week: (P) 5-7 days per week   Times per day: (P) Daily(QD to BID )  Plan weeks: (P) Recommend 1-2 weeks of SUSANA & then home with [address] and then outpt  Current Treatment Recommendations: (P) Strengthening, ROM, Endurance Training, Functional Mobility Training, Safety Education & Training, Home Exercise Program, Transfer Training, Gait Training, Manual Therapy - Soft Tissue Mobilization, Neuromuscular Re-education, Patient/Caregiver Education & Training, Modalities  Safety Devices  Type of devices: (P) Left in chair, Call light within reach     Therapy Time   Individual Concurrent Group Co-treatment   Time In  8:45 am         Time Out  9:45 am         Minutes  61                 Mercedes Hammer PTA  License and Documentation Cosign  Therapy License Number: (P)

## 2020-03-16 NOTE — ADT AUTH CERT
hose  asa 81 mg po bid    Medications    (X) Prophylactic antibiotics discontinued    (X) * Epidural and PCA absent [G]    * Milestone   Additional Notes   3/14/2020 POD#1      98.2 18 66 129/61 94% ra   Pain 6/10      Roxicodone 5 mg q4 prn x1 po   Roxicodone 10 mg q4 prn x6 po (in 24 hrs)      ________________________________________________________________      Per ortho   Plan:                   1: Continues current post-op course :   2:  Continue Deep venous thrombosis prophylaxis   3:  Continue physical therapy   4:  Continue Pain Control   ________________________________________________________________      Per IM   *Status post right knee replacement.  Wound care, ambulation recommendations, pain management, DVT prophylaxis to be addressed by orthopedic team.  Currently she is on aspirin twice a day for DVT prophylaxis.       *Diabetes, type II.  Contrary to what is listed patient is not on metformin.  The plan as per her primary care doctor is to check her blood sugar 2 weeks after surgery then decide if she needs to be on metformin or any other medication.  Patient is requesting to take her off metformin and change her Accu-Chek to once daily only.       *Anemia, mild blood loss.  No need for blood transfusion.

## 2020-03-16 NOTE — PROGRESS NOTES
Extremity Weight Bearing Restrictions  Right Lower Extremity Weight Bearing: (P) Weight Bearing As Tolerated  Required Braces or Orthoses  Right Lower Extremity Brace: (P) Knee Immobilizer  Subjective   General  Chart Reviewed: (P) Yes  Additional Pertinent Hx: (P) Pt has a Right Total Knee Replacement 3/13/20; Now here for SUSANA   Family / Caregiver Present: (P) No  Referring Practitioner: (P) Dr Looney/ Dr Panfilo Rust (surgeon)  Subjective  Subjective: (P) Pt reports 6/10 R knee pain at rest.   General Comment  Comments: (P) Pt lying in bed this afternoon, agreeable to therapy. \"I'm so tired, I've been sleeping since lunch'  Pain Screening  Patient Currently in Pain: (P) Yes  Vital Signs  Patient Currently in Pain: (P) Yes       Orientation     Cognition      Objective   Bed mobility  Bridging: Contact guard assistance  Transfers  Sit to Stand: (P) Contact guard assistance;Stand by assistance  Stand to sit: (P) Stand by assistance  Bed to Chair: Contact guard assistance  Stand Pivot Transfers: (P) Stand by assistance  Comment: (P) AD for support- cues to take small steps to turn vs quickly pivoting on L LE only. Ambulation  Ambulation?: (P) Yes  Ambulation 1  Surface: (P) level tile  Device: (P) Rolling Walker  Other Apparatus: (P) Right;Knee Immobilizer  Assistance: (P) Stand by assistance;Contact guard assistance  Quality of Gait: (P) Antalgic step pattern, heavy use of UEs, decreased stance time/wt acceptance through R LE  Gait Deviations: (P) Slow Ivelisse;Decreased step length  Distance: (P) 50', 65' x 3  Comments: (P) seated rest periods in between, frequent tactile and v/c's to decrease WBing through UEs, keeping AD at a further/safer distance.    Stairs/Curb  Stairs?: (P) Yes  Stairs  # Steps : (P) 1  Stairs Height: (P) 4\"  Rails: (P) Bilateral  Device: (P) No Device  Assistance: (P) Minimal assistance  Comment: (P) pt able to tolerate 1 steps up; cues provided for sequencing and technique- discontinued d/t Devices  Type of devices: (P) Left in chair, Call light within reach     Therapy Time   Individual Concurrent Group Co-treatment   Time In  2:50 pm         Time Out  3:40 pm         Minutes  [address] PTA  License and [address] Number: [phone]

## 2020-03-16 NOTE — PROGRESS NOTES
Psychosocial Assessment     Physical Appearance: Average    Dress: Casual    Hygiene: Good    Speech: Coherent    Affect/Mood: Appropriate and Cooperative    Alert and Orientated: Person and Place and situation    Thought Process: Relevant    Behavior: Cooperative    Resides:Patient reports residing at home alone     DME: Darlene Richey and [employer]    Support System: lives alone    History of Mental Health: Patient reports history of anxiety and reports taking medication to stabilize mood     Suicidal/ Homicidal:  No    Food: Patient reports being able to afford cost of food and reports that family will be able to assist with grocery shopping needs upon DC     Medication: Patient reports being able to afford cost of medication and reports using [facility] pharmacy in [place]     Transportation:Yes- Patient reports having transportation and reports that if she is unable to drive then family will be able to assist with transportation needs upon [address] for transition of care is related to the following treatment goals: Patient reports that goal is to get stronger and be able to return home as independent as possible upon DC from [facility]    The patient was provided with choice of provider, and agrees with the discharge plan: on-going     The patient was provided with choice of all post acute providers and agrees with the discharge plan: on-going     Electronically signed by YOSELIN Reed on 3/16/2020 at 1:24 PM

## 2020-03-16 NOTE — H&P
Chung Lane MD   Alcohol Swabs PADS DX: diabetes mellitus. Test 1-3 time(s) daily - Ok to substitute per insurance (1 each = 1 box) 3/10/20   Chung Lane MD   blood glucose monitor kit and supplies DX: diabetes mellitus. Test 1-3 time(s) daily - Ok to substitute per insurance 3/10/20   Chung Lane MD   blood glucose monitor strips DX: diabetes mellitus. Use 1-3 time(s) daily - Ok to substitute per insurance 3/10/20   Chung Lane MD   Lancets MISC DX: diabetes mellitus. Use 1-3 time(s) daily - Ok to substitute per insurance (1 each = 1 box) 3/10/20   Chung Lane MD   Respiratory Therapy Supplies [facility] New CPAP mask and supplies 2/27/18   Delilah Gunderson MD       Allergies: Other; Influenza vaccines; and Seasonal    Social History:      The patient currently lives home    TOBACCO:   reports that she has been smoking. She has a 15.00 pack-year smoking history. She has never used smokeless tobacco.  ETOH:   reports current alcohol use of about 1.0 standard drinks of alcohol per week. Family History:       Reviewed in detail and negative for DM, CAD, Cancer, CVA. Positive as follows:        Problem Relation Age of Onset    Heart Disease Mother     High Blood Pressure Mother     Vision Loss Mother     Other Mother         leiden factor 5    Diabetes Father     Heart Disease Father     Depression Sister     Diabetes Sister     Obesity Sister     Diabetes Brother     Arthritis Brother     Other Brother         leiden factor 5    Allergy (Severe) Sister     Arthritis Sister     Depression Sister     Arthritis Sister     Depression Sister     Diabetes Sister     Arthritis Sister     Depression Sister     Arthritis Sister     Arthritis Sister     Arthritis Sister     Arthritis Brother     Arthritis Brother     Arthritis Brother     Thyroid Disease Daughter     No Known Problems Son        REVIEW OF SYSTEMS:   Pertinent positives as noted in the HPI.  All other systems reviewed and negative. PHYSICAL EXAM:    BP (!) 157/85   Pulse 68   Temp 98.2 °F (36.8 °C) (Oral)   Resp 18   Ht 5' 2\" (1.575 m)   Wt 164 lb (74.4 kg)   LMP 01/16/2008   SpO2 98%   BMI 30.00 kg/m²     General appearance:  No apparent distress, appears stated age and cooperative. HEENT:  Normal cephalic, atraumatic without obvious deformity. Pupils equal, round, and reactive to light. Extra ocular muscles intact. Conjunctivae/corneas clear. Neck: Supple, with full range of motion. No jugular venous distention. Trachea midline. Respiratory:  Normal respiratory effort. Clear to auscultation, bilaterally without Rales/Wheezes/Rhonchi. Cardiovascular:  Regular rate and rhythm with normal S1/S2 without murmurs, rubs or gallops. Abdomen: Soft, non-tender, non-distended with normal bowel sounds. Musculoskeletal: R knee postoperative dressing intact  Skin: Skin color, texture, turgor normal.  No rashes or lesions. Neurologic:  Neurovascularly intact without any focal sensory/motor deficits. Cranial nerves: II-XII intact, grossly non-focal.  Psychiatric:  Alert and oriented, thought content appropriate, normal insight  Capillary Refill: Brisk,< 3 seconds   Peripheral Pulses: +2 palpable, equal bilaterally       Labs:     Recent Labs     03/14/20  0447 03/15/20  0640 03/16/20  0449   WBC 8.0 8.8 7.5   HGB 9.8* 11.3* 11.6*   HCT 29.5* 34.1* 36.3*    204 217     Recent Labs     03/14/20  0447 03/16/20  0449    137   K 4.2 4.1    96   CO2 25 28   BUN 15 12   CREATININE 0.78 0.74   CALCIUM 9.0 9.8     No results for input(s): AST, ALT, BILIDIR, BILITOT, ALKPHOS in the last 72 hours. No results for input(s): INR in the last 72 hours. No results for input(s): Jeromy Bhavin in the last 72 hours.     Urinalysis:      Lab Results   Component Value Date    NITRU Negative 02/27/2020    WBCUA 0-2 02/27/2020    BACTERIA Negative 02/27/2020    RBCUA 0-2 02/27/2020    BLOODU TRACE 02/27/2020    SPECGRAV 1.009

## 2020-03-16 NOTE — PLAN OF CARE
Nutrition Problem: No nutrition diagnosis at this time  Intervention: Food and/or Nutrient Delivery: Modify current diet  Nutritional Goals: Intake > 75% of meals. Glu <180.

## 2020-03-16 NOTE — PROGRESS NOTES
Occupational Therapy   Occupational Therapy Initial Assessment- [facility]  Date: 3/16/2020   Patient Name: Kim Resendiz  MRN: [id number]     : 1950    Date of Service: 3/16/2020    Discharge Recommendations:  Home with assist PRN, Home with Home health OT(HH PT, transition to OP PT)       Assessment   Performance deficits / Impairments: Decreased functional mobility ; Decreased ADL status; Decreased strength;Decreased endurance;Decreased balance;Decreased high-level IADLs  Assessment: Pt is a 71y/o female PLOF lives home alone, was I with ADL and IADL who was receiving therapy at [facility] s/p R TKA who has flipped to [facility] for more rehab. Pt demo's the above perf def/impair and would benefit from cont'd OT skilled services to maximize strength/end and safety/I with ADL for safe d/c transition. Prognosis: Good  Decision Making: Medium Complexity  History: multi comorb  Exam: 6 perf def/impair  OT Education: OT Role;Plan of Care;Transfer Training  REQUIRES OT FOLLOW UP: Yes  Safety Devices  Safety Devices in place: Yes  Type of devices: All fall risk precautions in place           Patient Diagnosis(es): There were no encounter diagnoses. has a past medical history of Allergic rhinitis, Anxiety, History of blood transfusion, Hyperlipidemia, Osteoarthritis, and Sleep apnea. has a past surgical history that includes  section ( &  & ); Ankle surgery (Right, ); Colonoscopy; Endoscopy, colon, diagnostic; Total knee arthroplasty (Left, 2019); and Total knee arthroplasty (Right, 3/13/2020).            Restrictions  Restrictions/Precautions  Restrictions/Precautions: Weight Bearing  Required Braces or Orthoses?: Yes  Implants present? : Metal implants(prior L TKA, recent R TKA)  Lower Extremity Weight Bearing Restrictions  Right Lower Extremity Weight Bearing: Weight Bearing As Tolerated  Required Braces or Orthoses  Right Lower Extremity Brace: Knee Immobilizer(until +SLR)    Subjective General  Chart Reviewed: Yes  Patient assessed for rehabilitation services?: Yes  Family / Caregiver Present: No  Referring Practitioner: Dr. Sina York  Diagnosis: R TKA  Subjective  Subjective: Pt agreeable to OT eval and shower  Patient Currently in Pain: Yes  Pain Assessment  Pain Assessment: 0-10  Pain Level: 7(prior to shower, 4/10 post shower)  Pain Type: Surgical pain  Pain Location: Knee  Pain Orientation: Right  Pain Descriptors: Aching; Sore  Vital Signs  Level of Consciousness: Alert  Patient Currently in Pain: Yes     Social/Functional History  Social/Functional History  Lives With: Alone  Type of Home: Apartment  Home Layout: One level  Home Access: Stairs to enter with rails  Entrance Stairs - Number of Steps: [place] car in basement then navigates up 20 steps in order to get to the elevator  Entrance Stairs - Rails: Both  Bathroom Shower/Tub: Tub/Shower unit  Bathroom Toilet: Standard  Bathroom Equipment: Grab bars in shower  Bathroom Accessibility: Walker accessible  Home Equipment: Rolling walker, Cane(plans to have shower chair lended from family)  Receives Help From: Family  ADL Assistance: Independent  Homemaking Assistance: Independent  Homemaking Responsibilities: Yes  Ambulation Assistance: Independent  Transfer Assistance: Independent  Active : Yes  Mode of Transportation: Car  Occupation: Retired, Other(comment)  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[place] [address]: [address] [place] [address], [employer], reading, walking around town       Objective   Vision: Impaired  Vision Exceptions: Wears glasses at all times  Hearing: Within functional limits    Orientation  Overall Orientation Status: Within Normal Limits  Observation/Palpation  Observation: R knee aquacel dressing, RLE immobilizer  Functional Mobility  Functional - Mobility Device: Rolling Walker  Activity: To/from bathroom  Assist Level: Contact guard assistance(/SBA)  Toilet Transfers  Toilet - Technique: Ambulating  Equipment Used: Grab bars(commode used over standard toilet)  Toilet Transfer: Contact guard assistance;Stand by assistance  Shower Transfers  Shower - Transfer From: Wade Wolfe - Transfer Type: To and From  Shower - Transfer To: Shower seat with back  Shower - Technique: Sit pivot  Shower Transfers: Contact Guard;Stand by assistance  ADL  Feeding: Independent  Grooming: Independent(wash hair)  UE Bathing: Setup;Supervision  LE Bathing: Stand by assistance;Contact guard assistance  UE Dressing: Setup;Supervision  LE Dressing: Stand by assistance;Minimal assistance  Toileting: Stand by assistance;Contact guard assistance  Tone RUE  RUE Tone: Normotonic  Tone LUE  LUE Tone: Normotonic  Coordination  Movements Are Fluid And Coordinated: Yes        Transfers  Sit to stand: Stand by assistance  Stand to sit: Stand by assistance                       LUE AROM (degrees)  LUE AROM : WFL  Left Hand AROM (degrees)  Left Hand AROM: WFL  RUE AROM (degrees)  RUE AROM : WFL  Right Hand AROM (degrees)  Right Hand AROM: WFL  LUE Strength  Gross LUE Strength: WFL  L Shoulder Flex: 4/5  RUE Strength  Gross RUE Strength: WFL  R Shoulder Flex: 4/5     Hand Dominance  Hand Dominance: Right             Plan   Plan  Times per week: 3-6x/wk  Times per day: Daily  Plan weeks: 1-2 wks  Current Treatment Recommendations: Strengthening, Balance Training, Functional Mobility Training, Endurance Training, Stair training, Pain Management, Safety Education & Training, Patient/Caregiver Education & Training, Self-Care / ADL, Home Management Training       Goals  Short term goals  Time Frame for Short term goals: 1 wk  Short term goal 1: Tolerate 25-30min BUE ther ex/act to increase strength/end for ADL  Short term goal 2: Increase to CGA/SBA LB dressing  Short term goal 3:  Increase to SBA/Spv toileting  Long term goals  Time Frame for Long term goals : 2 wks  Long term goal 1: I BUE HEP  Long term goal 2: I/MI LB dressing and

## 2020-03-17 LAB
GLUCOSE BLD-MCNC: 116 MG/DL (ref 60–115)
PERFORMED ON: ABNORMAL

## 2020-03-17 PROCEDURE — 97530 THERAPEUTIC ACTIVITIES: CPT

## 2020-03-17 PROCEDURE — 6370000000 HC RX 637 (ALT 250 FOR IP): Performed by: INTERNAL MEDICINE

## 2020-03-17 PROCEDURE — 97110 THERAPEUTIC EXERCISES: CPT

## 2020-03-17 PROCEDURE — 97116 GAIT TRAINING THERAPY: CPT

## 2020-03-17 PROCEDURE — 1200000002 HC SEMI PRIVATE SWING BED

## 2020-03-17 RX ORDER — LISINOPRIL 10 MG/1
10 TABLET ORAL DAILY
Status: DISCONTINUED | OUTPATIENT
Start: 2020-03-17 | End: 2020-03-20

## 2020-03-17 RX ADMIN — OXYCODONE 5 MG: 5 TABLET ORAL at 23:30

## 2020-03-17 RX ADMIN — OXYCODONE 5 MG: 5 TABLET ORAL at 00:16

## 2020-03-17 RX ADMIN — CITALOPRAM HYDROBROMIDE 20 MG: 20 TABLET, FILM COATED ORAL at 08:28

## 2020-03-17 RX ADMIN — SENNOSIDES AND DOCUSATE SODIUM 1 TABLET: 8.6; 5 TABLET ORAL at 19:58

## 2020-03-17 RX ADMIN — SENNOSIDES AND DOCUSATE SODIUM 1 TABLET: 8.6; 5 TABLET ORAL at 08:28

## 2020-03-17 RX ADMIN — ROSUVASTATIN CALCIUM 10 MG: 5 TABLET ORAL at 19:57

## 2020-03-17 RX ADMIN — OXYCODONE 5 MG: 5 TABLET ORAL at 17:47

## 2020-03-17 RX ADMIN — OXYCODONE 5 MG: 5 TABLET ORAL at 06:28

## 2020-03-17 RX ADMIN — ASPIRIN 81 MG: 81 TABLET, COATED ORAL at 19:57

## 2020-03-17 RX ADMIN — OXYCODONE 5 MG: 5 TABLET ORAL at 12:04

## 2020-03-17 RX ADMIN — ACETAMINOPHEN 650 MG: 325 TABLET ORAL at 08:27

## 2020-03-17 RX ADMIN — ASPIRIN 81 MG: 81 TABLET, COATED ORAL at 08:28

## 2020-03-17 ASSESSMENT — PAIN DESCRIPTION - PAIN TYPE: TYPE: SURGICAL PAIN

## 2020-03-17 ASSESSMENT — PAIN SCALES - GENERAL
PAINLEVEL_OUTOF10: 7
PAINLEVEL_OUTOF10: 6
PAINLEVEL_OUTOF10: 8
PAINLEVEL_OUTOF10: 7
PAINLEVEL_OUTOF10: 9
PAINLEVEL_OUTOF10: 7

## 2020-03-17 ASSESSMENT — PAIN DESCRIPTION - ORIENTATION: ORIENTATION: RIGHT

## 2020-03-17 ASSESSMENT — PAIN DESCRIPTION - PROGRESSION
CLINICAL_PROGRESSION: NOT CHANGED

## 2020-03-17 ASSESSMENT — PAIN DESCRIPTION - LOCATION: LOCATION: KNEE

## 2020-03-17 NOTE — PROGRESS NOTES
7. 5   HGB 11.3* 11.6*   HCT 34.1* 36.3*    217     Recent Labs     03/16/20  0449      K 4.1   CL 96   CO2 28   BUN 12   CREATININE 0.74   CALCIUM 9.8     No results for input(s): AST, ALT, BILIDIR, BILITOT, ALKPHOS in the last 72 hours. No results for input(s): INR in the last 72 hours. No results for input(s): [place] Jaime in the last 72 hours.     Urinalysis:      Lab Results   Component Value Date    NITRU Negative 02/27/2020    WBCUA 0-2 02/27/2020    BACTERIA Negative 02/27/2020    RBCUA 0-2 02/27/2020    BLOODU TRACE 02/27/2020    SPECGRAV 1.009 02/27/2020    GLUCOSEU Negative 02/27/2020       Radiology:  No orders to display           Assessment/Plan:    Active Hospital Problems    Diagnosis Date Noted    History of total right knee replacement [Z96.651] 03/16/2020         DVT Prophylaxis: ASA BID  Diet: DIET GENERAL; Carb Control: 4 carb choices (60 gms)/meal  Code Status: Full Code    PT/OT Eval Status: done    Dispo - Dispo - RTKA- pain controlled, continue rehab orders  HTN-   controlled-  lisinopril back to 10 mg, follow up clinically   Constipation-resolved  DM- pt reluctant to start meds now, follow up with ACCU check, ISS  Morbid obesity due to excessive calory intake with BMI 30%- supportive care   Medically stable for skilled status at [facility]       Electronically signed by Batsheva Neves MD on 3/17/2020 at 8:31 AM

## 2020-03-17 NOTE — PROGRESS NOTES
goal 1: Transfers with SBA  Short term goal 2: Bed mobility with supervison   Short term goal 3: Pt able to ambulate with AD for 125'x 1 with improved step length   Short term goal 4: Increase strength R LE 3-/5   Short term goal 5: Pt able to ambulate up/down 5 steps with CGA and B HR  Long term goals  Time Frame for Long term goals : 2 weeks of [facility]   Long term goal 1: Indep with bed mobility   Long term goal 2: Indep with transfers   Long term goal 3: Pt able to ambulate 250' x 1 or > with mod indep with AD without a rest break due to fatigue   Long term goal 4: Pt able to ambulate up/down a full flight of stairs indep/safely with AD and one HR  Long term goal 5: Indep with HEP   Patient Goals   Patient goals :  To be able to return home safely and ambulate up/down a flight of stair (to enter/exit her apartment)     Plan    Plan  Times per week: 5-7 days per week   Times per day: Daily(QD to BID )  Plan weeks: Recommend 1-2 weeks of [facility] & then home with Chapito Britt and samir rizvi  Current Treatment Recommendations: Strengthening, ROM, Endurance Training, Functional Mobility Training, Safety Education & Training, Home Exercise Program, Transfer Training, Gait Training, Manual Therapy - Soft Tissue Mobilization, Neuromuscular Re-education, Patient/Caregiver Education & Training, Modalities  Safety Devices  Type of devices: (P) All fall risk precautions in place, Call light within reach     Therapy Time   Individual Concurrent Group Co-treatment   Time In  09:00 am         Time Out  10:00 am         Minutes  [address], PTA  License and [place] [address] Number: [phone]

## 2020-03-17 NOTE — PROGRESS NOTES
Family / Caregiver Present: (P) No  Referring Practitioner: (P) Dr Looney/ Dr Gini Han (surgeon)  Subjective  Subjective: (P) Pt reports 3/10 pain at rest.   General Comment  Comments: (P) \"I just can't wait until I'm out of pain\"  Pain Screening  Patient Currently in Pain: (P) Yes  Vital Signs  Patient Currently in Pain: (P) Yes       Orientation     Cognition      Objective   Bed mobility  Scooting: (P) Modified independent  Transfers  Sit to Stand: (P) Contact guard assistance;Stand by assistance  Stand to sit: (P) Stand by assistance  Stand Pivot Transfers: (P) Stand by assistance  Comment: (P) AD for support- cues to take small steps to turn vs quickly pivoting on L LE only.   Ambulation  Ambulation?: (P) Yes  Ambulation 1  Surface: (P) level tile  Device: (P) Rolling Walker  Other Apparatus: (P) Right;Knee Immobilizer  Assistance: (P) Stand by assistance  Quality of Gait: (P) Antalgic step pattern, heavy use of UEs, decreased stance time/wt acceptance through R LE  Gait Deviations: (P) Decreased step length;Decreased step height;Decreased head and trunk rotation  Distance: (P) 75-80' x 2 trials  Comments: (P) seated rest break in between, cues provided for improved step pattern; decreasing use of UEs, correct spacing between body and crossbar of walker     Balance  Sitting - Static: (P) Good  Sitting - Dynamic: (P) Good  Standing - Static: (P) Good;-  Standing - Dynamic: (P) Fair;+  Comments: (P) Decreased wt shift towards the R  Exercises  Straight Leg Raise: (P) x 10, AAROM initially with R LE  Quad Sets: (P) x 20 H5  Heelslides: (P) 2 x 10 H5- towel/board under R foot- AAROM  Gluteal Sets: (P) x 20 H5  Hip Abduction: (P) 2 x 10- towel/board under R foot  Ankle Pumps: (P) x 20         Other Activities: (P) Other (see comment)  Comment: (P) Assisted pt with toilet transfer; SBA for safet- pt able to manage pants, hygiene, SBA unsupported standing at sink for hand washing, decreased wt shift through R LE G-Code     OutComes Score                                                     AM-PAC Score             Goals  Short term goals  Time Frame for Short term goals: (P) 1 week [facility]   Short term goal 1: (P) Transfers with SBA  Short term goal 2: (P) Bed mobility with supervison   Short term goal 3: (P) Pt able to ambulate with AD for 125'x 1 with improved step length   Short term goal 4: (P) Increase strength R LE 3-/5   Short term goal 5: (P) Pt able to ambulate up/down 5 steps with CGA and B HR  Long term goals  Time Frame for Long term goals : (P) 2 weeks of [facility]   Long term goal 1: (P) Indep with bed mobility   Long term goal 2: (P) Indep with transfers   Long term goal 3: (P) Pt able to ambulate 250' x 1 or > with mod indep with AD without a rest break due to fatigue   Long term goal 4: (P) Pt able to ambulate up/down a full flight of stairs indep/safely with AD and one HR  Long term goal 5: (P) Indep with HEP   Patient Goals   Patient goals : (P) To be able to return home safely and ambulate up/down a flight of stair (to enter/exit her apartment)     Plan    Plan  Times per week: (P) 5-7 days per week   Times per day: (P) Daily(QD to BID )  Plan weeks: (P) Recommend 1-2 weeks of [facility] & then home with Jesusjorge Britt and then outpt  Current Treatment Recommendations: (P) Strengthening, ROM, Endurance Training, Functional Mobility Training, Safety Education & Training, Home Exercise Program, Transfer Training, Gait Training, Manual Therapy - Soft Tissue Mobilization, Neuromuscular Re-education, Patient/Caregiver Education & Training, Modalities  Safety Devices  Type of devices: (P) All fall risk precautions in place, Call light within reach     Therapy Time   Individual Concurrent Group Co-treatment   Time In  1:00 pm         Time Out  1:50 pm         Minutes  [address] PTA  License and Documentation Cosign  Therapy License Number: [id number] [phone]

## 2020-03-17 NOTE — PROGRESS NOTES
Occupational Therapy  Facility/Department: [facility] MED SURG UNIT  Daily Treatment Note  NAME: Tasha Gomez  : 1950  MRN: [id number]    Date of Service: 3/17/2020    Discharge Recommendations:  Home with assist PRN, Home with Home health OT(HH PT, transition to OP PT)       Assessment   Performance deficits / Impairments: Decreased functional mobility ; Decreased ADL status; Decreased strength;Decreased endurance;Decreased balance;Decreased high-level IADLs  Assessment: Pt reported 2/10 pain at rest in bed, increased to 8/10 with dyn stand wt shifting onto RLE, decreased to 6/10 upon sitting and resting. Completed 2 dyn stands at 4-5min each with focus on increasing wt shift and WB to RLE 2* pt compensates by placing more wt through BUE on the RW and her LLE. Pt cont's with -SLR this am, wore RLE knee immobilizer. Prognosis: Good  History: multi comorb  Exam: 6 perf def/impair  REQUIRES OT FOLLOW UP: Yes  Safety Devices  Safety Devices in place: Yes  Type of devices: All fall risk precautions in place         Patient Diagnosis(es): There were no encounter diagnoses. has a past medical history of Allergic rhinitis, Anxiety, History of blood transfusion, Hyperlipidemia, Osteoarthritis, and Sleep apnea. has a past surgical history that includes  section ( &  & ); Ankle surgery (Right, ); Colonoscopy; Endoscopy, colon, diagnostic; Total knee arthroplasty (Left, 2019); and Total knee arthroplasty (Right, 3/13/2020).     Restrictions  Restrictions/Precautions  Restrictions/Precautions: Weight Bearing  Required Braces or Orthoses?: Yes  Implants present? : Metal implants  Lower Extremity Weight Bearing Restrictions  Right Lower Extremity Weight Bearing: Weight Bearing As Tolerated  Required Braces or Orthoses  Right Lower Extremity Brace: Knee Immobilizer  Subjective   General  Chart Reviewed: Yes  Patient assessed for rehabilitation services?: Yes  Family / Caregiver Present: light homemaking tasks/IADL  Long term goal 5: Increase to 10+min stand mary ellen/end (PLOF) for increased safety and I with ADL  Patient Goals   Patient goals :  \"To strengthen my knee so I can go back to doing what I was doing\"       Therapy Time   Individual Concurrent Group Co-treatment   Time In  10:00         Time Out  11:00         Minutes  [address] [facility], [place]abel [place]

## 2020-03-18 LAB
GLUCOSE BLD-MCNC: 105 MG/DL (ref 60–115)
GLUCOSE BLD-MCNC: 119 MG/DL (ref 60–115)
GLUCOSE BLD-MCNC: 123 MG/DL (ref 60–115)
GLUCOSE BLD-MCNC: 147 MG/DL (ref 60–115)
PERFORMED ON: ABNORMAL
PERFORMED ON: NORMAL

## 2020-03-18 PROCEDURE — 6370000000 HC RX 637 (ALT 250 FOR IP): Performed by: INTERNAL MEDICINE

## 2020-03-18 PROCEDURE — 97110 THERAPEUTIC EXERCISES: CPT

## 2020-03-18 PROCEDURE — 97116 GAIT TRAINING THERAPY: CPT

## 2020-03-18 PROCEDURE — 97535 SELF CARE MNGMENT TRAINING: CPT

## 2020-03-18 PROCEDURE — 97530 THERAPEUTIC ACTIVITIES: CPT

## 2020-03-18 PROCEDURE — 1200000002 HC SEMI PRIVATE SWING BED

## 2020-03-18 RX ORDER — OXYCODONE HYDROCHLORIDE AND ACETAMINOPHEN 5; 325 MG/1; MG/1
2 TABLET ORAL EVERY 4 HOURS PRN
Status: DISCONTINUED | OUTPATIENT
Start: 2020-03-18 | End: 2020-03-27 | Stop reason: HOSPADM

## 2020-03-18 RX ORDER — TRAMADOL HYDROCHLORIDE 50 MG/1
50 TABLET ORAL EVERY 6 HOURS PRN
Status: DISCONTINUED | OUTPATIENT
Start: 2020-03-18 | End: 2020-03-27 | Stop reason: HOSPADM

## 2020-03-18 RX ORDER — OXYCODONE HYDROCHLORIDE AND ACETAMINOPHEN 5; 325 MG/1; MG/1
1 TABLET ORAL EVERY 4 HOURS PRN
Status: DISCONTINUED | OUTPATIENT
Start: 2020-03-18 | End: 2020-03-27 | Stop reason: HOSPADM

## 2020-03-18 RX ORDER — OXYCODONE HYDROCHLORIDE 5 MG/1
10 TABLET ORAL EVERY 4 HOURS PRN
Status: DISCONTINUED | OUTPATIENT
Start: 2020-03-18 | End: 2020-03-18

## 2020-03-18 RX ADMIN — ROSUVASTATIN CALCIUM 10 MG: 5 TABLET ORAL at 20:27

## 2020-03-18 RX ADMIN — OXYCODONE HYDROCHLORIDE AND ACETAMINOPHEN 2 TABLET: 5; 325 TABLET ORAL at 18:26

## 2020-03-18 RX ADMIN — ACETAMINOPHEN 650 MG: 325 TABLET ORAL at 03:59

## 2020-03-18 RX ADMIN — OXYCODONE 5 MG: 5 TABLET ORAL at 08:22

## 2020-03-18 RX ADMIN — ASPIRIN 81 MG: 81 TABLET, COATED ORAL at 08:18

## 2020-03-18 RX ADMIN — OXYCODONE HYDROCHLORIDE AND ACETAMINOPHEN 2 TABLET: 5; 325 TABLET ORAL at 23:40

## 2020-03-18 RX ADMIN — OXYCODONE 5 MG: 5 TABLET ORAL at 12:24

## 2020-03-18 RX ADMIN — TRAMADOL HYDROCHLORIDE 50 MG: 50 TABLET, FILM COATED ORAL at 14:08

## 2020-03-18 RX ADMIN — TRAMADOL HYDROCHLORIDE 50 MG: 50 TABLET, FILM COATED ORAL at 20:26

## 2020-03-18 RX ADMIN — CITALOPRAM HYDROBROMIDE 20 MG: 20 TABLET, FILM COATED ORAL at 08:19

## 2020-03-18 RX ADMIN — OXYCODONE 5 MG: 5 TABLET ORAL at 04:00

## 2020-03-18 RX ADMIN — ASPIRIN 81 MG: 81 TABLET, COATED ORAL at 20:27

## 2020-03-18 RX ADMIN — SENNOSIDES AND DOCUSATE SODIUM 1 TABLET: 8.6; 5 TABLET ORAL at 20:27

## 2020-03-18 RX ADMIN — SENNOSIDES AND DOCUSATE SODIUM 1 TABLET: 8.6; 5 TABLET ORAL at 08:18

## 2020-03-18 RX ADMIN — POLYETHYLENE GLYCOL 3350 17 G: 17 POWDER, FOR SOLUTION ORAL at 08:17

## 2020-03-18 ASSESSMENT — PAIN SCALES - GENERAL
PAINLEVEL_OUTOF10: 7
PAINLEVEL_OUTOF10: 0
PAINLEVEL_OUTOF10: 0
PAINLEVEL_OUTOF10: 8
PAINLEVEL_OUTOF10: 7
PAINLEVEL_OUTOF10: 8
PAINLEVEL_OUTOF10: 5
PAINLEVEL_OUTOF10: 0
PAINLEVEL_OUTOF10: 4
PAINLEVEL_OUTOF10: 5
PAINLEVEL_OUTOF10: 7
PAINLEVEL_OUTOF10: 5
PAINLEVEL_OUTOF10: 6

## 2020-03-18 ASSESSMENT — PAIN DESCRIPTION - PROGRESSION
CLINICAL_PROGRESSION: NOT CHANGED
CLINICAL_PROGRESSION: GRADUALLY IMPROVING
CLINICAL_PROGRESSION: NOT CHANGED
CLINICAL_PROGRESSION: GRADUALLY IMPROVING

## 2020-03-18 ASSESSMENT — PAIN DESCRIPTION - ORIENTATION
ORIENTATION: RIGHT
ORIENTATION: RIGHT

## 2020-03-18 ASSESSMENT — PAIN DESCRIPTION - PAIN TYPE
TYPE: SURGICAL PAIN
TYPE: SURGICAL PAIN

## 2020-03-18 ASSESSMENT — PAIN DESCRIPTION - LOCATION
LOCATION: KNEE
LOCATION: KNEE

## 2020-03-18 ASSESSMENT — PAIN DESCRIPTION - DESCRIPTORS: DESCRIPTORS: ACHING;SORE

## 2020-03-18 ASSESSMENT — PAIN - FUNCTIONAL ASSESSMENT: PAIN_FUNCTIONAL_ASSESSMENT: PREVENTS OR INTERFERES SOME ACTIVE ACTIVITIES AND ADLS

## 2020-03-18 ASSESSMENT — PAIN DESCRIPTION - ONSET: ONSET: ON-GOING

## 2020-03-18 ASSESSMENT — PAIN DESCRIPTION - FREQUENCY: FREQUENCY: CONTINUOUS

## 2020-03-18 NOTE — PROGRESS NOTES
Replacement 3/13/20; Now here for [facility]   Family / Caregiver Present: (P) No  Referring Practitioner: (P) Dr Looney/ Dr Josselin Knowles (surgeon)  Subjective  Subjective: (P) Pt reports 5/10 R knee pain at rest, increasing to 8/10 with movement. General Comment  Comments: (P) \"I can see that I'm getting better- but I can also see where I'm not putting enough wt on that leg\"  Pain Screening  Patient Currently in Pain: (P) Yes  Vital Signs  Patient Currently in Pain: (P) Yes       Orientation     Cognition      Objective   Bed mobility  Scooting: Modified independent  Transfers  Sit to Stand: (P) Stand by assistance  Stand to sit: (P) Stand by assistance  Stand Pivot Transfers: (P) Stand by assistance  Comment: (P) cues for safety, pt can be impulsive- moves quickly  Ambulation  Ambulation?: (P) Yes  Ambulation 1  Surface: (P) level tile  Device: (P) Rolling Walker  Other Apparatus: (P) Wheelchair follow  Assistance: (P) Stand by assistance  Quality of Gait: (P) Antalgic step pattern, heavy use of UEs, decreased stance time/wt acceptance through R LE  Gait Deviations: (P) Decreased step length;Decreased step height;Decreased head and trunk rotation  Distance: (P) 60', 15' x 3  Comments: (P) cues provided for heel toe step pattern, maintaining safe distancing of AD, and decreased use of UEs. Balance  Posture: (P) Good  Sitting - Static: (P) Good  Sitting - Dynamic: (P) Good  Standing - Static: (P) Good;-  Standing - Dynamic: (P) Fair;+  Comments: (P) Decreased wt shift through R LE in standing.    Exercises  Straight Leg Raise: (P) x 10 Andrew, tactile cues/muscle tapping to initiate  Quad Sets: (P) x 20 H5  Heelslides: (P) 2 x 10 Andrew  Gluteal Sets: (P) x 20 H5  Hip Abduction: (P) 2 x 10 Andrew  Knee Long Arc Quad: (P) 2 x 10 Andrew  Knee Short Arc Quad: (P) x 20 H3, alt LEs  Ankle Pumps: (P) x 20                        G-Code     OutComes Score                                                     AM-PAC Score Goals  Short term goals  Time Frame for Short term goals: 1 week [facility]   Short term goal 1: Transfers with SBA  Short term goal 2: Bed mobility with supervison   Short term goal 3: Pt able to ambulate with AD for 125'x 1 with improved step length   Short term goal 4: Increase strength R LE 3-/5   Short term goal 5: Pt able to ambulate up/down 5 steps with CGA and B HR  Long term goals  Time Frame for Long term goals : 2 weeks of [facility]   Long term goal 1: Indep with bed mobility   Long term goal 2: Indep with transfers   Long term goal 3: Pt able to ambulate 250' x 1 or > with mod indep with AD without a rest break due to fatigue   Long term goal 4: Pt able to ambulate up/down a full flight of stairs indep/safely with AD and one HR  Long term goal 5: Indep with HEP   Patient Goals   Patient goals :  To be able to return home safely and ambulate up/down a flight of stair (to enter/exit her apartment)     Plan    Plan  Times per week: 5-7 days per week   Times per day: Daily(QD to BID )  Plan weeks: Recommend 1-2 weeks of [facility] & then home with [facility] and then outpt  Current Treatment Recommendations: Strengthening, ROM, Endurance Training, Functional Mobility Training, Safety Education & Training, Home Exercise Program, Transfer Training, Gait Training, Manual Therapy - Soft Tissue Mobilization, Neuromuscular Re-education, Patient/Caregiver Education & Training, Modalities  Safety Devices  Type of devices: (P) All fall risk precautions in place, Call light within reach     Therapy Time   Individual Concurrent Group Co-treatment   Time In  1:05 pm         Time Out  2:05 pm         Minutes  [address] [facility]  License and [address] [address] Number: [phone]

## 2020-03-18 NOTE — PROGRESS NOTES
Objective    Pt present for Interdisciplinary Care Conference this date. See separate note for full report. Pt encouraged to stay on top of pain meds to be successful and participate with therapy ROM and stairs more. Pt has 20 steps to get to her apartment. Assessment    Pt is having a lot of pain and participating as able with therapy. Pt requires at least one more week of therapy to achieve 20 stair goal to get into apartment. Discharge Recommendations:  Home with Home health PT, Outpatient PT, Home with assist PRN    Goals  Short term goals  Time Frame for Short term goals: 1 week SUSANA   Short term goal 1: Transfers with SBA  Short term goal 2: Bed mobility with supervison   Short term goal 3: Pt able to ambulate with AD for 125'x 1 with improved step length   Short term goal 4: Increase strength R LE 3-/5   Short term goal 5: Pt able to ambulate up/down 5 steps with CGA and B HR  Long term goals  Time Frame for Long term goals : 2 weeks of SUSANA   Long term goal 1: Indep with bed mobility   Long term goal 2: Indep with transfers   Long term goal 3: Pt able to ambulate 250' x 1 or > with mod indep with AD without a rest break due to fatigue   Long term goal 4: Pt able to ambulate up/down a full flight of stairs indep/safely with AD and one HR  Long term goal 5: Indep with HEP   Patient Goals   Patient goals :  To be able to return home safely and ambulate up/down a flight of stair (to enter/exit her apartment)     Plan    Plan  Times per week: 5-7 days per week   Times per day: Daily(QD to BID )  Plan weeks: Recommend 1-2 weeks of SUSANA & then home with Chapito Britt and then outpt  Current Treatment Recommendations: Strengthening, ROM, Endurance Training, Functional Mobility Training, Safety Education & Training, Home Exercise Program, Transfer Training, Gait Training, Manual Therapy - Soft Tissue Mobilization, Neuromuscular Re-education, Patient/Caregiver Education & Training, Modalities  Safety

## 2020-03-18 NOTE — PROGRESS NOTES
Pt assessment done and am meds given. R knee dressing CDI with old drainage. Pt up x1 with SBA. A&O. No IV access.

## 2020-03-18 NOTE — PROGRESS NOTES
(P) 2 x 10- towel/board under R foot  Ankle Pumps: (P) x 20                        G-Code     OutComes Score                                                     AM-PAC Score             Goals  Short term goals  Time Frame for Short term goals: (P) 1 week SUSANA   Short term goal 1: (P) Transfers with SBA  Short term goal 2: (P) Bed mobility with supervison   Short term goal 3: (P) Pt able to ambulate with AD for 125'x 1 with improved step length   Short term goal 4: (P) Increase strength R LE 3-/5   Short term goal 5: (P) Pt able to ambulate up/down 5 steps with CGA and B HR  Long term goals  Time Frame for Long term goals : (P) 2 weeks of SUSANA   Long term goal 1: (P) Indep with bed mobility   Long term goal 2: (P) Indep with transfers   Long term goal 3: (P) Pt able to ambulate 250' x 1 or > with mod indep with AD without a rest break due to fatigue   Long term goal 4: (P) Pt able to ambulate up/down a full flight of stairs indep/safely with AD and one HR  Long term goal 5: (P) Indep with HEP   Patient Goals   Patient goals : (P) To be able to return home safely and ambulate up/down a flight of stair (to enter/exit her apartment)     Plan    Plan  Times per week: (P) 5-7 days per week   Times per day: (P) Daily(QD to BID )  Plan weeks: (P) Recommend 1-2 weeks of SUSANA & then home with [facility] [address] and then outpt  Current Treatment Recommendations: (P) Strengthening, ROM, Endurance Training, Functional Mobility Training, Safety Education & Training, Home Exercise Program, Transfer Training, Gait Training, Manual Therapy - Soft Tissue Mobilization, Neuromuscular Re-education, Patient/Caregiver Education & Training, Modalities  Safety Devices  Type of devices: (P) All fall risk precautions in place, Call light within reach     Therapy Time   Individual Concurrent Group Co-treatment   Time In  8:30 am         Time Out  9:30 am         Minutes  [address], PTA  License and Documentation [address] Number: ([id number]

## 2020-03-18 NOTE — PROGRESS NOTES
Chart reviewed. Multidisciplinary team met with patient in care conference. Discussed patient's care and DC planning. Patient reports that goal is to get stronger and be able to manuever stairs before returning home upon DC. Patient reports that sister is planning to stay with patient upon DC from [facility]. Therapy recommending HHC and prn assist upon DC from [facility]. [place] of choice provided. Patient identifies [facility] as agency of choice. SS to continue to follow and assist in coordinating [facility]. Patient identifies no further DC needs. SS to continue to follow.

## 2020-03-18 NOTE — PROGRESS NOTES
Pt's skin on R leg anterior thigh above incision site is red and irritated. It is not warm to touch or painful. Will continue to monitor.

## 2020-03-19 LAB
ANION GAP SERPL CALCULATED.3IONS-SCNC: 10 MEQ/L (ref 9–15)
BASOPHILS ABSOLUTE: 0 K/UL (ref 0–0.2)
BASOPHILS RELATIVE PERCENT: 0.4 %
BUN BLDV-MCNC: 14 MG/DL (ref 8–23)
CALCIUM SERPL-MCNC: 9.9 MG/DL (ref 8.5–9.9)
CHLORIDE BLD-SCNC: 98 MEQ/L (ref 95–107)
CO2: 30 MEQ/L (ref 20–31)
CREAT SERPL-MCNC: 0.69 MG/DL (ref 0.5–0.9)
EOSINOPHILS ABSOLUTE: 0.1 K/UL (ref 0–0.7)
EOSINOPHILS RELATIVE PERCENT: 1.5 %
GFR AFRICAN AMERICAN: >60
GFR NON-AFRICAN AMERICAN: >60
GLUCOSE BLD-MCNC: 125 MG/DL (ref 70–99)
GLUCOSE BLD-MCNC: 129 MG/DL (ref 60–115)
HCT VFR BLD CALC: 33.3 % (ref 37–47)
HEMOGLOBIN: 10.9 G/DL (ref 12–16)
LYMPHOCYTES ABSOLUTE: 1.5 K/UL (ref 1–4.8)
LYMPHOCYTES RELATIVE PERCENT: 21.4 %
MCH RBC QN AUTO: 31.7 PG (ref 27–31.3)
MCHC RBC AUTO-ENTMCNC: 32.8 % (ref 33–37)
MCV RBC AUTO: 96.8 FL (ref 82–100)
MONOCYTES ABSOLUTE: 0.7 K/UL (ref 0.2–0.8)
MONOCYTES RELATIVE PERCENT: 10.4 %
NEUTROPHILS ABSOLUTE: 4.6 K/UL (ref 1.4–6.5)
NEUTROPHILS RELATIVE PERCENT: 66.3 %
PDW BLD-RTO: 13.8 % (ref 11.5–14.5)
PERFORMED ON: ABNORMAL
PLATELET # BLD: 254 K/UL (ref 130–400)
POTASSIUM SERPL-SCNC: 4.7 MEQ/L (ref 3.4–4.9)
RBC # BLD: 3.44 M/UL (ref 4.2–5.4)
SODIUM BLD-SCNC: 138 MEQ/L (ref 135–144)
WBC # BLD: 6.9 K/UL (ref 4.8–10.8)

## 2020-03-19 PROCEDURE — 97116 GAIT TRAINING THERAPY: CPT

## 2020-03-19 PROCEDURE — 36415 COLL VENOUS BLD VENIPUNCTURE: CPT

## 2020-03-19 PROCEDURE — 97530 THERAPEUTIC ACTIVITIES: CPT

## 2020-03-19 PROCEDURE — 6370000000 HC RX 637 (ALT 250 FOR IP): Performed by: INTERNAL MEDICINE

## 2020-03-19 PROCEDURE — 97535 SELF CARE MNGMENT TRAINING: CPT

## 2020-03-19 PROCEDURE — 1200000002 HC SEMI PRIVATE SWING BED

## 2020-03-19 PROCEDURE — 97110 THERAPEUTIC EXERCISES: CPT

## 2020-03-19 PROCEDURE — 85025 COMPLETE CBC W/AUTO DIFF WBC: CPT

## 2020-03-19 PROCEDURE — 80048 BASIC METABOLIC PNL TOTAL CA: CPT

## 2020-03-19 RX ADMIN — ASPIRIN 81 MG: 81 TABLET, COATED ORAL at 08:19

## 2020-03-19 RX ADMIN — OXYCODONE HYDROCHLORIDE AND ACETAMINOPHEN 2 TABLET: 5; 325 TABLET ORAL at 18:07

## 2020-03-19 RX ADMIN — SENNOSIDES AND DOCUSATE SODIUM 1 TABLET: 8.6; 5 TABLET ORAL at 20:05

## 2020-03-19 RX ADMIN — ASPIRIN 81 MG: 81 TABLET, COATED ORAL at 20:05

## 2020-03-19 RX ADMIN — SENNOSIDES AND DOCUSATE SODIUM 1 TABLET: 8.6; 5 TABLET ORAL at 08:19

## 2020-03-19 RX ADMIN — OXYCODONE HYDROCHLORIDE AND ACETAMINOPHEN 2 TABLET: 5; 325 TABLET ORAL at 05:15

## 2020-03-19 RX ADMIN — TRAMADOL HYDROCHLORIDE 50 MG: 50 TABLET, FILM COATED ORAL at 02:35

## 2020-03-19 RX ADMIN — OXYCODONE HYDROCHLORIDE AND ACETAMINOPHEN 2 TABLET: 5; 325 TABLET ORAL at 22:04

## 2020-03-19 RX ADMIN — ROSUVASTATIN CALCIUM 10 MG: 5 TABLET ORAL at 20:05

## 2020-03-19 RX ADMIN — TRAMADOL HYDROCHLORIDE 50 MG: 50 TABLET, FILM COATED ORAL at 08:22

## 2020-03-19 RX ADMIN — TRAMADOL HYDROCHLORIDE 50 MG: 50 TABLET, FILM COATED ORAL at 21:13

## 2020-03-19 RX ADMIN — TRAMADOL HYDROCHLORIDE 50 MG: 50 TABLET, FILM COATED ORAL at 14:53

## 2020-03-19 RX ADMIN — OXYCODONE HYDROCHLORIDE AND ACETAMINOPHEN 2 TABLET: 5; 325 TABLET ORAL at 13:11

## 2020-03-19 RX ADMIN — CITALOPRAM HYDROBROMIDE 20 MG: 20 TABLET, FILM COATED ORAL at 08:19

## 2020-03-19 RX ADMIN — POLYETHYLENE GLYCOL 3350 17 G: 17 POWDER, FOR SOLUTION ORAL at 08:20

## 2020-03-19 ASSESSMENT — PAIN DESCRIPTION - ORIENTATION
ORIENTATION: RIGHT
ORIENTATION: RIGHT

## 2020-03-19 ASSESSMENT — PAIN DESCRIPTION - DESCRIPTORS
DESCRIPTORS: ACHING;SORE
DESCRIPTORS: ACHING;DISCOMFORT
DESCRIPTORS: ACHING

## 2020-03-19 ASSESSMENT — PAIN DESCRIPTION - PROGRESSION
CLINICAL_PROGRESSION: GRADUALLY IMPROVING

## 2020-03-19 ASSESSMENT — PAIN SCALES - GENERAL
PAINLEVEL_OUTOF10: 8
PAINLEVEL_OUTOF10: 5
PAINLEVEL_OUTOF10: 4
PAINLEVEL_OUTOF10: 7
PAINLEVEL_OUTOF10: 0
PAINLEVEL_OUTOF10: 8
PAINLEVEL_OUTOF10: 7
PAINLEVEL_OUTOF10: 7
PAINLEVEL_OUTOF10: 0
PAINLEVEL_OUTOF10: 7
PAINLEVEL_OUTOF10: 5
PAINLEVEL_OUTOF10: 0
PAINLEVEL_OUTOF10: 7

## 2020-03-19 ASSESSMENT — PAIN DESCRIPTION - LOCATION
LOCATION: KNEE

## 2020-03-19 ASSESSMENT — PAIN DESCRIPTION - PAIN TYPE
TYPE: SURGICAL PAIN

## 2020-03-19 ASSESSMENT — PAIN - FUNCTIONAL ASSESSMENT: PAIN_FUNCTIONAL_ASSESSMENT: PREVENTS OR INTERFERES SOME ACTIVE ACTIVITIES AND ADLS

## 2020-03-19 ASSESSMENT — PAIN DESCRIPTION - ONSET
ONSET: ON-GOING
ONSET: ON-GOING

## 2020-03-19 ASSESSMENT — PAIN DESCRIPTION - FREQUENCY
FREQUENCY: CONTINUOUS

## 2020-03-19 NOTE — PROGRESS NOTES
Physical Therapy  Facility/Department: [facility] MED SURG UNIT  Daily Treatment Note  NAME: Dionicio Dyer  : 1950  MRN: [id number]    Date of Service: 3/19/2020    Discharge Recommendations:  (P) Home with Home health PT, Outpatient PT, Home with assist PRN        Assessment   Body structures, Functions, Activity limitations: (P) Decreased functional mobility ; Decreased ROM; Decreased strength;Decreased endurance; Increased pain  Treatment Diagnosis: (P) R TKA  Prognosis: (P) Good  Decision Making: (P) Medium Complexity  REQUIRES PT FOLLOW UP: (P) Yes  Activity Tolerance  Activity Tolerance: (P) Patient Tolerated treatment well;Patient limited by pain; Patient limited by fatigue     Patient Diagnosis(es): There were no encounter diagnoses. has a past medical history of Allergic rhinitis, Anxiety, History of blood transfusion, Hyperlipidemia, Osteoarthritis, and Sleep apnea. has a past surgical history that includes  section ( &  & ); Ankle surgery (Right, ); Colonoscopy; Endoscopy, colon, diagnostic; Total knee arthroplasty (Left, 2019); and Total knee arthroplasty (Right, 3/13/2020). Restrictions  Restrictions/Precautions  Restrictions/Precautions: (P) Weight Bearing  Required Braces or Orthoses?: (P) Yes  Implants present? : (P) Metal implants  Lower Extremity Weight Bearing Restrictions  Right Lower Extremity Weight Bearing: (P) Weight Bearing As Tolerated  Required Braces or Orthoses    Subjective   General  Chart Reviewed: (P) Yes  Additional Pertinent Hx: (P) Pt has a Right Total Knee Replacement 3/13/20; Now here for [facility]   Family / Caregiver Present: (P) No  Referring Practitioner: (P) Dr Looney/ Dr Peyton Harkins (surgeon)  Subjective  Subjective: (P) Pt reports 2-3/10 pain in R knee at rest, 8/10 with movement. General Comment  Comments: (P) Pt sitting up in recliner this morning, motivated for therapy.    Pain Screening  Patient Currently in Pain: (P) Yes  Vital

## 2020-03-19 NOTE — PROGRESS NOTES
Hospitalist Progress Note      PCP: RAOUL Marshall CNP    Date of Admission: 3/15/2020    Chief Complaint: pain    Subjective: pt eating breakfast, looks comfortable     Medications:  Reviewed    Infusion Medications   Scheduled Medications    lisinopril  10 mg Oral Daily    polyethylene glycol  17 g Oral Daily    aspirin  81 mg Oral BID    sennosides-docusate sodium  1 tablet Oral BID    citalopram  20 mg Oral Daily    rosuvastatin  10 mg Oral Nightly     PRN Meds: traMADol, oxyCODONE-acetaminophen **OR** oxyCODONE-acetaminophen, sodium chloride, acetaminophen, magnesium hydroxide, glucagon (rDNA), glucose, ondansetron      Intake/Output Summary (Last 24 hours) at 3/19/2020 0815  Last data filed at 3/18/2020 1707  Gross per 24 hour   Intake 720 ml   Output --   Net 720 ml       Exam:    BP (!) 122/57   Pulse 59   Temp 98.2 °F (36.8 °C) (Oral)   Resp 18   Ht 5' 2\" (1.575 m)   Wt 164 lb (74.4 kg)   LMP 01/16/2008   SpO2 98%   BMI 30.00 kg/m²     General appearance: No apparent distress, appears stated age and cooperative. HEENT: Pupils equal, round, and reactive to light. Conjunctivae/corneas clear. Neck: Supple, with full range of motion. No jugular venous distention. Trachea midline. Respiratory:  Normal respiratory effort. Clear to auscultation, bilaterally without Rales/Wheezes/Rhonchi. Cardiovascular: Regular rate and rhythm with normal S1/S2 without murmurs, rubs or gallops. Abdomen: Soft, non-tender, non-distended with normal bowel sounds. Musculoskeletal:R knee dressing intact  Skin: Skin color, texture, turgor normal.  No rashes or lesions. Neurologic:  Neurovascularly intact without any focal sensory/motor deficits.  Cranial nerves: II-XII intact, grossly non-focal.  Psychiatric: Alert and oriented, thought content appropriate, normal insight  Capillary Refill: Brisk,< 3 seconds   Peripheral Pulses: +2 palpable, equal bilaterally       Labs:   Recent Labs     03/19/20  9388 WBC 6.9   HGB 10.9*   HCT 33.3*        Recent Labs     03/19/20  0443      K 4.7   CL 98   CO2 30   BUN 14   CREATININE 0.69   CALCIUM 9.9     No results for input(s): AST, ALT, BILIDIR, BILITOT, ALKPHOS in the last 72 hours. No results for input(s): INR in the last 72 hours. No results for input(s): Jeromy Bhavin in the last 72 hours.     Urinalysis:      Lab Results   Component Value Date    NITRU Negative 02/27/2020    WBCUA 0-2 02/27/2020    BACTERIA Negative 02/27/2020    RBCUA 0-2 02/27/2020    BLOODU TRACE 02/27/2020    SPECGRAV 1.009 02/27/2020    GLUCOSEU Negative 02/27/2020       Radiology:  No orders to display           Assessment/Plan:    Active Hospital Problems    Diagnosis Date Noted    History of total right knee replacement [Z96.651] 03/16/2020         DVT Prophylaxis: ASA BID  Diet: DIET GENERAL; Carb Control: 4 carb choices (60 gms)/meal  Code Status: Full Code    PT/OT Eval Status: done    Dispo - Dispo - RTKA- pain controlled, continue rehab orders  HTN-   controlled-  lisinopril back to 10 mg, follow up clinically   Constipation-resolved  DM- diet controlled, follow up with ACCU check, ISS  Morbid obesity due to excessive calory intake with BMI 30%- supportive care   Medically stable for skilled status at [employer] signed by Ham Arizmendi MD on 3/19/2020 at 8:15 AM

## 2020-03-19 NOTE — PROGRESS NOTES
Occupational Therapy  Facility/Department: [facility] MED SURG UNIT  Daily Treatment Note  NAME: Torres Luevano  : 1950  MRN: [id number]    Date of Service: 3/19/2020    Discharge Recommendations:  Home with assist PRN, Home with Home health OT(HH PT, transition to OP PT)       Assessment      REQUIRES OT FOLLOW UP: Yes     Pt. Is agreeable to OT. Pt. Stated her pain is 5/10 in knee. Pt. Tolerated standing for 5 minutes with ww. Pt. Tolerated toileting and toilet transfer at [facility]. Pt. Tolerated FM UB activity with BUE in sitting position to increase FM skills at supervision. Pt. Demonstrated UB exercises with BUE in all planes and directions to increase UB strength. Answered pt.'s questions and concerns. Patient Diagnosis(es): There were no encounter diagnoses. has a past medical history of Allergic rhinitis, Anxiety, History of blood transfusion, Hyperlipidemia, Osteoarthritis, and Sleep apnea. has a past surgical history that includes  section ( &  & ); Ankle surgery (Right, ); Colonoscopy; Endoscopy, colon, diagnostic; Total knee arthroplasty (Left, 2019); and Total knee arthroplasty (Right, 3/13/2020).     Restrictions  Restrictions/Precautions  Restrictions/Precautions: Weight Bearing  Required Braces or Orthoses?: Yes  Implants present? : Metal implants  Lower Extremity Weight Bearing Restrictions  Right Lower Extremity Weight Bearing: Weight Bearing As Tolerated  Required Braces or Orthoses  Right Lower Extremity Brace: Knee Immobilizer  Subjective   General  Chart Reviewed: Yes  Patient assessed for rehabilitation services?: Yes  Family / Caregiver Present: No  Referring Practitioner: Dr. Emma Jaramillo  Diagnosis: R TKA  Subjective  Subjective: Pt agreeable to OT      Orientation     Objective             Standing Balance  Time: 4-5min x2     Transfers  Sit to stand: Stand by assistance  Stand to sit: Stand by assistance      Toileting-SBA  Toilet transfer-SBA  FM UB activity with BUE in sitting position reaching in all planes and directions to increase ROM, coordination, and FM skills for ADLS-supervision                                      Type of ROM/Therapeutic Exercise  Type of ROM/Therapeutic Exercise: Cane/Wand(2 lb. T-bar with BUE in all planes and directions to increase UB strength)  Exercises  Shoulder Elevation: 20  Shoulder Flexion: 20  Shoulder Extension: 20  Shoulder ABduction: 20  Shoulder ADduction: 20  Horizontal ABduction: 20  Horizontal ADduction: 20  Elbow Flexion: 20  Elbow Extension: 20  Pronation: 20  Wrist Flexion: 20  Other: to increase strength/end for ADL                    Plan   Plan  Times per week: 3-6x/wk  Times per day: Daily  Plan weeks: 1-2 wks  Current Treatment Recommendations: Strengthening, Balance Training, Functional Mobility Training, Endurance Training, Stair training, Pain Management, Safety Education & Training, Patient/Caregiver Education & Training, Self-Care / ADL, Home Management Training  G-Code     OutComes Score                                                  AM-PAC Score             Goals  Short term goals  Time Frame for Short term goals: 1 wk  Short term goal 1: Tolerate 25-30min BUE ther ex/act to increase strength/end for ADL  Short term goal 2: Increase to CGA/SBA LB dressing  Short term goal 3: Increase to SBA/Spv toileting  Long term goals  Time Frame for Long term goals : 2 wks  Long term goal 1: I BUE HEP  Long term goal 2: I/MI LB dressing and bathing  Long term goal 3: I/MI toileting  Long term goal 4: I/MI light homemaking tasks/IADL  Long term goal 5: Increase to 10+min stand mary ellen/end (PLOF) for increased safety and I with ADL  Patient Goals   Patient goals :  \"To strengthen my knee so I can go back to doing what I was doing\"       Therapy Time   Individual Concurrent Group Co-treatment   Time In  9:45am/12:45pm         Time Out  10:15am/1:05pm         Minutes  30+20=50                 CESAR Camara/SHIREEN Benson

## 2020-03-19 NOTE — PLAN OF CARE
Problem: Falls - Risk of:  Goal: Will remain free from falls  Description: Will remain free from falls  Outcome: Met This Shift  Goal: Absence of physical injury  Description: Absence of physical injury  Outcome: Met This Shift     Problem: Pain:  Description: Pain management should include both nonpharmacologic and pharmacologic interventions.   Goal: Pain level will decrease  Description: Pain level will decrease  Outcome: Met This Shift  Goal: Control of acute pain  Description: Control of acute pain  Outcome: Met This Shift  Goal: Control of chronic pain  Description: Control of chronic pain  Outcome: Met This Shift     Problem: Nutrition  Goal: Optimal nutrition therapy  Outcome: Met This Shift

## 2020-03-19 NOTE — PROGRESS NOTES
Replacement 3/13/20; Now here for SUSANA   Family / Caregiver Present: (P) No  Referring Practitioner: (P) Dr Looney/ Dr Bridger Johnson (surgeon)  Subjective  Subjective: (P) Pt reports 2/10 pain in R knee at rest, 6/10 with movement. General Comment  Comments: (P) Pt sitting up in recliner this morning, motivated for therapy. Pain Screening  Patient Currently in Pain: (P) Yes(nurse in to administer meds at start of session)  Vital Signs  Patient Currently in Pain: (P) Yes(nurse in to administer meds at start of session)       Orientation     Cognition      Objective      Transfers  Sit to Stand: (P) Stand by assistance;Supervision  Stand to sit: (P) Stand by assistance;Supervision  Stand Pivot Transfers: Stand by assistance  Comment: (P) cues for safety, pt can be impulsive- moves quickly  Ambulation  Ambulation?: (P) Yes  Ambulation 1  Surface: (P) level tile  Device: (P) Rolling Walker  Other Apparatus: (P) Wheelchair follow  Assistance: (P) Stand by assistance  Quality of Gait: (P) Antalgic step pattern, heavy use of UEs, decreased stance time/wt acceptance through R LE  Gait Deviations: (P) Decreased step length;Decreased step height;Decreased head and trunk rotation  Distance: (P) 60', 75'  Comments: (P) cues provided for heel toe step pattern, maintaining safe distancing of AD, and decreased use of UEs. Balance  Posture: (P) Good  Sitting - Static: (P) Good  Sitting - Dynamic: (P) Good  Standing - Static: (P) Good;-  Standing - Dynamic: (P) Fair;+  Comments: (P) Decreased wt shift through R LE in standing.    Exercises  Straight Leg Raise: (P) 2 x 10  Quad Sets: (P) x 20 H5  Heelslides: (P) 2 x 10 H5  Gluteal Sets: (P) x 20 H5  Hip Abduction: (P) 2 x 10 Andrew  Knee Short Arc Quad: (P) x 20 H3, alt LEs  Ankle Pumps: (P) x 20  Other exercises  Other exercises 1: (P) supported std: lateral wt shifts x 15    supported std: heel/toe raises x 15 cues for even stance, upright posture        Manual therapy  Joint

## 2020-03-20 LAB
GLUCOSE BLD-MCNC: 120 MG/DL (ref 60–115)
PERFORMED ON: ABNORMAL

## 2020-03-20 PROCEDURE — 97530 THERAPEUTIC ACTIVITIES: CPT

## 2020-03-20 PROCEDURE — 97535 SELF CARE MNGMENT TRAINING: CPT

## 2020-03-20 PROCEDURE — 97116 GAIT TRAINING THERAPY: CPT

## 2020-03-20 PROCEDURE — 6370000000 HC RX 637 (ALT 250 FOR IP): Performed by: INTERNAL MEDICINE

## 2020-03-20 PROCEDURE — 1200000002 HC SEMI PRIVATE SWING BED

## 2020-03-20 PROCEDURE — 97110 THERAPEUTIC EXERCISES: CPT

## 2020-03-20 RX ORDER — DIPHENHYDRAMINE HCL 25 MG
25 TABLET ORAL ONCE
Status: COMPLETED | OUTPATIENT
Start: 2020-03-20 | End: 2020-03-20

## 2020-03-20 RX ORDER — DIPHENHYDRAMINE HCL 25 MG
25 TABLET ORAL EVERY 6 HOURS PRN
Status: DISCONTINUED | OUTPATIENT
Start: 2020-03-20 | End: 2020-03-27 | Stop reason: HOSPADM

## 2020-03-20 RX ADMIN — OXYCODONE HYDROCHLORIDE AND ACETAMINOPHEN 2 TABLET: 5; 325 TABLET ORAL at 11:51

## 2020-03-20 RX ADMIN — ASPIRIN 81 MG: 81 TABLET, COATED ORAL at 19:51

## 2020-03-20 RX ADMIN — POLYETHYLENE GLYCOL 3350 17 G: 17 POWDER, FOR SOLUTION ORAL at 08:14

## 2020-03-20 RX ADMIN — ASPIRIN 81 MG: 81 TABLET, COATED ORAL at 08:14

## 2020-03-20 RX ADMIN — TRAMADOL HYDROCHLORIDE 50 MG: 50 TABLET, FILM COATED ORAL at 19:51

## 2020-03-20 RX ADMIN — OXYCODONE HYDROCHLORIDE AND ACETAMINOPHEN 2 TABLET: 5; 325 TABLET ORAL at 18:52

## 2020-03-20 RX ADMIN — DIPHENHYDRAMINE HCL 25 MG: 25 TABLET ORAL at 08:14

## 2020-03-20 RX ADMIN — SENNOSIDES AND DOCUSATE SODIUM 1 TABLET: 8.6; 5 TABLET ORAL at 08:14

## 2020-03-20 RX ADMIN — ROSUVASTATIN CALCIUM 10 MG: 5 TABLET ORAL at 19:51

## 2020-03-20 RX ADMIN — CITALOPRAM HYDROBROMIDE 20 MG: 20 TABLET, FILM COATED ORAL at 08:14

## 2020-03-20 RX ADMIN — TRAMADOL HYDROCHLORIDE 50 MG: 50 TABLET, FILM COATED ORAL at 02:57

## 2020-03-20 RX ADMIN — OXYCODONE HYDROCHLORIDE AND ACETAMINOPHEN 2 TABLET: 5; 325 TABLET ORAL at 23:10

## 2020-03-20 RX ADMIN — OXYCODONE HYDROCHLORIDE AND ACETAMINOPHEN 2 TABLET: 5; 325 TABLET ORAL at 05:57

## 2020-03-20 RX ADMIN — SENNOSIDES AND DOCUSATE SODIUM 1 TABLET: 8.6; 5 TABLET ORAL at 19:51

## 2020-03-20 RX ADMIN — OXYCODONE HYDROCHLORIDE AND ACETAMINOPHEN 2 TABLET: 5; 325 TABLET ORAL at 01:59

## 2020-03-20 ASSESSMENT — PAIN SCALES - GENERAL
PAINLEVEL_OUTOF10: 8
PAINLEVEL_OUTOF10: 7
PAINLEVEL_OUTOF10: 8
PAINLEVEL_OUTOF10: 9
PAINLEVEL_OUTOF10: 8

## 2020-03-20 ASSESSMENT — PAIN DESCRIPTION - PAIN TYPE: TYPE: SURGICAL PAIN

## 2020-03-20 ASSESSMENT — PAIN DESCRIPTION - ORIENTATION: ORIENTATION: RIGHT

## 2020-03-20 ASSESSMENT — PAIN DESCRIPTION - LOCATION: LOCATION: KNEE

## 2020-03-20 NOTE — PROGRESS NOTES
Physical Therapy  Facility/Department: [facility] MED SURG UNIT  Daily Treatment Note  NAME: Marty Martin  : 1950  MRN: [id number]    Date of Service: 3/20/2020    Discharge Recommendations:  (P) Home with Home health PT, Outpatient PT, Home with assist PRN        Assessment   Body structures, Functions, Activity limitations: (P) Decreased functional mobility ; Decreased ROM; Decreased strength;Decreased endurance; Increased pain  Assessment: (P) Pt is motivated, very talkative and easily distracted, requiring cues for redirection. Pt moves quicky, requiring cues throughout tx session for pacing, maintaining correct alignment and technique in order to achieve increased ROM and facilitate isolated muscle control when performing ther ex and stretches. Pt continues to demo quick, antalgic step pattern, requiring cues to correct as able. Pt returned to bed at end of session d/t reports of increased fatigue, ice packs positioned on R knee to assist with decreasing pain/inflammation, no change noted in rate of pain. Treatment Diagnosis: (P) R TKA  Prognosis: (P) Good  Decision Making: (P) Medium Complexity  REQUIRES PT FOLLOW UP: (P) Yes  Activity Tolerance  Activity Tolerance: (P) Patient Tolerated treatment well;Patient limited by pain; Patient limited by endurance     Patient Diagnosis(es): There were no encounter diagnoses. has a past medical history of Allergic rhinitis, Anxiety, History of blood transfusion, Hyperlipidemia, Osteoarthritis, and Sleep apnea. has a past surgical history that includes  section ( &  & ); Ankle surgery (Right, ); Colonoscopy; Endoscopy, colon, diagnostic; Total knee arthroplasty (Left, 2019); and Total knee arthroplasty (Right, 3/13/2020).     Restrictions  Restrictions/Precautions  Restrictions/Precautions: (P) Weight Bearing  Required Braces or Orthoses?: (P) Yes  Implants present? : (P) Metal implants  Lower Extremity Weight Bearing Restrictions  Right Lower Extremity Weight Bearing: (P) Weight Bearing As Tolerated  Subjective   General  Chart Reviewed: (P) Yes  Additional Pertinent Hx: (P) Pt has a Right Total Knee Replacement 3/13/20; Now here for SUSANA   Family / Caregiver Present: (P) No  Referring Practitioner: (P) Dr Looney/ Dr Joshua Blackwood (surgeon)  Subjective  Subjective: (P) Pt reports 2/10 pain in R knee at rest, 7/10 with movement. General Comment  Comments: (P) Pt awake, in bed this morning, agreeable to tx session. Pain Screening  Patient Currently in Pain: (P) Yes  Vital Signs  Patient Currently in Pain: (P) Yes       Orientation     Cognition      Objective   Bed mobility  Scooting: (P) Modified independent  Transfers  Sit to Stand: (P) Supervision  Stand to sit: (P) Supervision  Stand Pivot Transfers: (P) Stand by assistance;Supervision  Comment: (P) cues for safety, pt can be impulsive- moves quickly  Ambulation  Ambulation?: (P) Yes  Ambulation 1  Surface: (P) level tile  Device: (P) Rolling Walker  Other Apparatus: (P) Wheelchair follow  Assistance: (P) Stand by assistance  Quality of Gait: (P) Antalgic step pattern, heavy use of UEs, decreased stance time/wt acceptance through R LE  Gait Deviations: (P) Decreased step length;Decreased step height;Decreased head and trunk rotation  Distance: (P) 70' x 2  Comments: (P) cues provided for heel toe step pattern, maintaining safe distancing of AD, and decreased use of UEs. Stairs/Curb  Stairs?: (P) Yes  Stairs  # Steps : (P) 5  Stairs Height: (P) 6\"  Rails: (P) Bilateral  Device: (P) No Device  Assistance: (P) Contact guard assistance  Comment: (P) pt demo improved control when descending/improved wt tolerance through R LE. Balance  Posture: (P) Good  Sitting - Static: (P) Good  Sitting - Dynamic: (P) Good  Standing - Static: (P) Good;-  Standing - Dynamic: (P) Fair;+  Comments: (P) Decreased wt shift through R LE in standing.    Exercises  Straight Leg Raise: (P) 2 x 10  Quad Sets: (P) x 20 H5  Heelslides: (P) 2 x 10 H5  Gluteal Sets: (P) x 20 H5  Hip Abduction: (P) 2 x 10 Andrew  Ankle Pumps: (P) x 20  Other exercises  Other exercises 1: (P) supported std: HS stretch, using B rails for support, heel propped on step x 3 H10  Other exercises 2: (P) supported std: knee flex stretch, B rails for support, foot positioned on step x 3 H10         Other Activities: (P) Other (see comment)  Comment: (P) Pt able to complete toileting with SUP              G-Code     OutComes Score                                                     AM-PAC Score             Goals  Short term goals  Time Frame for Short term goals: (P) 1 week SUSANA   Short term goal 1: (P) Transfers with SBA  Short term goal 2: (P) Bed mobility with supervison   Short term goal 3: (P) Pt able to ambulate with AD for 125'x 1 with improved step length   Short term goal 4: (P) Increase strength R LE 3-/5   Short term goal 5: (P) Pt able to ambulate up/down 5 steps with CGA and B HR  Long term goals  Time Frame for Long term goals : (P) 2 weeks of SUSANA   Long term goal 1: (P) Indep with bed mobility   Long term goal 2: (P) Indep with transfers   Long term goal 3: (P) Pt able to ambulate 250' x 1 or > with mod indep with AD without a rest break due to fatigue   Long term goal 4: (P) Pt able to ambulate up/down a full flight of stairs indep/safely with AD and one HR  Long term goal 5: (P) Indep with HEP   Patient Goals   Patient goals : (P) To be able to return home safely and ambulate up/down a flight of stair (to enter/exit her apartment)     Plan    Plan  Times per week: (P) 5-7 days per week   Times per day: (P) Daily(QD to BID )  Plan weeks: (P) Recommend 1-2 weeks of SUSANA & then home with Chapito Britt and then outpt  Current Treatment Recommendations: (P) Strengthening, ROM, Endurance Training, Functional Mobility Training, Safety Education & Training, Home Exercise Program, Transfer Training, Gait Training, Manual Therapy - Soft Tissue

## 2020-03-20 NOTE — PROGRESS NOTES
Hospitalist Progress Note      PCP: RAOUL Tinajero - CNP    Date of Admission: 3/15/2020    Chief Complaint: itchiness     Subjective: pt awake, eating breakfast     Medications:  Reviewed    Infusion Medications   Scheduled Medications    polyethylene glycol  17 g Oral Daily    aspirin  81 mg Oral BID    sennosides-docusate sodium  1 tablet Oral BID    citalopram  20 mg Oral Daily    rosuvastatin  10 mg Oral Nightly     PRN Meds: diphenhydrAMINE, traMADol, oxyCODONE-acetaminophen **OR** oxyCODONE-acetaminophen, sodium chloride, acetaminophen, magnesium hydroxide, glucagon (rDNA), glucose, ondansetron      Intake/Output Summary (Last 24 hours) at 3/20/2020 0817  Last data filed at 3/19/2020 1809  Gross per 24 hour   Intake 1080 ml   Output --   Net 1080 ml       Exam:    BP (!) 122/57   Pulse 59   Temp 98.2 °F (36.8 °C) (Oral)   Resp 18   Ht 5' 2\" (1.575 m)   Wt 164 lb (74.4 kg)   LMP 01/16/2008   SpO2 98%   BMI 30.00 kg/m²     General appearance: No apparent distress, appears stated age and cooperative. HEENT: Pupils equal, round, and reactive to light. Conjunctivae/corneas clear. Neck: Supple, with full range of motion. No jugular venous distention. Trachea midline. Respiratory:  Normal respiratory effort. Clear to auscultation, bilaterally without Rales/Wheezes/Rhonchi. Cardiovascular: Regular rate and rhythm with normal S1/S2 without murmurs, rubs or gallops. Abdomen: Soft, non-tender, non-distended with normal bowel sounds. Musculoskeletal:R knee postoperative dressing intact   Skin:some redness in lower back, both arms   Neurologic:  Neurovascularly intact without any focal sensory/motor deficits.     Psychiatric: Alert and oriented, thought content appropriate, normal insight  Capillary Refill: Brisk,< 3 seconds   Peripheral Pulses: +2 palpable, equal bilaterally       Labs:   Recent Labs     03/19/20  0443   WBC 6.9   HGB 10.9*   HCT 33.3*        Recent Labs

## 2020-03-20 NOTE — PROGRESS NOTES
Removed aquacel, old drainage on dressing. Patient concerned about old drainage and replacing dressing. I educated patient that the drainage was old, and unless there is new drainage it needed to stay open to air. Patient expressed understanding.  Will continue to monitor at this time

## 2020-03-20 NOTE — PROGRESS NOTES
Weight Bearing Restrictions  Right Lower Extremity Weight Bearing: (P) Weight Bearing As Tolerated  Subjective   General  Chart Reviewed: (P) Yes  Additional Pertinent Hx: (P) Pt has a Right Total Knee Replacement 3/13/20; Now here for [facility]   Family / Caregiver Present: (P) No  Referring Practitioner: (P) Dr Looney/ Dr Latia Mathis (surgeon)  Subjective  Subjective: (P) Pt reports 6/10 R knee pain this afternoon. General Comment  Comments: (P) Pt awake, lying in bed; agreeable to therapy. Pain Screening  Patient Currently in Pain: (P) Yes  Vital Signs  Patient Currently in Pain: (P) Yes       Orientation     Cognition      Objective   Bed mobility  Scooting: Modified independent  Transfers  Sit to Stand: (P) Supervision  Stand to sit: (P) Supervision  Stand Pivot Transfers: (P) Stand by assistance;Supervision  Comment: (P) cues for safety, pt can be impulsive- moves quickly  Ambulation  Ambulation?: (P) Yes  Ambulation 1  Surface: (P) level tile  Device: (P) Rolling Walker  Other Apparatus: (P) Wheelchair follow  Assistance: (P) Stand by assistance  Quality of Gait: (P) Antalgic step pattern, heavy use of UEs, decreased stance time/wt acceptance through R LE  Gait Deviations: (P) Decreased step length;Decreased step height;Decreased head and trunk rotation  Distance: (P) 70' x 2  Comments: (P) cues provided for heel toe step pattern, maintaining safe distancing of AD, and decreased use of UEs. Stairs/Curb  Stairs?: (P) Yes  Stairs  # Steps : (P) 5  Stairs Height: (P) 6\"  Rails: (P) Bilateral  Device: (P) No Device  Assistance: (P) Contact guard assistance  Comment: (P) pt demo improved wt acceptance through R LE, continues to demo heavy WBing through UEs     Balance  Posture: (P) Good  Sitting - Static: (P) Good  Sitting - Dynamic: (P) Good  Standing - Static: (P) Good;-  Standing - Dynamic: (P) Fair;+  Comments: (P) Decreased wt shift through R LE in standing.    Exercises  Straight Leg Raise: 2 x 10  Quad Sets: x 20 H5  Heelslides: (P) 2 x 10 H5  Gluteal Sets: x 20 H5  Hip Abduction: (P) 2 x 10 Andrew  Knee Long Arc Quad: (P) 2 x 10 Andrew  Ankle Pumps: (P) x 20  Comments: (P) cues provided for pacing with increased ROM as able.    Other exercises        Other Activities: (P) Other (see comment)  Comment: (P) Pt seen for dynamic sitting and standing tasks with varying levels of support; SBA for safety- cues for safe positioning of AD, postural awareness and even stance to facilitate improved wt acceptance through R LE.               G-Code     OutComes Score                                                     AM-PAC Score             Goals  Short term goals  Time Frame for Short term goals: (P) 1 week SUSANA   Short term goal 1: (P) Transfers with SBA  Short term goal 2: (P) Bed mobility with supervison   Short term goal 3: (P) Pt able to ambulate with AD for 125'x 1 with improved step length   Short term goal 4: (P) Increase strength R LE 3-/5   Short term goal 5: (P) Pt able to ambulate up/down 5 steps with CGA and B HR  Long term goals  Time Frame for Long term goals : (P) 2 weeks of SUSANA   Long term goal 1: (P) Indep with bed mobility   Long term goal 2: (P) Indep with transfers   Long term goal 3: (P) Pt able to ambulate 250' x 1 or > with mod indep with AD without a rest break due to fatigue   Long term goal 4: (P) Pt able to ambulate up/down a full flight of stairs indep/safely with AD and one HR  Long term goal 5: (P) Indep with HEP   Patient Goals   Patient goals : (P) To be able to return home safely and ambulate up/down a flight of stair (to enter/exit her apartment)     Plan    Plan  Times per week: (P) 5-7 days per week   Times per day: (P) Daily(QD to BID )  Plan weeks: (P) Recommend 1-2 weeks of SUSANA & then home with [facility] and then outpt  Current Treatment Recommendations: (P) Strengthening, ROM, Endurance Training, Functional Mobility Training, Safety Education & Training, Home Exercise Program, Transfer Training, Gait

## 2020-03-20 NOTE — PROGRESS NOTES
Occupational Therapy  Facility/Department: [facility] MED SURG UNIT  Daily Treatment Note  NAME: Jack Justin  : 1950  MRN: [id number]    Date of Service: 3/20/2020    Discharge Recommendations:  Home with assist PRN, Home with Home health OT( PT)       Assessment   Performance deficits / Impairments: Decreased functional mobility ; Decreased ADL status; Decreased strength;Decreased endurance;Decreased balance;Decreased high-level IADLs  Assessment: Pt requesting shower. Pt progressing toward ADL. MI UB bathing and dressing. Pt Spv LB bathing, SBA/Cordelia LB dressing. Prognosis: Good  REQUIRES OT FOLLOW UP: Yes         Patient Diagnosis(es): There were no encounter diagnoses. has a past medical history of Allergic rhinitis, Anxiety, History of blood transfusion, Hyperlipidemia, Osteoarthritis, and Sleep apnea. has a past surgical history that includes  section ( &  & ); Ankle surgery (Right, ); Colonoscopy; Endoscopy, colon, diagnostic; Total knee arthroplasty (Left, 2019); and Total knee arthroplasty (Right, 3/13/2020).     Restrictions  Restrictions/Precautions  Restrictions/Precautions: Weight Bearing  Required Braces or Orthoses?: Yes  Implants present? : Metal implants  Lower Extremity Weight Bearing Restrictions  Right Lower Extremity Weight Bearing: Weight Bearing As Tolerated  Required Braces or Orthoses  Right Lower Extremity Brace: Knee Immobilizer  Subjective   General  Chart Reviewed: Yes  Patient assessed for rehabilitation services?: Yes  Family / Caregiver Present: No  Referring Practitioner: Dr. Gabino Torres  Diagnosis: R TKA  Subjective  Subjective: Pt agreeable to OT  Pain Assessment  Pain Assessment: 0-10  Pain Level: 8  Pain Type: Surgical pain  Pain Location: Knee  Pain Orientation: Right  Vital Signs  Patient Currently in Pain: Yes      Objective    ADL  Grooming: Independent  UE Bathing: Setup;Modified independent   LE Bathing: Setup;Supervision  UE Dressing:

## 2020-03-21 LAB
GLUCOSE BLD-MCNC: 127 MG/DL (ref 60–115)
PERFORMED ON: ABNORMAL

## 2020-03-21 PROCEDURE — 97110 THERAPEUTIC EXERCISES: CPT

## 2020-03-21 PROCEDURE — 6370000000 HC RX 637 (ALT 250 FOR IP): Performed by: INTERNAL MEDICINE

## 2020-03-21 PROCEDURE — 97530 THERAPEUTIC ACTIVITIES: CPT

## 2020-03-21 PROCEDURE — 1200000002 HC SEMI PRIVATE SWING BED

## 2020-03-21 PROCEDURE — 97140 MANUAL THERAPY 1/> REGIONS: CPT

## 2020-03-21 PROCEDURE — 97116 GAIT TRAINING THERAPY: CPT

## 2020-03-21 RX ADMIN — CITALOPRAM HYDROBROMIDE 20 MG: 20 TABLET, FILM COATED ORAL at 09:24

## 2020-03-21 RX ADMIN — ASPIRIN 81 MG: 81 TABLET, COATED ORAL at 09:24

## 2020-03-21 RX ADMIN — SENNOSIDES AND DOCUSATE SODIUM 1 TABLET: 8.6; 5 TABLET ORAL at 09:23

## 2020-03-21 RX ADMIN — ASPIRIN 81 MG: 81 TABLET, COATED ORAL at 20:35

## 2020-03-21 RX ADMIN — ACETAMINOPHEN 650 MG: 325 TABLET ORAL at 20:34

## 2020-03-21 RX ADMIN — POLYETHYLENE GLYCOL 3350 17 G: 17 POWDER, FOR SOLUTION ORAL at 09:52

## 2020-03-21 RX ADMIN — SENNOSIDES AND DOCUSATE SODIUM 1 TABLET: 8.6; 5 TABLET ORAL at 20:35

## 2020-03-21 RX ADMIN — DIPHENHYDRAMINE HCL 25 MG: 25 TABLET ORAL at 01:30

## 2020-03-21 RX ADMIN — OXYCODONE HYDROCHLORIDE AND ACETAMINOPHEN 2 TABLET: 5; 325 TABLET ORAL at 09:24

## 2020-03-21 RX ADMIN — OXYCODONE HYDROCHLORIDE AND ACETAMINOPHEN 2 TABLET: 5; 325 TABLET ORAL at 20:34

## 2020-03-21 RX ADMIN — OXYCODONE HYDROCHLORIDE AND ACETAMINOPHEN 2 TABLET: 5; 325 TABLET ORAL at 15:09

## 2020-03-21 RX ADMIN — ROSUVASTATIN CALCIUM 10 MG: 5 TABLET ORAL at 20:35

## 2020-03-21 RX ADMIN — TRAMADOL HYDROCHLORIDE 50 MG: 50 TABLET, FILM COATED ORAL at 02:16

## 2020-03-21 RX ADMIN — OXYCODONE HYDROCHLORIDE AND ACETAMINOPHEN 2 TABLET: 5; 325 TABLET ORAL at 03:42

## 2020-03-21 ASSESSMENT — PAIN DESCRIPTION - LOCATION
LOCATION: KNEE
LOCATION: KNEE

## 2020-03-21 ASSESSMENT — PAIN SCALES - GENERAL
PAINLEVEL_OUTOF10: 8
PAINLEVEL_OUTOF10: 8
PAINLEVEL_OUTOF10: 7
PAINLEVEL_OUTOF10: 6
PAINLEVEL_OUTOF10: 8
PAINLEVEL_OUTOF10: 9

## 2020-03-21 ASSESSMENT — PAIN DESCRIPTION - ORIENTATION
ORIENTATION: RIGHT
ORIENTATION: RIGHT

## 2020-03-21 ASSESSMENT — PAIN DESCRIPTION - DESCRIPTORS
DESCRIPTORS: ACHING
DESCRIPTORS: ACHING

## 2020-03-21 ASSESSMENT — PAIN DESCRIPTION - PAIN TYPE
TYPE: SURGICAL PAIN

## 2020-03-21 NOTE — PROGRESS NOTES
term goal 1: Indep with bed mobility   Long term goal 2: Indep with transfers   Long term goal 3: Pt able to ambulate 250' x 1 or > with mod indep with AD without a rest break due to fatigue   Long term goal 4: Pt able to ambulate up/down a full flight of stairs indep/safely with AD and one HR  Long term goal 5: Indep with HEP   Patient Goals   Patient goals : To be able to return home safely and ambulate up/down a flight of stair (to enter/exit her apartment)     Plan    Plan  Times per week: 5-7 days per week   Times per day: (QD to BID )  Plan weeks: Recommend 1-2 weeks of SUSANA & then home with [facility] and then outpt  Current Treatment Recommendations: Strengthening, ROM, Endurance Training, Functional Mobility Training, Safety Education & Training, Home Exercise Program, Transfer Training, Gait Training, Manual Therapy - Soft Tissue Mobilization, Neuromuscular Re-education, Patient/Caregiver Education & Training, Modalities  Safety Devices  Type of devices:  All fall risk precautions in place, Call light within reach, Left in chair     Therapy Time   Individual Concurrent Group Co-treatment   Time In  850         Time Out  935         Minutes  [address] [place] [address], [facility]  License and [address] Number: [phone]

## 2020-03-21 NOTE — PROGRESS NOTES
protocol with calling for nursing. Pain Screening  Patient Currently in Pain: Yes  Pain Assessment  Pain Assessment: 0-10  Pain Level: 8  Pain Type: Surgical pain  Vital Signs  Patient Currently in Pain: Yes       Orientation     Cognition      Objective   Bed mobility  Bridging: Contact guard assistance  Scooting: Modified independent  Transfers  Sit to Stand: Supervision  Stand to sit: Supervision  Bed to Chair: Contact guard assistance  Stand Pivot Transfers: Stand by assistance;Supervision  Comment: toilet transfer, cues for safety, SBA   Ambulation  Ambulation?: Yes  Ambulation 1  Surface: level tile  Device: Rolling Walker  Assistance: Stand by assistance  Quality of Gait: altalgic gait, decreased heel strike and toe off,  Gait Deviations: Decreased step length;Decreased step height  Distance: 70' x 2  Comments: improved step through pattern in pm   Stairs/Curb  Stairs?: Yes  Stairs  # Steps : 5  Stairs Height: 4\"  Rails: Bilateral  Device: No Device  Assistance: Contact guard assistance  Comment: cues for up with good down with  \"bad\" foot      Balance  Posture: Good  Sitting - Static: Good  Sitting - Dynamic: Good  Standing - Static: Good;-  Standing - Dynamic: Fair;+  Exercises  Straight Leg Raise: x20  Quad Sets: x 20 H5  Heelslides: 2 x 10 H5  Gluteal Sets: x 20 H5  Hip Flexion: x20  Hip Abduction: x20   Ankle Pumps: x 20               Manual therapy  PROM:  5'  Soft Tissue Mobalization: x5'  Other: manual x 10' total. Pocket of swelling in medial knee at inferior region of incision. Nursing notified as well as area of scabbing that pt reported bleeding/serum intermittently in small amounts.          G-Code     OutComes Score                                                     AM-PAC Score             Goals  Short term goals  Time Frame for Short term goals: 1 week SUSANA   Short term goal 1: Transfers with SBA  Short term goal 2: Bed mobility with supervison   Short term goal 3: Pt able to ambulate with AD for 125'x 1 with improved step length   Short term goal 4: Increase strength R LE 3-/5   Short term goal 5: Pt able to ambulate up/down 5 steps with CGA and B HR  Long term goals  Time Frame for Long term goals : 2 weeks of SUSANA   Long term goal 1: Indep with bed mobility   Long term goal 2: Indep with transfers   Long term goal 3: Pt able to ambulate 250' x 1 or > with mod indep with AD without a rest break due to fatigue   Long term goal 4: Pt able to ambulate up/down a full flight of stairs indep/safely with AD and one HR  Long term goal 5: Indep with HEP   Patient Goals   Patient goals : To be able to return home safely and ambulate up/down a flight of stair (to enter/exit her apartment)     Plan    Plan  Times per week: 5-7 days per week   Times per day: (QD to BID)  Plan weeks: Recommend 1-2 weeks of SUSANA & then home with [facility] and then outpt  Current Treatment Recommendations: Strengthening, ROM, Endurance Training, Functional Mobility Training, Safety Education & Training, Home Exercise Program, Transfer Training, Gait Training, Manual Therapy - Soft Tissue Mobilization, Neuromuscular Re-education, Patient/Caregiver Education & Training, Modalities  Safety Devices  Type of devices:  All fall risk precautions in place, Call light within reach, Left in chair     Therapy Time   Individual Concurrent Group Co-treatment   Time In  1230         Time Out  125         Minutes  [address], PTA  License and [address] Number: [phone]

## 2020-03-22 PROCEDURE — 97110 THERAPEUTIC EXERCISES: CPT

## 2020-03-22 PROCEDURE — 97116 GAIT TRAINING THERAPY: CPT

## 2020-03-22 PROCEDURE — 6370000000 HC RX 637 (ALT 250 FOR IP): Performed by: INTERNAL MEDICINE

## 2020-03-22 PROCEDURE — 1200000002 HC SEMI PRIVATE SWING BED

## 2020-03-22 PROCEDURE — 97530 THERAPEUTIC ACTIVITIES: CPT

## 2020-03-22 RX ADMIN — OXYCODONE HYDROCHLORIDE AND ACETAMINOPHEN 2 TABLET: 5; 325 TABLET ORAL at 07:17

## 2020-03-22 RX ADMIN — OXYCODONE HYDROCHLORIDE AND ACETAMINOPHEN 2 TABLET: 5; 325 TABLET ORAL at 16:44

## 2020-03-22 RX ADMIN — ROSUVASTATIN CALCIUM 10 MG: 5 TABLET ORAL at 20:00

## 2020-03-22 RX ADMIN — SENNOSIDES AND DOCUSATE SODIUM 1 TABLET: 8.6; 5 TABLET ORAL at 07:55

## 2020-03-22 RX ADMIN — CITALOPRAM HYDROBROMIDE 20 MG: 20 TABLET, FILM COATED ORAL at 07:55

## 2020-03-22 RX ADMIN — OXYCODONE HYDROCHLORIDE AND ACETAMINOPHEN 2 TABLET: 5; 325 TABLET ORAL at 11:21

## 2020-03-22 RX ADMIN — SENNOSIDES AND DOCUSATE SODIUM 1 TABLET: 8.6; 5 TABLET ORAL at 20:00

## 2020-03-22 RX ADMIN — TRAMADOL HYDROCHLORIDE 50 MG: 50 TABLET, FILM COATED ORAL at 23:49

## 2020-03-22 RX ADMIN — OXYCODONE HYDROCHLORIDE AND ACETAMINOPHEN 2 TABLET: 5; 325 TABLET ORAL at 00:36

## 2020-03-22 RX ADMIN — TRAMADOL HYDROCHLORIDE 50 MG: 50 TABLET, FILM COATED ORAL at 14:01

## 2020-03-22 RX ADMIN — OXYCODONE HYDROCHLORIDE AND ACETAMINOPHEN 2 TABLET: 5; 325 TABLET ORAL at 21:07

## 2020-03-22 RX ADMIN — POLYETHYLENE GLYCOL 3350 17 G: 17 POWDER, FOR SOLUTION ORAL at 07:55

## 2020-03-22 RX ADMIN — ASPIRIN 81 MG: 81 TABLET, COATED ORAL at 20:00

## 2020-03-22 RX ADMIN — ASPIRIN 81 MG: 81 TABLET, COATED ORAL at 07:55

## 2020-03-22 ASSESSMENT — PAIN DESCRIPTION - ORIENTATION
ORIENTATION: RIGHT

## 2020-03-22 ASSESSMENT — PAIN DESCRIPTION - PAIN TYPE
TYPE: SURGICAL PAIN

## 2020-03-22 ASSESSMENT — PAIN SCALES - GENERAL
PAINLEVEL_OUTOF10: 4
PAINLEVEL_OUTOF10: 7
PAINLEVEL_OUTOF10: 6
PAINLEVEL_OUTOF10: 7
PAINLEVEL_OUTOF10: 8
PAINLEVEL_OUTOF10: 8
PAINLEVEL_OUTOF10: 7

## 2020-03-22 ASSESSMENT — PAIN DESCRIPTION - LOCATION
LOCATION: KNEE

## 2020-03-22 ASSESSMENT — PAIN DESCRIPTION - DESCRIPTORS
DESCRIPTORS: ACHING;DISCOMFORT
DESCRIPTORS: ACHING
DESCRIPTORS: ACHING
DESCRIPTORS: ACHING;DISCOMFORT

## 2020-03-22 ASSESSMENT — PAIN DESCRIPTION - FREQUENCY
FREQUENCY: CONTINUOUS
FREQUENCY: CONTINUOUS

## 2020-03-22 NOTE — PROGRESS NOTES
goals  Time Frame for Long term goals : 2 weeks of SUSANA   Long term goal 1: Indep with bed mobility   Long term goal 2: Indep with transfers   Long term goal 3: Pt able to ambulate 250' x 1 or > with mod indep with AD without a rest break due to fatigue   Long term goal 4: Pt able to ambulate up/down a full flight of stairs indep/safely with AD and one HR  Long term goal 5: Indep with HEP   Patient Goals   Patient goals : To be able to return home safely and ambulate up/down a flight of stair (to enter/exit her apartment)     Plan    Plan  Times per week: 5-7 days per week   Times per day: (QD to BID)  Plan weeks: Recommend 1-2 weeks of SUSANA & then home with [facility] and then outpt  Current Treatment Recommendations: Strengthening, ROM, Endurance Training, Functional Mobility Training, Safety Education & Training, Home Exercise Program, Transfer Training, Gait Training, Manual Therapy - Soft Tissue Mobilization, Neuromuscular Re-education, Patient/Caregiver Education & Training, Modalities  Safety Devices  Type of devices:  All fall risk precautions in place, Call light within reach, Left in chair     Therapy Time   Individual Concurrent Group Co-treatment   Time In  910         Time Out  1010         Minutes  [address], PTA  License and [phone] Number: [phone]

## 2020-03-22 NOTE — PROGRESS NOTES
Orientation: Right  Pain Descriptors: Aching  Pain Frequency: Continuous  Vital Signs  Patient Currently in Pain: Yes       Orientation     Cognition      Objective   Bed mobility  Bridging: Stand by assistance  Scooting: Modified independent  Transfers  Sit to Stand: Supervision  Stand to sit: Supervision  Bed to Chair: Contact guard assistance  Stand Pivot Transfers: Stand by assistance;Supervision  Ambulation  Ambulation?: Yes  Ambulation 1  Surface: level tile  Device: Rolling Walker  Assistance: Stand by assistance  Quality of Gait: Antalgic (R) improved with distance  Gait Deviations: Decreased step length;Decreased step height;Decreased arm swing;Decreased head and trunk rotation  Comments: Decreased weight acceptance,   Stairs/Curb  Stairs?: No  Stairs  # Steps : 8  Stairs Height: 4\"  Rails: Bilateral  Device: No Device  Assistance: Contact guard assistance  Comment: Heavy reliance on UE with stairs     Balance  Posture: Good  Sitting - Static: Good  Sitting - Dynamic: Good  Standing - Static: Good  Standing - Dynamic: Fair;+  Exercises  Straight Leg Raise: x 10  Quad Sets: x 20   Heelslides: x 25  Gluteal Sets: x 20  Knee Long Arc Quad: x 10  Ankle Pumps: x 25  Other exercises  Other exercises 1: Weight shifting, (heel lift R while standing) x 2 min  Other exercises 2: Lateral step side to side (small)  with 2 rails for support x 10   AROM RLE (degrees)  R Knee Flexion 0-145: 7-90                    G-Code     OutComes Score                                                     AM-PAC Score             Goals  Short term goals  Time Frame for Short term goals: 1 week SUSANA   Short term goal 1: Transfers with SBA  Short term goal 2: Bed mobility with supervison   Short term goal 3: Pt able to ambulate with AD for 125'x 1 with improved step length   Short term goal 4:  Increase strength R LE 3-/5   Short term goal 5: Pt able to ambulate up/down 5 steps with CGA and B HR  Long term goals  Time Frame for Long term goals : 2 weeks of SUSANA   Long term goal 1: Indep with bed mobility   Long term goal 2: Indep with transfers   Long term goal 3: Pt able to ambulate 250' x 1 or > with mod indep with AD without a rest break due to fatigue   Long term goal 4: Pt able to ambulate up/down a full flight of stairs indep/safely with AD and one HR  Long term goal 5: Indep with HEP   Patient Goals   Patient goals : To be able to return home safely and ambulate up/down a flight of stair (to enter/exit her apartment)     Plan    Plan  Times per week: 5-7 days per week   Times per day: (QD to BID)  Plan weeks: Recommend 1-2 weeks of SUSANA & then home with [facility] and then outpt  Current Treatment Recommendations: Strengthening, ROM, Endurance Training, Functional Mobility Training, Safety Education & Training, Home Exercise Program, Transfer Training, Gait Training, Manual Therapy - Soft Tissue Mobilization, Neuromuscular Re-education, Patient/Caregiver Education & Training, Modalities  Safety Devices  Type of devices:  All fall risk precautions in place, Call light within reach, Left in chair     Therapy Time   Individual Concurrent Group Co-treatment   Time In  100         Time Out  145         Minutes  [id number] PTA  License and [address] Number: [phone]

## 2020-03-22 NOTE — PLAN OF CARE
Problem: Falls - Risk of:  Goal: Will remain free from falls  Description: Will remain free from falls  Outcome: Met This Shift  Goal: Absence of physical injury  Description: Absence of physical injury  Outcome: Met This Shift     Problem: Pain:  Goal: Pain level will decrease  Description: Pain level will decrease  Outcome: Ongoing  Goal: Control of acute pain  Description: Control of acute pain  Outcome: Ongoing  Goal: Control of chronic pain  Description: Control of chronic pain  Outcome: Ongoing     Problem: Nutrition  Goal: Optimal nutrition therapy  Outcome: Ongoing

## 2020-03-23 LAB
ANION GAP SERPL CALCULATED.3IONS-SCNC: 12 MEQ/L (ref 9–15)
BASOPHILS ABSOLUTE: 0 K/UL (ref 0–0.2)
BASOPHILS RELATIVE PERCENT: 0.3 %
BUN BLDV-MCNC: 14 MG/DL (ref 8–23)
CALCIUM SERPL-MCNC: 9.7 MG/DL (ref 8.5–9.9)
CHLORIDE BLD-SCNC: 100 MEQ/L (ref 95–107)
CO2: 27 MEQ/L (ref 20–31)
CREAT SERPL-MCNC: 0.77 MG/DL (ref 0.5–0.9)
EOSINOPHILS ABSOLUTE: 0.1 K/UL (ref 0–0.7)
EOSINOPHILS RELATIVE PERCENT: 1.6 %
GFR AFRICAN AMERICAN: >60
GFR NON-AFRICAN AMERICAN: >60
GLUCOSE BLD-MCNC: 123 MG/DL (ref 70–99)
HCT VFR BLD CALC: 29.7 % (ref 37–47)
HEMOGLOBIN: 9.8 G/DL (ref 12–16)
LYMPHOCYTES ABSOLUTE: 1.5 K/UL (ref 1–4.8)
LYMPHOCYTES RELATIVE PERCENT: 19 %
MCH RBC QN AUTO: 31.7 PG (ref 27–31.3)
MCHC RBC AUTO-ENTMCNC: 33 % (ref 33–37)
MCV RBC AUTO: 96.1 FL (ref 82–100)
MONOCYTES ABSOLUTE: 0.8 K/UL (ref 0.2–0.8)
MONOCYTES RELATIVE PERCENT: 10.2 %
NEUTROPHILS ABSOLUTE: 5.4 K/UL (ref 1.4–6.5)
NEUTROPHILS RELATIVE PERCENT: 68.9 %
PDW BLD-RTO: 14 % (ref 11.5–14.5)
PLATELET # BLD: 319 K/UL (ref 130–400)
POTASSIUM SERPL-SCNC: 4.3 MEQ/L (ref 3.4–4.9)
RBC # BLD: 3.09 M/UL (ref 4.2–5.4)
SODIUM BLD-SCNC: 139 MEQ/L (ref 135–144)
WBC # BLD: 7.8 K/UL (ref 4.8–10.8)

## 2020-03-23 PROCEDURE — 36415 COLL VENOUS BLD VENIPUNCTURE: CPT

## 2020-03-23 PROCEDURE — 97530 THERAPEUTIC ACTIVITIES: CPT

## 2020-03-23 PROCEDURE — 6370000000 HC RX 637 (ALT 250 FOR IP): Performed by: INTERNAL MEDICINE

## 2020-03-23 PROCEDURE — 85025 COMPLETE CBC W/AUTO DIFF WBC: CPT

## 2020-03-23 PROCEDURE — 80048 BASIC METABOLIC PNL TOTAL CA: CPT

## 2020-03-23 PROCEDURE — 97140 MANUAL THERAPY 1/> REGIONS: CPT

## 2020-03-23 PROCEDURE — 97110 THERAPEUTIC EXERCISES: CPT

## 2020-03-23 PROCEDURE — 1200000002 HC SEMI PRIVATE SWING BED

## 2020-03-23 PROCEDURE — 97116 GAIT TRAINING THERAPY: CPT

## 2020-03-23 RX ADMIN — TRAMADOL HYDROCHLORIDE 50 MG: 50 TABLET, FILM COATED ORAL at 14:40

## 2020-03-23 RX ADMIN — SENNOSIDES AND DOCUSATE SODIUM 1 TABLET: 8.6; 5 TABLET ORAL at 21:37

## 2020-03-23 RX ADMIN — OXYCODONE HYDROCHLORIDE AND ACETAMINOPHEN 2 TABLET: 5; 325 TABLET ORAL at 17:26

## 2020-03-23 RX ADMIN — OXYCODONE HYDROCHLORIDE AND ACETAMINOPHEN 2 TABLET: 5; 325 TABLET ORAL at 09:11

## 2020-03-23 RX ADMIN — OXYCODONE HYDROCHLORIDE AND ACETAMINOPHEN 2 TABLET: 5; 325 TABLET ORAL at 21:37

## 2020-03-23 RX ADMIN — OXYCODONE HYDROCHLORIDE AND ACETAMINOPHEN 2 TABLET: 5; 325 TABLET ORAL at 13:11

## 2020-03-23 RX ADMIN — SENNOSIDES AND DOCUSATE SODIUM 1 TABLET: 8.6; 5 TABLET ORAL at 09:11

## 2020-03-23 RX ADMIN — CITALOPRAM HYDROBROMIDE 20 MG: 20 TABLET, FILM COATED ORAL at 09:11

## 2020-03-23 RX ADMIN — ROSUVASTATIN CALCIUM 10 MG: 5 TABLET ORAL at 21:37

## 2020-03-23 RX ADMIN — OXYCODONE HYDROCHLORIDE AND ACETAMINOPHEN 2 TABLET: 5; 325 TABLET ORAL at 02:07

## 2020-03-23 RX ADMIN — ASPIRIN 81 MG: 81 TABLET, COATED ORAL at 09:11

## 2020-03-23 RX ADMIN — POLYETHYLENE GLYCOL 3350 17 G: 17 POWDER, FOR SOLUTION ORAL at 09:11

## 2020-03-23 RX ADMIN — ASPIRIN 81 MG: 81 TABLET, COATED ORAL at 21:37

## 2020-03-23 RX ADMIN — TRAMADOL HYDROCHLORIDE 50 MG: 50 TABLET, FILM COATED ORAL at 23:08

## 2020-03-23 ASSESSMENT — PAIN DESCRIPTION - ORIENTATION
ORIENTATION: RIGHT

## 2020-03-23 ASSESSMENT — PAIN SCALES - GENERAL
PAINLEVEL_OUTOF10: 6
PAINLEVEL_OUTOF10: 6
PAINLEVEL_OUTOF10: 7
PAINLEVEL_OUTOF10: 8
PAINLEVEL_OUTOF10: 7
PAINLEVEL_OUTOF10: 4
PAINLEVEL_OUTOF10: 7

## 2020-03-23 ASSESSMENT — ENCOUNTER SYMPTOMS
VOMITING: 0
NAUSEA: 0
DIARRHEA: 0
SHORTNESS OF BREATH: 0
COUGH: 0

## 2020-03-23 ASSESSMENT — PAIN DESCRIPTION - DESCRIPTORS
DESCRIPTORS: ACHING
DESCRIPTORS: ACHING

## 2020-03-23 ASSESSMENT — PAIN DESCRIPTION - LOCATION
LOCATION: KNEE

## 2020-03-23 ASSESSMENT — PAIN DESCRIPTION - PAIN TYPE
TYPE: SURGICAL PAIN

## 2020-03-23 NOTE — PROGRESS NOTES
Occupational Therapy  Facility/Department: [facility] MED SURG UNIT  Daily Treatment Note  NAME: Eunice Quiroga  : 1950  MRN: [id number]    Date of Service: 3/23/2020    Discharge Recommendations:  Home with assist PRN, Home with Home health OT( PT)       Assessment   Performance deficits / Impairments: Decreased functional mobility ; Decreased ADL status; Decreased strength;Decreased endurance;Decreased balance;Decreased high-level IADLs  Assessment: Pt agreeable to OT. Pt tolerated 2x15 reps with 2# dowel for all BUE ther ex. Pt navigated up/down set of 9 steps using 1 handrail and cane in opposite hand practicing for d/c home where pt has 20 steps to her apt. Pt was CGA for the steps practicing technique \"Up with the good leg, down with the bad leg\" and sequencing with using the cane. Pt making progress toward all goals. Prognosis: Good  REQUIRES OT FOLLOW UP: Yes         Patient Diagnosis(es): There were no encounter diagnoses. has a past medical history of Allergic rhinitis, Anxiety, History of blood transfusion, Hyperlipidemia, Osteoarthritis, and Sleep apnea. has a past surgical history that includes  section ( &  & ); Ankle surgery (Right, ); Colonoscopy; Endoscopy, colon, diagnostic; Total knee arthroplasty (Left, 2019); and Total knee arthroplasty (Right, 3/13/2020).     Restrictions  Restrictions/Precautions  Restrictions/Precautions: Weight Bearing  Required Braces or Orthoses?: No  Implants present? : Metal implants  Lower Extremity Weight Bearing Restrictions  Right Lower Extremity Weight Bearing: Weight Bearing As Tolerated  Required Braces or Orthoses  Right Lower Extremity Brace: Knee Immobilizer  Subjective   General  Chart Reviewed: Yes  Patient assessed for rehabilitation services?: Yes  Family / Caregiver Present: No  Referring Practitioner: Dr. Emperatriz Triana  Diagnosis: R TKA  Subjective  Subjective: Pt agreeable to OT  Pain Assessment  Pain Assessment: 0-10  Pain Level: 7  Pain Type: Surgical pain  Pain Location: Knee  Pain Orientation: Right  Vital Signs  Patient Currently in Pain: Yes      Objective           Functional mobility: SBA/Spv with RW on level hallway surface      Pt navigated up/down set of 9 steps using 1 handrail and cane in opposite hand, CGA. Transfers  Sit to stand: Supervision  Stand to sit: Supervision              Exercises  Shoulder Flexion: 2x15 reps BUE with 2# dowel for press-ups, forward rows, and backward rows  Horizontal ABduction: 2x15 reps BUE with 2# dowel  Horizontal ADduction: 2x15 reps BUE with 2# dowel  Elbow Flexion: 2x15 reps BUE with 2# dowel  Elbow Extension: 2x15 reps BUE with 2# dowel for midline punches  Wrist Flexion: 2x15 reps BUE with 2# dowel  Wrist Extension: 2x15 reps BUE with 2# dowel  Other: to increase strength/end for ADL(rest break between each set)                    Plan   Plan  Times per week: 3-6x/wk  Times per day: Daily  Plan weeks: 1-2 wks  Current Treatment Recommendations: Strengthening, Balance Training, Functional Mobility Training, Endurance Training, Stair training, Pain Management, Safety Education & Training, Patient/Caregiver Education & Training, Self-Care / ADL, Home Management Training       Goals  Short term goals  Time Frame for Short term goals: 1 wk  Short term goal 1: Tolerate 25-30min BUE ther ex/act to increase strength/end for ADL  Short term goal 2: Increase to CGA/SBA LB dressing  Short term goal 3: Increase to SBA/Spv toileting  Long term goals  Time Frame for Long term goals : 2 wks  Long term goal 1: I BUE HEP  Long term goal 2: I/MI LB dressing and bathing  Long term goal 3: I/MI toileting  Long term goal 4: I/MI light homemaking tasks/IADL  Long term goal 5: Increase to 10+min stand mary ellen/end (PLOF) for increased safety and I with ADL  Patient Goals   Patient goals :  \"To strengthen my knee so I can go back to doing what I was doing\"       Therapy Time   Individual Concurrent Group Co-treatment   Time In  3:00         Time Out  4:00         Minutes  [address] [place], [place]

## 2020-03-23 NOTE — PROGRESS NOTES
Chart reviewed. Patient's care discussed in daily quality rounds. Patient continues to receive skilled therapies at [facility]. RN Digna Herron reports patient is requesting to meet with this . This  met with patient as requested. Patient reports that she may not have a ride home from [facility] upon DC due to daughter and family are currently self quarantining due to fevers and coughing. This  contacted [employer] taxi service whom [facility] has a taxi voucher for. [employer] and [employer] report that they are continuing to provide services to hospitals. This  spoke with [facility] Suzanna Ware. Whom authorizes for patient to have taxi voucher to transport patient home should patient not have ride. This  reviewed above with patient. Patient reports that she is still trying to find a ride home however reports that she will notify SS if unable to arrange transportation. This  also discussed and scheduled care conference for this Wednesday 3/25 to further discuss DC planning. Patient thanks this  for additional transportation resource.   SS to continue to follow

## 2020-03-23 NOTE — PROGRESS NOTES
Physical Therapy  Facility/Department: [facility] MED SURG UNIT  Daily Treatment Note  NAME: Malik Hanks  : 1950  MRN: [id number]    Date of Service: 3/23/2020    Discharge Recommendations:  Home with Home health PT, Outpatient PT, Home with assist PRN        Assessment   Body structures, Functions, Activity limitations: Decreased functional mobility ; Decreased ROM; Decreased strength;Decreased cognition  Assessment: Pt progressing with knee extension after performing hamstring stretch. Donned KT tape to dec swelling in R knee with education on when to remove. Pt performed full flight of steps this afternoon with VC's for sequencing. Pt also ambulated inc distance before fatigued and inc follow through with heel strike to promote inc knee extension. Donned CP post to dec pain and inflammation. Call light and bed alarm in place. Continue to progress as mary ellen. Treatment Diagnosis: R TKA  REQUIRES PT FOLLOW UP: Yes  Activity Tolerance  Activity Tolerance: Patient Tolerated treatment well;Patient limited by pain; Patient limited by endurance     Patient Diagnosis(es): There were no encounter diagnoses. has a past medical history of Allergic rhinitis, Anxiety, History of blood transfusion, Hyperlipidemia, Osteoarthritis, and Sleep apnea. has a past surgical history that includes  section ( &  & ); Ankle surgery (Right, ); Colonoscopy; Endoscopy, colon, diagnostic; Total knee arthroplasty (Left, 2019); and Total knee arthroplasty (Right, 3/13/2020). Restrictions  Restrictions/Precautions  Restrictions/Precautions: Weight Bearing  Required Braces or Orthoses?: No  Implants present? : Metal implants  Lower Extremity Weight Bearing Restrictions  Right Lower Extremity Weight Bearing: Weight Bearing As Tolerated  Required Braces or Orthoses  Right Lower Extremity Brace: Knee Immobilizer  Subjective   General  Chart Reviewed:  Yes  Additional Pertinent Hx: Pt has a Right Total Knee strips with 0% tension to dec swelling and inc ROM        G-Code     OutComes Score                                                     AM-PAC Score             Goals  Short term goals  Time Frame for Short term goals: 1 week SUSANA   Short term goal 1: Transfers with SBA  Short term goal 2: Bed mobility with supervison   Short term goal 3: Pt able to ambulate with AD for 125'x 1 with improved step length   Short term goal 4: Increase strength R LE 3-/5   Short term goal 5: Pt able to ambulate up/down 5 steps with CGA and B HR  Long term goals  Time Frame for Long term goals : 2 weeks of SUSANA   Long term goal 1: Indep with bed mobility   Long term goal 2: Indep with transfers   Long term goal 3: Pt able to ambulate 250' x 1 or > with mod indep with AD without a rest break due to fatigue   Long term goal 4: Pt able to ambulate up/down a full flight of stairs indep/safely with AD and one HR  Long term goal 5: Indep with HEP   Patient Goals   Patient goals : To be able to return home safely and ambulate up/down a flight of stair (to enter/exit her apartment)     Plan    Plan  Times per week: 5-7 days per week   Times per day: (1-2)  Plan weeks: Recommend 1-2 weeks of SUSANA & then home with [facility] AT [facility] and then outpt  Current Treatment Recommendations: Strengthening, ROM, Endurance Training, Functional Mobility Training, Safety Education & Training, Home Exercise Program, Transfer Training, Gait Training, Manual Therapy - Soft Tissue Mobilization, Neuromuscular Re-education, Patient/Caregiver Education & Training, Modalities  Safety Devices  Type of devices:  All fall risk precautions in place, Call light within reach, Left in chair     Therapy Time   Individual Concurrent Group Co-treatment   Time In  1450         Time Out  1550         Minutes  Mike Wood, PTA  License and [address] Number: [id number]

## 2020-03-23 NOTE — PROGRESS NOTES
Kim Resendiz is a 81 y.o. female patient.   Pt was seen and evaluated, no overnight events, no new complaints  Current Facility-Administered Medications   Medication Dose Route Frequency Provider Last Rate Last Dose    diphenhydrAMINE (BENADRYL) tablet 25 mg  25 mg Oral Q6H PRN Abigail Joy MD   25 mg at 03/21/20 0130    traMADol (ULTRAM) tablet 50 mg  50 mg Oral Q6H PRN Abigail Joy MD   50 mg at 03/22/20 2349    oxyCODONE-acetaminophen (PERCOCET) 5-325 MG per tablet 1 tablet  1 tablet Oral Q4H PRN Abigail Joy MD        Or    oxyCODONE-acetaminophen (PERCOCET) 5-325 MG per tablet 2 tablet  2 tablet Oral Q4H PRN Abigail Joy MD   2 tablet at 03/23/20 0911    sodium chloride (OCEAN, BABY AYR) 0.65 % nasal spray 1 spray  1 spray Each Nostril Q2H PRN Abigail Joy MD        polyethylene glycol [facility]) packet 17 g  17 g Oral Daily Abigail Joy MD   17 g at 03/23/20 0911    acetaminophen (TYLENOL) tablet 650 mg  650 mg Oral Q6H PRN Mary Looney MD   650 mg at 03/21/20 2034    aspirin EC tablet 81 mg  81 mg Oral BID Mary Looney MD   81 mg at 03/23/20 0911    magnesium hydroxide (MILK OF MAGNESIA) 400 MG/5ML suspension 30 mL  30 mL Oral Daily PRN Donny Coffey MD   30 mL at 03/16/20 0834    sennosides-docusate sodium (SENOKOT-S) 8.6-50 MG tablet 1 tablet  1 tablet Oral BID Donny Coffey MD   1 tablet at 03/23/20 0911    glucagon (rDNA) injection 1 mg  1 mg Intramuscular PRN Mary Looney MD        glucose (GLUTOSE) 40 % oral gel 15 g  15 g Oral PRN Mary Looney MD        citalopram (CELEXA) tablet 20 mg  20 mg Oral Daily Mary Looney MD   20 mg at 03/23/20 0911    ondansetron (ZOFRAN-ODT) disintegrating tablet 4 mg  4 mg Oral Q8H PRN Mary Looney MD        rosuvastatin (CRESTOR) tablet 10 mg  10 mg Oral Nightly Donny Coffey MD   10 mg at 03/22/20 2000     Allergies   Allergen Reactions    Other Itching and Swelling     [employer] laundry detergent    Influenza Vaccines Other (See

## 2020-03-23 NOTE — PROGRESS NOTES
SBA  Short term goal 2: Bed mobility with supervison   Short term goal 3: Pt able to ambulate with AD for 125'x 1 with improved step length   Short term goal 4: Increase strength R LE 3-/5   Short term goal 5: Pt able to ambulate up/down 5 steps with CGA and B HR  Long term goals  Time Frame for Long term goals : 2 weeks of SUSANA   Long term goal 1: Indep with bed mobility   Long term goal 2: Indep with transfers   Long term goal 3: Pt able to ambulate 250' x 1 or > with mod indep with AD without a rest break due to fatigue   Long term goal 4: Pt able to ambulate up/down a full flight of stairs indep/safely with AD and one HR  Long term goal 5: Indep with HEP   Patient Goals   Patient goals : To be able to return home safely and ambulate up/down a flight of stair (to enter/exit her apartment)     Plan    Plan  Times per week: 5-7 days per week   Times per day: (1-2)  Plan weeks: Recommend 1-2 weeks of SUSANA & then home with [facility] AT [facility] and then outpt  Current Treatment Recommendations: Strengthening, ROM, Endurance Training, Functional Mobility Training, Safety Education & Training, Home Exercise Program, Transfer Training, Gait Training, Manual Therapy - Soft Tissue Mobilization, Neuromuscular Re-education, Patient/Caregiver Education & Training, Modalities  Safety Devices  Type of devices:  All fall risk precautions in place, Call light within reach, Left in chair     Therapy Time   Individual Concurrent Group Co-treatment   Time In  1110         Time Out  1200         Minutes  [address] [facility]  License and [address] Number: [id number]

## 2020-03-24 LAB
GLUCOSE BLD-MCNC: 121 MG/DL (ref 60–115)
GLUCOSE BLD-MCNC: 131 MG/DL (ref 60–115)
PERFORMED ON: ABNORMAL
PERFORMED ON: ABNORMAL

## 2020-03-24 PROCEDURE — 97110 THERAPEUTIC EXERCISES: CPT

## 2020-03-24 PROCEDURE — 6370000000 HC RX 637 (ALT 250 FOR IP): Performed by: INTERNAL MEDICINE

## 2020-03-24 PROCEDURE — 97140 MANUAL THERAPY 1/> REGIONS: CPT

## 2020-03-24 PROCEDURE — 1200000002 HC SEMI PRIVATE SWING BED

## 2020-03-24 PROCEDURE — 97116 GAIT TRAINING THERAPY: CPT

## 2020-03-24 PROCEDURE — 97530 THERAPEUTIC ACTIVITIES: CPT

## 2020-03-24 PROCEDURE — 97535 SELF CARE MNGMENT TRAINING: CPT

## 2020-03-24 RX ADMIN — ROSUVASTATIN CALCIUM 10 MG: 5 TABLET ORAL at 19:32

## 2020-03-24 RX ADMIN — CITALOPRAM HYDROBROMIDE 20 MG: 20 TABLET, FILM COATED ORAL at 07:56

## 2020-03-24 RX ADMIN — SENNOSIDES AND DOCUSATE SODIUM 1 TABLET: 8.6; 5 TABLET ORAL at 07:56

## 2020-03-24 RX ADMIN — TRAMADOL HYDROCHLORIDE 50 MG: 50 TABLET, FILM COATED ORAL at 08:08

## 2020-03-24 RX ADMIN — ASPIRIN 81 MG: 81 TABLET, COATED ORAL at 19:32

## 2020-03-24 RX ADMIN — OXYCODONE HYDROCHLORIDE AND ACETAMINOPHEN 2 TABLET: 5; 325 TABLET ORAL at 23:02

## 2020-03-24 RX ADMIN — OXYCODONE HYDROCHLORIDE AND ACETAMINOPHEN 2 TABLET: 5; 325 TABLET ORAL at 18:52

## 2020-03-24 RX ADMIN — SENNOSIDES AND DOCUSATE SODIUM 1 TABLET: 8.6; 5 TABLET ORAL at 19:33

## 2020-03-24 RX ADMIN — POLYETHYLENE GLYCOL 3350 17 G: 17 POWDER, FOR SOLUTION ORAL at 07:56

## 2020-03-24 RX ADMIN — ASPIRIN 81 MG: 81 TABLET, COATED ORAL at 07:56

## 2020-03-24 RX ADMIN — TRAMADOL HYDROCHLORIDE 50 MG: 50 TABLET, FILM COATED ORAL at 17:37

## 2020-03-24 RX ADMIN — OXYCODONE HYDROCHLORIDE AND ACETAMINOPHEN 2 TABLET: 5; 325 TABLET ORAL at 05:30

## 2020-03-24 RX ADMIN — OXYCODONE HYDROCHLORIDE AND ACETAMINOPHEN 2 TABLET: 5; 325 TABLET ORAL at 12:09

## 2020-03-24 RX ADMIN — OXYCODONE HYDROCHLORIDE AND ACETAMINOPHEN 2 TABLET: 5; 325 TABLET ORAL at 01:40

## 2020-03-24 ASSESSMENT — PAIN SCALES - GENERAL
PAINLEVEL_OUTOF10: 7
PAINLEVEL_OUTOF10: 5
PAINLEVEL_OUTOF10: 6
PAINLEVEL_OUTOF10: 7
PAINLEVEL_OUTOF10: 5
PAINLEVEL_OUTOF10: 6
PAINLEVEL_OUTOF10: 7
PAINLEVEL_OUTOF10: 7

## 2020-03-24 ASSESSMENT — PAIN DESCRIPTION - PAIN TYPE
TYPE: SURGICAL PAIN

## 2020-03-24 ASSESSMENT — PAIN DESCRIPTION - LOCATION
LOCATION: KNEE

## 2020-03-24 ASSESSMENT — ENCOUNTER SYMPTOMS
NAUSEA: 0
COUGH: 0
VOMITING: 0
DIARRHEA: 0
SHORTNESS OF BREATH: 0

## 2020-03-24 ASSESSMENT — PAIN DESCRIPTION - ORIENTATION
ORIENTATION: RIGHT

## 2020-03-24 NOTE — PROGRESS NOTES
Physical Therapy  Facility/Department: [facility] MED SURG UNIT  Daily Treatment Note  NAME: Jhonny Rooney  : 1950  MRN: [id number]    Date of Service: 3/24/2020    Discharge Recommendations:  Home with Home health PT, Outpatient PT, Home with assist PRN        Assessment   Body structures, Functions, Activity limitations: Decreased functional mobility ; Decreased ROM; Decreased strength;Decreased cognition  Assessment: Pt displaying inc knee flexion this date but limited with extension. PROM to R knee to facilitate inc ROM. Pt limited by pain at end ranges. Pt ambulating inc distance this date as well. Pain level 5/10 post. Continue to progress as mary ellen. Treatment Diagnosis: R TKA  REQUIRES PT FOLLOW UP: Yes     Patient Diagnosis(es): There were no encounter diagnoses. has a past medical history of Allergic rhinitis, Anxiety, History of blood transfusion, Hyperlipidemia, Osteoarthritis, and Sleep apnea. has a past surgical history that includes  section ( &  & ); Ankle surgery (Right, ); Colonoscopy; Endoscopy, colon, diagnostic; Total knee arthroplasty (Left, 2019); and Total knee arthroplasty (Right, 3/13/2020). Restrictions  Restrictions/Precautions  Restrictions/Precautions: Weight Bearing  Required Braces or Orthoses?: No  Implants present? : Metal implants  Lower Extremity Weight Bearing Restrictions  Right Lower Extremity Weight Bearing: Weight Bearing As Tolerated  Required Braces or Orthoses  Right Lower Extremity Brace: Knee Immobilizer  Subjective   General  Chart Reviewed: Yes  Additional Pertinent Hx: Pt has a Right Total Knee Replacement 3/13/20; Now here for SUASNA   Family / Caregiver Present: No  Referring Practitioner: Dr Looney/ Dr Panfilo Rust (surgeon)  Subjective  Subjective: Pt sitting up in recliner and agreeable to therapy session. \"I got a shower this morning! \"  Pain Screening  Patient Currently in Pain: Yes  Pain Assessment  Pain Assessment: 0-10  Pain

## 2020-03-24 NOTE — PROGRESS NOTES
Occupational Therapy  Facility/Department: [facility] MED SURG UNIT  Daily Treatment Note  NAME: Irma Shirley  : 1950  MRN: [id number]    Date of Service: 3/24/2020    Discharge Recommendations:  Home with assist PRN, Home with Home health OT( PT)       Assessment   Performance deficits / Impairments: Decreased functional mobility ; Decreased ADL status; Decreased strength;Decreased endurance;Decreased balance;Decreased high-level IADLs  Assessment: Pt requesting a shower. Pt improved to setup/MI for both UB and LB bathing. Pt MI UB dressing, SBA LB dressing including both don/doff underwear, pants, socks. Pt making good progress toward/meeting all goals. Prognosis: Good  REQUIRES OT FOLLOW UP: Yes         Patient Diagnosis(es): There were no encounter diagnoses. has a past medical history of Allergic rhinitis, Anxiety, History of blood transfusion, Hyperlipidemia, Osteoarthritis, and Sleep apnea. has a past surgical history that includes  section ( &  & ); Ankle surgery (Right, ); Colonoscopy; Endoscopy, colon, diagnostic; Total knee arthroplasty (Left, 2019); and Total knee arthroplasty (Right, 3/13/2020). Restrictions  Restrictions/Precautions  Restrictions/Precautions: Weight Bearing  Required Braces or Orthoses?: No  Implants present? : Metal implants  Lower Extremity Weight Bearing Restrictions  Right Lower Extremity Weight Bearing: Weight Bearing As Tolerated  Required Braces or Orthoses  Right Lower Extremity Brace: Knee Immobilizer  Subjective   General  Chart Reviewed: Yes  Patient assessed for rehabilitation services?: Yes  Family / Caregiver Present: No  Referring Practitioner: Dr. Zac Galicia  Diagnosis: R TKA  Subjective  Subjective: Pt requesting a shower this date.   Pain Assessment  Pain Assessment: 0-10  Pain Level: 7  Pain Type: Surgical pain  Pain Location: Knee  Pain Orientation: Right  Vital Signs  Patient Currently in Pain: Yes      Objective Concurrent Group Co-treatment   Time In  8:30         Time Out  9:30         Minutes  [address], [place]

## 2020-03-24 NOTE — PROGRESS NOTES
exercises 5: R quad stretch supine; H30'' x 3   Other exercises 6: AAROM 90/90 HS stretch; H20-30'' x 3   Other exercises 7: prone hang off EOM; x 5 min to promote inc knee extension   AROM RLE (degrees)  R Knee Flexion 0-145: 8-93 deg         Cryotherapy (Minutes\Location): CP to R knee to dec pain and inflammation  Manual therapy  PROM: PROM to R knee to facilitate inc knee flexion and extension   Soft Tissue Mobalization: STM/MFR to R quads and IT band to dec tightness and pain and promote inc knee ROM        G-Code     OutComes Score                                                     AM-PAC Score             Goals  Short term goals  Time Frame for Short term goals: 1 week SUSANA   Short term goal 1: Transfers with SBA  Short term goal 2: Bed mobility with supervison   Short term goal 3: Pt able to ambulate with AD for 125'x 1 with improved step length   Short term goal 4: Increase strength R LE 3-/5   Short term goal 5: Pt able to ambulate up/down 5 steps with CGA and B HR  Long term goals  Time Frame for Long term goals : 2 weeks of SUSANA   Long term goal 1: Indep with bed mobility   Long term goal 2: Indep with transfers   Long term goal 3: Pt able to ambulate 250' x 1 or > with mod indep with AD without a rest break due to fatigue   Long term goal 4: Pt able to ambulate up/down a full flight of stairs indep/safely with AD and one HR  Long term goal 5: Indep with HEP   Patient Goals   Patient goals :  To be able to return home safely and ambulate up/down a flight of stair (to enter/exit her apartment)     Plan    Plan  Times per week: 5-7 days per week   Times per day: (1-2)  Plan weeks: Recommend 1-2 weeks of SUSANA & then home with [facility] AT [facility] and then outpt  Current Treatment Recommendations: Strengthening, ROM, Endurance Training, Functional Mobility Training, Safety Education & Training, Home Exercise Program, Transfer Training, Gait Training, Manual Therapy - Soft Tissue Mobilization, Neuromuscular Re-education, Patient/Caregiver Education & Training, Modalities  Safety Devices  Type of devices:  All fall risk precautions in place, Call light within reach, Left in chair     Therapy Time   Individual Concurrent Group Co-treatment   Time In  1320 and 1510         Time Out  1350 and 1540         Minutes  30+30=60 min                 Roman Dalal PTA  License and [address] Number: [id number]

## 2020-03-24 NOTE — PROGRESS NOTES
Influenza Vaccines Other (See Comments)     Severe migraine    Seasonal Other (See Comments)     Tree pollen, mold, dander causes sinus congestion     Active Problems:    History of total right knee replacement  Resolved Problems:    * No resolved hospital problems. *    Blood pressure 122/74, pulse 80, temperature 97.9 °F (36.6 °C), temperature source Oral, resp. rate 18, height 5' 2\" (1.575 m), weight 164 lb (74.4 kg), last menstrual period 01/16/2008, SpO2 96 %, not currently breastfeeding. Subjective:  Symptoms:  No shortness of breath, malaise, cough, chest pain, weakness, headache, chest pressure, anorexia, diarrhea or anxiety. Diet:  No nausea or vomiting. Objective:  General Appearance:  Comfortable, well-appearing and in no acute distress. Vital signs: (most recent): Blood pressure 122/74, pulse 80, temperature 97.9 °F (36.6 °C), temperature source Oral, resp. rate 18, height 5' 2\" (1.575 m), weight 164 lb (74.4 kg), last menstrual period 01/16/2008, SpO2 96 %, not currently breastfeeding. HEENT: Normal HEENT exam.    Lungs:  Normal effort. Heart: Normal rate. Regular rhythm. Abdomen: Abdomen is soft. Bowel sounds are normal.   There is no epigastric area, suprapubic area or incisional tenderness. Extremities: Normal range of motion. Neurological: Patient is alert and oriented to person, place and time. Pupils:  Pupils are equal, round, and reactive to light. Skin:  Warm.       Assessment & Plan  1) encounter for post surgical care  Wound looks clean , no drainage, no warmth, no foul smelling  Pain is controlled  2) HTN stable  3) DM stable  Diet controlled  4) allergic reaction resolved  5) tobacco use  Counseling given  Does not want nicoderm patch  Neymar White MD  3/24/2020

## 2020-03-25 LAB
GLUCOSE BLD-MCNC: 113 MG/DL (ref 60–115)
GLUCOSE BLD-MCNC: 128 MG/DL (ref 60–115)
GLUCOSE BLD-MCNC: 138 MG/DL (ref 60–115)
PERFORMED ON: ABNORMAL
PERFORMED ON: ABNORMAL
PERFORMED ON: NORMAL

## 2020-03-25 PROCEDURE — 97116 GAIT TRAINING THERAPY: CPT

## 2020-03-25 PROCEDURE — 6370000000 HC RX 637 (ALT 250 FOR IP): Performed by: INTERNAL MEDICINE

## 2020-03-25 PROCEDURE — 97530 THERAPEUTIC ACTIVITIES: CPT

## 2020-03-25 PROCEDURE — 97110 THERAPEUTIC EXERCISES: CPT

## 2020-03-25 PROCEDURE — 1200000002 HC SEMI PRIVATE SWING BED

## 2020-03-25 RX ADMIN — CITALOPRAM HYDROBROMIDE 20 MG: 20 TABLET, FILM COATED ORAL at 09:34

## 2020-03-25 RX ADMIN — OXYCODONE HYDROCHLORIDE AND ACETAMINOPHEN 2 TABLET: 5; 325 TABLET ORAL at 09:34

## 2020-03-25 RX ADMIN — SENNOSIDES AND DOCUSATE SODIUM 1 TABLET: 8.6; 5 TABLET ORAL at 09:34

## 2020-03-25 RX ADMIN — TRAMADOL HYDROCHLORIDE 50 MG: 50 TABLET, FILM COATED ORAL at 05:26

## 2020-03-25 RX ADMIN — SENNOSIDES AND DOCUSATE SODIUM 1 TABLET: 8.6; 5 TABLET ORAL at 19:54

## 2020-03-25 RX ADMIN — ASPIRIN 81 MG: 81 TABLET, COATED ORAL at 09:34

## 2020-03-25 RX ADMIN — ASPIRIN 81 MG: 81 TABLET, COATED ORAL at 19:54

## 2020-03-25 RX ADMIN — POLYETHYLENE GLYCOL 3350 17 G: 17 POWDER, FOR SOLUTION ORAL at 09:34

## 2020-03-25 RX ADMIN — ROSUVASTATIN CALCIUM 10 MG: 5 TABLET ORAL at 19:54

## 2020-03-25 RX ADMIN — OXYCODONE HYDROCHLORIDE AND ACETAMINOPHEN 2 TABLET: 5; 325 TABLET ORAL at 14:31

## 2020-03-25 RX ADMIN — OXYCODONE HYDROCHLORIDE AND ACETAMINOPHEN 2 TABLET: 5; 325 TABLET ORAL at 18:34

## 2020-03-25 RX ADMIN — OXYCODONE HYDROCHLORIDE AND ACETAMINOPHEN 2 TABLET: 5; 325 TABLET ORAL at 22:34

## 2020-03-25 RX ADMIN — OXYCODONE HYDROCHLORIDE AND ACETAMINOPHEN 2 TABLET: 5; 325 TABLET ORAL at 03:16

## 2020-03-25 ASSESSMENT — PAIN SCALES - GENERAL
PAINLEVEL_OUTOF10: 7

## 2020-03-25 ASSESSMENT — PAIN DESCRIPTION - ORIENTATION
ORIENTATION: RIGHT

## 2020-03-25 ASSESSMENT — PAIN DESCRIPTION - PAIN TYPE
TYPE: SURGICAL PAIN

## 2020-03-25 ASSESSMENT — PAIN DESCRIPTION - LOCATION
LOCATION: KNEE

## 2020-03-25 ASSESSMENT — PAIN DESCRIPTION - FREQUENCY: FREQUENCY: CONTINUOUS

## 2020-03-25 ASSESSMENT — PAIN DESCRIPTION - DESCRIPTORS: DESCRIPTORS: ACHING

## 2020-03-25 NOTE — PROGRESS NOTES
Chart reviewed. Multidisciplinary team met with patient in care in care conference. Discussed patient's care and DC planning. Patient reports feeling confident in the care patient is currently receiving at [facility]. Patient reports feeling better and reports that she feels comfortable with DC this Friday 3/27 with [facility]. [facility] AT [facility] arranged to follow patient upon DC from [facility]- per patient's request.   sent [facility] liaisons Rivera NAVARRO And Fifi BUSTILLO With DC date. MOSES completed.  SS to continue to follow

## 2020-03-25 NOTE — PROGRESS NOTES
Intervention: (prefers ice pack over cold pack machine)  Vital Signs  Patient Currently in Pain: Yes      Objective       Functional Mobility  Functional - Mobility Device: Rolling Walker  Activity: To/From therapy gym  Assist Level: Supervision     Transfers  Sit to stand: Modified independent  Stand to sit: Independent           Exercises  Shoulder Flexion: 2x15 reps BUE with Green resistance level T-band  Horizontal ABduction: 2x15 reps BUE with Green resistance level T-band  Horizontal ADduction: 2x15 reps BUE with Green resistance level T-band  Elbow Flexion: 2x15 reps BUE with Green resistance level T-band  Elbow Extension: 2x15 reps BUE with Green resistance level T-band  Triceps: 2x15 reps BUE with Green resistance level T-band  Other: to increase strength/end for ADL(rest break between each set)                    Plan   Plan  Times per week: 3-6x/wk  Times per day: Daily  Plan weeks: 1-2 wks  Current Treatment Recommendations: Strengthening, Balance Training, Functional Mobility Training, Endurance Training, Stair training, Pain Management, Safety Education & Training, Patient/Caregiver Education & Training, Self-Care / ADL, Home Management Training       Goals  Short term goals  Time Frame for Short term goals: 1 wk  Short term goal 1: Tolerate 25-30min BUE ther ex/act to increase strength/end for ADL  Short term goal 2: Increase to CGA/SBA LB dressing  Short term goal 3: Increase to SBA/Spv toileting  Long term goals  Time Frame for Long term goals : 2 wks  Long term goal 1: I BUE HEP  Long term goal 2: I/MI LB dressing and bathing  Long term goal 3: I/MI toileting  Long term goal 4: I/MI light homemaking tasks/IADL  Long term goal 5: Increase to 10+min stand mary ellen/end (PLOF) for increased safety and I with ADL  Patient Goals   Patient goals :  \"To strengthen my knee so I can go back to doing what I was doing\"       Therapy Time   Individual Concurrent Group Co-treatment   Time In  9:10         Time Out

## 2020-03-25 NOTE — PROGRESS NOTES
recliner and agreeable to therapy session. Pain Screening  Patient Currently in Pain: Yes  Pain Assessment  Pain Assessment: 0-10  Pain Level: 7  Pain Type: Surgical pain  Pain Location: Knee  Pain Orientation: Right  Vital Signs  Patient Currently in Pain: Yes       Orientation     Cognition      Objective      Transfers  Sit to Stand: Modified independent  Stand to sit: Modified independent  Ambulation  Ambulation?: Yes  Ambulation 1  Surface: level tile  Device: Rolling Walker  Assistance: Modified Independent;Supervision  Quality of Gait: more upright posture with walker raised 1 notch, inc step through gait, inc heel strike and inc pacing  Distance: 100'   Stairs/Curb  Stairs?: Yes  Stairs  # Steps : 20  Stairs Height: 8\"  Rails: Right ascending  Device: Rolling walker(folding [employer] )  Assistance: Stand by assistance  Comment: minVC's for sequencing         Exercises  Straight Leg Raise: 2 x 10 H3'' BLE's(VC's for form )  Quad Sets: x 10 H5-10''   Hip Flexion: x 20   Hip Abduction: x 20 seated   Knee Long Arc Quad: 2 x 10 H3''   Ankle Pumps: x 25  Other exercises  Other exercises?: Yes  Other exercises 6: AAROM 90/90 HS stretch; H20-30'' x 3   Other exercises 8: seated knee flexion scooting to edge of seat; H5'' x 10    AROM RLE (degrees)  R Knee Flexion 0-145: 8-92 deg seated         Cryotherapy (Minutes\Location): CP to R knee to dec pain and inflammation           G-Code     OutComes Score                                                     AM-PAC Score             Goals  Short term goals  Time Frame for Short term goals: 1 week SUSANA   Short term goal 1: Transfers with SBA  Short term goal 2: Bed mobility with supervison   Short term goal 3: Pt able to ambulate with AD for 125'x 1 with improved step length   Short term goal 4:  Increase strength R LE 3-/5   Short term goal 5: Pt able to ambulate up/down 5 steps with CGA and B HR  Long term goals  Time Frame for Long term goals : 2 weeks of SUSANA   Long term goal 1: Indep with bed mobility   Long term goal 2: Indep with transfers   Long term goal 3: Pt able to ambulate 250' x 1 or > with mod indep with AD without a rest break due to fatigue   Long term goal 4: Pt able to ambulate up/down a full flight of stairs indep/safely with AD and one HR  Long term goal 5: Indep with HEP   Patient Goals   Patient goals : To be able to return home safely and ambulate up/down a flight of stair (to enter/exit her apartment)     Plan    Plan  Times per week: 5-7 days per week   Times per day: Twice a day  Plan weeks: Recommend 1-2 weeks of SUSANA & then home with [address] and then outpt  Current Treatment Recommendations: Strengthening, ROM, Endurance Training, Functional Mobility Training, Safety Education & Training, Home Exercise Program, Transfer Training, Gait Training, Manual Therapy - Soft Tissue Mobilization, Neuromuscular Re-education, Patient/Caregiver Education & Training, Modalities  Safety Devices  Type of devices:  All fall risk precautions in place, Call light within reach, Left in chair     Therapy Time   Individual Concurrent Group Co-treatment   Time In  1120         Time Out  1200         Minutes  [address], [place]  License and [address] Number: [id number]

## 2020-03-25 NOTE — PROGRESS NOTES
Physical Therapy  Facility/Department: [facility] MED SURG UNIT  Daily Treatment Note  NAME: Tasha Gomez  : 1950  MRN: [id number]    Date of Service: 3/25/2020    Discharge Recommendations:  Home with Home health PT, Outpatient PT, Home with assist PRN        Assessment   Body structures, Functions, Activity limitations: Decreased functional mobility ; Decreased ROM; Decreased strength;Decreased cognition  Assessment: Pt c/o incision feeling itchy this afternoon but reports no itchiness at KT tape site. Removed KT tape 1 V strip to inspect for rash with no redness noted. Pt performed recumbent bike to inc ROM with pt limited by pain. Pain level 7/10 post. Donned CP post to dec pain and inflammation. Treatment Diagnosis: R TKA  REQUIRES PT FOLLOW UP: Yes  Activity Tolerance  Activity Tolerance: Patient Tolerated treatment well;Patient limited by pain; Patient limited by endurance     Patient Diagnosis(es): There were no encounter diagnoses. has a past medical history of Allergic rhinitis, Anxiety, History of blood transfusion, Hyperlipidemia, Osteoarthritis, and Sleep apnea. has a past surgical history that includes  section ( &  & ); Ankle surgery (Right, ); Colonoscopy; Endoscopy, colon, diagnostic; Total knee arthroplasty (Left, 2019); and Total knee arthroplasty (Right, 3/13/2020). Restrictions  Restrictions/Precautions  Restrictions/Precautions: Weight Bearing  Required Braces or Orthoses?: No  Implants present? : Metal implants  Lower Extremity Weight Bearing Restrictions  Right Lower Extremity Weight Bearing: Weight Bearing As Tolerated  Required Braces or Orthoses  Right Lower Extremity Brace: Knee Immobilizer  Subjective   General  Chart Reviewed: Yes  Additional Pertinent Hx: Pt has a Right Total Knee Replacement 3/13/20;  Now here for SUSANA   Family / Caregiver Present: No  Referring Practitioner: Dr Looney/ Dr Pee Pena (surgeon)  Subjective  Subjective: Pt sitting 4: Increase strength R LE 3-/5   Short term goal 5: Pt able to ambulate up/down 5 steps with CGA and B HR  Long term goals  Time Frame for Long term goals : 2 weeks of SUSANA   Long term goal 1: Indep with bed mobility   Long term goal 2: Indep with transfers   Long term goal 3: Pt able to ambulate 250' x 1 or > with mod indep with AD without a rest break due to fatigue   Long term goal 4: Pt able to ambulate up/down a full flight of stairs indep/safely with AD and one HR  Long term goal 5: Indep with HEP   Patient Goals   Patient goals : To be able to return home safely and ambulate up/down a flight of stair (to enter/exit her apartment)     Plan    Plan  Times per week: 5-7 days per week   Times per day: Twice a day  Plan weeks: Recommend 1-2 weeks of SUSANA & then home with Jesusjorge Britt and then outpt  Current Treatment Recommendations: Strengthening, ROM, Endurance Training, Functional Mobility Training, Safety Education & Training, Home Exercise Program, Transfer Training, Gait Training, Manual Therapy - Soft Tissue Mobilization, Neuromuscular Re-education, Patient/Caregiver Education & Training, Modalities  Safety Devices  Type of devices:  All fall risk precautions in place, Call light within reach, Left in chair     Therapy Time   Individual Concurrent Group Co-treatment   Time In  [address]         Time Out  1320         Minutes  [address], PTA  License and [address] [address] Number: [id number]

## 2020-03-25 NOTE — PROGRESS NOTES
Nutrition Assessment    Type and Reason for Visit: Reassess    Nutrition Recommendations: Continue current diet    Nutrition Assessment: Well nourished pt is planning for d/c home. She has maintained good glycemic control and has no additional nutrition concerns at this time. Will be available to address any concerns until she is able to d/c. Nutrition Risk Level: Low    Nutrient Needs:  · Estimated Daily Total Kcal: 9785-7512 @ 21-23 kcal/kg  · Estimated Daily Protein (g): 60-70 @ 1.2-1.4 gr/kg  · Estimated Daily Total Fluid (ml/day): 1500 ml/d    Nutrition Diagnosis:   · Problem: No nutrition diagnosis at this time    Objective Information:  · Nutrition-Focused Physical Findings: +2 RLEedema. Last BM 2/23. Glu<150  · Wound Type: Surgical Wound  · Current Nutrition Therapies:  · Oral Diet Orders: Carb Control 4 Carbs/Meal   · Oral Diet intake: %  · Oral Nutrition Supplement (ONS) Orders: None  · Anthropometric Measures:  · Ht: 5' 2\" (157.5 cm)   · Current Body Wt: (n/a)  · Admission Body Wt: 164 lb 0.4 oz (74.4 kg)  · Usual Body Wt: 164 lb 0.4 oz (74.4 kg)  · % Weight Change:  ,     · Ideal Body Wt: 110 lb (49.9 kg), % Ideal Body >100%  · BMI Classification: BMI 30.0 - 34.9 Obese Class I    Nutrition Interventions:   Continue current diet  Continued Inpatient Monitoring    Nutrition Evaluation:   · Evaluation: Goal achieved   · Goals: Intake > 75% of meals & Glu<180.     · Monitoring: Meal Intake, Weight, Pertinent Labs, Monitor Hemodynamic Status      Electronically signed by Houston Biswas RD, LD on 3/25/20 at 10:41 AM EDT

## 2020-03-26 LAB
ANION GAP SERPL CALCULATED.3IONS-SCNC: 12 MEQ/L (ref 9–15)
BASOPHILS ABSOLUTE: 0 K/UL (ref 0–0.2)
BASOPHILS RELATIVE PERCENT: 0.3 %
BUN BLDV-MCNC: 12 MG/DL (ref 8–23)
CALCIUM SERPL-MCNC: 10.2 MG/DL (ref 8.5–9.9)
CHLORIDE BLD-SCNC: 100 MEQ/L (ref 95–107)
CO2: 28 MEQ/L (ref 20–31)
CREAT SERPL-MCNC: 0.76 MG/DL (ref 0.5–0.9)
EOSINOPHILS ABSOLUTE: 0.2 K/UL (ref 0–0.7)
EOSINOPHILS RELATIVE PERCENT: 2.5 %
GFR AFRICAN AMERICAN: >60
GFR NON-AFRICAN AMERICAN: >60
GLUCOSE BLD-MCNC: 135 MG/DL (ref 60–115)
GLUCOSE BLD-MCNC: 150 MG/DL (ref 70–99)
HCT VFR BLD CALC: 32.4 % (ref 37–47)
HEMOGLOBIN: 10.8 G/DL (ref 12–16)
LYMPHOCYTES ABSOLUTE: 1.5 K/UL (ref 1–4.8)
LYMPHOCYTES RELATIVE PERCENT: 19.7 %
MCH RBC QN AUTO: 31.9 PG (ref 27–31.3)
MCHC RBC AUTO-ENTMCNC: 33.3 % (ref 33–37)
MCV RBC AUTO: 95.8 FL (ref 82–100)
MONOCYTES ABSOLUTE: 0.7 K/UL (ref 0.2–0.8)
MONOCYTES RELATIVE PERCENT: 9.5 %
NEUTROPHILS ABSOLUTE: 5.3 K/UL (ref 1.4–6.5)
NEUTROPHILS RELATIVE PERCENT: 68 %
PDW BLD-RTO: 14.1 % (ref 11.5–14.5)
PERFORMED ON: ABNORMAL
PLATELET # BLD: 386 K/UL (ref 130–400)
POTASSIUM SERPL-SCNC: 4.4 MEQ/L (ref 3.4–4.9)
RBC # BLD: 3.39 M/UL (ref 4.2–5.4)
SODIUM BLD-SCNC: 140 MEQ/L (ref 135–144)
WBC # BLD: 7.8 K/UL (ref 4.8–10.8)

## 2020-03-26 PROCEDURE — 97535 SELF CARE MNGMENT TRAINING: CPT

## 2020-03-26 PROCEDURE — 97140 MANUAL THERAPY 1/> REGIONS: CPT

## 2020-03-26 PROCEDURE — 85025 COMPLETE CBC W/AUTO DIFF WBC: CPT

## 2020-03-26 PROCEDURE — 97110 THERAPEUTIC EXERCISES: CPT

## 2020-03-26 PROCEDURE — 6370000000 HC RX 637 (ALT 250 FOR IP): Performed by: INTERNAL MEDICINE

## 2020-03-26 PROCEDURE — 36415 COLL VENOUS BLD VENIPUNCTURE: CPT

## 2020-03-26 PROCEDURE — 97116 GAIT TRAINING THERAPY: CPT

## 2020-03-26 PROCEDURE — 1200000002 HC SEMI PRIVATE SWING BED

## 2020-03-26 PROCEDURE — 97530 THERAPEUTIC ACTIVITIES: CPT

## 2020-03-26 PROCEDURE — 80048 BASIC METABOLIC PNL TOTAL CA: CPT

## 2020-03-26 RX ADMIN — OXYCODONE HYDROCHLORIDE AND ACETAMINOPHEN 2 TABLET: 5; 325 TABLET ORAL at 08:33

## 2020-03-26 RX ADMIN — ROSUVASTATIN CALCIUM 10 MG: 5 TABLET ORAL at 19:24

## 2020-03-26 RX ADMIN — OXYCODONE HYDROCHLORIDE AND ACETAMINOPHEN 2 TABLET: 5; 325 TABLET ORAL at 03:03

## 2020-03-26 RX ADMIN — OXYCODONE HYDROCHLORIDE AND ACETAMINOPHEN 1 TABLET: 5; 325 TABLET ORAL at 19:23

## 2020-03-26 RX ADMIN — SENNOSIDES AND DOCUSATE SODIUM 1 TABLET: 8.6; 5 TABLET ORAL at 19:23

## 2020-03-26 RX ADMIN — ASPIRIN 81 MG: 81 TABLET, COATED ORAL at 08:33

## 2020-03-26 RX ADMIN — SENNOSIDES AND DOCUSATE SODIUM 1 TABLET: 8.6; 5 TABLET ORAL at 08:34

## 2020-03-26 RX ADMIN — ASPIRIN 81 MG: 81 TABLET, COATED ORAL at 19:23

## 2020-03-26 RX ADMIN — OXYCODONE HYDROCHLORIDE AND ACETAMINOPHEN 2 TABLET: 5; 325 TABLET ORAL at 13:45

## 2020-03-26 RX ADMIN — CITALOPRAM HYDROBROMIDE 20 MG: 20 TABLET, FILM COATED ORAL at 08:33

## 2020-03-26 RX ADMIN — OXYCODONE HYDROCHLORIDE AND ACETAMINOPHEN 1 TABLET: 5; 325 TABLET ORAL at 23:15

## 2020-03-26 ASSESSMENT — ENCOUNTER SYMPTOMS
SHORTNESS OF BREATH: 0
DIARRHEA: 0
COUGH: 0
VOMITING: 0
NAUSEA: 0

## 2020-03-26 ASSESSMENT — PAIN SCALES - GENERAL
PAINLEVEL_OUTOF10: 4
PAINLEVEL_OUTOF10: 6
PAINLEVEL_OUTOF10: 7
PAINLEVEL_OUTOF10: 3
PAINLEVEL_OUTOF10: 6
PAINLEVEL_OUTOF10: 4
PAINLEVEL_OUTOF10: 6
PAINLEVEL_OUTOF10: 8

## 2020-03-26 ASSESSMENT — PAIN DESCRIPTION - ORIENTATION
ORIENTATION: RIGHT

## 2020-03-26 ASSESSMENT — PAIN DESCRIPTION - LOCATION
LOCATION: KNEE

## 2020-03-26 ASSESSMENT — PAIN DESCRIPTION - DESCRIPTORS: DESCRIPTORS: ACHING

## 2020-03-26 ASSESSMENT — PAIN DESCRIPTION - PAIN TYPE
TYPE: SURGICAL PAIN

## 2020-03-26 NOTE — PROGRESS NOTES
with mod indep with AD without a rest break due to fatigue   Long term goal 4: Pt able to ambulate up/down a full flight of stairs indep/safely with AD and one HR  Long term goal 5: Indep with HEP   Patient Goals   Patient goals :  To be able to return home safely and ambulate up/down a flight of stair (to enter/exit her apartment)     Plan    Plan  Times per week: 5-7 days per week   Times per day: (1-2)  Plan weeks: Recommend 1-2 weeks of SUSANA & then home with [facility] and then outpt  Current Treatment Recommendations: Strengthening, ROM, Endurance Training, Functional Mobility Training, Safety Education & Training, Home Exercise Program, Transfer Training, Gait Training, Manual Therapy - Soft Tissue Mobilization, Neuromuscular Re-education, Patient/Caregiver Education & Training, Modalities  Safety Devices  Type of devices: Call light within reach(Pt indep in her room )     Therapy Time   Individual Concurrent Group Co-treatment   Time In  2:15pm          Time Out  3:20pm         Minutes  65 min                 Romina Morris, PT  License and Documentation [address] Number: [id number]

## 2020-03-26 NOTE — PROGRESS NOTES
Letty Coburn is a 81 y.o. female patient.   Pt was seen and evaluated, no overnight events, no new complaints  Current Facility-Administered Medications   Medication Dose Route Frequency Provider Last Rate Last Dose    diphenhydrAMINE (BENADRYL) tablet 25 mg  25 mg Oral Q6H PRN Kuldeep Lowe MD   25 mg at 03/21/20 0130    traMADol (ULTRAM) tablet 50 mg  50 mg Oral Q6H PRN Kuldeep Lowe MD   50 mg at 03/25/20 0526    oxyCODONE-acetaminophen (PERCOCET) 5-325 MG per tablet 1 tablet  1 tablet Oral Q4H PRN Kuldeep Lowe MD        Or    oxyCODONE-acetaminophen (PERCOCET) 5-325 MG per tablet 2 tablet  2 tablet Oral Q4H PRN Kuldeep Lowe MD   2 tablet at 03/26/20 0303    sodium chloride (OCEAN, BABY AYR) 0.65 % nasal spray 1 spray  1 spray Each Nostril Q2H PRN Kuldeep Lowe MD        polyethylene glycol [place] [facility] [facility]) packet 17 g  17 g Oral Daily Kuldeep Lowe MD   17 g at 03/25/20 0934    acetaminophen (TYLENOL) tablet 650 mg  650 mg Oral Q6H PRN Mary Looney MD   650 mg at 03/21/20 2034    aspirin EC tablet 81 mg  81 mg Oral BID Mary Looney MD   81 mg at 03/25/20 1954    magnesium hydroxide (MILK OF MAGNESIA) 400 MG/5ML suspension 30 mL  30 mL Oral Daily PRN Mary Looney MD   30 mL at 03/16/20 0834    sennosides-docusate sodium (SENOKOT-S) 8.6-50 MG tablet 1 tablet  1 tablet Oral BID Xiomara Li MD   1 tablet at 03/25/20 1954    glucagon (rDNA) injection 1 mg  1 mg Intramuscular PRN Mary Looney MD        glucose (GLUTOSE) 40 % oral gel 15 g  15 g Oral PRN Mary Looney MD        citalopram (CELEXA) tablet 20 mg  20 mg Oral Daily Mary Looney MD   20 mg at 03/25/20 0934    ondansetron (ZOFRAN-ODT) disintegrating tablet 4 mg  4 mg Oral Q8H PRN Mary Looney MD        rosuvastatin (CRESTOR) tablet 10 mg  10 mg Oral Nightly Xiomara Li MD   10 mg at 03/25/20 1954     Allergies   Allergen Reactions    Other Itching and Swelling     [employer] laundry detergent    Influenza Vaccines Other (See

## 2020-03-26 NOTE — PROGRESS NOTES
Pertinent Hx: Pt has a Right Total Knee Replacement 3/13/20; Now here for SUSANA   Family / Caregiver Present: No  Referring Practitioner: Dr Looney/ Dr Sherri Stovall (surgeon)  Subjective  Subjective: Pt sitting up in recliner and agreeable to therapy session. Pain Screening  Patient Currently in Pain: Yes  Pain Assessment  Pain Assessment: 0-10  Pain Level: 4  Pain Type: Surgical pain  Pain Location: Knee  Pain Orientation: Right  Vital Signs  Patient Currently in Pain: Yes       Orientation     Cognition      Objective      Transfers  Sit to Stand: Modified independent  Stand to sit: Modified independent  Ambulation  Ambulation?: Yes  Ambulation 1  Surface: level tile  Device: Rolling Walker  Assistance: Modified Independent  Quality of Gait: near equal step length, more fluid gait, inc push off with RLE. Distance: 165' x 2 with seated RB between trial         Exercises  Quad Sets: x 10 H5''  Comments: recumbent bike; x 10 min   Other exercises  Other exercises?: Yes  Other exercises 6: seated hamstring stretch; H30'' x 3   Other exercises 8: seated knee flexion scooting to edge of seat; H5'' x 10   Other exercises 9: gastroc stretch; H30'' x 5   AROM RLE (degrees)  R Knee Flexion 0-145: 4-93 deg seated           Manual therapy  Soft Tissue Mobalization: STM/MFR to gastroc, quads and IT band to dec tightness and pain         G-Code     OutComes Score                                                     AM-PAC Score             Goals  Short term goals  Time Frame for Short term goals: 1 week SUSANA   Short term goal 1: Transfers with SBA  Short term goal 2: Bed mobility with supervison   Short term goal 3: Pt able to ambulate with AD for 125'x 1 with improved step length   Short term goal 4:  Increase strength R LE 3-/5   Short term goal 5: Pt able to ambulate up/down 5 steps with CGA and B HR  Long term goals  Time Frame for Long term goals : 2 weeks of SUSANA   Long term goal 1: Indep with bed mobility   Long term goal 2: Indep with transfers   Long term goal 3: Pt able to ambulate 250' x 1 or > with mod indep with AD without a rest break due to fatigue   Long term goal 4: Pt able to ambulate up/down a full flight of stairs indep/safely with AD and one HR  Long term goal 5: Indep with HEP   Patient Goals   Patient goals : To be able to return home safely and ambulate up/down a flight of stair (to enter/exit her apartment)     Plan    Plan  Times per week: 5-7 days per week   Times per day: (1-2)  Plan weeks: Recommend 1-2 weeks of SUSANA & then home with Jesusjorge [address] and then outpt  Current Treatment Recommendations: Strengthening, ROM, Endurance Training, Functional Mobility Training, Safety Education & Training, Home Exercise Program, Transfer Training, Gait Training, Manual Therapy - Soft Tissue Mobilization, Neuromuscular Re-education, Patient/Caregiver Education & Training, Modalities  Safety Devices  Type of devices:  All fall risk precautions in place, Call light within reach, Left in chair     Therapy Time   Individual Concurrent Group Co-treatment   Time In 1030         Time Out  1130         Minutes  Mike Wood, PTA  License and [address] Number: [id number]

## 2020-03-26 NOTE — PROGRESS NOTES
Bathing: Modified independent   LE Bathing: Modified independent (while seated on  shower chair)  UE Dressing: Independent(UBD completed in sitting)  LE Dressing: Modified independent (BLE threaded into pants while seated)  Toileting: Modified independent (FWW and grab bar)  Additional Comments: Pt. completed bathing on shower chair at mod I.        Balance  Sitting Balance: Independent  Standing Balance: Independent  Standing Balance  Time: 3 minutes  Activity: ADLs  Functional Mobility  Functional - Mobility Device: Rolling Walker  Activity: To/From therapy gym  Toilet Transfers  Toilet - Technique: Ambulating  Equipment Used: Grab bars  Toilet Transfer: Modified independent  Toilet Transfers Comments: FWW  Shower Transfers  Shower - Transfer From: Walker  Shower - Transfer Type: To and From  Shower - Transfer To: Shower seat with back  Shower - Technique: Sit pivot  Shower Transfers: Modified independence     Transfers  Stand Step Transfers: Modified independent  Sit to stand: Modified independent  Stand to sit: Modified independent  Transfer Comments: [address]  Times per week: 3-6x/wk  Times per day: Daily  Plan weeks: 1-2 wks  Current Treatment Recommendations: Strengthening, Balance Training, Functional Mobility Training, Endurance Training, Stair training, Pain Management, Safety Education & Training, Patient/Caregiver Education & Training, Self-Care / ADL, Home Management Training  Plan Comment: Continue OT per POC to address balance, strength, and endurance needed to increase safety and independence with ADLs, transfers, and functional mobility. G-Code     OutComes Score                                                  AM-PAC Score             Goals  Short term goals  Time Frame for Short term goals: 1 wk  Short term goal 1: Tolerate 25-30min BUE ther ex/act to increase strength/end for ADL  Short term goal 2:  Increase to CGA/SBA LB dressing  Short term goal 3: Increase to SBA/Spv toileting  Long term goals  Time Frame for Long term goals : 2 wks  Long term goal 1: I BUE HEP  Long term goal 2: I/MI LB dressing and bathing  Long term goal 3: I/MI toileting  Long term goal 4: I/MI light homemaking tasks/IADL  Long term goal 5: Increase to 10+min stand mary ellen/end (PLOF) for increased safety and I with ADL  Patient Goals   Patient goals :  \"To strengthen my knee so I can go back to doing what I was doing\"       Therapy Time   Individual Concurrent Group Co-treatment   Time In  0930         Time Out  1010         Minutes  40                 Monie Schneider, OT

## 2020-03-27 VITALS
OXYGEN SATURATION: 97 % | DIASTOLIC BLOOD PRESSURE: 89 MMHG | HEART RATE: 70 BPM | WEIGHT: 164 LBS | SYSTOLIC BLOOD PRESSURE: 105 MMHG | BODY MASS INDEX: 30.18 KG/M2 | RESPIRATION RATE: 18 BRPM | HEIGHT: 62 IN | TEMPERATURE: 98.4 F

## 2020-03-27 PROCEDURE — 97535 SELF CARE MNGMENT TRAINING: CPT

## 2020-03-27 PROCEDURE — 6370000000 HC RX 637 (ALT 250 FOR IP): Performed by: INTERNAL MEDICINE

## 2020-03-27 PROCEDURE — 97116 GAIT TRAINING THERAPY: CPT

## 2020-03-27 PROCEDURE — 97530 THERAPEUTIC ACTIVITIES: CPT

## 2020-03-27 PROCEDURE — 97110 THERAPEUTIC EXERCISES: CPT

## 2020-03-27 RX ORDER — OXYCODONE HYDROCHLORIDE AND ACETAMINOPHEN 5; 325 MG/1; MG/1
1 TABLET ORAL EVERY 6 HOURS PRN
Qty: 12 TABLET | Refills: 0 | Status: SHIPPED | OUTPATIENT
Start: 2020-03-27 | End: 2020-03-30

## 2020-03-27 RX ADMIN — POLYETHYLENE GLYCOL 3350 17 G: 17 POWDER, FOR SOLUTION ORAL at 09:07

## 2020-03-27 RX ADMIN — OXYCODONE HYDROCHLORIDE AND ACETAMINOPHEN 1 TABLET: 5; 325 TABLET ORAL at 03:38

## 2020-03-27 RX ADMIN — SENNOSIDES AND DOCUSATE SODIUM 1 TABLET: 8.6; 5 TABLET ORAL at 09:07

## 2020-03-27 RX ADMIN — ASPIRIN 81 MG: 81 TABLET, COATED ORAL at 09:07

## 2020-03-27 RX ADMIN — CITALOPRAM HYDROBROMIDE 20 MG: 20 TABLET, FILM COATED ORAL at 09:07

## 2020-03-27 RX ADMIN — OXYCODONE HYDROCHLORIDE AND ACETAMINOPHEN 1 TABLET: 5; 325 TABLET ORAL at 15:15

## 2020-03-27 ASSESSMENT — PAIN DESCRIPTION - LOCATION
LOCATION: KNEE
LOCATION: KNEE

## 2020-03-27 ASSESSMENT — PAIN DESCRIPTION - ORIENTATION
ORIENTATION: RIGHT
ORIENTATION: RIGHT

## 2020-03-27 ASSESSMENT — PAIN SCALES - GENERAL
PAINLEVEL_OUTOF10: 6
PAINLEVEL_OUTOF10: 7
PAINLEVEL_OUTOF10: 7
PAINLEVEL_OUTOF10: 6

## 2020-03-27 NOTE — PROGRESS NOTES
Chart reviewed. Pateint's care discussed in daily quality rounds. Plan remains for patient to DC home on this date with [facility] to follow. This  spoke with [place]. From [facility] whom confirms that Chapito Britt will follow patient upon DC from [facility]. This  met with patient to follow up regarding transportation. Patient thanks this  for assisting patient with transportation resources however reports that daughter is now available to transport patient home. Patient identifies no further help at home needs.

## 2020-03-27 NOTE — PROGRESS NOTES
rehabilitation services?: Yes  Family / Caregiver Present: No  Referring Practitioner: Dr. Whitehead  Diagnosis: R TKA  Subjective  Subjective: Pt pleasant, agreeable to OT, pt d/c home today  Pain Assessment  Pain Assessment: 0-10  Pain Level: 7  Pain Location: Knee  Pain Orientation: Right  Vital Signs  Patient Currently in Pain: Yes      Objective    Instrumental ADL's  Instrumental ADLs: Yes  Light Housekeeping  Light Housekeeping Level: Mickiel Mar Housekeeping Level of Assistance: Modified independent  Light Housekeeping: standing while opening/closing closets and retrieving items from the closets. Functional Mobility  Functional - Mobility Device: Rolling Walker  Activity: To/From therapy gym  Assist Level: Modified independent        Transfers  Stand Step Transfers: Modified independent(with RW)  Sit to stand: Modified independent  Stand to sit: Modified independent                    Additional Activities Comment  Additional Activities: Pt performed fxl mob with RW, using reacher to  items from the floor, and bending down to pick them up as well with good balance in prep for if pt drops something at home. Pt demo's good safety awareness and balance. Plan   Plan  Times per week: 3-6x/wk  Times per day: Daily  Plan weeks: 1-2 wks  Current Treatment Recommendations: Strengthening, Balance Training, Functional Mobility Training, Endurance Training, Stair training, Pain Management, Safety Education & Training, Patient/Caregiver Education & Training, Self-Care / ADL, Home Management Training  Plan Comment: Continue OT per POC to address balance, strength, and endurance needed to increase safety and independence with ADLs, transfers, and functional mobility. Goals  Short term goals  Time Frame for Short term goals: 1 wk  Short term goal 1: Tolerate 25-30min BUE ther ex/act to increase strength/end for ADL  Short term goal 2:  Increase to CGA/SBA LB dressing  Short term goal 3: Increase to SBA/Spv toileting  Long term goals  Time Frame for Long term goals : 2 wks  Long term goal 1: I BUE HEP  Long term goal 2: I/MI LB dressing and bathing  Long term goal 3: I/MI toileting  Long term goal 4: I/MI light homemaking tasks/IADL  Long term goal 5: Increase to 10+min stand mary ellen/end (PLOF) for increased safety and I with ADL  Patient Goals   Patient goals :  \"To strengthen my knee so I can go back to doing what I was doing\"       Therapy Time   Individual Concurrent Group Co-treatment   Time In  10:30         Time Out  11:15         Minutes  [address] [place], [place]

## 2020-03-27 NOTE — DISCHARGE SUMMARY
03/26/20 1345    sodium chloride (OCEAN, BABY AYR) 0.65 % nasal spray 1 spray  1 spray Each Nostril Q2H PRN Amy Coleman MD        polyethylene glycol [facility]) packet 17 g  17 g Oral Daily Amy Coleman MD   17 g at 03/25/20 0934    acetaminophen (TYLENOL) tablet 650 mg  650 mg Oral Q6H PRN Scottie Denise MD   650 mg at 03/21/20 2034    aspirin EC tablet 81 mg  81 mg Oral BID Mary Looney MD   81 mg at 03/26/20 1923    magnesium hydroxide (MILK OF MAGNESIA) 400 MG/5ML suspension 30 mL  30 mL Oral Daily PRN Mary Looney MD   30 mL at 03/16/20 0834    sennosides-docusate sodium (SENOKOT-S) 8.6-50 MG tablet 1 tablet  1 tablet Oral BID Scottie Denise MD   1 tablet at 03/26/20 1923    glucagon (rDNA) injection 1 mg  1 mg Intramuscular PRN Mary Looney MD        glucose (GLUTOSE) 40 % oral gel 15 g  15 g Oral PRN Scottie Denise MD        citalopram (CELEXA) tablet 20 mg  20 mg Oral Daily Mary Looney MD   20 mg at 03/26/20 9295    ondansetron (ZOFRAN-ODT) disintegrating tablet 4 mg  4 mg Oral Q8H PRN Mary Looney MD        rosuvastatin (CRESTOR) tablet 10 mg  10 mg Oral Nightly Scottie Denise MD   10 mg at 03/26/20 1924         Discharge Exam:  HEENT: AT/NC, PERRLA, no JVD  HEART: s1/s2 wnl w/o s3  LUNG: clear  ABD: soft, NT  EXT: no edema  SKin : no rash  Neuro:no focal deficits  Wound is clean, no discharge    Disposition: home    Patient Instructions:   [unfilled]  Activity: as tolerated  Diet: diabetic diet      Follow-up with PCP in 1 week  Overtime on dc summary was 45 min     Medication List      CONTINUE taking these medications    Alcohol Swabs Pads  DX: diabetes mellitus. Test 1-3 time(s) daily - Ok to substitute per insurance (1 each = 1 box)     blood glucose monitor kit and supplies  DX: diabetes mellitus. Test 1-3 time(s) daily - Ok to substitute per insurance     blood glucose test strips  DX: diabetes mellitus.  Use 1-3 time(s) daily - Ok to substitute per insurance     citalopram 20 MG tablet  Commonly known as:  CELEXA  TAKE 1 TABLET DAILY     CPAP Machine Misc     fexofenadine 180 MG tablet  Commonly known as:  ALLEGRA     fluticasone 50 MCG/ACT nasal spray  Commonly known as:  FLONASE     Lancets [facility]  DX: diabetes mellitus.  Use 1-3 time(s) daily - Ok to substitute per insurance (1 each = 1 box)     metFORMIN 500 MG extended release tablet  Commonly known as:  GLUCOPHAGE-XR  Take 1 tablet by mouth daily (with breakfast)     Respiratory Therapy Supplies Jerilyn  New CPAP mask and supplies     rosuvastatin 10 MG tablet  Commonly known as:  CRESTOR  TAKE 1 TABLET DAILY     WOMENS MULTIVITAMIN PO        STOP taking these medications    meloxicam 15 MG tablet  Commonly known as:  ZAN Kunz  3/27/2020  8:20 AM

## 2020-03-31 ENCOUNTER — TELEPHONE (OUTPATIENT)
Dept: FAMILY MEDICINE CLINIC | Age: 70
End: 2020-03-31

## 2020-04-02 ENCOUNTER — VIRTUAL VISIT (OUTPATIENT)
Dept: FAMILY MEDICINE CLINIC | Age: 70
End: 2020-04-02
Payer: MEDICARE

## 2020-04-02 PROCEDURE — 99214 OFFICE O/P EST MOD 30 MIN: CPT | Performed by: NURSE PRACTITIONER

## 2020-04-02 NOTE — PROGRESS NOTES
(GLUCOPHAGE-XR) 500 MG extended release tablet Take 1 tablet by mouth daily (with breakfast)  Xavier Kirk MD   Alcohol Swabs PADS DX: diabetes mellitus. Test 1-3 time(s) daily - Ok to substitute per insurance (1 each = 1 box)  Xavier Kirk MD   blood glucose monitor kit and supplies DX: diabetes mellitus. Test 1-3 time(s) daily - Ok to substitute per insurance  Xavier Kirk MD   blood glucose monitor strips DX: diabetes mellitus. Use 1-3 time(s) daily - Ok to substitute per insurance  Xavier Kirk MD   Lancets MISC DX: diabetes mellitus. Use 1-3 time(s) daily - Ok to substitute per insurance (1 each = 1 box)  Xavier Kirk MD   Multiple Vitamins-Minerals (WOMENS MULTIVITAMIN PO) Take 1 tablet by mouth daily  Historical Provider, MD   rosuvastatin (CRESTOR) 10 MG tablet TAKE 1 TABLET DAILY  RAOUL Parker CNP   citalopram (CELEXA) 20 MG tablet TAKE 1 TABLET DAILY  RAOUL Parker CNP   fluticasone (FLONASE) 50 MCG/ACT nasal spray 1 spray by Each Nare route daily  Historical Provider, MD   CPAP Machine MISC by Does not apply route  Historical Provider, MD   fexofenadine (ALLEGRA) 180 MG tablet Take 180 mg by mouth daily  Historical Provider, MD   Respiratory Therapy Supplies EMORY Patrick CPAP mask and supplies  Min Wheatley MD       Social History     Tobacco Use    Smoking status: Heavy Tobacco Smoker     Packs/day: 0.50     Years: 30.00     Pack years: 15.00    Smokeless tobacco: Never Used   Substance Use Topics    Alcohol use:  Yes     Alcohol/week: 1.0 standard drinks     Types: 1 Shots of liquor per week     Comment: Once per month    Drug use: No          PHYSICAL EXAMINATION:  [ INSTRUCTIONS:  \"[x]\" Indicates a positive item  \"[]\" Indicates a negative item  -- DELETE ALL ITEMS NOT EXAMINED]  [x] Alert  [x] Oriented to person/place/time    [x] No apparent distress  [] Toxic appearing    [] Face flushed appearing [] Sclera clear  [] Lips are cyanotic       [x] Breathing appears normal  [] Appears tachypneic      [] Rash on visible skin   Right knee incision is FIONA and well approximated. No open areas or drainage. [x] Cranial Nerves II-XII grossly intact    [x] Motor grossly intact in visible upper extremities    [] Motor grossly intact in visible lower extremities    [x] Normal Mood  [] Anxious appearing    [] Depressed appearing  [] Confused appearing      [] Poor short term memory  [] Poor long term memory    [] OTHER:      Due to this being a TeleHealth encounter, evaluation of the following organ systems is limited: Vitals/Constitutional/EENT/Resp/CV/GI//MS/Neuro/Skin/Heme-Lymph-Imm. Diagnosis Orders   1. Type 2 diabetes mellitus without complication, without long-term current use of insulin (HCC)  CBC Auto Differential    Comprehensive Metabolic Panel    Lipid Panel    Hemoglobin A1C    Microalbumin / Creatinine Urine Ratio   2. Mixed hyperlipidemia     3. Generalized anxiety disorder     4. Right knee pain, unspecified chronicity     5. Status post total knee replacement, right           Return in about 3 months (around 7/2/2020) for diabetes. 1. She is planning to start taking metformin in the near future. Diagnosed with diabetes after recent labs. We did review her other lab work results today. 2. Lipid panel has been stable on statin. No side effects reported. 3. Anxiety symptoms have been stable with current medication. Continue the same. 4. 5. She is recovering well after knee replacement on the right. Is mobile with a cane now rather than walker. PT In home 3 x weekly. An  electronic signature was used to authenticate this note.     --Sadaf Mukherjee, RAOUL - CNP on 4/2/2020 at 9:33 AM        Pursuant to the emergency declaration under the [facility]5 waiver authority and the [employer] Act, this Virtual  Visit was conducted, with patient's consent, to reduce the patient's risk of exposure to COVID-19 and provide continuity of care for an established patient. Services were provided through a video synchronous discussion virtually to substitute for in-person clinic visit.

## 2020-04-30 ENCOUNTER — TELEPHONE (OUTPATIENT)
Dept: FAMILY MEDICINE CLINIC | Age: 70
End: 2020-04-30

## 2020-05-19 ENCOUNTER — VIRTUAL VISIT (OUTPATIENT)
Dept: FAMILY MEDICINE CLINIC | Age: 70
End: 2020-05-19
Payer: MEDICARE

## 2020-05-19 PROCEDURE — G0438 PPPS, INITIAL VISIT: HCPCS | Performed by: NURSE PRACTITIONER

## 2020-05-19 RX ORDER — PHYTOSTEROL 450 MG
1 TABLET ORAL DAILY
Qty: 30 TABLET | Refills: 2
Start: 2020-05-19

## 2020-05-19 ASSESSMENT — PATIENT HEALTH QUESTIONNAIRE - PHQ9
SUM OF ALL RESPONSES TO PHQ QUESTIONS 1-9: 0
SUM OF ALL RESPONSES TO PHQ QUESTIONS 1-9: 0

## 2020-05-19 ASSESSMENT — LIFESTYLE VARIABLES: HOW OFTEN DO YOU HAVE A DRINK CONTAINING ALCOHOL: 0

## 2020-05-19 NOTE — PATIENT INSTRUCTIONS
Personalized Preventive Plan for Chloé Stephens - 5/19/2020  Medicare offers a range of preventive health benefits. Some of the tests and screenings are paid in full while other may be subject to a deductible, co-insurance, and/or copay. Some of these benefits include a comprehensive review of your medical history including lifestyle, illnesses that may run in your family, and various assessments and screenings as appropriate. After reviewing your medical record and screening and assessments performed today your provider may have ordered immunizations, labs, imaging, and/or referrals for you. A list of these orders (if applicable) as well as your Preventive Care list are included within your After Visit Summary for your review. Other Preventive Recommendations:    · A preventive eye exam performed by an eye specialist is recommended every 1-2 years to screen for glaucoma; cataracts, macular degeneration, and other eye disorders. · A preventive dental visit is recommended every 6 months. · Try to get at least 150 minutes of exercise per week or 10,000 steps per day on a pedometer . · Order or download the FREE \"Exercise & Physical Activity: Your Everyday Guide\" from [employer]. Call 5-334.464.3205 or search [employer] online. · You need 2935-9282 mg of calcium and 7761-3737 IU of vitamin D per day. It is possible to meet your calcium requirement with diet alone, but a vitamin D supplement is usually necessary to meet this goal.  · When exposed to the sun, use a sunscreen that protects against both UVA and UVB radiation with an SPF of 30 or greater. Reapply every 2 to 3 hours or after sweating, drying off with a towel, or swimming. · Always wear a seat belt when traveling in a car. Always wear a helmet when riding a bicycle or motorcycle.

## 2020-05-19 NOTE — PROGRESS NOTES
Medicare Annual Wellness Visit  Name: Nacho Ybarra Date: 2020   MRN: [id number] Sex: Female   Age: 78 y.o. Ethnicity: Non-/Non    : 1950 Race: Jarrett Rosales is here for Medicare AWV    Screenings for behavioral, psychosocial and functional/safety risks, and cognitive dysfunction are all negative except as indicated below. These results, as well as other patient data from the [address] form, are documented in Flowsheets linked to this Encounter. Allergies   Allergen Reactions    Other Itching and Swelling     [employer] laundry detergent    Influenza Vaccines Other (See Comments)     Severe migraine    Seasonal Other (See Comments)     Tree pollen, mold, dander causes sinus congestion         Prior to Visit Medications    Medication Sig Taking? Authorizing Provider   Phytosterols 450 MG TABS Take 1 tablet by mouth daily Yes RAOUL Paredes CNP   metFORMIN (GLUCOPHAGE-XR) 500 MG extended release tablet Take 1 tablet by mouth daily (with breakfast) Yes Napoleon Lowry MD   Alcohol Swabs PADS DX: diabetes mellitus. Test 1-3 time(s) daily - Ok to substitute per insurance (1 each = 1 box) Yes Napoleon Lowry MD   blood glucose monitor kit and supplies DX: diabetes mellitus. Test 1-3 time(s) daily - Ok to substitute per insurance Yes Napoleon Lowry MD   blood glucose monitor strips DX: diabetes mellitus. Use 1-3 time(s) daily - Ok to substitute per insurance Yes Napoleon Lowry MD   Lancets MISC DX: diabetes mellitus.  Use 1-3 time(s) daily - Ok to substitute per insurance (1 each = 1 box) Yes Napoleon Lowry MD   Multiple Vitamins-Minerals (WOMENS MULTIVITAMIN PO) Take 1 tablet by mouth daily Yes Historical Provider, MD   rosuvastatin (CRESTOR) 10 MG tablet TAKE 1 TABLET DAILY Yes RAOUL Kennedy CNP   citalopram (CELEXA) 20 MG tablet TAKE 1 TABLET DAILY Yes RAOUL Kennedy CNP   fluticasone (FLONASE) 50 MCG/ACT nasal spray 1 spray by Each Nare route daily Yes Historical Provider, MD   CPAP Machine MISC by Does not apply route Yes Historical Provider, MD   fexofenadine (ALLEGRA) 180 MG tablet Take 180 mg by mouth daily Yes Historical Provider, MD   Respiratory Therapy Supplies [facility] New CPAP mask and supplies Yes Kallie Maria MD         Past Medical History:   Diagnosis Date    Allergic rhinitis     Anxiety     History of blood transfusion     blood transfusions with c section x 3    Hyperlipidemia     meds > 2 yrs    Osteoarthritis     Sleep apnea        Past Surgical History:   Procedure Laterality Date    ANKLE SURGERY Right     tendon repair    [address]    pt has had 3     COLONOSCOPY      ENDOSCOPY, COLON, DIAGNOSTIC      TOTAL KNEE ARTHROPLASTY Left 2019    LEFT TOTAL KNEE ARTHROPLASTY, SUPINE, 23 HR OBS, IRAJ CEMENTED PERSONA performed by Bella Duvall MD at [facility] Right 3/13/2020    RIGHT TOTAL KNEE  ARTHROPLASTY performed by Bella Duvall MD at [address] [facility] [address] History   Problem Relation Age of Onset    Heart Disease Mother     High Blood Pressure Mother     Vision Loss Mother     Other Mother         leiden factor 5    Diabetes Father     Heart Disease Father     Depression Sister     Diabetes Sister     Obesity Sister     Diabetes Brother     Arthritis Brother     Other Brother         leiden factor 5    Allergy (Severe) Sister     Arthritis Sister     Depression Sister     Arthritis Sister     Depression Sister     Diabetes Sister     Arthritis Sister     Depression Sister     Arthritis Sister     Arthritis Sister     Arthritis Sister     Arthritis Brother     Arthritis Brother     Arthritis Brother     Thyroid Disease Daughter     No Known Problems Son        CareTeam (Including outside providers/suppliers regularly involved in providing care):   Patient Care Team:  RAOUL Paige - [place] as PCP - Habits/Nutrition  Do you exercise for at least 20 minutes 2-3 times per week?: Yes  Have you lost any weight without trying in the past 3 months?: No  Do you eat fewer than 2 meals per day?: No  Have you seen a dentist within the past year?: Yes  There is no height or weight on file to calculate BMI. Health Habits/Nutrition Interventions:  · Inadequate physical activity:  patient agrees to increase physical activity as follows: gradual increase with increased mobility    Safety:  Safety  Do you have working smoke detectors?: Yes  Have all throw rugs been removed or fastened?: (!) No  Do you have non-slip mats or surfaces in all bathtubs/showers?: Yes  Do all of your stairways have a railing or banister?: Yes  Are your doorways, halls and stairs free of clutter?: Yes  Do you always fasten your seatbelt when you are in a car?: Yes  Safety Interventions:  · Home safety tips provided    Personalized Preventive Plan   Current Health Maintenance Status  Immunization History   Administered Date(s) Administered    Tdap (Boostrix, Adacel) 05/31/2018        Health Maintenance   Topic Date Due    Diabetic foot exam  05/17/1962    Diabetic retinal exam  05/17/1962    Diabetic microalbuminuria test  05/17/1970    Pneumococcal 65+ years Vaccine (1 of 1 - PPSV23) 05/17/2017    Colon cancer screen colonoscopy  07/23/2017    Annual Wellness Visit (AWV)  05/29/2019    Breast cancer screen  03/08/2020    Shingles Vaccine (1 of 2) 02/11/2021 (Originally 5/17/2002)    Hepatitis C screen  02/11/2021 (Originally 1950)    A1C test (Diabetic or Prediabetic)  02/18/2021    Lipid screen  02/18/2021    DTaP/Tdap/Td vaccine (2 - Td) 05/31/2028    DEXA (modify frequency per FRAX score)  Completed    Hepatitis A vaccine  Aged Out    Hib vaccine  Aged Out    Meningococcal (ACWY) vaccine  Aged Out     Recommendations for [employer] Due: see orders and patient instructions/AVS.  .   Recommended screening schedule for

## 2020-07-07 ENCOUNTER — HOSPITAL ENCOUNTER (OUTPATIENT)
Dept: LAB | Age: 70
Discharge: HOME | End: 2020-07-07
Payer: MEDICARE

## 2020-07-07 LAB
ANION GAP SERPL CALCULATED.3IONS-SCNC: 13 MEQ/L (ref 9–15)
BUN BLDV-MCNC: 18 MG/DL (ref 8–23)
CALCIUM SERPL-MCNC: 9.2 MG/DL (ref 8.5–9.9)
CHLORIDE BLD-SCNC: 104 MEQ/L (ref 95–107)
CO2: 23 MEQ/L (ref 20–31)
CREAT SERPL-MCNC: 0.72 MG/DL (ref 0.5–0.9)
GFR AFRICAN AMERICAN: >60
GFR NON-AFRICAN AMERICAN: >60
GLUCOSE BLD-MCNC: 105 MG/DL (ref 70–99)
POTASSIUM SERPL-SCNC: 4.3 MEQ/L (ref 3.4–4.9)
SODIUM BLD-SCNC: 140 MEQ/L (ref 135–144)

## 2020-07-07 PROCEDURE — 36415 COLL VENOUS BLD VENIPUNCTURE: CPT

## 2020-07-07 PROCEDURE — 80048 BASIC METABOLIC PNL TOTAL CA: CPT

## 2020-07-09 ENCOUNTER — VIRTUAL VISIT (OUTPATIENT)
Dept: FAMILY MEDICINE CLINIC | Age: 70
End: 2020-07-09
Payer: MEDICARE

## 2020-07-09 PROCEDURE — 99214 OFFICE O/P EST MOD 30 MIN: CPT | Performed by: NURSE PRACTITIONER

## 2020-07-09 RX ORDER — AMOXICILLIN 500 MG/1
CAPSULE ORAL
Qty: 4 CAPSULE | Refills: 0 | Status: SHIPPED | OUTPATIENT
Start: 2020-07-09 | End: 2022-03-14

## 2020-07-09 RX ORDER — MELOXICAM 15 MG/1
15 TABLET ORAL DAILY
Qty: 30 TABLET | Refills: 3 | Status: SHIPPED | OUTPATIENT
Start: 2020-07-09 | End: 2020-10-15 | Stop reason: SDUPTHER

## 2020-07-09 NOTE — PROGRESS NOTES
2020    TELEHEALTH EVALUATION -- Audio/Visual (During VVMTA-83 public health emergency)    Due to COVID 19 outbreak, patient's office visit was converted to a virtual visit. Patient was contacted and agreed to proceed with a virtual visit via [employer]. me  The risks and benefits of converting to a virtual visit were discussed in light of the current infectious disease epidemic. Patient also understood that insurance coverage and co-pays are up to their individual insurance plans. Maria D Mcguire is a 78 y.o. female being evaluated by a Virtual Visit (video visit) encounter to address concerns as mentioned above. A caregiver was present when appropriate. Due to this being a TeleHealth encounter (During VPBJO-85 public health emergency), evaluation of the following organ systems was limited: Vitals/Constitutional/EENT/Resp/CV/GI//MS/Neuro/Skin/Heme-Lymph-Imm. Pursuant to the emergency declaration under the [address] [place] [address] and the [employer] Act, this Virtual Visit was conducted with patient's (and/or legal guardian's) consent, to reduce the patient's risk of exposure to COVID-19 and provide necessary medical care. The patient (and/or legal guardian) has also been advised to contact this office for worsening conditions or problems, and seek emergency medical treatment and/or call 911 if deemed necessary. Patient identification was verified at the start of the visit: Yes    Total time spent for this encounter: Not billed by time    Services were provided through a video synchronous discussion virtually to substitute for in-person clinic visit. Patient and provider were located at their individual homes. The patient is talking with me virtually from her home and I am located at my office in [facility].      HPI:    Maria D Mcguire (:  1950) has requested an audio/video evaluation for the following concern(s):    Diabetes/dyslipidemia: She states that she has been following a healthy diet overall with decreased amount of starches. Noticed that her sugar level does tend to go up after eating popcorn but otherwise it has been doing okay. Typically not over 150 when checked sporadically. She has not had any low sugar episodes reported. She has not had any muscle cramping or stomach upset while taking Crestor. Right shoulder pain/back pain: She states that she has been having right shoulder pain a lot lately with no injury. She is able to move her arm in all directions but this is something fairly new for her. Also having some pain in her lower back more on the left side down into her buttocks on but not into her leg. She states that she has a history of osteo-arthritis in multiple joints. Does have a history of total knee replacement and states that she is still interested in preventative antibiotic prior to dental procedure and will need prescription for an appointment coming up in August.  She is not currently taking any medication for discomfort but family members are taking meloxicam and she would like to try this if possible. She continues to get routine physical activity and thinks that this has been helpful in preventing pain from getting worse. Review of Systems   This patient reports no chest pain or pressure. There is no shortness of breath or cough. The patient reports no nausea or vomiting. There is no heartburn or indigestion. There is no diarrhea or constipation. No black, bloody, mucusy or tarry stool noticed. The patient reports no bloating and no change in appetite. There is no numbness, tingling or swelling in the extremities. Prior to Visit Medications    Medication Sig Taking?  Authorizing Provider   amoxicillin (AMOXIL) 500 MG capsule TAKE 4 TABLETS 1 HOUR PRIOR TO PROCEDURE Yes Cayden Brandon, APRN - CNP   meloxicam (MOBIC) 15 MG tablet Take 1 tablet by mouth daily Yes RAOUL Guerrero CNP   Phytosterols 450 MG TABS Take 1 tablet by mouth daily  RAOUL Guerrero CNP   metFORMIN (GLUCOPHAGE-XR) 500 MG extended release tablet Take 1 tablet by mouth daily (with breakfast)  Mone Grey MD   Alcohol Swabs PADS DX: diabetes mellitus. Test 1-3 time(s) daily - Ok to substitute per insurance (1 each = 1 box)  Mone Grey MD   blood glucose monitor kit and supplies DX: diabetes mellitus. Test 1-3 time(s) daily - Ok to substitute per insurance  Mone Grey MD   blood glucose monitor strips DX: diabetes mellitus. Use 1-3 time(s) daily - Ok to substitute per insurance  Mone Grey MD   Lancets MISC DX: diabetes mellitus. Use 1-3 time(s) daily - Ok to substitute per insurance (1 each = 1 box)  Mone Grey MD   Multiple Vitamins-Minerals (WOMENS MULTIVITAMIN PO) Take 1 tablet by mouth daily  Historical Provider, MD   rosuvastatin (CRESTOR) 10 MG tablet TAKE 1 TABLET DAILY  RAOUL Guerrero CNP   citalopram (CELEXA) 20 MG tablet TAKE 1 TABLET DAILY  RAOUL Guerrero CNP   fluticasone (FLONASE) 50 MCG/ACT nasal spray 1 spray by Each Nare route daily  Historical Provider, MD   CPAP Machine MISC by Does not apply route  Historical Provider, MD   fexofenadine (ALLEGRA) 180 MG tablet Take 180 mg by mouth daily  Historical Provider, MD   Respiratory Therapy Supplies [facility] CPAP mask and supplies  Rolly Nagy MD       Social History     Tobacco Use    Smoking status: Heavy Tobacco Smoker     Packs/day: 0.50     Years: 30.00     Pack years: 15.00    Smokeless tobacco: Never Used   Substance Use Topics    Alcohol use:  Yes     Alcohol/week: 1.0 standard drinks     Types: 1 Shots of liquor per week     Comment: Once per month    Drug use: No          PHYSICAL EXAMINATION:  [ INSTRUCTIONS:  \"[x]\" Indicates a positive item  \"[]\" Indicates a negative item  -- DELETE ALL ITEMS NOT EXAMINED]  [x] Alert  [x] Oriented to person/place/time    [x] No apparent distress  [] Toxic appearing    [] Face flushed appearing [] Sclera clear  [] Lips are cyanotic      [x] Breathing appears normal  [] Appears tachypneic      [] Rash on visible skin    [x] Cranial Nerves II-XII grossly intact    [x] Motor grossly intact in visible upper extremities    [] Motor grossly intact in visible lower extremities    [x] Normal Mood  [] Anxious appearing    [] Depressed appearing  [] Confused appearing      [] Poor short term memory  [] Poor long term memory    [] OTHER:      Due to this being a TeleHealth encounter, evaluation of the following organ systems is limited: Vitals/Constitutional/EENT/Resp/CV/GI//MS/Neuro/Skin/Heme-Lymph-Imm. ASSESSMENT/PLAN:   Diagnosis Orders   1. Type 2 diabetes mellitus without complication, without long-term current use of insulin ([facility] [address]     2. Mixed hyperlipidemia     3. Status post total knee replacement, right  amoxicillin (AMOXIL) 500 MG capsule   4. Chronic right shoulder pain  meloxicam (MOBIC) 15 MG tablet   5. Chronic left-sided low back pain without sciatica  meloxicam (MOBIC) 15 MG tablet         Return in about 3 months (around 10/9/2020) for diabetes. 1.  Diabetes has been well controlled. Recent glucose log shows no very high or very low readings. We will plan to get blood work in the fall. Recent BMP is stable. Fasting glucose slightly elevated at 105.    2.  She has not had any side effects with Crestor and will plan to get lipid panel checked in the fall. Has been following a healthy diet overall and getting routine physical activity. 3.  Antibiotic given for one-time dose prior to upcoming dental procedure. No recent issues with the right knee after replacement surgery. 4.  5.  Discussed option of starting Mobic once daily with food and to avoid other anti-inflammatories while doing this. Will check kidney function routinely.   Discussed option of x-rays if symptoms do not improve with stretching exercise, routine activity and medication. She verbalizes understanding. She is encouraged to start taking daily vitamin D supplement as well as calcium to help with bone health. She verbalizes understanding. Please note this report has been partially produced using speech recognition software and may cause contain errors related to that system including grammar, punctuation and spelling as well as words and phrases that may seem inappropriate. If there are questions or concerns please feel free to contact me to clarify. An  electronic signature was used to authenticate this note. --Adelina Osler, APRN - CNP on 7/9/2020 at 1:43 PM        Pursuant to the emergency declaration under the [facility] waiver authority and the [employer] and [employer] Act, this Virtual  Visit was conducted, with patient's consent, to reduce the patient's risk of exposure to COVID-19 and provide continuity of care for an established patient. Services were provided through a video synchronous discussion virtually to substitute for in-person clinic visit.

## 2020-07-10 RX ORDER — METFORMIN HYDROCHLORIDE 500 MG/1
500 TABLET, EXTENDED RELEASE ORAL
Qty: 90 TABLET | Refills: 0 | Status: SHIPPED | OUTPATIENT
Start: 2020-07-10 | End: 2020-10-22 | Stop reason: SDUPTHER

## 2020-07-10 RX ORDER — METFORMIN HYDROCHLORIDE 500 MG/1
500 TABLET, EXTENDED RELEASE ORAL
Qty: 90 TABLET | Refills: 0 | Status: SHIPPED | OUTPATIENT
Start: 2020-07-10 | End: 2020-07-10 | Stop reason: SDUPTHER

## 2020-07-10 NOTE — TELEPHONE ENCOUNTER
Patient requesting medication refill. Please approve or deny this request.    [facility] only sent half of the patient prescription.     Rx requested:  Requested Prescriptions     Pending Prescriptions Disp Refills    metFORMIN (GLUCOPHAGE-XR) 500 MG extended release tablet 90 tablet 0     Sig: Take 1 tablet by mouth daily (with breakfast)         Last Office Visit:   2/11/2020      Next Visit Date:  Future Appointments   Date Time Provider Iain Medina   11/4/2020 10:00 AM Hedy Mohr MD [address] [place] [address]

## 2020-07-10 NOTE — TELEPHONE ENCOUNTER
Pharmacy requesting medication refill.  Please approve or deny this request.      Rx requested:  Requested Prescriptions     Pending Prescriptions Disp Refills    metFORMIN (GLUCOPHAGE-XR) 500 MG extended release tablet 90 tablet 0     Sig: Take 1 tablet by mouth daily (with breakfast)         Last Office Visit:   2/11/2020      Next Visit Date:  Future Appointments   Date Time Provider Iain Medina   11/4/2020 10:00 AM Amanda Tejada MD [place]

## 2020-10-03 LAB — FECAL BLOOD IMMUNOCHEMICAL TEST: NEGATIVE

## 2020-10-07 ENCOUNTER — HOSPITAL ENCOUNTER (OUTPATIENT)
Dept: LAB | Age: 70
Discharge: HOME | End: 2020-10-07
Payer: MEDICARE

## 2020-10-07 LAB
ALBUMIN SERPL-MCNC: 4.4 G/DL (ref 3.5–4.6)
ALP BLD-CCNC: 63 U/L (ref 40–130)
ALT SERPL-CCNC: 14 U/L (ref 0–33)
ANION GAP SERPL CALCULATED.3IONS-SCNC: 10 MEQ/L (ref 9–15)
AST SERPL-CCNC: 23 U/L (ref 0–35)
BASOPHILS ABSOLUTE: 0 K/UL (ref 0–0.2)
BASOPHILS RELATIVE PERCENT: 0.4 %
BILIRUB SERPL-MCNC: 0.4 MG/DL (ref 0.2–0.7)
BUN BLDV-MCNC: 18 MG/DL (ref 8–23)
CALCIUM SERPL-MCNC: 8.9 MG/DL (ref 8.5–9.9)
CHLORIDE BLD-SCNC: 104 MEQ/L (ref 95–107)
CHOLESTEROL, TOTAL: 141 MG/DL (ref 0–199)
CO2: 25 MEQ/L (ref 20–31)
CREAT SERPL-MCNC: 0.81 MG/DL (ref 0.5–0.9)
CREATININE URINE: 146.7 MG/DL
EOSINOPHILS ABSOLUTE: 0.2 K/UL (ref 0–0.7)
EOSINOPHILS RELATIVE PERCENT: 3.3 %
GFR AFRICAN AMERICAN: >60
GFR NON-AFRICAN AMERICAN: >60
GLOBULIN: 2.7 G/DL (ref 2.3–3.5)
GLUCOSE BLD-MCNC: 102 MG/DL (ref 70–99)
HBA1C MFR BLD: 5.8 % (ref 4.8–5.9)
HCT VFR BLD CALC: 37.4 % (ref 37–47)
HDLC SERPL-MCNC: 58 MG/DL (ref 40–59)
HEMOGLOBIN: 12.4 G/DL (ref 12–16)
LDL CHOLESTEROL CALCULATED: 66 MG/DL (ref 0–129)
LYMPHOCYTES ABSOLUTE: 1.7 K/UL (ref 1–4.8)
LYMPHOCYTES RELATIVE PERCENT: 29.9 %
MCH RBC QN AUTO: 32 PG (ref 27–31.3)
MCHC RBC AUTO-ENTMCNC: 33.3 % (ref 33–37)
MCV RBC AUTO: 96 FL (ref 82–100)
MICROALBUMIN UR-MCNC: <1.2 MG/DL
MICROALBUMIN/CREAT UR-RTO: NORMAL MG/G (ref 0–30)
MONOCYTES ABSOLUTE: 0.6 K/UL (ref 0.2–0.8)
MONOCYTES RELATIVE PERCENT: 10.1 %
NEUTROPHILS ABSOLUTE: 3.1 K/UL (ref 1.4–6.5)
NEUTROPHILS RELATIVE PERCENT: 56.3 %
PDW BLD-RTO: 15 % (ref 11.5–14.5)
PLATELET # BLD: 215 K/UL (ref 130–400)
POTASSIUM SERPL-SCNC: 4.6 MEQ/L (ref 3.4–4.9)
RBC # BLD: 3.89 M/UL (ref 4.2–5.4)
SODIUM BLD-SCNC: 139 MEQ/L (ref 135–144)
TOTAL PROTEIN: 7.1 G/DL (ref 6.3–8)
TRIGL SERPL-MCNC: 83 MG/DL (ref 0–150)
WBC # BLD: 5.6 K/UL (ref 4.8–10.8)

## 2020-10-07 PROCEDURE — 80053 COMPREHEN METABOLIC PANEL: CPT

## 2020-10-07 PROCEDURE — 36415 COLL VENOUS BLD VENIPUNCTURE: CPT

## 2020-10-07 PROCEDURE — 82043 UR ALBUMIN QUANTITATIVE: CPT

## 2020-10-07 PROCEDURE — 83036 HEMOGLOBIN GLYCOSYLATED A1C: CPT

## 2020-10-07 PROCEDURE — 82570 ASSAY OF URINE CREATININE: CPT

## 2020-10-07 PROCEDURE — 85025 COMPLETE CBC W/AUTO DIFF WBC: CPT

## 2020-10-07 PROCEDURE — 80061 LIPID PANEL: CPT

## 2020-10-08 NOTE — RESULT ENCOUNTER NOTE
Please notify Anastasia Dancer that I have received lab results which are stable overall but I do recommend routine follow-up in the next month or so.

## 2020-10-09 ENCOUNTER — TELEPHONE (OUTPATIENT)
Dept: FAMILY MEDICINE CLINIC | Age: 70
End: 2020-10-09

## 2020-10-13 NOTE — TELEPHONE ENCOUNTER
Can you see if she can do next week Thursday at 710 instead? This would be a virtual visit. I see that she is scheduled this week for a virtual visit at 710 on Thursday. I would prefer to avoid double booking my early morning appts due to lack of staffing at that time. October 22 I have an opening at 80 for a virtual visit appt.

## 2020-10-14 NOTE — TELEPHONE ENCOUNTER
Patient  requesting medication refill.  Please approve or deny this request.    Rx requested:  Requested Prescriptions     Pending Prescriptions Disp Refills    meloxicam (MOBIC) 15 MG tablet 30 tablet 3     Sig: Take 1 tablet by mouth daily         Last Office Visit:   7/9/2020      Next Visit Date:  Future Appointments   Date Time Provider Iain Medina   10/22/2020  7:10 AM Hungcherelle Barnes APRN - CNP [address]   11/4/2020 10:00 AM Vickie Iraheta MD [facility]

## 2020-10-15 RX ORDER — MELOXICAM 15 MG/1
15 TABLET ORAL DAILY
Qty: 30 TABLET | Refills: 3 | Status: SHIPPED | OUTPATIENT
Start: 2020-10-15 | End: 2020-10-22 | Stop reason: SDUPTHER

## 2020-10-22 ENCOUNTER — VIRTUAL VISIT (OUTPATIENT)
Dept: FAMILY MEDICINE CLINIC | Age: 70
End: 2020-10-22
Payer: MEDICARE

## 2020-10-22 ENCOUNTER — TELEPHONE (OUTPATIENT)
Dept: FAMILY MEDICINE CLINIC | Age: 70
End: 2020-10-22

## 2020-10-22 PROCEDURE — 99214 OFFICE O/P EST MOD 30 MIN: CPT | Performed by: NURSE PRACTITIONER

## 2020-10-22 RX ORDER — MELOXICAM 15 MG/1
15 TABLET ORAL DAILY
Qty: 30 TABLET | Refills: 5 | Status: SHIPPED | OUTPATIENT
Start: 2020-10-22 | End: 2020-12-15 | Stop reason: SDUPTHER

## 2020-10-22 RX ORDER — METFORMIN HYDROCHLORIDE 500 MG/1
500 TABLET, EXTENDED RELEASE ORAL
Qty: 90 TABLET | Refills: 3 | Status: SHIPPED | OUTPATIENT
Start: 2020-10-22 | End: 2022-01-24 | Stop reason: SDUPTHER

## 2020-10-22 RX ORDER — AZITHROMYCIN 250 MG/1
TABLET, FILM COATED ORAL
Qty: 6 TABLET | Refills: 0 | Status: SHIPPED | OUTPATIENT
Start: 2020-10-22 | End: 2020-11-01

## 2020-10-22 ASSESSMENT — PATIENT HEALTH QUESTIONNAIRE - PHQ9
SUM OF ALL RESPONSES TO PHQ9 QUESTIONS 1 & 2: 0
SUM OF ALL RESPONSES TO PHQ QUESTIONS 1-9: 0
1. LITTLE INTEREST OR PLEASURE IN DOING THINGS: 0
SUM OF ALL RESPONSES TO PHQ QUESTIONS 1-9: 0
2. FEELING DOWN, DEPRESSED OR HOPELESS: 0
SUM OF ALL RESPONSES TO PHQ QUESTIONS 1-9: 0

## 2020-10-22 NOTE — TELEPHONE ENCOUNTER
Called patient to let her know that I was sending out her AVS and that you wanted her to f/u in 8 months for a video visit and she was asking about a egg free flu shot if we had them. cp

## 2020-10-22 NOTE — PROGRESS NOTES
10/22/2020    TELEHEALTH EVALUATION -- Audio/Visual (During BPJHK-39 public health emergency)    Due to COVID 19 outbreak, patient's office visit was converted to a virtual visit. Patient was contacted and agreed to proceed with a virtual visit via Doxy. me  The risks and benefits of converting to a virtual visit were discussed in light of the current infectious disease epidemic. Patient also understood that insurance coverage and co-pays are up to their individual insurance plans. Jennifer Merlos is a 78 y.o. female being evaluated by a Virtual Visit (video visit) encounter to address concerns as mentioned above. A caregiver was present when appropriate. Due to this being a TeleHealth encounter (During IIQYN-19 public health emergency), evaluation of the following organ systems was limited: Vitals/Constitutional/EENT/Resp/CV/GI//MS/Neuro/Skin/Heme-Lymph-Imm. Pursuant to the emergency declaration under the [address] [place] [address] and the [employer] and [employer] Act, this Virtual Visit was conducted with patient's (and/or legal guardian's) consent, to reduce the patient's risk of exposure to COVID-19 and provide necessary medical care. The patient (and/or legal guardian) has also been advised to contact this office for worsening conditions or problems, and seek emergency medical treatment and/or call 911 if deemed necessary. Patient identification was verified at the start of the visit: Yes    Total time spent for this encounter: Not billed by time    Services were provided through a video synchronous discussion virtually to substitute for in-person clinic visit. Patient and provider were located at their individual homes. The patient is talking with me virtually from her home and I am located at my office in [facility].        HPI:    Jennifer Merlos (:  1950) has requested an audio/video evaluation for the following concern(s): review recent labs. Diabetes: She states that her glucose levels have been running well lately. No low sugar episodes reported. She does have some questions about Metformin and recent stories that she has heard in the news about possible cancer causing components. She has not had any side effects with the medication. States that typically her sugar is around 110-130. Dyslipidemia: She states that she has been trying to be more active overall. Continues to take Crestor with no side effects. No stomach upset or muscle cramping. Has been trying to eat healthy lately and has limited a lot of foods out of her diet. She has not been eating nearly as much past as she used to and has been trying to avoid a lot of high fat foods. Shoulder pain: She states that she continues to have issues with right shoulder pain but states that she continues to get relief and symptoms with Mobic taken once daily. She has not had any side effects. Has had occasional chronic low back pain symptoms worsen on occasion but overall no significant change in symptoms. She prefers to have the medication prescription sent to [facility] now. Cost is very expensive through [facility] and she knows that she can get it for much cheaper at [facility]. Anxiety: She states overall her mood has been stable. She continues to take 20 mg of Celexa with no side effects reported. No down or depressed thoughts. No significant insomnia symptoms reported. No thoughts of self-harm or harm to others. She plans to travel to [place] from December to June to watch her new granddaughter for her daughter. She is looking forward to this. Cough: She states that she has been having issues with cold symptoms for about 2 weeks now. Developed a cough that can be harsh at times but she does not feel that there is any significant bronchitis yet but thinks that she might be moving into that.   She has tried different over-the-counter medications including zinc supplement and things to help break up mucus. She has been bringing up some yellow phlegm. She denies any chest tightness or wheezing but states that the cough can be very bothersome at times. Does not seem to be improving over time. She is a current smoker. She is not ready for smoking cessation. She states that she has had a lot of clear nasal drainage. Does have a sore area to her left nostril now from so much drainage. Feels very raw and looks red. Review of Systems   She denies any chest pain or chest pressure. No heart palpitations. She denies any fever or chills. No flank pain. No hematuria or dysuria. No constipation or diarrhea. No dark tarry colored stools or bloody stools. Prior to Visit Medications    Medication Sig Taking? Authorizing Provider   meloxicam (MOBIC) 15 MG tablet Take 1 tablet by mouth daily Yes RAOUL Rascon CNP   metFORMIN (GLUCOPHAGE-XR) 500 MG extended release tablet Take 1 tablet by mouth daily (with breakfast) Yes AROUL Rascon CNP   azithromycin (ZITHROMAX) 250 MG tablet Two tablets today then one tablet daily for 4 days Yes RAOUL Rascon CNP   mupirocin (BACTROBAN) 2 % ointment Apply 3 times daily. Yes RAOUL Rascon CNP   amoxicillin (AMOXIL) 500 MG capsule TAKE 4 TABLETS 1 HOUR PRIOR TO PROCEDURE Yes RAOUL Rascon CNP   Phytosterols 450 MG TABS Take 1 tablet by mouth daily Yes RAOUL Rascon CNP   Alcohol Swabs PADS DX: diabetes mellitus. Test 1-3 time(s) daily - Ok to substitute per insurance (1 each = 1 box) Yes Nadya Bertrand MD   blood glucose monitor kit and supplies DX: diabetes mellitus. Test 1-3 time(s) daily - Ok to substitute per insurance Yes Nadya Bertrand MD   blood glucose monitor strips DX: diabetes mellitus. Use 1-3 time(s) daily - Ok to substitute per insurance Yes Nadya Bertrand MD   Lancets MISC DX: diabetes mellitus.  Use 1-3 Vitals/Constitutional/EENT/Resp/CV/GI//MS/Neuro/Skin/Heme-Lymph-Imm. ASSESSMENT/PLAN:   Diagnosis Orders   1. Type 2 diabetes mellitus without complication, without long-term current use of insulin (HCC)  metFORMIN (GLUCOPHAGE-XR) 500 MG extended release tablet    CBC Auto Differential    Comprehensive Metabolic Panel    Lipid Panel    Hemoglobin A1C    Microalbumin / Creatinine Urine Ratio   2. Mixed hyperlipidemia     3. Generalized anxiety disorder     4. Chronic right shoulder pain  meloxicam (MOBIC) 15 MG tablet   5. Chronic left-sided low back pain without sciatica  meloxicam (MOBIC) 15 MG tablet   6. Bronchitis  azithromycin (ZITHROMAX) 250 MG tablet   7. Nostril sore  mupirocin (BACTROBAN) 2 % ointment   8. Current occasional smoker     9. Encounter for screening mammogram for breast cancer  JEANIE DIGITAL SCREEN W OR WO CAD BILATERAL   10. Screening for colon cancer  Ambulatory referral to Gastroenterology         Return in about 8 months (around 6/22/2021) for diabetes- level 2.     1.  2.  Diabetes has been well controlled on Metformin. Reassurance given that she would be notified of her specific medication was involved in recent recall. Continue the same. Continue statin. Lipid panel is well managed with no side effects with medication. 3.  Mood has been stable on current dose of Celexa. Continue the same. 4.  5. She will continue to take Mobic for chronic pain symptoms. No side effects reported. Sent to local pharmacy for more affordable pricing. 6.  7.  8.  She is not ready for smoking cessation. Education given. Ointment ordered for sore to the nostril and antibiotic ordered for symptoms of bronchitis. She will notify me if symptoms do not resolve. She will also notify me if symptoms worsen. 9.  Discussed recommendation for routine breast cancer screening and mammogram order given. 10.  Discussed recommendation for colon cancer screening and order given for GI consult. She has had an occult stool test done but not sure what company this was even done through. That test was negative but she would like to have colon cancer screening. Please note this report has been partially produced using speech recognition software and may cause contain errors related to that system including grammar, punctuation and spelling as well as words and phrases that may seem inappropriate. If there are questions or concerns please feel free to contact me to clarify. An  electronic signature was used to authenticate this note. --RAOUL Mustafa - CNP on 10/22/2020 at 8:24 AM        Pursuant to the emergency declaration under the [facility], [facility] waiver authority and the [employer] and [employer] Act, this Virtual  Visit was conducted, with patient's consent, to reduce the patient's risk of exposure to COVID-19 and provide continuity of care for an established patient. Services were provided through a video synchronous discussion virtually to substitute for in-person clinic visit.

## 2020-10-28 ENCOUNTER — TELEPHONE (OUTPATIENT)
Dept: ENDOSCOPY | Age: 70
End: 2020-10-28

## 2020-11-04 ENCOUNTER — HOSPITAL ENCOUNTER (OUTPATIENT)
Age: 70
Setting detail: SPECIMEN
Discharge: HOME | End: 2020-11-04
Payer: MEDICARE

## 2020-11-04 ENCOUNTER — NURSE ONLY (OUTPATIENT)
Dept: PRIMARY CARE CLINIC | Age: 70
End: 2020-11-04

## 2020-11-04 PROCEDURE — U0003 INFECTIOUS AGENT DETECTION BY NUCLEIC ACID (DNA OR RNA); SEVERE ACUTE RESPIRATORY SYNDROME CORONAVIRUS 2 (SARS-COV-2) (CORONAVIRUS DISEASE [COVID-19]), AMPLIFIED PROBE TECHNIQUE, MAKING USE OF HIGH THROUGHPUT TECHNOLOGIES AS DESCRIBED BY CMS-2020-01-R: HCPCS

## 2020-11-04 NOTE — PROGRESS NOTES
Pt sent to [facility] flu clinic by PCP for Covid-19 testing. Patient arrived as a drive-thru visit and specimen was collected via oropharyngeal route and sent to lab. The flu clinic provider did not perform a physical assessment.

## 2020-11-05 ENCOUNTER — OFFICE VISIT (OUTPATIENT)
Dept: PULMONOLOGY | Age: 70
End: 2020-11-05
Payer: MEDICARE

## 2020-11-05 VITALS
OXYGEN SATURATION: 98 % | HEIGHT: 62 IN | TEMPERATURE: 96.9 F | HEART RATE: 68 BPM | DIASTOLIC BLOOD PRESSURE: 72 MMHG | BODY MASS INDEX: 31.54 KG/M2 | SYSTOLIC BLOOD PRESSURE: 114 MMHG | RESPIRATION RATE: 16 BRPM | WEIGHT: 171.4 LBS

## 2020-11-05 PROCEDURE — 99213 OFFICE O/P EST LOW 20 MIN: CPT | Performed by: INTERNAL MEDICINE

## 2020-11-05 ASSESSMENT — ENCOUNTER SYMPTOMS
CHEST TIGHTNESS: 0
COUGH: 0
SINUS PRESSURE: 0
DIARRHEA: 0
NAUSEA: 0
WHEEZING: 0
SHORTNESS OF BREATH: 0
SORE THROAT: 0
ABDOMINAL PAIN: 0
RHINORRHEA: 0
VOMITING: 0

## 2020-11-05 NOTE — PROGRESS NOTES
Subjective:     Daisy Byrd is a 78 y.o. female who complains today of:     Chief Complaint   Patient presents with    Follow-up     1 YR F/U for AMANDA on CPAP       HPI  Patient is here for a follow-up visit regarding obstructive sleep apnea. Over the past year patient has continued to do very well with her CPAP using it regularly every night all night averaging over 7 hours of sleep. She has had the same machine for over 7 years now [place] and [place], there was results were not available to me. We do not have a compliance report downloaded prior to this visit. Patient was an active smoker until recently states that she is quit smoking based on her daughter's request to allow her to come and stay with them and with her grand daughter as long as she is a non-smoker according to her. Allergies: Other;  Influenza vaccines; and Seasonal  Past Medical History:   Diagnosis Date    Allergic rhinitis     Anxiety     History of blood transfusion     blood transfusions with c section x 3    Hyperlipidemia     meds > 2 yrs    Osteoarthritis     Sleep apnea      Past Surgical History:   Procedure Laterality Date    ANKLE SURGERY Right     tendon repair   [facility]    pt has had 3     COLONOSCOPY      ENDOSCOPY, COLON, DIAGNOSTIC      TOTAL KNEE ARTHROPLASTY Left 2019    LEFT TOTAL KNEE ARTHROPLASTY, SUPINE, 23 HR OBS, IRAJ CEMENTED PERSONA performed by Padma Lanza MD at [address] [place] [address] Right 3/13/2020    RIGHT TOTAL KNEE  ARTHROPLASTY performed by Padma Lanza MD at [facility]     Family History   Problem Relation Age of Onset    Heart Disease Mother     High Blood Pressure Mother     Vision Loss Mother     Other Mother         leiden factor 5    Diabetes Father     Heart Disease Father     Depression Sister     Diabetes Sister     Obesity Sister     Diabetes Brother     Arthritis Brother     Other Brother         leiden factor 5    Allergy (Severe) Sister     Arthritis Sister     Depression Sister     Arthritis Sister     Depression Sister     Diabetes Sister     Arthritis Sister     Depression Sister     Arthritis Sister     Arthritis Sister     Arthritis Sister     Arthritis Brother     Arthritis Brother     Arthritis Brother     Thyroid Disease Daughter     No Known Problems Son      Social History     Socioeconomic History    Marital status:      Spouse name: Not on file    Number of children: 3    Years of education: 25    Highest education level: Master's degree (e.g., MA, MS, Sybil, MEd, MSW, MIRTA)   Occupational History    Occupation: Teacher    Social Needs    Financial resource strain: Not hard at all   eduFire insecurity     Worry: Never true     Inability: Never true    Transportation needs     Medical: No     Non-medical: No   Tobacco Use    Smoking status: Former Smoker     Packs/day: 0.50     Years: 30.00     Pack years: 15.00     Last attempt to quit: 11/2/2020    Smokeless tobacco: Never Used   Substance and Sexual Activity    Alcohol use: Yes     Alcohol/week: 1.0 standard drinks     Types: 1 Shots of liquor per week     Comment: Once per month    Drug use: No    Sexual activity: Not Currently     Partners: Male   Lifestyle    Physical activity     Days per week: 0 days     Minutes per session: 0 min    Stress:  Only a little   Relationships    Social connections     Talks on phone: More than three times a week     Gets together: Twice a week     Attends Rastafarian service: More than 4 times per year     Active member of club or organization: No     Attends meetings of clubs or organizations: Never     Relationship status:     Intimate partner violence     Fear of current or ex partner: Not on file     Emotionally abused: Not on file     Physically abused: Not on file     Forced sexual activity: Not on file   Other Topics Concern  Not on file   Social History Narrative    Not on file         Review of Systems   Constitutional: Negative for appetite change, chills, diaphoresis, fatigue and fever. HENT: Negative for congestion, nosebleeds, postnasal drip, rhinorrhea, sinus pressure, sneezing and sore throat. Eyes: Negative for visual disturbance. Respiratory: Negative for cough, chest tightness, shortness of breath and wheezing. Cardiovascular: Negative for chest pain, palpitations and leg swelling. Gastrointestinal: Negative for abdominal pain, diarrhea, nausea and vomiting. Genitourinary: Negative for dysuria and urgency. Musculoskeletal: Negative for arthralgias, joint swelling and myalgias. Skin: Negative for rash. Allergic/Immunologic: Negative for environmental allergies. Neurological: Negative for tremors, weakness, light-headedness and headaches. Psychiatric/Behavioral: Negative for behavioral problems and sleep disturbance.         :     Vitals:    11/05/20 1019   BP: 114/72   Site: Right Upper Arm   Position: Sitting   Cuff Size: Large Adult   Pulse: 68   Resp: 16   Temp: 96.9 °F (36.1 °C)   TempSrc: Tympanic   SpO2: 98%   Weight: 171 lb 6.4 oz (77.7 kg)   Height: 5' 2\" (1.575 m)         Physical Exam  Constitutional:       Appearance: She is well-developed. HENT:      Head: Normocephalic and atraumatic. Eyes:      Pupils: Pupils are equal, round, and reactive to light. Neck:      Thyroid: No thyromegaly. Vascular: No JVD. Trachea: No tracheal deviation. Cardiovascular:      Rate and Rhythm: Normal rate and regular rhythm. Heart sounds: No murmur. No gallop. Pulmonary:      Breath sounds: No wheezing or rales. Chest:      Chest wall: No tenderness. Abdominal:      General: There is no distension. Musculoskeletal: Normal range of motion. Skin:     General: Skin is warm and dry. Findings: No rash.    Neurological:      Mental Status: She is alert and oriented to person, place, and time. Coordination: Coordination normal.             Assessment:      Diagnosis Orders   1. AMANDA on CPAP       We discussed use of CPAP at length today. I encouraged her to contact her homecare company to try and get data on her machine regarding compliance and control of her apneas with current settings in addition to finding out the actual current settings at this point. She would qualify for newer machines if needed. Alternatively she also was given the option of contacting a local homecare company as long as her insurance would cover it to facilitate getting this done. Plan:     No orders of the defined types were placed in this encounter. No orders of the defined types were placed in this encounter. Return in about 1 year (around 11/5/2021) for re-evaluation.       Michaelle Rae MD

## 2020-11-07 LAB
SARS-COV-2: NOT DETECTED
SOURCE: NORMAL

## 2020-11-11 ENCOUNTER — HOSPITAL ENCOUNTER (OUTPATIENT)
Age: 70
Setting detail: OUTPATIENT SURGERY
Discharge: HOME | End: 2020-11-11
Attending: SPECIALIST | Admitting: SPECIALIST
Payer: MEDICARE

## 2020-11-11 ENCOUNTER — ANESTHESIA EVENT (OUTPATIENT)
Dept: OPERATING ROOM | Age: 70
End: 2020-11-11
Payer: MEDICARE

## 2020-11-11 ENCOUNTER — ANESTHESIA (OUTPATIENT)
Dept: OPERATING ROOM | Age: 70
End: 2020-11-11
Payer: MEDICARE

## 2020-11-11 VITALS
OXYGEN SATURATION: 98 % | RESPIRATION RATE: 13 BRPM | DIASTOLIC BLOOD PRESSURE: 57 MMHG | SYSTOLIC BLOOD PRESSURE: 101 MMHG

## 2020-11-11 VITALS
HEART RATE: 62 BPM | OXYGEN SATURATION: 98 % | BODY MASS INDEX: 31.28 KG/M2 | RESPIRATION RATE: 16 BRPM | DIASTOLIC BLOOD PRESSURE: 72 MMHG | HEIGHT: 62 IN | WEIGHT: 170 LBS | SYSTOLIC BLOOD PRESSURE: 121 MMHG | TEMPERATURE: 96.2 F

## 2020-11-11 LAB
GLUCOSE BLD-MCNC: 112 MG/DL (ref 60–115)
PERFORMED ON: NORMAL

## 2020-11-11 PROCEDURE — G0105 COLORECTAL SCRN; HI RISK IND: HCPCS | Performed by: SPECIALIST

## 2020-11-11 PROCEDURE — 3700000000 HC ANESTHESIA ATTENDED CARE: Performed by: SPECIALIST

## 2020-11-11 PROCEDURE — 7100000010 HC PHASE II RECOVERY - FIRST 15 MIN: Performed by: SPECIALIST

## 2020-11-11 PROCEDURE — 3609027000 HC COLONOSCOPY: Performed by: SPECIALIST

## 2020-11-11 PROCEDURE — 7100000011 HC PHASE II RECOVERY - ADDTL 15 MIN: Performed by: SPECIALIST

## 2020-11-11 PROCEDURE — 6370000000 HC RX 637 (ALT 250 FOR IP): Performed by: SPECIALIST

## 2020-11-11 PROCEDURE — 6360000002 HC RX W HCPCS: Performed by: NURSE ANESTHETIST, CERTIFIED REGISTERED

## 2020-11-11 PROCEDURE — 2580000003 HC RX 258: Performed by: SPECIALIST

## 2020-11-11 PROCEDURE — 3700000001 HC ADD 15 MINUTES (ANESTHESIA): Performed by: SPECIALIST

## 2020-11-11 PROCEDURE — 2709999900 HC NON-CHARGEABLE SUPPLY: Performed by: SPECIALIST

## 2020-11-11 RX ORDER — PROPOFOL 10 MG/ML
INJECTION, EMULSION INTRAVENOUS PRN
Status: DISCONTINUED | OUTPATIENT
Start: 2020-11-11 | End: 2020-11-11 | Stop reason: SDUPTHER

## 2020-11-11 RX ORDER — MAGNESIUM HYDROXIDE 1200 MG/15ML
LIQUID ORAL PRN
Status: DISCONTINUED | OUTPATIENT
Start: 2020-11-11 | End: 2020-11-11 | Stop reason: ALTCHOICE

## 2020-11-11 RX ORDER — SODIUM CHLORIDE, SODIUM LACTATE, POTASSIUM CHLORIDE, CALCIUM CHLORIDE 600; 310; 30; 20 MG/100ML; MG/100ML; MG/100ML; MG/100ML
INJECTION, SOLUTION INTRAVENOUS CONTINUOUS
Status: DISCONTINUED | OUTPATIENT
Start: 2020-11-11 | End: 2020-11-11 | Stop reason: HOSPADM

## 2020-11-11 RX ORDER — SIMETHICONE 20 MG/.3ML
EMULSION ORAL PRN
Status: DISCONTINUED | OUTPATIENT
Start: 2020-11-11 | End: 2020-11-11 | Stop reason: ALTCHOICE

## 2020-11-11 RX ADMIN — PROPOFOL 30 MG: 10 INJECTION, EMULSION INTRAVENOUS at 10:06

## 2020-11-11 RX ADMIN — PROPOFOL 30 MG: 10 INJECTION, EMULSION INTRAVENOUS at 10:03

## 2020-11-11 RX ADMIN — PROPOFOL 120 MG: 10 INJECTION, EMULSION INTRAVENOUS at 09:58

## 2020-11-11 RX ADMIN — SODIUM CHLORIDE, POTASSIUM CHLORIDE, SODIUM LACTATE AND CALCIUM CHLORIDE: 600; 310; 30; 20 INJECTION, SOLUTION INTRAVENOUS at 09:28

## 2020-11-11 ASSESSMENT — PULMONARY FUNCTION TESTS
PIF_VALUE: 1

## 2020-11-11 ASSESSMENT — LIFESTYLE VARIABLES: SMOKING_STATUS: 1

## 2020-11-11 NOTE — H&P
Patient Name: Tyree Dodson  : 1950  MRN: [id number]  DATE: 20      ENDOSCOPY  History and Physical    Procedure:    [] Diagnostic Colonoscopy       [x] Screening Colonoscopy  [] EGD      [] ERCP      [] EUS       [] Other    [x] Previous office notes/History and Physical reviewed from the patients chart. Please see EMR for further details of HPI. I have examined the patient's status immediately prior to the procedure and:      Indications/HPI:    []Abdominal Pain  []Cancer- GI/Lung  []Fhx of colon CA/polyps  []History of Polyps  [][place]s   []Melena  []Abnormal Imaging  []Dysphagia    []Persistent Pneumonia  []Anemia  []Food Impaction  []History of Polyps  []GI Bleed  []Pulmonary nodule/Mass  []Change in bowel habits []Heartburn/Reflux  []Rectal Bleed (BRBPR)  []Chest Pain - Non Cardiac []Heme (+) Stoo  l[]Ulcers  []Constipation  []Hemoptysis   []Varices  []Diarrhea  []Hypoxemia  []Nausea/Vomiting  []Screening   []Crohns/Colitis  []Other:   Anesthesia:   [x] MAC [] Moderate Sedation   [] General   [] None     ROS: 12 pt Review of Symptoms was negative unless mentioned above    Medications:   Prior to Admission medications    Medication Sig Start Date End Date Taking?  Authorizing Provider   citalopram (CELEXA) 20 MG tablet TAKE 1 TABLET DAILY 10/14/19  Yes RAOUL Liao CNP   fexofenadine (ALLEGRA) 180 MG tablet Take 180 mg by mouth daily   Yes Historical Provider, MD   meloxicam (MOBIC) 15 MG tablet Take 1 tablet by mouth daily 10/22/20   RAOUL Liao CNP   metFORMIN (GLUCOPHAGE-XR) 500 MG extended release tablet Take 1 tablet by mouth daily (with breakfast) 10/22/20   RAOUL Liao CNP   amoxicillin (AMOXIL) 500 MG capsule TAKE 4 TABLETS 1 HOUR PRIOR TO PROCEDURE  Patient not taking: Reported on 2020   RAOUL Liao CNP   Phytosterols 450 MG TABS Take 1 tablet by mouth daily 20   RAOUL Liao CNP   Alcohol Swabs PADS DX: diabetes mellitus. Test 1-3 time(s) daily - Ok to substitute per insurance (1 each = 1 box) 3/10/20   John Iglesias MD   blood glucose monitor kit and supplies DX: diabetes mellitus. Test 1-3 time(s) daily - Ok to substitute per insurance 3/10/20   John Iglesias MD   blood glucose monitor strips DX: diabetes mellitus. Use 1-3 time(s) daily - Ok to substitute per insurance 3/10/20   John Iglesias MD   Lancets MISC DX: diabetes mellitus. Use 1-3 time(s) daily - Ok to substitute per insurance (1 each = 1 box) 3/10/20   John Iglesias MD   Multiple Vitamins-Minerals (WOMENS MULTIVITAMIN PO) Take 1 tablet by mouth daily    Historical Provider, MD   rosuvastatin (CRESTOR) 10 MG tablet TAKE 1 TABLET DAILY 10/14/19   Tyrone Brandon APRN - CNP   fluticasone (FLONASE) 50 MCG/ACT nasal spray 1 spray by Each Nare route daily    Historical Provider, MD   CPAP Machine MISC by Does not apply route    Historical Provider, MD   Respiratory Therapy Supplies [facility] New CPAP mask and supplies 18   Leslie Camejo MD       Allergies:    Allergies   Allergen Reactions    Other Itching and Swelling     [employer] laundry detergent    Influenza Vaccines Other (See Comments)     Severe migraine    Seasonal Other (See Comments)     Tree pollen, mold, dander causes sinus congestion        History of allergic reaction to anesthesia:  No    Past Medical History:  Past Medical History:   Diagnosis Date    Allergic rhinitis     Anxiety     History of blood transfusion     blood transfusions with c section x 3    Hyperlipidemia     meds > 2 yrs    Osteoarthritis     Sleep apnea        Past Surgical History:  Past Surgical History:   Procedure Laterality Date    ANKLE SURGERY Right     tendon repair     [address]    pt has had 3     COLONOSCOPY      ENDOSCOPY, COLON, DIAGNOSTIC      TOTAL KNEE ARTHROPLASTY Left 2019    LEFT TOTAL KNEE ARTHROPLASTY, SUPINE, 23 HR OBS, IRAJ CEMENTED PERSONA performed by Cande Wells MD at [address] Right 3/13/2020    RIGHT TOTAL KNEE  ARTHROPLASTY performed by Cande Wells MD at [facility] History:  Social History     Tobacco Use    Smoking status: Former Smoker     Packs/day: 0.50     Years: 30.00     Pack years: 15.00     Last attempt to quit: 2020     Years since quittin.0    Smokeless tobacco: Never Used   Substance Use Topics    Alcohol use: Yes     Alcohol/week: 1.0 standard drinks     Types: 1 Shots of liquor per week     Comment: Once per month    Drug use: No       Vital Signs:   Vitals:    20 0929   BP: (!) 153/76   Pulse: 77   Resp: 16   Temp: 96.2 °F (35.7 °C)   SpO2: 95%        Physical Exam:  Cardiac:  [x]WNL  []Comments:  Pulmonary:  [x]WNL   []Comments:   Neuro/Mental Status:  [x]WNL  []Comments:  Abdominal:  [x]WNL    []Comments:  Other:   []WNL  []Comments:    Informed Consent:  The risks and benefits of the procedure have been discussed with either the patient or if they cannot consent, their representative. Assessment:  Patient examined and appropriate for planned sedation and procedure. Plan:  Proceed with planned sedation and procedure as above.     Inocencio Ramachandran MD  9:48 AM

## 2020-11-11 NOTE — ANESTHESIA PRE PROCEDURE
Department of Anesthesiology  Preprocedure Note       Name:  Kris Lang   Age:  78 y.o.  :  1950                                          MRN:  [id number]         Date:  2020      Surgeon: Josh Storey):  Gary Ruiz MD    Procedure: Procedure(s):  COLORECTAL CANCER SCREENING, NOT HIGH RISK    Medications prior to admission:   Prior to Admission medications    Medication Sig Start Date End Date Taking? Authorizing Provider   meloxicam (MOBIC) 15 MG tablet Take 1 tablet by mouth daily 10/22/20   [facility] RAOUL Brandon CNP   metFORMIN (GLUCOPHAGE-XR) 500 MG extended release tablet Take 1 tablet by mouth daily (with breakfast) 10/22/20   [facility]   amoxicillin (AMOXIL) 500 MG capsule TAKE 4 TABLETS 1 HOUR PRIOR TO PROCEDURE  Patient not taking: Reported on 2020   ZafarriRAOUL Baker CNP   Phytosterols 450 MG TABS Take 1 tablet by mouth daily 20   ZafarriRAOUL Baker CNP   Alcohol Swabs PADS DX: diabetes mellitus. Test 1-3 time(s) daily - Ok to substitute per insurance (1 each = 1 box) 3/10/20   Presley Garcia MD   blood glucose monitor kit and supplies DX: diabetes mellitus. Test 1-3 time(s) daily - Ok to substitute per insurance 3/10/20   Presley Garcia MD   blood glucose monitor strips DX: diabetes mellitus. Use 1-3 time(s) daily - Ok to substitute per insurance 3/10/20   Presley Garcia MD   Lancets MISC DX: diabetes mellitus.  Use 1-3 time(s) daily - Ok to substitute per insurance (1 each = 1 box) 3/10/20   Presley Garcia MD   Multiple Vitamins-Minerals (WOMENS MULTIVITAMIN PO) Take 1 tablet by mouth daily    Historical Provider, MD   rosuvastatin (CRESTOR) 10 MG tablet TAKE 1 TABLET DAILY 10/14/19   [facility] RAOUL Concepcion CNP   citalopram (CELEXA) 20 MG tablet TAKE 1 TABLET DAILY 10/14/19   [facility]   fluticasone (FLONASE) 50 MCG/ACT nasal spray 1 spray by Each Nare route daily    Historical Provider, MD   CPAP Machine MISC by Does not apply route    Historical Provider, MD   fexofenadine (ALLEGRA) 180 MG tablet Take 180 mg by mouth daily    Historical Provider, MD   Respiratory Therapy Supplies [facility] New CPAP mask and supplies 18   Jena Andrade MD       Current medications:    No current facility-administered medications for this encounter. Allergies:     Allergies   Allergen Reactions    Other Itching and Swelling     [employer] laundry detergent    Influenza Vaccines Other (See Comments)     Severe migraine    Seasonal Other (See Comments)     Tree pollen, mold, dander causes sinus congestion       Problem List:    Patient Active Problem List   Diagnosis Code    Hyperlipidemia E78.5    Sleep apnea G47.30    Anxiety F41.9    Chronic pain of both knees M25.561, M25.562, G89.29    Dry skin dermatitis L85.3    Current occasional smoker Z72.0    Elevated glucose R73.09    Blood pressure elevated without history of HTN R03.0    Family history of diabetes mellitus Z83.3    Left knee DJD 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    Vagal reaction R55    Status post total knee replacement, left D83.021    Status post left knee replacement Z96.652    Simple chronic bronchitis (HCC) J41.0    Status post total knee replacement, right Z96.651    History of total right knee replacement Z96.651       Past Medical History:        Diagnosis Date    Allergic rhinitis     Anxiety     History of blood transfusion     blood transfusions with c section x 3    Hyperlipidemia     meds > 2 yrs    Osteoarthritis     Sleep apnea        Past Surgical History:        Procedure Laterality Date    ANKLE SURGERY Right 2000    tendon repair     [address]    pt has had 3     COLONOSCOPY      ENDOSCOPY, COLON, DIAGNOSTIC      TOTAL KNEE ARTHROPLASTY Left 2019    LEFT TOTAL KNEE ARTHROPLASTY, SUPINE, 23 HR OBS, IRAJ CEMENTED PERSONA performed by Vi Dickson MD at [facility] Right 3/13/2020    RIGHT TOTAL KNEE  ARTHROPLASTY performed by Rocio Sanderson MD at [address] History:    Social History     Tobacco Use    Smoking status: Former Smoker     Packs/day: 0.50     Years: 30.00     Pack years: 15.00     Last attempt to quit: 2020     Years since quittin.0    Smokeless tobacco: Never Used   Substance Use Topics    Alcohol use: Yes     Alcohol/week: 1.0 standard drinks     Types: 1 Shots of liquor per week     Comment: Once per month                                Counseling given: Not Answered      Vital Signs (Current): There were no vitals filed for this visit. BP Readings from Last 3 Encounters:   20 114/72   20 105/89   03/15/20 (!) 167/68       NPO Status:                                                                                 BMI:   Wt Readings from Last 3 Encounters:   20 171 lb 6.4 oz (77.7 kg)   03/15/20 164 lb (74.4 kg)   20 164 lb (74.4 kg)     There is no height or weight on file to calculate BMI.    CBC:   Lab Results   Component Value Date    WBC 5.6 10/07/2020    RBC 3.89 10/07/2020    HGB 12.4 10/07/2020    HCT 37.4 10/07/2020    MCV 96.0 10/07/2020    RDW 15.0 10/07/2020     10/07/2020       CMP:   Lab Results   Component Value Date     10/07/2020    K 4.6 10/07/2020    K 4.2 2020     10/07/2020    CO2 25 10/07/2020    BUN 18 10/07/2020    CREATININE 0.81 10/07/2020    GFRAA >60.0 10/07/2020    LABGLOM >60.0 10/07/2020    GLUCOSE 102 10/07/2020    PROT 7.1 10/07/2020    CALCIUM 8.9 10/07/2020    BILITOT 0.4 10/07/2020    ALKPHOS 63 10/07/2020    AST 23 10/07/2020    ALT 14 10/07/2020       POC Tests: No results for input(s): POCGLU, POCNA, POCK, POCCL, POCBUN, POCHEMO, POCHCT in the last 72 hours.     Coags:   Lab Results   Component Value Date    PROTIME 10.2 2019    INR 1.0 2019       HCG (If Applicable): No results found for: PREGTESTUR, PREGSERUM, HCG, HCGQUANT

## 2020-11-11 NOTE — PROGRESS NOTES
Patient given beverage, tolerating well. Discharge instructions given to patient by [facility] RAND APPIAH, Patient verbalized understanding.

## 2020-12-14 DIAGNOSIS — M25.511 CHRONIC RIGHT SHOULDER PAIN: ICD-10-CM

## 2020-12-14 DIAGNOSIS — M54.50 CHRONIC LEFT-SIDED LOW BACK PAIN WITHOUT SCIATICA: ICD-10-CM

## 2020-12-14 DIAGNOSIS — G89.29 CHRONIC RIGHT SHOULDER PAIN: ICD-10-CM

## 2020-12-14 DIAGNOSIS — G89.29 CHRONIC LEFT-SIDED LOW BACK PAIN WITHOUT SCIATICA: ICD-10-CM

## 2020-12-14 NOTE — TELEPHONE ENCOUNTER
Patient is out of town for 7 months and would like her RX to be sent to the [address] [facility] in [place]   Rx request   Requested Prescriptions     Pending Prescriptions Disp Refills    meloxicam (MOBIC) 15 MG tablet 30 tablet 5     Sig: Take 1 tablet by mouth daily     LOV 10/22/2020  Next Visit Date:  No future appointments.

## 2020-12-15 RX ORDER — MELOXICAM 15 MG/1
15 TABLET ORAL DAILY
Qty: 30 TABLET | Refills: 0 | Status: SHIPPED | OUTPATIENT
Start: 2020-12-15 | End: 2021-02-05 | Stop reason: SDUPTHER

## 2020-12-17 ENCOUNTER — TELEPHONE (OUTPATIENT)
Dept: FAMILY MEDICINE CLINIC | Age: 70
End: 2020-12-17

## 2020-12-20 NOTE — TELEPHONE ENCOUNTER
1st attempt to reach patient. Called patient @ [phone]    and left message on machine for patient to return call during normal business hours of 8:30 AM and 5 PM @ [phone] option 2.

## 2021-02-04 ENCOUNTER — TELEPHONE (OUTPATIENT)
Dept: FAMILY MEDICINE CLINIC | Age: 71
End: 2021-02-04

## 2021-02-04 DIAGNOSIS — G89.29 CHRONIC LEFT-SIDED LOW BACK PAIN WITHOUT SCIATICA: ICD-10-CM

## 2021-02-04 DIAGNOSIS — G89.29 CHRONIC RIGHT SHOULDER PAIN: ICD-10-CM

## 2021-02-04 DIAGNOSIS — M54.50 CHRONIC LEFT-SIDED LOW BACK PAIN WITHOUT SCIATICA: ICD-10-CM

## 2021-02-04 DIAGNOSIS — M25.511 CHRONIC RIGHT SHOULDER PAIN: ICD-10-CM

## 2021-02-05 RX ORDER — MELOXICAM 15 MG/1
15 TABLET ORAL DAILY
Qty: 90 TABLET | Refills: 1 | Status: SHIPPED | OUTPATIENT
Start: 2021-02-05 | End: 2021-08-23 | Stop reason: SDUPTHER

## 2021-06-23 DIAGNOSIS — E78.2 MIXED HYPERLIPIDEMIA: ICD-10-CM

## 2021-06-23 RX ORDER — ROSUVASTATIN CALCIUM 10 MG/1
TABLET, COATED ORAL
Qty: 90 TABLET | Refills: 4 | Status: SHIPPED | OUTPATIENT
Start: 2021-06-23

## 2021-06-23 NOTE — TELEPHONE ENCOUNTER
Patient requesting medication refill. Please approve or deny this request.    Rx requested:  Requested Prescriptions     Pending Prescriptions Disp Refills    rosuvastatin (CRESTOR) 10 MG tablet 90 tablet 4         Last Office Visit:   10/22/2020      Next Visit Date:  No future appointments.

## 2021-07-21 ENCOUNTER — TELEPHONE (OUTPATIENT)
Dept: FAMILY MEDICINE CLINIC | Age: 71
End: 2021-07-21

## 2021-07-21 DIAGNOSIS — E11.9 TYPE 2 DIABETES MELLITUS WITHOUT COMPLICATION, WITHOUT LONG-TERM CURRENT USE OF INSULIN (HCC): ICD-10-CM

## 2021-07-21 NOTE — TELEPHONE ENCOUNTER
----- Message from [place] sent at 7/21/2021 11:30 AM EDT -----  Subject: Message to Provider    QUESTIONS  Information for Provider? Patient called in stating that her blood glucose   machine is saying that she needs to replace the battery everyday and that   she is doing the test wrong. The date and time does not work. This has   been going on for 2 months. What should she do? Does she needs a script   for a new machine? Please call patient to advise.   ---------------------------------------------------------------------------  --------------  CALL BACK INFO  What is the best way for the office to contact you? OK to leave message on   voicemail  Preferred Call Back Phone Number? [phone]  ---------------------------------------------------------------------------  --------------  SCRIPT ANSWERS  Relationship to Patient?  Self

## 2021-07-21 NOTE — TELEPHONE ENCOUNTER
----- Message from Hollis Tolbert sent at 7/21/2021 11:30 AM EDT -----  Subject: Message to Provider    QUESTIONS  Information for Provider? Patient called in stating that her blood glucose   machine is saying that she needs to replace the battery everyday and that   she is doing the test wrong. The date and time does not work. This has   been going on for 2 months. What should she do? Does she needs a script   for a new machine? Please call patient to advise.   ---------------------------------------------------------------------------  --------------  CALL BACK INFO  What is the best way for the office to contact you? OK to leave message on   voicemail  Preferred Call Back Phone Number? [phone]  ---------------------------------------------------------------------------  --------------  SCRIPT ANSWERS  Relationship to Patient?  Self

## 2021-07-26 NOTE — TELEPHONE ENCOUNTER
1st attempt to reach patient.  Called patient @ [phone]  and left message on machine for patient to return call during normal business hours of 8:30 AM and 5 PM @ [phone] option 2.mj

## 2021-08-04 NOTE — TELEPHONE ENCOUNTER
Patient returned call, states she has had her meter for a year and a half. She uses [employer]. Patient also wanted to let you know she has a hammer toe, and wants to know if she can see you for this, or if she needs a referral to see someone else. Please advise.

## 2021-08-05 ENCOUNTER — TELEPHONE (OUTPATIENT)
Dept: FAMILY MEDICINE CLINIC | Age: 71
End: 2021-08-05

## 2021-08-05 DIAGNOSIS — E11.9 TYPE 2 DIABETES MELLITUS WITHOUT COMPLICATION, WITHOUT LONG-TERM CURRENT USE OF INSULIN (HCC): ICD-10-CM

## 2021-08-05 RX ORDER — LANCETS 30 GAUGE
EACH MISCELLANEOUS
Qty: 1 EACH | Refills: 5 | Status: SHIPPED | OUTPATIENT
Start: 2021-08-05

## 2021-08-05 RX ORDER — GLUCOSAMINE HCL/CHONDROITIN SU 500-400 MG
CAPSULE ORAL
Qty: 100 STRIP | Refills: 5 | Status: SHIPPED | OUTPATIENT
Start: 2021-08-05 | End: 2022-03-14 | Stop reason: SDUPTHER

## 2021-08-05 RX ORDER — GLUCOSAMINE HCL/CHONDROITIN SU 500-400 MG
CAPSULE ORAL
Qty: 100 STRIP | Refills: 5 | Status: SHIPPED | OUTPATIENT
Start: 2021-08-05 | End: 2021-08-05 | Stop reason: SDUPTHER

## 2021-08-05 RX ORDER — BLOOD PRESSURE TEST KIT
KIT MISCELLANEOUS
Qty: 1 EACH | Refills: 5 | Status: SHIPPED | OUTPATIENT
Start: 2021-08-05

## 2021-08-05 NOTE — TELEPHONE ENCOUNTER
[address] calling to ask if you can re-write the order sent this morning     Test 3 times a day     as Medicare will not accept range ex: up to 3 times a day.

## 2021-08-05 NOTE — TELEPHONE ENCOUNTER
Rx for meter sent to pharmacy. I would recommend a referral to podiatry. Does she have a preference for provider and what toe is it?

## 2021-08-23 DIAGNOSIS — M25.511 CHRONIC RIGHT SHOULDER PAIN: ICD-10-CM

## 2021-08-23 DIAGNOSIS — G89.29 CHRONIC LEFT-SIDED LOW BACK PAIN WITHOUT SCIATICA: ICD-10-CM

## 2021-08-23 DIAGNOSIS — G89.29 CHRONIC RIGHT SHOULDER PAIN: ICD-10-CM

## 2021-08-23 DIAGNOSIS — M54.50 CHRONIC LEFT-SIDED LOW BACK PAIN WITHOUT SCIATICA: ICD-10-CM

## 2021-08-23 RX ORDER — MELOXICAM 15 MG/1
15 TABLET ORAL DAILY
Qty: 30 TABLET | Refills: 0 | Status: SHIPPED | OUTPATIENT
Start: 2021-08-23 | End: 2021-09-13 | Stop reason: SDUPTHER

## 2021-08-23 NOTE — TELEPHONE ENCOUNTER
Patient is requesting medication refill.  Please approve or deny this request.    Rx requested:  Requested Prescriptions     Pending Prescriptions Disp Refills    meloxicam (MOBIC) 15 MG tablet 90 tablet 1     Sig: Take 1 tablet by mouth daily         Last Office Visit:   10/22/2020      Next Visit Date:  Future Appointments   Date Time Provider Iain Medina   9/13/2021  7:50 AM Kacey Diallo, [address]

## 2021-09-09 ENCOUNTER — HOSPITAL ENCOUNTER (OUTPATIENT)
Dept: LAB | Age: 71
Discharge: HOME | End: 2021-09-09
Payer: MEDICARE

## 2021-09-09 DIAGNOSIS — E11.9 TYPE 2 DIABETES MELLITUS WITHOUT COMPLICATION, WITHOUT LONG-TERM CURRENT USE OF INSULIN (HCC): ICD-10-CM

## 2021-09-09 LAB
ALBUMIN SERPL-MCNC: 4.4 G/DL (ref 3.5–4.6)
ALP BLD-CCNC: 69 U/L (ref 40–130)
ALT SERPL-CCNC: 17 U/L (ref 0–33)
ANION GAP SERPL CALCULATED.3IONS-SCNC: 11 MEQ/L (ref 9–15)
AST SERPL-CCNC: 24 U/L (ref 0–35)
BASOPHILS ABSOLUTE: 0 K/UL (ref 0–0.2)
BASOPHILS RELATIVE PERCENT: 0.5 %
BILIRUB SERPL-MCNC: 0.4 MG/DL (ref 0.2–0.7)
BUN BLDV-MCNC: 16 MG/DL (ref 8–23)
CALCIUM SERPL-MCNC: 9.5 MG/DL (ref 8.5–9.9)
CHLORIDE BLD-SCNC: 101 MEQ/L (ref 95–107)
CHOLESTEROL, TOTAL: 133 MG/DL (ref 0–199)
CO2: 25 MEQ/L (ref 20–31)
CREAT SERPL-MCNC: 0.84 MG/DL (ref 0.5–0.9)
CREATININE URINE: 172.4 MG/DL
EOSINOPHILS ABSOLUTE: 0.2 K/UL (ref 0–0.7)
EOSINOPHILS RELATIVE PERCENT: 3 %
GFR AFRICAN AMERICAN: >60
GFR NON-AFRICAN AMERICAN: >60
GLOBULIN: 2.5 G/DL (ref 2.3–3.5)
GLUCOSE BLD-MCNC: 106 MG/DL (ref 70–99)
HBA1C MFR BLD: 6 % (ref 4.8–5.9)
HCT VFR BLD CALC: 39.1 % (ref 37–47)
HDLC SERPL-MCNC: 56 MG/DL (ref 40–59)
HEMOGLOBIN: 13.1 G/DL (ref 12–16)
LDL CHOLESTEROL CALCULATED: 58 MG/DL (ref 0–129)
LYMPHOCYTES ABSOLUTE: 1.4 K/UL (ref 1–4.8)
LYMPHOCYTES RELATIVE PERCENT: 25 %
MCH RBC QN AUTO: 32.6 PG (ref 27–31.3)
MCHC RBC AUTO-ENTMCNC: 33.6 % (ref 33–37)
MCV RBC AUTO: 97.2 FL (ref 82–100)
MICROALBUMIN UR-MCNC: <1.2 MG/DL
MICROALBUMIN/CREAT UR-RTO: NORMAL MG/G (ref 0–30)
MONOCYTES ABSOLUTE: 0.6 K/UL (ref 0.2–0.8)
MONOCYTES RELATIVE PERCENT: 10 %
NEUTROPHILS ABSOLUTE: 3.5 K/UL (ref 1.4–6.5)
NEUTROPHILS RELATIVE PERCENT: 61.5 %
PDW BLD-RTO: 14.9 % (ref 11.5–14.5)
PLATELET # BLD: 202 K/UL (ref 130–400)
POTASSIUM SERPL-SCNC: 4.6 MEQ/L (ref 3.4–4.9)
RBC # BLD: 4.02 M/UL (ref 4.2–5.4)
SODIUM BLD-SCNC: 137 MEQ/L (ref 135–144)
TOTAL PROTEIN: 6.9 G/DL (ref 6.3–8)
TRIGL SERPL-MCNC: 95 MG/DL (ref 0–150)
WBC # BLD: 5.7 K/UL (ref 4.8–10.8)

## 2021-09-09 PROCEDURE — 85025 COMPLETE CBC W/AUTO DIFF WBC: CPT

## 2021-09-09 PROCEDURE — 83036 HEMOGLOBIN GLYCOSYLATED A1C: CPT

## 2021-09-09 PROCEDURE — 80061 LIPID PANEL: CPT

## 2021-09-09 PROCEDURE — 82570 ASSAY OF URINE CREATININE: CPT

## 2021-09-09 PROCEDURE — 36415 COLL VENOUS BLD VENIPUNCTURE: CPT

## 2021-09-09 PROCEDURE — 82043 UR ALBUMIN QUANTITATIVE: CPT

## 2021-09-09 PROCEDURE — 80053 COMPREHEN METABOLIC PANEL: CPT

## 2021-09-13 ENCOUNTER — OFFICE VISIT (OUTPATIENT)
Dept: FAMILY MEDICINE CLINIC | Age: 71
End: 2021-09-13
Payer: MEDICARE

## 2021-09-13 VITALS
HEIGHT: 62 IN | DIASTOLIC BLOOD PRESSURE: 70 MMHG | BODY MASS INDEX: 32.39 KG/M2 | OXYGEN SATURATION: 97 % | RESPIRATION RATE: 16 BRPM | WEIGHT: 176 LBS | SYSTOLIC BLOOD PRESSURE: 110 MMHG | HEART RATE: 78 BPM | TEMPERATURE: 97 F

## 2021-09-13 DIAGNOSIS — E78.2 MIXED HYPERLIPIDEMIA: ICD-10-CM

## 2021-09-13 DIAGNOSIS — M20.42 HAMMER TOE OF LEFT FOOT: ICD-10-CM

## 2021-09-13 DIAGNOSIS — M79.642 BILATERAL HAND PAIN: ICD-10-CM

## 2021-09-13 DIAGNOSIS — S91.105A OPEN WOUND OF SECOND TOE OF LEFT FOOT, INITIAL ENCOUNTER: ICD-10-CM

## 2021-09-13 DIAGNOSIS — H92.02 LEFT EAR PAIN: ICD-10-CM

## 2021-09-13 DIAGNOSIS — F17.200 CURRENT SMOKER: ICD-10-CM

## 2021-09-13 DIAGNOSIS — G89.29 CHRONIC RIGHT SHOULDER PAIN: ICD-10-CM

## 2021-09-13 DIAGNOSIS — F41.1 GENERALIZED ANXIETY DISORDER: ICD-10-CM

## 2021-09-13 DIAGNOSIS — G89.29 CHRONIC LEFT-SIDED LOW BACK PAIN WITHOUT SCIATICA: ICD-10-CM

## 2021-09-13 DIAGNOSIS — M25.511 CHRONIC RIGHT SHOULDER PAIN: ICD-10-CM

## 2021-09-13 DIAGNOSIS — E11.9 TYPE 2 DIABETES MELLITUS WITHOUT COMPLICATION, WITHOUT LONG-TERM CURRENT USE OF INSULIN (HCC): Primary | ICD-10-CM

## 2021-09-13 DIAGNOSIS — M54.50 CHRONIC LEFT-SIDED LOW BACK PAIN WITHOUT SCIATICA: ICD-10-CM

## 2021-09-13 DIAGNOSIS — J41.0 SIMPLE CHRONIC BRONCHITIS (HCC): ICD-10-CM

## 2021-09-13 DIAGNOSIS — H04.123 DRY EYES: ICD-10-CM

## 2021-09-13 DIAGNOSIS — G47.33 OSA (OBSTRUCTIVE SLEEP APNEA): ICD-10-CM

## 2021-09-13 DIAGNOSIS — M79.641 BILATERAL HAND PAIN: ICD-10-CM

## 2021-09-13 PROCEDURE — 99214 OFFICE O/P EST MOD 30 MIN: CPT | Performed by: NURSE PRACTITIONER

## 2021-09-13 RX ORDER — MELOXICAM 15 MG/1
15 TABLET ORAL DAILY
Qty: 90 TABLET | Refills: 2 | Status: SHIPPED | OUTPATIENT
Start: 2021-09-13 | End: 2022-01-14 | Stop reason: SDUPTHER

## 2021-09-13 SDOH — SOCIAL STABILITY: SOCIAL NETWORK: ARE YOU MARRIED, WIDOWED, DIVORCED, SEPARATED, NEVER MARRIED, OR LIVING WITH A PARTNER?: PATIENT DECLINED

## 2021-09-13 SDOH — HEALTH STABILITY: PHYSICAL HEALTH: ON AVERAGE, HOW MANY MINUTES DO YOU ENGAGE IN EXERCISE AT THIS LEVEL?: PATIENT DECLINED

## 2021-09-13 SDOH — SOCIAL STABILITY: SOCIAL INSECURITY
WITHIN THE LAST YEAR, HAVE YOU BEEN HUMILIATED OR EMOTIONALLY ABUSED IN OTHER WAYS BY YOUR PARTNER OR EX-PARTNER?: PATIENT DECLINED

## 2021-09-13 SDOH — ECONOMIC STABILITY: TRANSPORTATION INSECURITY
IN THE PAST 12 MONTHS, HAS LACK OF TRANSPORTATION KEPT YOU FROM MEETINGS, WORK, OR FROM GETTING THINGS NEEDED FOR DAILY LIVING?: PATIENT DECLINED

## 2021-09-13 SDOH — SOCIAL STABILITY: SOCIAL NETWORK: IN A TYPICAL WEEK, HOW MANY TIMES DO YOU TALK ON THE PHONE WITH FAMILY, FRIENDS, OR NEIGHBORS?: PATIENT DECLINED

## 2021-09-13 SDOH — ECONOMIC STABILITY: INCOME INSECURITY: IN THE LAST 12 MONTHS, WAS THERE A TIME WHEN YOU WERE NOT ABLE TO PAY THE MORTGAGE OR RENT ON TIME?: PATIENT REFUSED

## 2021-09-13 SDOH — ECONOMIC STABILITY: FOOD INSECURITY: WITHIN THE PAST 12 MONTHS, YOU WORRIED THAT YOUR FOOD WOULD RUN OUT BEFORE YOU GOT MONEY TO BUY MORE.: PATIENT DECLINED

## 2021-09-13 SDOH — SOCIAL STABILITY: SOCIAL NETWORK
DO YOU BELONG TO ANY CLUBS OR ORGANIZATIONS SUCH AS CHURCH GROUPS UNIONS, FRATERNAL OR ATHLETIC GROUPS, OR SCHOOL GROUPS?: PATIENT DECLINED

## 2021-09-13 SDOH — SOCIAL STABILITY: SOCIAL NETWORK: HOW OFTEN DO YOU ATTEND CHURCH OR RELIGIOUS SERVICES?: PATIENT DECLINED

## 2021-09-13 SDOH — SOCIAL STABILITY: SOCIAL NETWORK: HOW OFTEN DO YOU GET TOGETHER WITH FRIENDS OR RELATIVES?: PATIENT DECLINED

## 2021-09-13 SDOH — ECONOMIC STABILITY: TRANSPORTATION INSECURITY
IN THE PAST 12 MONTHS, HAS THE LACK OF TRANSPORTATION KEPT YOU FROM MEDICAL APPOINTMENTS OR FROM GETTING MEDICATIONS?: PATIENT DECLINED

## 2021-09-13 SDOH — ECONOMIC STABILITY: INCOME INSECURITY: HOW HARD IS IT FOR YOU TO PAY FOR THE VERY BASICS LIKE FOOD, HOUSING, MEDICAL CARE, AND HEATING?: PATIENT DECLINED

## 2021-09-13 SDOH — SOCIAL STABILITY: SOCIAL INSECURITY: WITHIN THE LAST YEAR, HAVE YOU BEEN AFRAID OF YOUR PARTNER OR EX-PARTNER?: PATIENT DECLINED

## 2021-09-13 SDOH — HEALTH STABILITY: MENTAL HEALTH: HOW OFTEN DO YOU HAVE A DRINK CONTAINING ALCOHOL?: PATIENT DECLINED

## 2021-09-13 SDOH — HEALTH STABILITY: MENTAL HEALTH: HOW MANY STANDARD DRINKS CONTAINING ALCOHOL DO YOU HAVE ON A TYPICAL DAY?: PATIENT DECLINED

## 2021-09-13 SDOH — SOCIAL STABILITY: SOCIAL NETWORK: HOW OFTEN DO YOU ATTENT MEETINGS OF THE CLUB OR ORGANIZATION YOU BELONG TO?: PATIENT DECLINED

## 2021-09-13 SDOH — HEALTH STABILITY: MENTAL HEALTH
STRESS IS WHEN SOMEONE FEELS TENSE, NERVOUS, ANXIOUS, OR CAN'T SLEEP AT NIGHT BECAUSE THEIR MIND IS TROUBLED. HOW STRESSED ARE YOU?: PATIENT DECLINED

## 2021-09-13 SDOH — SOCIAL STABILITY: SOCIAL INSECURITY
WITHIN THE LAST YEAR, HAVE YOU BEEN KICKED, HIT, SLAPPED, OR OTHERWISE PHYSICALLY HURT BY YOUR PARTNER OR EX-PARTNER?: PATIENT DECLINED

## 2021-09-13 SDOH — ECONOMIC STABILITY: FOOD INSECURITY: WITHIN THE PAST 12 MONTHS, THE FOOD YOU BOUGHT JUST DIDN'T LAST AND YOU DIDN'T HAVE MONEY TO GET MORE.: PATIENT DECLINED

## 2021-09-13 SDOH — SOCIAL STABILITY: SOCIAL INSECURITY
WITHIN THE LAST YEAR, HAVE TO BEEN RAPED OR FORCED TO HAVE ANY KIND OF SEXUAL ACTIVITY BY YOUR PARTNER OR EX-PARTNER?: PATIENT DECLINED

## 2021-09-13 SDOH — HEALTH STABILITY: PHYSICAL HEALTH
ON AVERAGE, HOW MANY DAYS PER WEEK DO YOU ENGAGE IN MODERATE TO STRENUOUS EXERCISE (LIKE A BRISK WALK)?: PATIENT DECLINED

## 2021-09-13 SDOH — ECONOMIC STABILITY: HOUSING INSECURITY
IN THE LAST 12 MONTHS, WAS THERE A TIME WHEN YOU DID NOT HAVE A STEADY PLACE TO SLEEP OR SLEPT IN A SHELTER (INCLUDING NOW)?: PATIENT REFUSED

## 2021-09-13 ASSESSMENT — PATIENT HEALTH QUESTIONNAIRE - PHQ9
SUM OF ALL RESPONSES TO PHQ9 QUESTIONS 1 & 2: 2
SUM OF ALL RESPONSES TO PHQ QUESTIONS 1-9: 2
SUM OF ALL RESPONSES TO PHQ QUESTIONS 1-9: 2
1. LITTLE INTEREST OR PLEASURE IN DOING THINGS: 1
2. FEELING DOWN, DEPRESSED OR HOPELESS: 1
SUM OF ALL RESPONSES TO PHQ QUESTIONS 1-9: 2

## 2021-09-13 NOTE — PROGRESS NOTES
Subjective:     Diabetes Mellitus Type 2: Current symptoms/problems include none. Home blood sugar records:  trend: stable  Any episodes of hypoglycemia? no  Tobacco history: She  reports that she quit smoking about 10 months ago. She has a 15.00 pack-year smoking history. She has never used smokeless tobacco.   Known diabetic complications: none   She has a new glucometer that has worked well for her. Hyperlipidemia:  No new myalgias or GI upset on rosuvastatin (Crestor). Right shoulder pain/back pain/hand pain: she states that should pain has resolved. She continues to have issues with chronic low back pain. Does have a family history of spinal stenosis. She has generalized arthritic pain and has been having more pain in her hands. Anxiety: Roland Perez reports being in a good mood that is stable. The patient is not reporting insomnia, difficulty concentrating and usual interest in activities. This patient is not homicidal or suicidal.    Toe problem: she states that she has had hammer toe on the left 2nd toe. Has been wrapping the toe tight to try to keep is straight but then developed open areas to the 2nd and 3rd toes. She has not seen a podiatrist lately. Pain to the toe is reported to be a 5 out of 10 to the toe. Dry eyes: she states that she has not had an eye exam for about 2 years now but plans to see her eye doctor soon. No visual changes. No pain of the eyes reported. Current smoker/history of chronic bronchitis: she smokes about 10 cigarettes per day. Has been able to quit in the past but is not currently ready for cessation. He does not report any wheezing or cough. No significant chest congestion or sputum production. Sleep apnea: she has an appointment with sleep specialist later this week. She does not currently have a CPAP that works correctly.        The five diabetic measures for control:   Hemoglobin A1C (%)   Date Value   09/09/2021 6.0 (H)     LDL Calculated (mg/dL)   Date Value   09/09/2021 58         Blood pressure less than 131/81,   BP Readings from Last 1 Encounters:   09/13/21 110/70     Smoking, non smoker is goal. This pt is  a smoker. This pt does an aspirin a day. Last eye exam was 2019  Last diabetic foot exam was today  Last urine microalbumin creatinine ratio was 2021    PMH: This 73 y.o. female  patient is  Not hypertensive, is  diabetic, is  hyperlipidemic. She has a history of smoking and has a  family history of heart disease. She is  obese. Review of Systems  Patient denies chest pain, shortness of breath, lightheadedness, blurred vision and peripheral edema. Eyes: no rapid change in vision  Ears, nose, mouth, throat, and face: no changes in hearing or sore throat  Respiratory: No shortness of breath or cough. Cardiovascular: no chest pain or pressure. This patient reports no polyuria, polydipsia or episodes of hypoglycemia. Treatment Adherence:   Medication compliance:  compliant most of the time  Diet compliance:  compliant most of the time  Weight trend: stable  Current exercise: no regular exercise  What might prevent you from meeting your goal?: none  Patient plan for overcoming barriers: N/A     Patient Confidence: 8/10      Objective:   EXAM:  Constitutional Blood pressure 110/70, pulse 78, temperature 97 °F (36.1 °C), temperature source Temporal, resp. rate 16, height 5' 2\" (1.575 m), weight 176 lb (79.8 kg), last menstrual period 01/16/2008, SpO2 97 %, not currently breastfeeding. .  She has a normal affect, no acute distress, appears well developed and well nourished. Ears:  TM- right-  normal and left-  perforation and scarred  Neck:  neck- supple, no mass, non-tender and no bruits  Lungs:  Normal expansion. Clear to auscultation. No rales, rhonchi, or wheezing., No chest wall tenderness. Heart:  Heart sounds are normal.  Regular rate and rhythm without murmur, gallop or rub.   Abdomen:  Soft, non-tender, normal bowel sounds. No bruits, organomegaly or masses. Extremities: Extremities warm to touch, pink, with no edema. Dorsalis pedis and posterior tibial pulses are symmetric. No fissures between the toes. No open sores on the feet. Sensation intact to monofilament testing in 8 of 8 areas tested. No edema in feet. Radial pulses strong and symmetric. No edema in hands or wrists. superficial erosion to the left second toe medial and left third toe lateral.  No erythema or warmth. Assessment:      Diagnosis Orders   1. Type 2 diabetes mellitus without complication, without long-term current use of insulin ([facility])   DIABETES FOOT EXAM    CBC Auto Differential    Comprehensive Metabolic Panel    Lipid Panel    Hemoglobin A1C    Microalbumin / Creatinine Urine Ratio   2. Mixed hyperlipidemia     3. Chronic right shoulder pain  meloxicam (MOBIC) 15 MG tablet   4. Chronic left-sided low back pain without sciatica  meloxicam (MOBIC) 15 MG tablet    [address], [place]   5. Simple chronic bronchitis ([address]     6. Generalized anxiety disorder     7. Hammer toe of left foot  TOSHA - Vasquez Chavira DPM, Podiatry, [place]   8. Left ear pain  AFL - Brianna Morales MD, Otolaryngology, [facility]   9. Bilateral hand pain  [address]   10. Dry eyes     11. Current smoker     12. AMANDA (obstructive sleep apnea)     13. Open wound of second toe of left foot, initial encounter         PLAN: Include orders in the DX section. Diabetes Counseling   Patient was counseled regarding disease risks and adopting healthy behaviors. Patient was provided education materials to assist with self management. Patient was provided log (or received log during previous visit) to record blood pressure, food intake and/or blood sugar. Patient was instructed to keep log up-to-date and to always bring log to all office visits. Follow up: 6 months and as needed.   Blood work one week prior as ordered. 1.  Diabetes is well controlled on current medication and diet. Continue the same. 2.  Lipid panel is stable on statin. No side effects reported. Continue the same. 3.  4.  9.  Discussed recommendation for referral to orthopedic specialist given her chronicity and nature of symptoms. She verbalizes understanding and referral given. 5.  11. She is not ready for smoking cessation at this time. No exacerbation of breathing issues reported. 6.  Mood has been stable on current medication. No side effects reported. Continue the same. 7.  13.  Discussed recommendation to leave the toes open to air at night to help with erosions from dressing. Referral given to podiatry for further evaluation and treatment. 8.  Discussed recommendation for ENT referral given chronicity of symptoms. Referral given. 10. She is encouraged to have routine eye exam.  It has been a couple of years. No obvious abnormality seen today. 12. She plans to follow with sleep specialist in the near future to discuss getting new equipment for CPAP. Please note this report has been partially produced using speech recognition software and may cause contain errors related to that system including grammar, punctuation and spelling as well as words and phrases that may seem inappropriate. If there are questions or concerns please feel free to contact me to clarify.         Electronically signed by RAOUL Martin CNP-CNP, 10:14 AM 9/13/21

## 2021-09-16 ENCOUNTER — OFFICE VISIT (OUTPATIENT)
Dept: PULMONOLOGY | Age: 71
End: 2021-09-16
Payer: MEDICARE

## 2021-09-16 VITALS
OXYGEN SATURATION: 98 % | TEMPERATURE: 98.2 F | HEIGHT: 62 IN | HEART RATE: 77 BPM | DIASTOLIC BLOOD PRESSURE: 77 MMHG | WEIGHT: 175 LBS | SYSTOLIC BLOOD PRESSURE: 131 MMHG | BODY MASS INDEX: 32.2 KG/M2

## 2021-09-16 DIAGNOSIS — F17.200 SMOKER: Primary | ICD-10-CM

## 2021-09-16 DIAGNOSIS — G47.33 OSA (OBSTRUCTIVE SLEEP APNEA): ICD-10-CM

## 2021-09-16 PROCEDURE — 99212 OFFICE O/P EST SF 10 MIN: CPT | Performed by: NURSE PRACTITIONER

## 2021-09-16 ASSESSMENT — ENCOUNTER SYMPTOMS
COUGH: 0
NAUSEA: 0
CHOKING: 0
SHORTNESS OF BREATH: 0
APNEA: 0
VOMITING: 0
CHEST TIGHTNESS: 0
EYES NEGATIVE: 1
STRIDOR: 0
ABDOMINAL PAIN: 0
WHEEZING: 0
ABDOMINAL DISTENTION: 0

## 2021-09-16 NOTE — PROGRESS NOTES
 Depression Sister     Arthritis Sister     Depression Sister     Diabetes Sister     Arthritis Sister     Depression Sister     Arthritis Sister     Arthritis Sister     Arthritis Sister     Arthritis Brother     Arthritis Brother     Arthritis Brother     Thyroid Disease Daughter     No Known Problems Son      Social History     Socioeconomic History    Marital status: Single     Spouse name: Not on file    Number of children: 3    Years of education: 25    Highest education level: Master's degree (e.g., MA, MS, Sybil, MEd, MSW, MIRTA)   Occupational History    Occupation: Teacher    Tobacco Use    Smoking status: Former Smoker     Packs/day: 0.50     Years: 30.00     Pack years: 15.00     Quit date: 2020     Years since quittin.8    Smokeless tobacco: Never Used   Vaping Use    Vaping Use: Never used   Substance and Sexual Activity    Alcohol use:  Yes     Alcohol/week: 1.0 standard drinks     Types: 1 Shots of liquor per week     Comment: Once per month    Drug use: No    Sexual activity: Not Currently     Partners: Male   Other Topics Concern    Not on file   Social History Narrative    Not on file     Social Determinants of Health     Financial Resource Strain: Unknown    Difficulty of Paying Living Expenses: Patient refused   Food Insecurity: Unknown    Worried About Running Out of Food in the Last Year: Patient refused    Ran Out of Food in the Last Year: Patient refused   Transportation Needs: Unknown    Lack of Transportation (Medical): Patient refused    Lack of Transportation (Non-Medical): Patient refused   Physical Activity: Unknown    Days of Exercise per Week: Patient refused    Minutes of Exercise per Session: Patient refused   Stress: Unknown    Feeling of Stress : Patient refused   Social Connections: Unknown    Frequency of Communication with Friends and Family: Patient refused    Frequency of Social Gatherings with Friends and Family: Patient refused  Attends Scientology Services: Patient refused    Active Member of Clubs or Organizations: Patient refused    Attends Club or Organization Meetings: Patient refused    Marital Status: Patient refused   Intimate Partner Violence: Unknown    Fear of Current or Ex-Partner: Patient refused    Emotionally Abused: Patient refused    Physically Abused: Patient refused    Sexually Abused: Patient refused         Review of Systems   Constitutional: Negative for chills and fever. HENT: Negative for congestion and postnasal drip. Eyes: Negative. Respiratory: Negative for apnea, cough, choking, chest tightness, shortness of breath, wheezing and stridor. Cardiovascular: Negative for chest pain and leg swelling. Gastrointestinal: Negative for abdominal distention, abdominal pain, nausea and vomiting. Genitourinary: Negative. Skin: Negative. Neurological: Negative for dizziness and weakness. Psychiatric/Behavioral: Negative. Objective:     Vitals:    09/16/21 1343   BP: 131/77   Pulse: 77   Temp: 98.2 °F (36.8 °C)   SpO2: 98%   Weight: 175 lb (79.4 kg)   Height: 5' 2\" (1.575 m)         Physical Exam  Vitals and nursing note reviewed. Constitutional:       Appearance: Normal appearance. HENT:      Head: Normocephalic. Nose: Nose normal.      Mouth/Throat:      Mouth: Mucous membranes are moist.      Pharynx: Oropharynx is clear. Eyes:      Conjunctiva/sclera: Conjunctivae normal.      Pupils: Pupils are equal, round, and reactive to light. Cardiovascular:      Rate and Rhythm: Normal rate and regular rhythm. Pulses: Normal pulses. Heart sounds: Normal heart sounds. No murmur heard. Pulmonary:      Effort: Pulmonary effort is normal.      Breath sounds: Normal breath sounds and air entry. Abdominal:      General: Bowel sounds are normal.      Palpations: Abdomen is soft. Musculoskeletal:         General: Normal range of motion.       Cervical back: Normal range of motion and neck supple. Skin:     General: Skin is warm and dry. Capillary Refill: Capillary refill takes less than 2 seconds. Neurological:      Mental Status: She is alert and oriented to person, place, and time. Psychiatric:         Mood and Affect: Mood normal.         Behavior: Behavior normal.             Assessment:      Diagnosis Orders   1. Smoker  CT lung screen [Initial/Annual]   2. AMANDA (obstructive sleep apnea)  Baseline Diagnostic Sleep Study     · Sleep study  · CT for lung cancer screening      Plan:     Orders Placed This Encounter   Procedures    CT lung screen [Initial/Annual]     Age: 73 y.o. Smoking History:   Social History    Tobacco Use      Smoking status: Former Smoker        Packs/day: 0.50        Years: 30.00        Pack years: 13        Quit date: 2020        Years since quittin.8      Smokeless tobacco: Never Used    Vaping Use      Vaping Use: Never used    Alcohol use: Yes      Alcohol/week: 1.0 standard drinks      Types: 1 Shots of liquor per week      Comment: Once per month    Drug use: No    Pack years: 15  Last CT lung screen: No previous lung cancer screening exam     Standing Status:   Future     Standing Expiration Date:   2022     Order Specific Question:   Is there documentation of shared decision making? Answer:   Yes     Order Specific Question:   Does the patient show any signs or symptoms of lung cancer? Answer:   No     Order Specific Question:   Is this the first (baseline) CT or an annual exam?     Answer:   Baseline [1]     Order Specific Question:   Is this a low dose CT or a routine CT? Answer:   Low Dose CT [1]     Order Specific Question:   Smoking Status? Answer:   Current Every Day Smoker [1]     Order Specific Question:   Smoking packs per day? Answer:   0.5     Order Specific Question:   Years smoking?      Answer:   27    Baseline Diagnostic Sleep Study     Standing Status:   Future     Standing Expiration Date:   9/16/2022     Order Specific Question:   Adult or Pediatric     Answer:   Adult Study (>7 Years)     Order Specific Question:   Location For Sleep Study     Answer:   [facility]     Order Specific Question:   Select Sleep Lab Location     Answer:   [facility]     No orders of the defined types were placed in this encounter. Patient was willing to do a new sleep study at a [facility] facility so that we can get her CPAP supplies and a new machine through medical supply company in the area. Patient is a current smoker, half pack for 30+ years. Explained the need for CT lung cancer screening and insurance will pay for it. Patient would like to get the screening done. Return in about 8 weeks (around 11/11/2021).       Wild Alexander, RAOUL - CNP

## 2021-09-28 ENCOUNTER — OFFICE VISIT (OUTPATIENT)
Dept: ORTHOPEDIC SURGERY | Age: 71
End: 2021-09-28
Payer: MEDICARE

## 2021-09-28 VITALS
WEIGHT: 175 LBS | OXYGEN SATURATION: 97 % | TEMPERATURE: 96.2 F | HEART RATE: 75 BPM | HEIGHT: 62 IN | BODY MASS INDEX: 32.2 KG/M2

## 2021-09-28 DIAGNOSIS — M54.16 LUMBAR RADICULOPATHY: Primary | ICD-10-CM

## 2021-09-28 DIAGNOSIS — M54.12 CERVICAL RADICULOPATHY: ICD-10-CM

## 2021-09-28 PROCEDURE — 99204 OFFICE O/P NEW MOD 45 MIN: CPT | Performed by: ORTHOPAEDIC SURGERY

## 2021-09-28 RX ORDER — METHYLPREDNISOLONE 4 MG/1
TABLET ORAL
Qty: 21 TABLET | Refills: 0 | Status: SHIPPED | OUTPATIENT
Start: 2021-09-28 | End: 2022-01-11

## 2021-09-28 ASSESSMENT — ENCOUNTER SYMPTOMS
ABDOMINAL PAIN: 0
EYE ITCHING: 0
EYE DISCHARGE: 0
SHORTNESS OF BREATH: 0
DIARRHEA: 0
CONSTIPATION: 0
COUGH: 0
EYE PAIN: 0

## 2021-09-28 NOTE — PROGRESS NOTES
Patient ID: Ryan Mcdonald is a 73 y.o. female who presents today for:  Chief Complaint   Patient presents with    Hand Pain     Bilateral hand, The patient c/o an onset of hand pain that began about 2 years ago. She denies having an injury. She states that right middle finger hurts the most. She also complains of a pain that radiates down her left arm. Her main complaint is her lower back. She states that her pain is on her left and right side and radiates down both legs. She gets relief from exercise.  Lower Back Pain       HPI:   The patient was mainly here complaining of lower back pain which radiated down the posterior aspect of both thighs. This is her main concern. Additionally, she has been getting intermittent pain going from the base of the neck on the left side all the way down to the wrist.  No weakness. The patient has been having 3 months of the pain in the lower back and the buttocks. A little bit longer but intermittent for the neck.       Past Medical History:   Diagnosis Date    Allergic rhinitis     Anxiety     History of blood transfusion     blood transfusions with c section x 3    Hyperlipidemia     meds > 2 yrs    Osteoarthritis     Sleep apnea      Past Surgical History:   Procedure Laterality Date    ANKLE SURGERY Right     tendon repair   [facility]    pt has had 3     COLONOSCOPY      COLONOSCOPY N/A 2020    COLONOSCOPY performed by Zhang Campbell MD at [facility], COLON, DIAGNOSTIC      TOTAL KNEE ARTHROPLASTY Left 2019    LEFT TOTAL KNEE ARTHROPLASTY, SUPINE, 23 HR OBS, IRAJ CEMENTED PERSONA performed by Claudean Glaser, MD at [facility] Right 3/13/2020    RIGHT TOTAL KNEE  ARTHROPLASTY performed by Claudean Glaser, MD at [facility] History     Socioeconomic History    Marital status: Single     Spouse name: Not on file    Number of children: 3    Years of education: 25    Highest education level: Master's degree (e.g., MA, MS, Sybil, MEd, MSW, MIRTA)   Occupational History    Occupation: Teacher    Tobacco Use    Smoking status: Former Smoker     Packs/day: 0.50     Years: 30.00     Pack years: 15.00     Types: Cigarettes     Quit date: 2020     Years since quittin.9    Smokeless tobacco: Never Used   Vaping Use    Vaping Use: Never used   Substance and Sexual Activity    Alcohol use:  Yes     Alcohol/week: 1.0 standard drinks     Types: 1 Shots of liquor per week     Comment: Once per month    Drug use: No    Sexual activity: Not Currently     Partners: Male   Other Topics Concern    Not on file   Social History Narrative    Not on file     Social Determinants of Health     Financial Resource Strain: Unknown    Difficulty of Paying Living Expenses: Patient refused   Food Insecurity: Unknown    Worried About Running Out of Food in the Last Year: Patient refused    Ran Out of Food in the Last Year: Patient refused   Transportation Needs: Unknown    Lack of Transportation (Medical): Patient refused    Lack of Transportation (Non-Medical): Patient refused   Physical Activity: Unknown    Days of Exercise per Week: Patient refused    Minutes of Exercise per Session: Patient refused   Stress: Unknown    Feeling of Stress : Patient refused   Social Connections: Unknown    Frequency of Communication with Friends and Family: Patient refused    Frequency of Social Gatherings with Friends and Family: Patient refused    Attends Episcopal Services: Patient refused    Active Member of Clubs or Organizations: Patient refused    Attends Club or Organization Meetings: Patient refused    Marital Status: Patient refused   Intimate Partner Violence: Unknown    Fear of Current or Ex-Partner: Patient refused    Emotionally Abused: Patient refused    Physically Abused: Patient refused    Sexually Abused: Patient refused     Family History   Problem Relation tablet by mouth daily 30 tablet 2    Multiple Vitamins-Minerals (WOMENS MULTIVITAMIN PO) Take 1 tablet by mouth daily      citalopram (CELEXA) 20 MG tablet TAKE 1 TABLET DAILY 90 tablet 4    fluticasone (FLONASE) 50 MCG/ACT nasal spray 1 spray by Each Nare route daily      CPAP Machine MISC by Does not apply route      fexofenadine (ALLEGRA) 180 MG tablet Take 180 mg by mouth daily      Respiratory Therapy Supplies [facility] New CPAP mask and supplies 1 Device 0     No current facility-administered medications on file prior to visit. Review of Systems   Constitutional: Negative for activity change, appetite change and chills. HENT: Negative for congestion, ear pain and hearing loss. Eyes: Negative for pain, discharge and itching. Respiratory: Negative for cough and shortness of breath. Cardiovascular: Negative for chest pain and leg swelling. Gastrointestinal: Negative for abdominal pain, constipation and diarrhea. Endocrine: Negative for cold intolerance, heat intolerance and polydipsia. Genitourinary: Negative for difficulty urinating, flank pain and frequency. Skin: Negative for rash and wound. Allergic/Immunologic: Negative for environmental allergies and food allergies. Neurological: Negative for dizziness, seizures and syncope. Physical Examination:   Back Exam     Tenderness   Back tenderness location: There was no tenderness to palpation of the bony elements of the lumbar or cervical spines. Range of Motion   Extension: normal   Flexion: normal   Lateral bend right: normal   Lateral bend left: normal   Rotation right: normal   Rotation left: normal     Muscle Strength   The patient has normal back strength. Tests   Straight leg raise right: positive  Straight leg raise left: positive    Other   Toe walk: normal  Heel walk: normal  Sensation: normal  Gait: normal     Comments:  Patient had no pain with internal or external rotation of either hip.             The patient has a persistent inconsistent lumbar radiculopathy. She also has some intermittent cervical radiculopathy. Treatment options were discussed. Patient opted for a Medrol Dosepak and physical therapy. She is having a tooth removed tomorrow. I told her she should wait at least a week before she takes the steroid pills. I have asked her to check this with the dentist.  We discussed physical therapy. She will complete this, and follow-up as needed as her symptoms may dictate. Diagnosis Orders   1. Lumbar radiculopathy  Ambulatory referral to Physical Therapy   2. Cervical radiculopathy  Ambulatory referral to Physical Therapy      Orders Placed This Encounter   Procedures    Ambulatory referral to Physical Therapy     Referral Priority:   Routine     Referral Type:   Eval and Treat     Referral Reason:   Specialty Services Required     Number of Visits Requested:   1     Orders Placed This Encounter   Medications    methylPREDNISolone (MEDROL DOSEPACK) 4 MG tablet     Sig: On days 1-6 take as directed on package for first 6-day course. Dispense:  21 tablet     Refill:  0       Return if symptoms worsen or fail to improve.     Amanda Krause MD

## 2021-10-04 LAB — DIABETIC RETINOPATHY: NEGATIVE

## 2021-10-05 ENCOUNTER — HOSPITAL ENCOUNTER (OUTPATIENT)
Dept: PHYSICAL THERAPY | Age: 71
Setting detail: THERAPIES SERIES
Discharge: HOME | End: 2021-10-05
Payer: MEDICARE

## 2021-10-05 PROCEDURE — 97110 THERAPEUTIC EXERCISES: CPT

## 2021-10-05 PROCEDURE — 97530 THERAPEUTIC ACTIVITIES: CPT

## 2021-10-05 PROCEDURE — 97162 PT EVAL MOD COMPLEX 30 MIN: CPT

## 2021-10-05 ASSESSMENT — PAIN DESCRIPTION - DESCRIPTORS: DESCRIPTORS: ACHING;SORE;NUMBNESS;TINGLING

## 2021-10-05 ASSESSMENT — PAIN DESCRIPTION - ORIENTATION: ORIENTATION: LEFT;RIGHT

## 2021-10-05 ASSESSMENT — PAIN SCALES - GENERAL: PAINLEVEL_OUTOF10: 1

## 2021-10-05 ASSESSMENT — PAIN DESCRIPTION - LOCATION: LOCATION: NECK;BACK

## 2021-10-05 NOTE — PROGRESS NOTES
Physical Therapy  Initial Assessment  Date: 10/5/2021  Patient Name: Anahi Gross  MRN: [id number]  : 1950     Treatment Diagnosis: lumbar radiculopathy, cervical radiculopathy       Subjective   General  Chart Reviewed: Yes  Additional Pertinent Hx: bilat TKA L , R , DM II  Family / Caregiver Present: No  Referring Practitioner: Dr. Oc Diaz  Referral Date : 21  Diagnosis: lumbar radiculopathy , cervical radiculopathy  PT Visit Information  Total # of Visits Approved:  (8-10)  Total # of Visits to Date: 1  Plan of Care/Certification Expiration Date: 21  No Show: 0  Canceled Appointment: 0  Subjective  Subjective: Pt reports onset of neck and L UE pain over the last 2-3 years sporadically and has been becoming more frequently. Pt reports onset of low back and bilat LE pain approx 4 months ago which has progressively worsened. Pt reports back and LE pain \"betterwhen I exercise\". Complaints of back and LE pain first few steps after sit to stand which improves with distance walking.   Pain Screening  Patient Currently in Pain: Yes  Pain Assessment  Pain Assessment: 0-10  Pain Level: 1 (Neck and shoulder pain approx 4/10 on ave, low back pain 6/10 on ave)  Pain Location: Neck;Back  Pain Orientation: Left;Right  Pain Radiating Towards: sacrum, bilat buttocks radiating down post thighs to just above the knee, ; neck radiates into L shoulder down to hand  Pain Descriptors: Aching;Sore;Numbness;Tingling (\"sporadic\" tingling and numbness L UE)  Vital Signs  Patient Currently in Pain: Yes    Social/Functional History  Social/Functional History  Occupation: Retired  Leisure & Hobbies: swimming, biking, reading, traveling    Objective   Observation/Palpation  Palpation: Mild pain and increased tone with palpation of bialt L 4-5 paraspinals, increased tone and mild tenderness with palpaiton of L UT and L C4-6 paraspinals    Spine  Cervical: 65 deg R rot 65 deg L rot 20 deg R SB, 22 degLSB, flex and radiculopathy, cervical radiculopathy  Prognosis: Good  REQUIRES PT FOLLOW UP: Yes  Treatment Initiated : IE completed. Pt educated on therapy POC, instructed in HEP, handout issued to pt. Pt educated on spinal protection during ADLs including use of correct body mechanics refraining from bending and twisting of the spine during lifting, carrying etc. Encouraged frequent changes in positions and use of CP PRN for pain reduciton. Plan   Plan  Times per week: 2  Plan weeks: 4-5  Current Treatment Recommendations: Strengthening, ROM, Functional Mobility Training, Modalities, Home Exercise Program, Manual Therapy - Soft Tissue Mobilization    Goals  Short term goals  Time Frame for Short term goals: 2 wks  Short term goal 1: I with HEP and report compliance with HEP 5/7 days or greater  Short term goal 2: Pt report any decrease in back pain during ADLs  Short term goal 3: Improve hamstring flexibility by 5 deg or greater R and L  Long term goals  Time Frame for Long term goals : 4-5 weeks  Long term goal 1: AROM lumbar spine WFL all planes without reproducing bilat LE sxs; improve cerv spine rot 70 deg, bilat SB 30 deg without pain  Long term goal 2: Improve Oswestry 15% or less  Long term goal 3: Improve bilat hip strength 4+/5 and core strength any amount to decrease strain on spine  Long term goal 4: Pt perform ADLs including community ambulation, housework with 1-2/10 or less neck and back pain majority of the day  Long term goal 5: Pt report 75% decrease in L UE and bilat LE radicular sxs  Patient Goals   Patient goals :  To be out of pain and move better       Therapy Time   Individual Concurrent Group Co-treatment   Time In  1000         Time Out  1105         Minutes  [facility], [address] [facility] Hospital[facility]iddharth Antonioton

## 2021-10-11 ENCOUNTER — TELEPHONE (OUTPATIENT)
Dept: FAMILY MEDICINE CLINIC | Age: 71
End: 2021-10-11

## 2021-10-11 DIAGNOSIS — Z12.31 ENCOUNTER FOR SCREENING MAMMOGRAM FOR MALIGNANT NEOPLASM OF BREAST: Primary | ICD-10-CM

## 2021-10-11 DIAGNOSIS — F41.9 ANXIETY: ICD-10-CM

## 2021-10-11 RX ORDER — CITALOPRAM 20 MG/1
TABLET ORAL
Qty: 90 TABLET | Refills: 4 | Status: SHIPPED | OUTPATIENT
Start: 2021-10-11 | End: 2022-04-04 | Stop reason: SDUPTHER

## 2021-10-11 NOTE — TELEPHONE ENCOUNTER
Patient states that she has the needles to check her blood sugar, but she doesn't have the \"thing that holds the needles\" to prick her finger. She doesn't know what they are called, but would like a refill of that as well. Please advise.     Rx requested:  Requested Prescriptions     Pending Prescriptions Disp Refills    citalopram (CELEXA) 20 MG tablet 90 tablet 4         Last Office Visit:   9/13/2021      Next Visit Date:  Future Appointments   Date Time Provider Iain Medina   10/19/2021 11:00 AM [facility]   10/21/2021 11:00 AM [facility]   11/11/2021  1:00 PM Mancil Camejo, APRN - CNP [facility]   3/14/2022  9:30 AM RAOUL Butler - CNP [address]

## 2021-10-12 ENCOUNTER — APPOINTMENT (OUTPATIENT)
Dept: PHYSICAL THERAPY | Age: 71
End: 2021-10-12
Payer: MEDICARE

## 2021-10-14 ENCOUNTER — APPOINTMENT (OUTPATIENT)
Dept: PHYSICAL THERAPY | Age: 71
End: 2021-10-14
Payer: MEDICARE

## 2021-10-19 ENCOUNTER — HOSPITAL ENCOUNTER (OUTPATIENT)
Dept: PHYSICAL THERAPY | Age: 71
Setting detail: THERAPIES SERIES
Discharge: HOME | End: 2021-10-19
Payer: MEDICARE

## 2021-10-19 PROCEDURE — 97110 THERAPEUTIC EXERCISES: CPT

## 2021-10-19 PROCEDURE — 97140 MANUAL THERAPY 1/> REGIONS: CPT

## 2021-10-19 ASSESSMENT — PAIN DESCRIPTION - LOCATION: LOCATION: NECK;BACK

## 2021-10-19 NOTE — PROGRESS NOTES
Physical Therapy  Daily Treatment Note  Date: 10/19/2021  Patient Name: Chris Brody  MRN: [id number]     :   1950    Subjective:   General  Chart Reviewed: Yes  Additional Pertinent Hx: bilat TKA L , R , DM II  Family / Caregiver Present: No  Referring Practitioner: Dr. Clark Giordano  PT Visit Information  Total # of Visits Approved:  (8-10)  Total # of Visits to Date: 2  Plan of Care/Certification Expiration Date: 21  No Show: 0  Canceled Appointment: 0  Subjective  Subjective: Pt states she has been riding the bike daily and doing her HEP. Pt reports she has been not sleeping with her arms under her head in flexion and states it has helped dec her pain. Pt states she gets radicular symptoms when she sits for longer periods.    Pain Screening  Patient Currently in Pain: Yes  Pain Assessment  Pain Assessment: 0-10  Pain Level:  (3/10 in neck, 5/10 back )  Pain Location: Neck;Back  Vital Signs  Patient Currently in Pain: Yes       Treatment Activities:   Manual therapy  Joint mobilization: suboccipital release to cervical area   PROM: PROM to cervical area; side bends, rotation and flex/ext to inc ROM (no c/o UE symptoms)  Soft Tissue Mobalization: MFR to lumbar and thoracic area to dec tightness post ther ex and inc ROM                                  Exercises  Exercise 1: supine 90/90 hamstring stretch H 30 sec x 3  Exercise 2: Diagonal knee to chest pirifromis stretch H 20-30 sec x 3  Exercise 3: Pelvic tilt x 5-10 reps H 3  Exercise 5: sidelying IT band stretch; H30'' x 3   Exercise 6: cervical rotation; H5'' x 10   Exercise 7: cervical side bend; H10'' x 10   Exercise 8: cervical flexion/ext; H5'' x 10   Exercise 9: doorway stretch; H20'' x 3   Exercise 10: high rows; H3'' x 10 RTB   Exercise 11: B shoulder ext; H3'' x 10 YTB         Manual therapy  Joint mobilization: suboccipital release to cervical area   PROM: PROM to cervical area; side bends, rotation and flex/ext to inc ROM (no c/o UE symptoms)  Soft Tissue Mobalization: MFR to lumbar and thoracic area to dec tightness post ther ex and inc ROM                          Assessment:   Conditions Requiring Skilled Therapeutic Intervention  Body structures, Functions, Activity limitations: Decreased functional mobility ; Decreased ROM; Decreased strength  Assessment: Pt able to perform core and upper back strengthening with no c/o pain. Some reports of \"pulling\" in low back with band ther ex but decreased with tactile and VC's for form and emphasis on abdominal bracing. Pt educated on neck stretches and issued for HEP with written handout. PROM and suboccipital release to cervical area with no pain. MFR to low back to dec tightness and pain. Pain level 0/10 in neck and 1/10 in low back post. Pt declined ice. Continue with POC. Treatment Diagnosis: lumbar radiculopathy, cervical radiculopathy  REQUIRES PT FOLLOW UP: Yes      G-Code:     OutComes Score                                                     Goals:  Short term goals  Time Frame for Short term goals: 2 wks  Short term goal 1: I with HEP and report compliance with HEP 5/7 days or greater  Short term goal 2: Pt report any decrease in back pain during ADLs  Short term goal 3: Improve hamstring flexibility by 5 deg or greater R and L  Long term goals  Time Frame for Long term goals : 4-5 weeks  Long term goal 1: AROM lumbar spine WFL all planes without reproducing bilat LE sxs; improve cerv spine rot 70 deg, bilat SB 30 deg without pain  Long term goal 2: Improve Oswestry 15% or less  Long term goal 3: Improve bilat hip strength 4+/5 and core strength any amount to decrease strain on spine  Long term goal 4: Pt perform ADLs including community ambulation, housework with 1-2/10 or less neck and back pain majority of the day  Long term goal 5: Pt report 75% decrease in L UE and bilat LE radicular sxs  Patient Goals   Patient goals :  To be out of pain and move better    Plan:      Plan  Times per week: 2  Plan weeks: 4-5  Current Treatment Recommendations: Strengthening, ROM, Functional Mobility Training, Modalities, Home Exercise Program, Manual Therapy - Soft Tissue Mobilization        Therapy Time   Individual Concurrent Group Co-treatment   Time In  1100         Time Out  1200         Minutes  Rock Clinton PTA  License and [address] Number: [id number]

## 2021-10-21 ENCOUNTER — HOSPITAL ENCOUNTER (OUTPATIENT)
Dept: PHYSICAL THERAPY | Age: 71
Setting detail: THERAPIES SERIES
Discharge: HOME | End: 2021-10-21
Payer: MEDICARE

## 2021-10-21 PROCEDURE — 97110 THERAPEUTIC EXERCISES: CPT

## 2021-10-21 PROCEDURE — 97140 MANUAL THERAPY 1/> REGIONS: CPT

## 2021-10-21 ASSESSMENT — PAIN DESCRIPTION - LOCATION: LOCATION: NECK;BACK

## 2021-10-21 NOTE — PROGRESS NOTES
Physical Therapy  Daily Treatment Note  Date: 10/21/2021  Patient Name: Jeri Muñoz  MRN: [id number]     :   1950    Subjective:   General  Chart Reviewed: Yes  Additional Pertinent Hx: bilat TKA L , R , DM II  Family / Caregiver Present: No  Referring Practitioner: Dr. Nati Valiente  PT Visit Information  Total # of Visits Approved:  (8-10)  Total # of Visits to Date: 3  Plan of Care/Certification Expiration Date: 21  No Show: 0  Canceled Appointment: 0  Subjective  Subjective: Pt reports low back pain rated 3/10. Pt states she has been going slower with her neck and it really seems to keep the pain down. Pain Screening  Patient Currently in Pain: Yes  Pain Assessment  Pain Assessment: 0-10  Pain Level:  (1/10 in neck and 3/10 in low back )  Pain Location: Neck;Back  Vital Signs  Patient Currently in Pain: Yes       Treatment Activities:   Manual therapy  PROM: PROM to cervical area; side bends, rotation and flex/ext to inc ROM (no c/o UE symptoms)                                  Exercises  Exercise 2: Diagonal knee to chest pirifromis stretch H 20-30 sec x 3  Exercise 3: Pelvic tilt x 10 reps H5  Exercise 4: scap retraction; H5'' x 10   Exercise 6: cervical rotation; H5'' x 10   Exercise 7: cervical side bend; H10'' x 10   Exercise 10: high rows; H5'' x 10 RTB   Exercise 11: B shoulder ext; H5'' x 10 YTB   Exercise 12: cervical retraction; H3'' x 10    Exercise 13: bridge; H3'' x 7 (c/o pain and thus deferred)  Exercise 14: LTR; H10'' x 10   Exercise 15: heel slides with abdominal bracing; x 10         Manual therapy  PROM: PROM to cervical area; side bends, rotation and flex/ext to inc ROM (no c/o UE symptoms)                          Assessment:   Conditions Requiring Skilled Therapeutic Intervention  Body structures, Functions, Activity limitations: Decreased functional mobility ; Decreased ROM; Decreased strength  Assessment: Pt needing VC's throughout session as well as demo and tactile cues for inc form with band postural ther ex as pt tends to extend at low back. Inc form after cues and no dec c/o discomfort in back. Addition of heel slides and bridges this date with some initial c/o pain which subsided post LTR and piriformis stretches. PROM to cervical area to dec tightness and inc ROM. Pain level 0/10 in neck post and 1/10 in low back. Continue to progress as mary ellen. Treatment Diagnosis: lumbar radiculopathy, cervical radiculopathy  REQUIRES PT FOLLOW UP: Yes      G-Code:     OutComes Score                                                     Goals:  Short term goals  Time Frame for Short term goals: 2 wks  Short term goal 1: I with HEP and report compliance with HEP 5/7 days or greater  Short term goal 2: Pt report any decrease in back pain during ADLs  Short term goal 3: Improve hamstring flexibility by 5 deg or greater R and L  Long term goals  Time Frame for Long term goals : 4-5 weeks  Long term goal 1: AROM lumbar spine WFL all planes without reproducing bilat LE sxs; improve cerv spine rot 70 deg, bilat SB 30 deg without pain  Long term goal 2: Improve Oswestry 15% or less  Long term goal 3: Improve bilat hip strength 4+/5 and core strength any amount to decrease strain on spine  Long term goal 4: Pt perform ADLs including community ambulation, housework with 1-2/10 or less neck and back pain majority of the day  Long term goal 5: Pt report 75% decrease in L UE and bilat LE radicular sxs  Patient Goals   Patient goals :  To be out of pain and move better    Plan:        Plan  Times per week: 2  Plan weeks: 4-5  Current Treatment Recommendations: Strengthening, ROM, Functional Mobility Training, Modalities, Home Exercise Program, Manual Therapy - Soft Tissue Mobilization        Therapy Time   Individual Concurrent Group Co-treatment   Time In  1100         Time Out  1200         Minutes  Rock Ellis PTA  License and Documentation Cosign  Therapy License Number: [id number]

## 2021-10-25 ENCOUNTER — HOSPITAL ENCOUNTER (OUTPATIENT)
Dept: PHYSICAL THERAPY | Age: 71
Setting detail: THERAPIES SERIES
Discharge: HOME | End: 2021-10-25
Payer: MEDICARE

## 2021-10-25 PROCEDURE — 97140 MANUAL THERAPY 1/> REGIONS: CPT

## 2021-10-25 PROCEDURE — 97110 THERAPEUTIC EXERCISES: CPT

## 2021-10-25 ASSESSMENT — PAIN DESCRIPTION - DESCRIPTORS: DESCRIPTORS: ACHING

## 2021-10-25 ASSESSMENT — PAIN DESCRIPTION - ORIENTATION: ORIENTATION: RIGHT;LEFT

## 2021-10-25 ASSESSMENT — PAIN SCALES - GENERAL: PAINLEVEL_OUTOF10: 4

## 2021-10-25 ASSESSMENT — PAIN DESCRIPTION - LOCATION: LOCATION: BACK;NECK

## 2021-10-25 NOTE — PROGRESS NOTES
Physical Therapy  Daily Treatment Note  Date: 10/25/2021  Patient Name: Jaye Moon  MRN: [id number]     :   1950    Subjective:   General  Chart Reviewed: Yes  Additional Pertinent Hx: bilat TKA L , R , DM II  Family / Caregiver Present: No  Referring Practitioner: Dr. Parish Rodriguez  PT Visit Information  Total # of Visits Approved: 10 (8-10)  Total # of Visits to Date: 4  Plan of Care/Certification Expiration Date: 21  No Show: 0  Canceled Appointment: 0  Subjective  Subjective: Pt reports 4/10 \"achy\" low back pain and  1/10 \"stiff\" neck pain   Pain Screening  Patient Currently in Pain: Yes  Pain Assessment  Pain Assessment: 0-10  Pain Level: 4 (4/10 low back and 1/10 neck pain )  Pain Location: Back;Neck  Pain Orientation: Right;Left  Pain Descriptors: Aching (stiffness )  Vital Signs  Patient Currently in Pain: Yes       Treatment Activities:   Manual therapy  Joint mobilization: suboccipital release to cervical area and cervical distraction with rotation right and left with no reports of pain or S/S down her arm. PROM: PROM to cervical area with rotation to inc ROM (no c/o UE symptoms)           Exercises  Exercise 3: Pelvic tilt x 10 reps H5  Exercise 7: UT stretch x 3 H20-30   Exercise 10: mid rows; H5'' x 10 RTB   Exercise 11: B shoulder ext; H5'' x 10 YTB   Exercise 14: LTR; H10'' x 10   Exercise 15: SLR with PPT x 10 BLE   Exercise 16: SKTC x 3 H20   Exercise 17: Post shoulder stretch x 3 H20   Exercise 20:          Manual therapy  Joint mobilization: suboccipital release to cervical area and cervical distraction with rotation right and left with no reports of pain or S/S down her arm. PROM: PROM to cervical area with rotation to inc ROM (no c/o UE symptoms)                          Assessment:   Conditions Requiring Skilled Therapeutic Intervention  Body structures, Functions, Activity limitations: Decreased functional mobility ; Decreased ROM; Decreased strength  Assessment: Pt reports having 4/10 \"achy\" back pain at beginning of tx. Pt started with stretches and reports \"This feels so good\". Pt needed some cues to use pain as her guide to modify her exercises if needed. Pt able to progress with strengthening in supine with no increase in low back pain. Pt then performed manual for pts cervical spine and then pt performed seated stretches with good for and reports of feeling tightness with UT stretch. Pt then performed Tband ther ex needing modification due to pain with shoulder extension. At end of session, pt reports feeling better and less sore than when she came to therapy. \"I always get such great care when I come here. \" Plan to progress as able. Treatment Diagnosis: lumbar radiculopathy, cervical radiculopathy  REQUIRES PT FOLLOW UP: Yes      G-Code:     OutComes Score                                                     Goals:  Short term goals  Time Frame for Short term goals: 2 wks  Short term goal 1: I with HEP and report compliance with HEP 5/7 days or greater  Short term goal 2: Pt report any decrease in back pain during ADLs  Short term goal 3: Improve hamstring flexibility by 5 deg or greater R and L  Long term goals  Time Frame for Long term goals : 4-5 weeks  Long term goal 1: AROM lumbar spine WFL all planes without reproducing bilat LE sxs; improve cerv spine rot 70 deg, bilat SB 30 deg without pain  Long term goal 2: Improve Oswestry 15% or less  Long term goal 3: Improve bilat hip strength 4+/5 and core strength any amount to decrease strain on spine  Long term goal 4: Pt perform ADLs including community ambulation, housework with 1-2/10 or less neck and back pain majority of the day  Long term goal 5: Pt report 75% decrease in L UE and bilat LE radicular sxs  Patient Goals   Patient goals :  To be out of pain and move better    Plan:       Frequency and duration of tx  Days: 2  Weeks: 4 (4-6)     Therapy Time   Individual Concurrent Group Co-treatment   Time In 10:00am          Time Out  11:00am          Minutes  60 min                  Floretta Sandhoff, PT  License and Documentation Cosign  Therapy License Number: [id number]

## 2021-10-27 ENCOUNTER — HOSPITAL ENCOUNTER (OUTPATIENT)
Dept: PHYSICAL THERAPY | Age: 71
Setting detail: THERAPIES SERIES
Discharge: HOME | End: 2021-10-27
Payer: MEDICARE

## 2021-10-27 PROCEDURE — 97140 MANUAL THERAPY 1/> REGIONS: CPT

## 2021-10-27 PROCEDURE — 97110 THERAPEUTIC EXERCISES: CPT

## 2021-10-27 NOTE — PROGRESS NOTES
Physical Therapy  Daily Treatment Note  Date: 10/27/2021  Patient Name: Toni King  MRN: [id number]     :   1950    Subjective:   General  Chart Reviewed: Yes  Additional Pertinent Hx: bilat TKA L , R , DM II  Family / Caregiver Present: No  Referring Practitioner: Dr. Geetha Davis  PT Visit Information  Total # of Visits Approved: 10 (8-10)  Total # of Visits to Date: 6  Plan of Care/Certification Expiration Date: 21  No Show: 0  Canceled Appointment: 0  Subjective  Subjective: Pt. denies pain this date. Pt. states she has been doing her exercises.    Pain Screening  Patient Currently in Pain: No  Vital Signs  Patient Currently in Pain: No       Treatment Activities:   Manual therapy  PROM: PROM to cervical area with rotation and SB  to inc ROM (no c/o UE symptoms)  Manual traction: gentle cervical distraction                                   Exercises  Exercise 1: supine 90/90 hamstring stretch H 30 sec x 3  Exercise 2: Diagonal knee to chest pirifromis stretch H 20-30 sec x 3  Exercise 3: Pelvic tilt x 10 reps H5  Exercise 4: scap retraction; H5'' x 10   Exercise 6: cervical rotation; H5'' x 10   Exercise 7: UT stretch x 3 H20-30   Exercise 9: doorway stretch; H20-3'' x 3   Exercise 10: mid rows; H5'' x 10 RTB  (staggered stance and abdominal bracing )  Exercise 11: B shoulder ext; H5'' x 10 YTB  (staggered stance and abdominal bracing )  Exercise 12: cervical retraction; H3'' x 10    Exercise 13: bridge hold 2-3 sec x 5  inital LLE pan /cramping but able to perform few reps with smaller ROM   Exercise 15: SLR with PPT x 10 BLE hold 3 sec   Exercise 16: SKTC x 3 H30         Manual therapy  PROM: PROM to cervical area with rotation and SB  to inc ROM (no c/o UE symptoms)  Manual traction: gentle cervical distraction                           Assessment:   Conditions Requiring Skilled Therapeutic Intervention  Body structures, Functions, Activity limitations: Decreased functional mobility ;Decreased ROM; Decreased strength  Assessment: Pt. cristobal good recall of HEP thus far. Able to progress HEP with addition of postural strengthening exercises. Pt. tolerates LIDIA this date with no pain. Reviwed sleeping positions with pt for inc nuetral spine. Treatment Diagnosis: lumbar radiculopathy, cervical radiculopathy  Prognosis: Good  REQUIRES PT FOLLOW UP: Yes      G-Code:     OutComes Score                                                     Goals:  Short term goals  Time Frame for Short term goals: 2 wks  Short term goal 1: I with HEP and report compliance with HEP 5/7 days or greater  Short term goal 2: Pt report any decrease in back pain during ADLs  Short term goal 3: Improve hamstring flexibility by 5 deg or greater R and L  Long term goals  Time Frame for Long term goals : 4-5 weeks  Long term goal 1: AROM lumbar spine WFL all planes without reproducing bilat LE sxs; improve cerv spine rot 70 deg, bilat SB 30 deg without pain  Long term goal 2: Improve Oswestry 15% or less  Long term goal 3: Improve bilat hip strength 4+/5 and core strength any amount to decrease strain on spine  Long term goal 4: Pt perform ADLs including community ambulation, housework with 1-2/10 or less neck and back pain majority of the day  Long term goal 5: Pt report 75% decrease in L UE and bilat LE radicular sxs  Patient Goals   Patient goals :  To be out of pain and move better    Plan:       Frequency and duration of tx  Days: 2  Weeks: 4 (4-6)     Therapy Time   Individual Concurrent Group Co-treatment   Time In  1000         Time Out  [address], [place]  License and [address] [address] Number: [id number]

## 2021-11-02 ENCOUNTER — TELEPHONE (OUTPATIENT)
Dept: CASE MANAGEMENT | Age: 71
End: 2021-11-02

## 2021-11-02 NOTE — TELEPHONE ENCOUNTER
Physician documentation on smoking history and CT Lung Screening reviewed. All required documentation complete. Patient is a former smoker (quit 2020) with a 26 pack year history ( .5 ppd x 52 years) per physician documentation.

## 2021-11-03 ENCOUNTER — HOSPITAL ENCOUNTER (OUTPATIENT)
Dept: PHYSICAL THERAPY | Age: 71
Setting detail: THERAPIES SERIES
Discharge: HOME | End: 2021-11-03
Payer: MEDICARE

## 2021-11-03 PROCEDURE — 97530 THERAPEUTIC ACTIVITIES: CPT

## 2021-11-03 PROCEDURE — 97110 THERAPEUTIC EXERCISES: CPT

## 2021-11-03 ASSESSMENT — PAIN SCALES - GENERAL: PAINLEVEL_OUTOF10: 2

## 2021-11-03 ASSESSMENT — PAIN DESCRIPTION - DESCRIPTORS: DESCRIPTORS: ACHING

## 2021-11-03 ASSESSMENT — PAIN DESCRIPTION - ORIENTATION: ORIENTATION: RIGHT;LEFT

## 2021-11-03 ASSESSMENT — PAIN DESCRIPTION - LOCATION: LOCATION: BACK

## 2021-11-03 NOTE — PROGRESS NOTES
Physical Therapy  Discharge Note      Date: 11/3/2021  Patient Name: Rene Brown  MRN: [id number]     :   1950    Subjective:   General  Chart Reviewed: Yes  Additional Pertinent Hx: bilat TKA L , R , DM II  Family / Caregiver Present: No  Referring Practitioner: Dr. Danielle Wright  PT Visit Information  Total # of Visits Approved: 10 (7 current visits )  Total # of Visits to Date: 7  Plan of Care/Certification Expiration Date: 21  No Show: 0  Canceled Appointment: 0  Subjective  Subjective: Pt reports 2/10 \"achy/sore\" after driving 4 hrs. General Comment  Comments: DC today due to being indep  with HEP   Pain Screening  Patient Currently in Pain: Yes  Pain Assessment  Pain Assessment: 0-10  Pain Level: 2  Pain Location: Back  Pain Orientation: Right;Left  Pain Descriptors: Aching (stiffness )  Vital Signs  Patient Currently in Pain: Yes       Treatment Activities:                                 Strength RLE  R Hip Flexion: 4+/5  R Hip ABduction: 4+/5  R Hip ADduction: 4+/5  R Knee Flexion: 4+/5  R Knee Extension: 4+/5  R Ankle Dorsiflexion: 4+/5  R Ankle Plantar flexion: 4+/5  Strength LLE  L Hip Flexion: 4+/5  L Hip ABduction: 4+/5  L Hip ADduction: 4+/5  L Knee Flexion: 4+/5  L Knee Extension: 4+/5  L Ankle Dorsiflexion: 4+/5  L Ankle Plantar Flexion: 4+/5    Exercises  Exercise 1: supine 90/90 hamstring stretch H 30 sec x 3  Exercise 2: Diagonal knee to chest pirifromis stretch H 20-30 sec x 3  Exercise 3: Pelvic tilt x 10 reps H5  Exercise 13: bridge hold 2-3 sec x 10  Exercise 14: LTR; H10'' x 10   Exercise 16: SKTC x 3 H30                                    Assessment:   Conditions Requiring Skilled Therapeutic Intervention  Body structures, Functions, Activity limitations: Decreased functional mobility ; Decreased ROM; Decreased strength  Assessment: Pt reports that she is doing very well and that she is ready for DC today.  PT completed DC measures and reviewed exercises with pt and how

## 2021-11-04 ENCOUNTER — TELEPHONE (OUTPATIENT)
Dept: FAMILY MEDICINE CLINIC | Age: 71
End: 2021-11-04

## 2021-11-04 NOTE — TELEPHONE ENCOUNTER
Patient contacted for AWV. No answer, message left for patient to call the office back to have AWV scheduled.

## 2021-11-05 ENCOUNTER — APPOINTMENT (OUTPATIENT)
Dept: PHYSICAL THERAPY | Age: 71
End: 2021-11-05
Payer: MEDICARE

## 2021-11-08 ENCOUNTER — HOSPITAL ENCOUNTER (OUTPATIENT)
Dept: CT IMAGING | Age: 71
Discharge: HOME | End: 2021-11-10
Payer: MEDICARE

## 2021-11-08 DIAGNOSIS — F17.200 SMOKER: ICD-10-CM

## 2021-11-08 PROCEDURE — 71271 CT THORAX LUNG CANCER SCR C-: CPT

## 2021-12-13 ENCOUNTER — HOSPITAL ENCOUNTER (OUTPATIENT)
Dept: SLEEP CENTER | Age: 71
Discharge: HOME | End: 2021-12-15
Payer: MEDICARE

## 2021-12-13 PROCEDURE — 95810 POLYSOM 6/> YRS 4/> PARAM: CPT | Performed by: INTERNAL MEDICINE

## 2021-12-13 PROCEDURE — 95810 POLYSOM 6/> YRS 4/> PARAM: CPT

## 2021-12-30 DIAGNOSIS — G47.33 OSA (OBSTRUCTIVE SLEEP APNEA): ICD-10-CM

## 2021-12-30 DIAGNOSIS — G47.33 OSA (OBSTRUCTIVE SLEEP APNEA): Primary | ICD-10-CM

## 2022-01-11 ENCOUNTER — OFFICE VISIT (OUTPATIENT)
Dept: PULMONOLOGY | Age: 72
End: 2022-01-11
Payer: MEDICARE

## 2022-01-11 VITALS
TEMPERATURE: 97.1 F | WEIGHT: 180 LBS | BODY MASS INDEX: 33.13 KG/M2 | HEIGHT: 62 IN | HEART RATE: 78 BPM | DIASTOLIC BLOOD PRESSURE: 68 MMHG | SYSTOLIC BLOOD PRESSURE: 139 MMHG | OXYGEN SATURATION: 97 %

## 2022-01-11 DIAGNOSIS — E66.9 OBESITY (BMI 30-39.9): ICD-10-CM

## 2022-01-11 DIAGNOSIS — U07.1 COVID-19: ICD-10-CM

## 2022-01-11 DIAGNOSIS — G47.33 OSA (OBSTRUCTIVE SLEEP APNEA): Primary | ICD-10-CM

## 2022-01-11 PROBLEM — E11.9 TYPE 2 DIABETES MELLITUS (HCC): Status: ACTIVE | Noted: 2022-01-11

## 2022-01-11 PROCEDURE — 99214 OFFICE O/P EST MOD 30 MIN: CPT | Performed by: INTERNAL MEDICINE

## 2022-01-11 ASSESSMENT — ENCOUNTER SYMPTOMS
RHINORRHEA: 0
VOMITING: 0
ABDOMINAL PAIN: 0
EYE ITCHING: 0
SHORTNESS OF BREATH: 0
SINUS PRESSURE: 0
WHEEZING: 0
COUGH: 0
CHEST TIGHTNESS: 0
TROUBLE SWALLOWING: 0
SORE THROAT: 0
NAUSEA: 0
VOICE CHANGE: 0
DIARRHEA: 0
EYE DISCHARGE: 0

## 2022-01-11 NOTE — PROGRESS NOTES
Subjective:     Kash Mcmanus is a 73 y.o. female who complains today of:     Chief Complaint   Patient presents with    Sleep Apnea     6 month f/u       HPI  She has PSG done on 21  an shows AMANDA. AHI  5.7  RDI 7.3  And hypoxia during sleep   CPAP titration study not done   She was on CPAP for 10 yrs and stop working for last 6 month . She is willing to go for CPAP titration study ,   She is not sleeping well without CPAP . Occasional daytime sleepiness and tiredness. She said she had covid in dec 23, last year. After 10 days she was negative. Allergies:   Other, Influenza vaccines, and Seasonal  Past Medical History:   Diagnosis Date    Allergic rhinitis     Anxiety     History of blood transfusion     blood transfusions with c section x 3    Hyperlipidemia     meds > 2 yrs    Osteoarthritis     Sleep apnea     Type 2 diabetes mellitus 2022     Past Surgical History:   Procedure Laterality Date    ANKLE SURGERY Right 2000    tendon repair     SECTION   &  &     pt has had 3     COLONOSCOPY      COLONOSCOPY N/A 2020    COLONOSCOPY performed [facility], COLON, DIAGNOSTIC      TOTAL KNEE ARTHROPLASTY Left 2019    LEFT TOTAL KNEE ARTHROPLASTY, SUPINE, 23 HR OBS, IRAJ CEMENTED PERSONA performed by Cezar Fletcher MD at [address] Right 3/13/2020    RIGHT TOTAL KNEE  ARTHROPLASTY performed by Cezar Fletcher MD at [place] [address] History   Problem Relation Age of Onset    Heart Disease Mother     High Blood Pressure Mother     Vision Loss Mother     Other Mother         leiden factor 5    Diabetes Father     Heart Disease Father     Depression Sister     Diabetes Sister     Obesity Sister     Diabetes Brother     Arthritis Brother     Other Brother         leiden factor 5    Allergy (Severe) Sister     Arthritis Sister     Depression Sister     Arthritis Sister     Depression Sister     Diabetes Sister     Arthritis Sister     Depression Sister     Arthritis Sister     Arthritis Sister     Arthritis Sister     Arthritis Brother     Arthritis Brother     Arthritis Brother     Thyroid Disease Daughter     No Known Problems Son      Social History     Socioeconomic History    Marital status: Single     Spouse name: Not on file    Number of children: 3    Years of education: 25    Highest education level: Master's degree (e.g., MA, MS, Sybil, MEd, MSW, MIRTA)   Occupational History    Occupation: Teacher    Tobacco Use    Smoking status: Former Smoker     Packs/day: 0.50     Years: 52.00     Pack years: 26.00     Types: Cigarettes     Start date:      Quit date: 2020     Years since quittin.1    Smokeless tobacco: Never Used   Vaping Use    Vaping Use: Never used   Substance and Sexual Activity    Alcohol use:  Yes     Alcohol/week: 1.0 standard drink     Types: 1 Shots of liquor per week     Comment: Once per month    Drug use: No    Sexual activity: Not Currently     Partners: Male   Other Topics Concern    Not on file   Social History Narrative    Not on file     Social Determinants of Health     Financial Resource Strain: Unknown    Difficulty of Paying Living Expenses: Patient refused   Food Insecurity: Unknown    Worried About Running Out of Food in the Last Year: Patient refused    Ran Out of Food in the Last Year: Patient refused   Transportation Needs: Unknown    Lack of Transportation (Medical): Patient refused    Lack of Transportation (Non-Medical): Patient refused   Physical Activity: Unknown    Days of Exercise per Week: Patient refused    Minutes of Exercise per Session: Patient refused   Stress: Unknown    Feeling of Stress : Patient refused   Social Connections: Unknown    Frequency of Communication with Friends and Family: Patient refused    Frequency of Social Gatherings with Friends and Family: Patient refused    Attends Jewish Services: Patient refused    Active Member of Clubs or Organizations: Patient refused    Attends Club or Organization Meetings: Patient refused    Marital Status: Patient refused   Intimate Partner Violence: Unknown    Fear of Current or Ex-Partner: Patient refused    Emotionally Abused: Patient refused    Physically Abused: Patient refused    Sexually Abused: Patient refused   Housing Stability: Unknown    Unable to Pay for Housing in the Last Year: Patient refused    Number of Places Lived in the Last Year: Not on file    Unstable Housing in the Last Year: Patient refused         Review of Systems   Constitutional: Negative for chills, diaphoresis, fatigue and fever. HENT: Negative for congestion, mouth sores, nosebleeds, postnasal drip, rhinorrhea, sinus pressure, sneezing, sore throat, trouble swallowing and voice change. Eyes: Negative for discharge, itching and visual disturbance. Respiratory: Negative for cough, chest tightness, shortness of breath and wheezing. Cardiovascular: Negative for chest pain, palpitations and leg swelling. Gastrointestinal: Negative for abdominal pain, diarrhea, nausea and vomiting. Genitourinary: Negative for difficulty urinating and hematuria. Musculoskeletal: Negative for arthralgias, joint swelling and myalgias. Skin: Negative for rash. Allergic/Immunologic: Negative for environmental allergies and food allergies. Neurological: Negative for dizziness, tremors, weakness and headaches. Psychiatric/Behavioral: Positive for sleep disturbance. Negative for behavioral problems.          :     Vitals:    01/11/22 1035   BP: 139/68   Pulse: 78   Temp: 97.1 °F (36.2 °C)   SpO2: 97%   Weight: 180 lb (81.6 kg)   Height: 5' 2\" (1.575 m)     Wt Readings from Last 3 Encounters:   01/11/22 180 lb (81.6 kg)   09/28/21 175 lb (79.4 kg)   09/16/21 175 lb (79.4 kg)         Physical Exam  Constitutional:       General: She is not in acute distress. Appearance: She is well-developed. She is obese. She is not diaphoretic. HENT:      Head: Normocephalic and atraumatic. Nose: Nose normal.   Eyes:      Pupils: Pupils are equal, round, and reactive to light. Neck:      Thyroid: No thyromegaly. Vascular: No JVD. Trachea: No tracheal deviation. Cardiovascular:      Rate and Rhythm: Normal rate and regular rhythm. Heart sounds: No murmur heard. No friction rub. No gallop. Pulmonary:      Effort: No respiratory distress. Breath sounds: No wheezing or rales. Chest:      Chest wall: No tenderness. Abdominal:      General: There is no distension. Tenderness: There is no abdominal tenderness. There is no rebound. Musculoskeletal:         General: Normal range of motion. Lymphadenopathy:      Cervical: No cervical adenopathy. Skin:     General: Skin is warm and dry. Neurological:      Mental Status: She is alert and oriented to person, place, and time. Coordination: Coordination normal.         Current Outpatient Medications   Medication Sig Dispense Refill    citalopram (CELEXA) 20 MG tablet TAKE 1 TABLET DAILY 90 tablet 4    meloxicam (MOBIC) 15 MG tablet Take 1 tablet by mouth daily 90 tablet 2    Alcohol Swabs PADS DX: diabetes mellitus. Test 1-3 time(s) daily - Ok to substitute per insurance (1 each = 1 box) 1 each 5    blood glucose monitor kit and supplies DX: diabetes mellitus. Test 1-3 time(s) daily - Ok to substitute per insurance 1 kit 0    Lancets MISC DX: diabetes mellitus. Use 1-3 time(s) daily - Ok to substitute per insurance (1 each = 1 box) 1 each 5    blood glucose monitor strips DX: diabetes mellitus.  Use 3 time(s) daily - Ok to substitute per insurance 100 strip 5    rosuvastatin (CRESTOR) 10 MG tablet TAKE 1 TABLET DAILY 90 tablet 4    metFORMIN (GLUCOPHAGE-XR) 500 MG extended release tablet Take 1 tablet by mouth daily (with breakfast) 90 tablet 3    amoxicillin (AMOXIL) 500 MG capsule TAKE 4 TABLETS 1 HOUR PRIOR TO PROCEDURE 4 capsule 0    Phytosterols 450 MG TABS Take 1 tablet by mouth daily 30 tablet 2    Multiple Vitamins-Minerals (WOMENS MULTIVITAMIN PO) Take 1 tablet by mouth daily      fluticasone (FLONASE) 50 MCG/ACT nasal spray 1 spray by Each Nare route daily      CPAP Machine MISC by Does not apply route      fexofenadine (ALLEGRA) 180 MG tablet Take 180 mg by mouth daily      Respiratory Therapy Supplies [facility] New CPAP mask and supplies 1 Device 0     No current facility-administered medications for this visit. Results for orders placed during the hospital encounter of 20    XR CHEST STANDARD (2 VW)    Narrative  Patient MRN: [id number]    : 1950    Age:  81 years    Gender: Female    Order Date: 2020 5:14 PM.    Exam: XR CHEST (2 VW)    Number of Views: 2    Indication:  Cough    Comparison: None    Findings: Cardiomediastinal silhouette is within normal limits. Lungs are clear without evidence of infiltrate/pneumonia, pneumothorax or pleural effusion    Impression  Impression:  No radiographic evidence of acute cardiopulmonary disease      Assessment/Plan:     1. AMANDA (obstructive sleep apnea)  She has PSG done on 21  an shows AMANDA. AHI  5.7  RDI 7.3  And hypoxia during sleep   CPAP titration study not done She was on CPAP for 10 yrs and stop working for last 6 month . She is willing to go for CPAP titration study , She is not sleeping well without CPAP . Occasional daytime sleepiness and tiredness. CPAP study requested    - Sleep Study with PAP Titration    2. Obesity (BMI 30-39. 9)  She is advised try to lose weight. obesity related risk explained to the patient ,  Current weight:  180 lb (81.6 kg) Lbs. BMI:  Body mass index is 32.92 kg/m². Suggested weight control approaches, including dietary changes , exercise, behavioral modification.     3. COVID-19  She had covid  3 weeks ago , she has symptoms of head cold but asymptomatic now. Return in about 4 weeks (around 2/8/2022) for lewis, sleep study.       Ravin Vidal MD

## 2022-01-24 DIAGNOSIS — E11.9 TYPE 2 DIABETES MELLITUS WITHOUT COMPLICATION, WITHOUT LONG-TERM CURRENT USE OF INSULIN (HCC): ICD-10-CM

## 2022-01-24 RX ORDER — METFORMIN HYDROCHLORIDE 500 MG/1
500 TABLET, EXTENDED RELEASE ORAL
Qty: 90 TABLET | Refills: 3 | Status: SHIPPED | OUTPATIENT
Start: 2022-01-24 | End: 2022-03-14 | Stop reason: ALTCHOICE

## 2022-01-27 ENCOUNTER — HOSPITAL ENCOUNTER (OUTPATIENT)
Dept: SLEEP CENTER | Age: 72
Discharge: HOME | End: 2022-01-29
Payer: MEDICARE

## 2022-01-27 PROCEDURE — 95811 POLYSOM 6/>YRS CPAP 4/> PARM: CPT

## 2022-02-21 PROCEDURE — 95811 POLYSOM 6/>YRS CPAP 4/> PARM: CPT | Performed by: INTERNAL MEDICINE

## 2022-03-03 ENCOUNTER — OFFICE VISIT (OUTPATIENT)
Dept: FAMILY MEDICINE CLINIC | Age: 72
End: 2022-03-03
Payer: MEDICARE

## 2022-03-03 DIAGNOSIS — E11.9 TYPE 2 DIABETES MELLITUS WITHOUT COMPLICATION, WITHOUT LONG-TERM CURRENT USE OF INSULIN (HCC): ICD-10-CM

## 2022-03-03 DIAGNOSIS — Z00.00 MEDICARE ANNUAL WELLNESS VISIT, SUBSEQUENT: Primary | ICD-10-CM

## 2022-03-03 PROCEDURE — G0439 PPPS, SUBSEQ VISIT: HCPCS

## 2022-03-03 ASSESSMENT — LIFESTYLE VARIABLES
HOW OFTEN DO YOU HAVE A DRINK CONTAINING ALCOHOL: MONTHLY OR LESS
HOW MANY STANDARD DRINKS CONTAINING ALCOHOL DO YOU HAVE ON A TYPICAL DAY: 1 OR 2

## 2022-03-03 ASSESSMENT — PATIENT HEALTH QUESTIONNAIRE - PHQ9
2. FEELING DOWN, DEPRESSED OR HOPELESS: 0
SUM OF ALL RESPONSES TO PHQ QUESTIONS 1-9: 0
SUM OF ALL RESPONSES TO PHQ QUESTIONS 1-9: 0
1. LITTLE INTEREST OR PLEASURE IN DOING THINGS: 0
SUM OF ALL RESPONSES TO PHQ QUESTIONS 1-9: 0
SUM OF ALL RESPONSES TO PHQ9 QUESTIONS 1 & 2: 0
SUM OF ALL RESPONSES TO PHQ QUESTIONS 1-9: 0

## 2022-03-03 NOTE — PATIENT INSTRUCTIONS
Personalized Preventive Plan for Rolando Gallardo - 3/3/2022  Medicare offers a range of preventive health benefits. Some of the tests and screenings are paid in full while other may be subject to a deductible, co-insurance, and/or copay. Some of these benefits include a comprehensive review of your medical history including lifestyle, illnesses that may run in your family, and various assessments and screenings as appropriate. After reviewing your medical record and screening and assessments performed today your provider may have ordered immunizations, labs, imaging, and/or referrals for you. A list of these orders (if applicable) as well as your Preventive Care list are included within your After Visit Summary for your review. Other Preventive Recommendations:    · A preventive eye exam performed by an eye specialist is recommended every 1-2 years to screen for glaucoma; cataracts, macular degeneration, and other eye disorders. · A preventive dental visit is recommended every 6 months. · Try to get at least 150 minutes of exercise per week or 10,000 steps per day on a pedometer . · Order or download the FREE \"Exercise & Physical Activity: Your Everyday Guide\" from [employer]. Call 9-126.259.1113 or search [employer] online. · You need 7865-4803 mg of calcium and 6534-1906 IU of vitamin D per day. It is possible to meet your calcium requirement with diet alone, but a vitamin D supplement is usually necessary to meet this goal.  · When exposed to the sun, use a sunscreen that protects against both UVA and UVB radiation with an SPF of 30 or greater. Reapply every 2 to 3 hours or after sweating, drying off with a towel, or swimming. · Always wear a seat belt when traveling in a car. Always wear a helmet when riding a bicycle or motorcycle.

## 2022-03-03 NOTE — PROGRESS NOTES
Medicare Annual Wellness Visit    Jessica Howard is here for Medicare AWV    Assessment & Plan    Recommendations for Preventive Services Due: see orders and patient instructions/AVS.  Recommended screening schedule for the next 5-10 years is provided to the patient in written form: see Patient Instructions/AVS.     Return for Medicare Annual Wellness Visit in 1 year. Subjective   The following acute and/or chronic problems were also addressed today:  Type 2 DM Fasting blood glucose 135 this morning. Reports she has FU for A1C check next week. Patient's complete Health Risk Assessment and screening values have been reviewed and are found in Flowsheets. The following problems were reviewed today and where indicated follow up appointments were made and/or referrals ordered.     Positive Risk Factor Screenings with Interventions:               General Health and ACP:  General  In general, how would you say your health is?: Very Good  In the past 7 days, have you experienced any of the following: New or Increased Pain, New or Increased Fatigue, Loneliness, Social Isolation, Stress or Anger?: No  Do you get the social and emotional support that you need?: Yes  Do you have a Living Will?: Yes    Advance Directives     Power of  Living Will ACP-Advance Directive ACP-Power of     Not on File Not on File Not on File Not on File      General Health Risk Interventions:  · Poor self-assessment of health status: patient declines any further evaluation/treatment for this issue    Health Habits/Nutrition:     Physical Activity: Insufficiently Active    Days of Exercise per Week: 3 days    Minutes of Exercise per Session: 30 min     Have you lost any weight without trying in the past 3 months?: No     Have you seen the dentist within the past year?: Yes    Health Habits/Nutrition Interventions:  · Inadequate physical activity:  Patient reports she is beginning to walk every day             Objective mask and supplies  Anna Grewal MD       [facility] (Including outside providers/suppliers regularly involved in providing care):   Patient Care Team:  RAOUL Donohue CNP as PCP - General (Certified Nurse Practitioner)  RAOUL Donohue CNP as PCP - REHABILITATION [facility] Empaneled Provider    Reviewed and updated this visit:  Allergies

## 2022-03-07 ENCOUNTER — HOSPITAL ENCOUNTER (OUTPATIENT)
Dept: LAB | Age: 72
Discharge: HOME | End: 2022-03-07
Payer: MEDICARE

## 2022-03-07 DIAGNOSIS — E11.9 TYPE 2 DIABETES MELLITUS WITHOUT COMPLICATION, WITHOUT LONG-TERM CURRENT USE OF INSULIN (HCC): ICD-10-CM

## 2022-03-07 LAB
ALBUMIN SERPL-MCNC: 4.2 G/DL (ref 3.5–4.6)
ALP BLD-CCNC: 68 U/L (ref 40–130)
ALT SERPL-CCNC: 18 U/L (ref 0–33)
ANION GAP SERPL CALCULATED.3IONS-SCNC: 11 MEQ/L (ref 9–15)
AST SERPL-CCNC: 22 U/L (ref 0–35)
BASOPHILS ABSOLUTE: 0 K/UL (ref 0–0.2)
BASOPHILS RELATIVE PERCENT: 0.6 %
BILIRUB SERPL-MCNC: 0.4 MG/DL (ref 0.2–0.7)
BUN BLDV-MCNC: 17 MG/DL (ref 8–23)
CALCIUM SERPL-MCNC: 9.2 MG/DL (ref 8.5–9.9)
CHLORIDE BLD-SCNC: 106 MEQ/L (ref 95–107)
CHOLESTEROL, TOTAL: 133 MG/DL (ref 0–199)
CO2: 23 MEQ/L (ref 20–31)
CREAT SERPL-MCNC: 0.84 MG/DL (ref 0.5–0.9)
CREATININE URINE: 147.2 MG/DL
EOSINOPHILS ABSOLUTE: 0.2 K/UL (ref 0–0.7)
EOSINOPHILS RELATIVE PERCENT: 3 %
GFR AFRICAN AMERICAN: >60
GFR NON-AFRICAN AMERICAN: >60
GLOBULIN: 2.9 G/DL (ref 2.3–3.5)
GLUCOSE BLD-MCNC: 119 MG/DL (ref 70–99)
HBA1C MFR BLD: 6.2 % (ref 4.8–5.9)
HCT VFR BLD CALC: 39 % (ref 37–47)
HDLC SERPL-MCNC: 56 MG/DL (ref 40–59)
HEMOGLOBIN: 12.8 G/DL (ref 12–16)
LDL CHOLESTEROL CALCULATED: 61 MG/DL (ref 0–129)
LYMPHOCYTES ABSOLUTE: 1.5 K/UL (ref 1–4.8)
LYMPHOCYTES RELATIVE PERCENT: 27.3 %
MCH RBC QN AUTO: 31.3 PG (ref 27–31.3)
MCHC RBC AUTO-ENTMCNC: 32.7 % (ref 33–37)
MCV RBC AUTO: 95.6 FL (ref 82–100)
MICROALBUMIN UR-MCNC: 1.7 MG/DL
MICROALBUMIN/CREAT UR-RTO: 11.5 MG/G (ref 0–30)
MONOCYTES ABSOLUTE: 0.5 K/UL (ref 0.2–0.8)
MONOCYTES RELATIVE PERCENT: 9.4 %
NEUTROPHILS ABSOLUTE: 3.2 K/UL (ref 1.4–6.5)
NEUTROPHILS RELATIVE PERCENT: 59.7 %
PDW BLD-RTO: 14.5 % (ref 11.5–14.5)
PLATELET # BLD: 201 K/UL (ref 130–400)
POTASSIUM SERPL-SCNC: 4.7 MEQ/L (ref 3.4–4.9)
RBC # BLD: 4.07 M/UL (ref 4.2–5.4)
SODIUM BLD-SCNC: 140 MEQ/L (ref 135–144)
TOTAL PROTEIN: 7.1 G/DL (ref 6.3–8)
TRIGL SERPL-MCNC: 81 MG/DL (ref 0–150)
WBC # BLD: 5.4 K/UL (ref 4.8–10.8)

## 2022-03-07 PROCEDURE — 82043 UR ALBUMIN QUANTITATIVE: CPT

## 2022-03-07 PROCEDURE — 36415 COLL VENOUS BLD VENIPUNCTURE: CPT

## 2022-03-07 PROCEDURE — 83036 HEMOGLOBIN GLYCOSYLATED A1C: CPT

## 2022-03-07 PROCEDURE — 80053 COMPREHEN METABOLIC PANEL: CPT

## 2022-03-07 PROCEDURE — 85025 COMPLETE CBC W/AUTO DIFF WBC: CPT

## 2022-03-07 PROCEDURE — 80061 LIPID PANEL: CPT

## 2022-03-07 PROCEDURE — 82570 ASSAY OF URINE CREATININE: CPT

## 2022-03-10 ENCOUNTER — TELEMEDICINE (OUTPATIENT)
Dept: PULMONOLOGY | Age: 72
End: 2022-03-10
Payer: MEDICARE

## 2022-03-10 DIAGNOSIS — G47.33 OSA (OBSTRUCTIVE SLEEP APNEA): Primary | ICD-10-CM

## 2022-03-10 DIAGNOSIS — E66.9 OBESITY (BMI 30-39.9): ICD-10-CM

## 2022-03-10 PROCEDURE — 99214 OFFICE O/P EST MOD 30 MIN: CPT | Performed by: INTERNAL MEDICINE

## 2022-03-10 ASSESSMENT — ENCOUNTER SYMPTOMS
WHEEZING: 0
RHINORRHEA: 0
VOMITING: 0
COUGH: 0
EYE ITCHING: 0
EYE DISCHARGE: 0
SINUS PRESSURE: 0
SORE THROAT: 0
ABDOMINAL PAIN: 0
TROUBLE SWALLOWING: 0
DIARRHEA: 0
NAUSEA: 0
SHORTNESS OF BREATH: 0
CHEST TIGHTNESS: 0
VOICE CHANGE: 0

## 2022-03-10 NOTE — PROGRESS NOTES
Subjective:     Michelle Wilkinson is a 73 y.o. female who complains today of:     Chief Complaint   Patient presents with    Sleep Apnea     SS results     Patient and/or health care decision maker is aware that that he/she may receive a bill for this telephone service, depending on his insurance coverage, and has provided verbal consent to proceed. HPI  She had sleep study done and show mild sleep apnea with significant O2 desaturation ,   She said her CPAP titration done and therapeutic  was 7 cm   She was on CPAP for 10 yrs and stop working for last 6 month . She is not sleeping well without CPAP . Occasional daytime sleepiness and tiredness. She want to start using CPAP to sleep better again     Allergies:   Other, Tomato, Influenza vaccines, and Seasonal  Past Medical History:   Diagnosis Date    Allergic rhinitis     Anxiety     History of blood transfusion     blood transfusions with c section x 3    Hyperlipidemia     meds > 2 yrs    Osteoarthritis     Sleep apnea     Type 2 diabetes mellitus 2022     Past Surgical History:   Procedure Laterality Date    ANKLE SURGERY Right 2000    tendon repair     SECTION   &  &     pt has had 3     COLONOSCOPY      COLONOSCOPY N/A 2020    COLONOSCOPY performed by Vinh Guerin MD at [address], DIAGNOSTIC      TOTAL KNEE ARTHROPLASTY Left 2019    LEFT TOTAL KNEE ARTHROPLASTY, SUPINE, 23 HR OBS, IRAJ CEMENTED PERSONA performed by Maria Del Carmen Griffin MD at [facility] Right 3/13/2020    RIGHT TOTAL KNEE  ARTHROPLASTY performed by Maria Del Carmen Griffin MD at [facility] 38 History   Problem Relation Age of Onset    Heart Disease Mother     High Blood Pressure Mother     Vision Loss Mother     Other Mother         leiden factor 5    Diabetes Father     Heart Disease Father     Depression Sister     Diabetes Sister     Obesity Sister     Diabetes Brother  Arthritis Brother     Other Brother         leiden factor 5    Allergy (Severe) Sister     Arthritis Sister     Depression Sister     Arthritis Sister     Depression Sister     Diabetes Sister     Arthritis Sister     Depression Sister     Arthritis Sister     Arthritis Sister     Arthritis Sister     Arthritis Brother     Arthritis Brother     Arthritis Brother     Thyroid Disease Daughter     No Known Problems Son      Social History     Socioeconomic History    Marital status: Single     Spouse name: Not on file    Number of children: 3    Years of education: 25    Highest education level: Master's degree (e.g., MA, MS, Sybil, MEd, MSW, MIRTA)   Occupational History    Occupation: Teacher    Tobacco Use    Smoking status: Former Smoker     Packs/day: 0.50     Years: 52.00     Pack years: 26.00     Types: Cigarettes     Start date:      Quit date: 2020     Years since quittin.3    Smokeless tobacco: Never Used   Vaping Use    Vaping Use: Never used   Substance and Sexual Activity    Alcohol use:  Yes     Alcohol/week: 1.0 standard drink     Types: 1 Shots of liquor per week     Comment: Once per month    Drug use: No    Sexual activity: Not Currently     Partners: Male   Other Topics Concern    Not on file   Social History Narrative    Not on file     Social Determinants of Health     Financial Resource Strain: Unknown    Difficulty of Paying Living Expenses: Patient refused   Food Insecurity: Unknown    Worried About Running Out of Food in the Last Year: Patient refused    Ran Out of Food in the Last Year: Patient refused   Transportation Needs: Unknown    Lack of Transportation (Medical): Patient refused    Lack of Transportation (Non-Medical): Patient refused   Physical Activity: Insufficiently Active    Days of Exercise per Week: 3 days    Minutes of Exercise per Session: 30 min   Stress: Unknown    Feeling of Stress : Patient refused   Social Connections: Unknown    Frequency of Communication with Friends and Family: Patient refused    Frequency of Social Gatherings with Friends and Family: Patient refused    Attends Anglican Services: Patient refused    Active Member of Clubs or Organizations: Patient refused    Attends Club or Organization Meetings: Patient refused    Marital Status: Patient refused   Intimate Partner Violence: Unknown    Fear of Current or Ex-Partner: Patient refused    Emotionally Abused: Patient refused    Physically Abused: Patient refused    Sexually Abused: Patient refused   Housing Stability: Unknown    Unable to Pay for Housing in the Last Year: Patient refused    Number of Places Lived in the Last Year: Not on file    Unstable Housing in the Last Year: Patient refused         Review of Systems   Constitutional: Negative for chills, diaphoresis, fatigue and fever. HENT: Negative for congestion, mouth sores, nosebleeds, postnasal drip, rhinorrhea, sinus pressure, sneezing, sore throat, trouble swallowing and voice change. Eyes: Negative for discharge, itching and visual disturbance. Respiratory: Negative for cough, chest tightness, shortness of breath and wheezing. Cardiovascular: Negative for chest pain, palpitations and leg swelling. Gastrointestinal: Negative for abdominal pain, diarrhea, nausea and vomiting. Genitourinary: Negative for difficulty urinating and hematuria. Musculoskeletal: Negative for arthralgias, joint swelling and myalgias. Skin: Negative for rash. Allergic/Immunologic: Negative for environmental allergies and food allergies. Neurological: Negative for dizziness, tremors, weakness and headaches. Psychiatric/Behavioral: Positive for sleep disturbance. Negative for behavioral problems.         :   There were no vitals filed for this visit.   Wt Readings from Last 3 Encounters:   03/14/22 177 lb 3.2 oz (80.4 kg)   01/11/22 180 lb (81.6 kg)   09/28/21 175 lb (79.4 kg)         Physical Exam phone visit     Current Outpatient Medications   Medication Sig Dispense Refill    CPAP Machine MISC by Does not apply route New CPAP 7-10 cm 1 each 0    meloxicam (MOBIC) 15 MG tablet Take 1 tablet by mouth daily 90 tablet 0    citalopram (CELEXA) 20 MG tablet TAKE 1 TABLET DAILY 90 tablet 4    Alcohol Swabs PADS DX: diabetes mellitus. Test 1-3 time(s) daily - Ok to substitute per insurance (1 each = 1 box) 1 each 5    blood glucose monitor kit and supplies DX: diabetes mellitus. Test 1-3 time(s) daily - Ok to substitute per insurance 1 kit 0    Lancets MISC DX: diabetes mellitus. Use 1-3 time(s) daily - Ok to substitute per insurance (1 each = 1 box) 1 each 5    rosuvastatin (CRESTOR) 10 MG tablet TAKE 1 TABLET DAILY 90 tablet 4    Phytosterols 450 MG TABS Take 1 tablet by mouth daily 30 tablet 2    Multiple Vitamins-Minerals (WOMENS MULTIVITAMIN PO) Take 1 tablet by mouth daily      fluticasone (FLONASE) 50 MCG/ACT nasal spray 1 spray by Each Nare route daily      fexofenadine (ALLEGRA) 180 MG tablet Take 180 mg by mouth daily      Respiratory Therapy Supplies [facility] New CPAP mask and supplies 1 Device 0    metFORMIN (GLUCOPHAGE) 1000 MG tablet Take 1 tablet by mouth 2 times daily (with meals) 180 tablet 1    blood glucose monitor strips DX: diabetes mellitus. Use 3 time(s) daily - Ok to substitute per insurance 100 strip 5    azithromycin (ZITHROMAX) 250 MG tablet Two tablets today then one tablet daily for 4 days 6 tablet 0     No current facility-administered medications for this visit. Results for orders placed during the hospital encounter of 20    XR CHEST STANDARD (2 VW)    Narrative  Patient MRN: [id number]    : 1950    Age:  81 years    Gender: Female    Order Date: 2020 5:14 PM.    Exam: XR CHEST (2 VW)    Number of Views: 2    Indication:  Cough    Comparison: None    Findings: Cardiomediastinal silhouette is within normal limits.  Lungs are clear without evidence of infiltrate/pneumonia, pneumothorax or pleural effusion    Impression  Impression:  No radiographic evidence of acute cardiopulmonary disease      Assessment/Plan:     1. AMANDA (obstructive sleep apnea)  She had sleep study done and show mils sleep apnea with significant O2 desaturation , She said her CPAP titration done and therapeutic  was 7 cm She was on CPAP for 10 yrs and stop working for last 6 month . She is not sleeping well without CPAP . Occasional daytime sleepiness and tiredness. She want to start using CPAP to sleep better again . CPAP study reviewed with patient     -New CPAP with 7-10 cm     2. Obesity (BMI 30-39. 9)  She is advised try to lose weight. obesity related risk explained to the patient ,  Suggested weight control approaches, including dietary changes , exercise, behavioral modification. Patient notified that this is a billable service and has given verbal consent for telehealth services. Time spent with patient 22  minutes. Return in about 2 months (around 5/10/2022) for amanda.       Juany Patten MD

## 2022-03-14 ENCOUNTER — HOSPITAL ENCOUNTER (OUTPATIENT)
Age: 72
Setting detail: SPECIMEN
Discharge: HOME | End: 2022-03-14
Payer: MEDICARE

## 2022-03-14 ENCOUNTER — OFFICE VISIT (OUTPATIENT)
Dept: FAMILY MEDICINE CLINIC | Age: 72
End: 2022-03-14
Payer: MEDICARE

## 2022-03-14 VITALS
DIASTOLIC BLOOD PRESSURE: 64 MMHG | TEMPERATURE: 97.3 F | HEART RATE: 73 BPM | OXYGEN SATURATION: 96 % | HEIGHT: 62 IN | SYSTOLIC BLOOD PRESSURE: 132 MMHG | WEIGHT: 177.2 LBS | BODY MASS INDEX: 32.61 KG/M2 | RESPIRATION RATE: 16 BRPM

## 2022-03-14 DIAGNOSIS — E11.9 TYPE 2 DIABETES MELLITUS WITHOUT COMPLICATION, WITHOUT LONG-TERM CURRENT USE OF INSULIN (HCC): Primary | ICD-10-CM

## 2022-03-14 DIAGNOSIS — J02.9 SORE THROAT: ICD-10-CM

## 2022-03-14 DIAGNOSIS — E78.2 MIXED HYPERLIPIDEMIA: ICD-10-CM

## 2022-03-14 DIAGNOSIS — Z87.2 HISTORY OF DERMATITIS: ICD-10-CM

## 2022-03-14 DIAGNOSIS — G89.29 CHRONIC LEFT-SIDED LOW BACK PAIN WITHOUT SCIATICA: ICD-10-CM

## 2022-03-14 DIAGNOSIS — J41.0 SIMPLE CHRONIC BRONCHITIS (HCC): ICD-10-CM

## 2022-03-14 DIAGNOSIS — M54.50 CHRONIC LEFT-SIDED LOW BACK PAIN WITHOUT SCIATICA: ICD-10-CM

## 2022-03-14 DIAGNOSIS — Z12.11 COLON CANCER SCREENING: ICD-10-CM

## 2022-03-14 DIAGNOSIS — Z12.83 SKIN CANCER SCREENING: ICD-10-CM

## 2022-03-14 DIAGNOSIS — G47.33 OSA (OBSTRUCTIVE SLEEP APNEA): ICD-10-CM

## 2022-03-14 LAB — S PYO AG THROAT QL: NORMAL

## 2022-03-14 PROCEDURE — 99214 OFFICE O/P EST MOD 30 MIN: CPT | Performed by: NURSE PRACTITIONER

## 2022-03-14 PROCEDURE — 87070 CULTURE OTHR SPECIMN AEROBIC: CPT

## 2022-03-14 PROCEDURE — 87880 STREP A ASSAY W/OPTIC: CPT | Performed by: NURSE PRACTITIONER

## 2022-03-14 RX ORDER — GLUCOSAMINE HCL/CHONDROITIN SU 500-400 MG
CAPSULE ORAL
Qty: 100 STRIP | Refills: 5 | Status: SHIPPED | OUTPATIENT
Start: 2022-03-14

## 2022-03-14 RX ORDER — AZITHROMYCIN 250 MG/1
TABLET, FILM COATED ORAL
Qty: 6 TABLET | Refills: 0 | Status: SHIPPED | OUTPATIENT
Start: 2022-03-14 | End: 2022-03-24

## 2022-03-14 NOTE — PROGRESS NOTES
Subjective:     Diabetes Mellitus Type 2: Current symptoms/problems include none. Home blood sugar records:  trend: stable  Any episodes of hypoglycemia? no  Tobacco history: She  reports that she quit smoking about 16 months ago. Her smoking use included cigarettes. She started smoking about 54 years ago. She has a 26.00 pack-year smoking history. She has never used smokeless tobacco.   Known diabetic complications: none   Patient states that she has made changes to her diet has been getting routine physical activity. Has been checking her glucose levels nearly every morning. Has had a couple spikes of sugar levels but this was around times of high stress for her. States that she recently lost her brother and this time last year lost her sister. She would like to possibly increase medication to help avoid true diagnosis of diabetes. She has not had any medication side effects with Metformin 500 mg XR. Hyperlipidemia:  No new myalgias or GI upset on rosuvastatin (Crestor). Sleep apnea: she is waiting to get a new CPAP machine. He is following with pulmonology. Has done well with CPAP in the past.    Chronic low back pain: she has been doing a lot better with her back pain. She went to PT and has been doing stretches routinely. She has not had any recent exacerbation of pain symptoms. Sore throat: She states that she has been having a sore throat for several days now. No difficulty swallowing but does feel quite irritated and painful. She does have a history of strep throat and symptoms feel consistent with that. Dermatitis: She states that she has intermittent issues with dermatitis of both of her hands that seem to resolve with over-the-counter antifungal cream.  She does not currently have any symptoms. Would like a referral to dermatology however for overall skin assessment. No current reported suspicious lesions.       The five diabetic measures for control:   Hemoglobin A1C (%) Date Value   03/07/2022 6.2 (H)     LDL Calculated (mg/dL)   Date Value   03/07/2022 61         Blood pressure less than 131/81,   BP Readings from Last 1 Encounters:   03/14/22 132/64     Smoking, non smoker is goal. This pt is not a smoker. PMH: This 73 y.o. female  patient is not hypertensive, is  diabetic, is  hyperlipidemic. She has a history of smoking and has a  family history of heart disease. She is  obese. Review of Systems  Patient denies chest pain, shortness of breath, lightheadedness, peripheral edema and palpitations. Eyes: no rapid change in vision  Ears, nose, mouth, throat, and face: no changes in hearing or sore throat  Respiratory: No shortness of breath or cough. Cardiovascular: no chest pain or pressure. This patient reports no polyuria, polydipsia or episodes of hypoglycemia. Treatment Adherence:   Medication compliance:  compliant most of the time  Diet compliance:  compliant most of the time  Weight trend: stable  Current exercise: walks 1 time(s) per day  What might prevent you from meeting your goal?: none  Patient plan for overcoming barriers: N/A     Patient Confidence: 8/10      Objective:   EXAM:  Constitutional Blood pressure 132/64, pulse 73, temperature 97.3 °F (36.3 °C), temperature source Temporal, resp. rate 16, height 5' 2\" (1.575 m), weight 177 lb 3.2 oz (80.4 kg), last menstrual period 01/16/2008, SpO2 96 %, not currently breastfeeding. .  She has a normal affect, no acute distress, appears well developed and well nourished. Posterior pharynx with erythema and white patches. No significant edema noted. Neck:  neck- supple, no mass, non-tender and no bruits  Lungs:  Normal expansion. Clear to auscultation. No rales, rhonchi, or wheezing., No chest wall tenderness. Heart:  Heart sounds are normal.  Regular rate and rhythm without murmur, gallop or rub. Abdomen:  Soft, non-tender, normal bowel sounds. No bruits, organomegaly or masses.   Extremities: Extremities warm to touch, pink, with no edema. Assessment:      Diagnosis Orders   1. Type 2 diabetes mellitus without complication, without long-term current use of insulin (HCC)  CBC with Auto Differential    Comprehensive Metabolic Panel    Lipid Panel    Hemoglobin A1C    Microalbumin / Creatinine Urine Ratio    metFORMIN (GLUCOPHAGE) 1000 MG tablet    blood glucose monitor strips   2. Mixed hyperlipidemia     3. Simple chronic bronchitis ([address]     4. Chronic left-sided low back pain without sciatica     5. AMANDA (obstructive sleep apnea)     6. Sore throat  POCT rapid strep A    Culture, Throat    azithromycin (ZITHROMAX) 250 MG tablet   7. Colon cancer screening  Machelle Alonzo MD, Gastroenterology, [place]   8. Skin cancer screening  Niyah Odom MD, Dermatology, [place]   9. History of dermatitis- hands         PLAN: Include orders in the DX section. Diabetes Counseling   Patient was counseled regarding disease risks and adopting healthy behaviors. Patient was provided education materials to assist with self management. Patient was provided log (or received log during previous visit) to record blood pressure, food intake and/or blood sugar. Patient was instructed to keep log up-to-date and to always bring log to all office visits. Follow up: 6 months and as needed. Blood work one week prior as ordered. 1.  Diabetes is well controlled but with mildly increased hemoglobin A1c. Patient would like to treat aggressively to help avoid further elevation. Will increase Metformin to 1000 mg twice a day. Notify me of any medication side effects. Continue healthy diet and physical activity regimen. Continue checking glucose levels first in the morning. Notify me if trending higher than 130 consistently or if she has multiple readings less than 70.    2.  Lipid panel is well controlled on statin. No side effects reported. Continue the same. 3.  5.   She follows routinely with pulmonology and will be receiving a new CPAP machine in the near future. No acute pulmonary symptoms reported. 4.  Back pain symptoms have improved with stretching with no recent exacerbation reported. 6.  Rapid strep testing is negative however physical examination findings consistent with bacterial pharyngitis. Antibiotic ordered and culture sent. 7.  Discussed recommendation for routine colorectal cancer screening. Last had stool testing with normal findings however patient would prefer colonoscopy with next check. Referral given. 8.  9.  Referral given to [address] [place] [address] dermatology. No current symptoms reported. Please note this report has been partially produced using speech recognition software and may cause contain errors related to that system including grammar, punctuation and spelling as well as words and phrases that may seem inappropriate. If there are questions or concerns please feel free to contact me to clarify.         Electronically signed by RAOUL Bravo, 1:00 PM 3/14/22

## 2022-03-16 DIAGNOSIS — G47.33 OSA (OBSTRUCTIVE SLEEP APNEA): ICD-10-CM

## 2022-03-17 LAB — THROAT CULTURE: NORMAL

## 2022-04-04 DIAGNOSIS — F41.9 ANXIETY: ICD-10-CM

## 2022-04-04 RX ORDER — CITALOPRAM 20 MG/1
TABLET ORAL
Qty: 90 TABLET | Refills: 4 | Status: SHIPPED | OUTPATIENT
Start: 2022-04-04

## 2022-04-04 NOTE — TELEPHONE ENCOUNTER
Comments:     Last Office Visit (last PCP visit):   3/14/2022    Next Visit Date:  Future Appointments   Date Time Provider Iain Medina   4/25/2022  4:15 PM Teo Bro MD [facility]   5/12/2022 10:15 AM Aden Walters MD [facility]   9/19/2022  9:10 AM Mayo Abad, APRN - CNP [facility]       **If hasn't been seen in over a year OR hasn't followed up according to last diabetes/ADHD visit, make appointment for patient before sending refill to provider.     Rx requested:  Requested Prescriptions     Pending Prescriptions Disp Refills    citalopram (CELEXA) 20 MG tablet 90 tablet 4     Sig: TAKE 1 TABLET DAILY

## 2022-04-25 ENCOUNTER — OFFICE VISIT (OUTPATIENT)
Dept: FAMILY MEDICINE CLINIC | Age: 72
End: 2022-04-25
Payer: MEDICARE

## 2022-04-25 VITALS — WEIGHT: 177 LBS | HEIGHT: 62 IN | TEMPERATURE: 97.9 F | BODY MASS INDEX: 32.57 KG/M2

## 2022-04-25 DIAGNOSIS — L82.1 SEBORRHEIC KERATOSES: Primary | ICD-10-CM

## 2022-04-25 DIAGNOSIS — L91.8 ACROCHORDON: ICD-10-CM

## 2022-04-25 PROCEDURE — 99212 OFFICE O/P EST SF 10 MIN: CPT | Performed by: FAMILY MEDICINE

## 2022-04-25 NOTE — PROGRESS NOTES
Diagnosis Orders   1. Seborrheic keratoses     2. Acrochordon       Return in about 1 year (around 4/25/2023) for 15 min skin check. Patient Instructions       Patient Education        Excision of Nonmelanoma Skin Cancer: Care Instructions  Overview     Excision of nonmelanoma skin cancer is a treatment to remove, or excise, basal cell and squamous cell cancers (carcinomas) from your skin. Skin cancer is theabnormal growth of cells in the skin. Most cases of these types of cancer can be cured if they are found and removedearly. If the cancer is not completely removed, it may come back. The doctor uses medicine to numb the area around the cancer. Then the doctor cuts out the cancer along with small amount of healthy skin around it. Thewound is most often closed with stitches. The procedure takes about 30 minutes. You will probably go home soon after. Youmay have a scar. The scar should fade with time. The tissue that was removed will be sent to a lab to be looked at under a microscope. This is done to confirm if the tissue is skin cancer and if all ofit was removed. When you find out that you have cancer, you may feel many emotions and may need some help coping. Seek out family, friends, and counselors for support. You also can do things at home to make yourself feel better while you go through treatment. Call the [facility] ([phone]) or visit itswebsite at [address] [facility]. org for more information. Follow-up care is a key part of your treatment and safety. Be sure to make and go to all appointments, and call your doctor if you are having problems. It's also a good idea to know your test results and keep alist of the medicines you take. How can you care for yourself at home?  If your doctor told you how to care for your wound, follow your doctor's instructions. If you did not get instructions, follow this general advice:  ? Wash around the wound with clean water 2 times a day.  Don't use hydrogen peroxide or alcohol, which can slow healing. ? You may cover the wound with a thin layer of petroleum jelly, such as Vaseline, and a nonstick bandage. ? Apply more petroleum jelly and replace the bandage as needed.  If you had stitches, your doctor will tell you when to come back to have them removed.  Be safe with medicines. Read and follow all instructions on the label. ? If the doctor gave you a prescription medicine for pain, take it as prescribed. ? If you are not taking a prescription pain medicine, ask your doctor if you can take an over-the-counter medicine. When should you call for help? Call your doctor now or seek immediate medical care if:     You have signs of infection, such as:  ? Increased pain, swelling, warmth, or redness near the area. ? Red streaks leading from the wound. ? Pus draining from the wound. ? A fever. Watch closely for changes in your health, and be sure to contact your doctor if:     You see a change in your skin, such as a growth or mole that:  ? Grows bigger. This may happen slowly. ? Changes color. ? Changes shape. ? Starts to bleed easily.      You do not get better as expected. Where can you learn more? Go to [email or web address].[email or web address] org and sign in to your [employer] Direct account. Enter B199 in the [facility] box to learn more about \"Excision of Nonmelanoma Skin Cancer: Care Instructions. \"     If you do not have an account, please click on the \"Sign Up Now\" link. Current as of: September 8, 2021               Content Version: 13.2  © 2006-2022 Healthwise, Incorporated. Care instructions adapted under license by [facility] ([facility]). If you have questions about a medical condition or this instruction, always ask your healthcare professional. [place] [address] any warranty or liability for your use of this information.              Subjective:      Patient ID: Shad Mata is a 73 y.o. female who presents for:  Chief Complaint   Patient presents with    Skin Exam     initial encounter general check    Skin Problem     left side abdomin concern        Patient's sister has had melanoma removed from the face multiple times. Patient would like exam she is concerned she may have abnormalities. Denies any obvious lesions other than one on her abdomen that was bleeding a couple weeks ago and it healed on its own. Does have a history of excessive sun exposure. Current Outpatient Medications on File Prior to Visit   Medication Sig Dispense Refill    citalopram (CELEXA) 20 MG tablet TAKE 1 TABLET DAILY 90 tablet 4    metFORMIN (GLUCOPHAGE) 1000 MG tablet Take 1 tablet by mouth 2 times daily (with meals) 180 tablet 1    blood glucose monitor strips DX: diabetes mellitus. Use 3 time(s) daily - Ok to substitute per insurance 100 strip 5    CPAP Machine MISC by Does not apply route New CPAP 7-10 cm 1 each 0    meloxicam (MOBIC) 15 MG tablet Take 1 tablet by mouth daily 90 tablet 0    Alcohol Swabs PADS DX: diabetes mellitus. Test 1-3 time(s) daily - Ok to substitute per insurance (1 each = 1 box) 1 each 5    blood glucose monitor kit and supplies DX: diabetes mellitus. Test 1-3 time(s) daily - Ok to substitute per insurance 1 kit 0    Lancets MISC DX: diabetes mellitus. Use 1-3 time(s) daily - Ok to substitute per insurance (1 each = 1 box) 1 each 5    rosuvastatin (CRESTOR) 10 MG tablet TAKE 1 TABLET DAILY 90 tablet 4    Phytosterols 450 MG TABS Take 1 tablet by mouth daily 30 tablet 2    Multiple Vitamins-Minerals (WOMENS MULTIVITAMIN PO) Take 1 tablet by mouth daily      fluticasone (FLONASE) 50 MCG/ACT nasal spray 1 spray by Each Nare route daily      fexofenadine (ALLEGRA) 180 MG tablet Take 180 mg by mouth daily      Respiratory Therapy Supplies [facility] New CPAP mask and supplies 1 Device 0     No current facility-administered medications on file prior to visit.      Past Medical History:   Diagnosis Date  Allergic rhinitis     Anxiety     History of blood transfusion     blood transfusions with c section x 3    Hyperlipidemia     meds > 2 yrs    Osteoarthritis     Sleep apnea     Type 2 diabetes mellitus 2022     Past Surgical History:   Procedure Laterality Date    ANKLE SURGERY Right     tendon repair   [facility]    pt has had 3     COLONOSCOPY      COLONOSCOPY N/A 2020    COLONOSCOPY performed by Lior Marrero MD at [facility]. [address], COLON, DIAGNOSTIC      TOTAL KNEE ARTHROPLASTY Left 2019    LEFT TOTAL KNEE ARTHROPLASTY, SUPINE, 23 HR OBS, Velia Ag CEMENTED PERSONA performed by Iwona Kaiser MD at [facility] Right 3/13/2020    RIGHT TOTAL KNEE  ARTHROPLASTY performed by Iwona Kaiser MD at [facility] History     Socioeconomic History    Marital status: Single     Spouse name: Not on file    Number of children: 3    Years of education: 25    Highest education level: Master's degree (e.g., MA, MS, Sybil, MEd, MSW, MIRTA)   Occupational History    Occupation: Teacher    Tobacco Use    Smoking status: Former Smoker     Packs/day: 0.50     Years: 52.00     Pack years: 26.00     Types: Cigarettes     Start date:      Quit date: 2020     Years since quittin.4    Smokeless tobacco: Never Used   Vaping Use    Vaping Use: Never used   Substance and Sexual Activity    Alcohol use:  Yes     Alcohol/week: 1.0 standard drink     Types: 1 Shots of liquor per week     Comment: Once per month    Drug use: No    Sexual activity: Not Currently     Partners: Male   Other Topics Concern    Not on file   Social History Narrative    Not on file     Social Determinants of Health     Financial Resource Strain: Unknown    Difficulty of Paying Living Expenses: Patient refused   Food Insecurity: Unknown    Worried About Running Out of Food in the Last Year: Patient refused    [address] in the Last Year: Patient refused   Transportation Needs: Unknown    Lack of Transportation (Medical): Patient refused    Lack of Transportation (Non-Medical): Patient refused   Physical Activity: Insufficiently Active    Days of Exercise per Week: 3 days    Minutes of Exercise per Session: 30 min   Stress: Unknown    Feeling of Stress : Patient refused   Social Connections: Unknown    Frequency of Communication with Friends and Family: Patient refused    Frequency of Social Gatherings with Friends and Family: Patient refused    Attends Temple Services: Patient refused    Active Member of Clubs or Organizations: Patient refused    Attends Club or Organization Meetings: Patient refused    Marital Status: Patient refused   Intimate Partner Violence: Unknown    Fear of Current or Ex-Partner: Patient refused    Emotionally Abused: Patient refused    Physically Abused: Patient refused    Sexually Abused: Patient refused   Housing Stability: Unknown    Unable to Pay for Housing in the Last Year: Patient refused    Number of Places Lived in the Last Year: Not on file    Unstable Housing in the Last Year: Patient refused     Family History   Problem Relation Age of Onset    Heart Disease Mother     High Blood Pressure Mother     Vision Loss Mother     Other Mother         leiden factor 5    Diabetes Father     Heart Disease Father     Depression Sister     Diabetes Sister     Obesity Sister     Diabetes Brother     Arthritis Brother     Other Brother         leiden factor 5    Allergy (Severe) Sister     Arthritis Sister     Depression Sister     Arthritis Sister     Depression Sister     Diabetes Sister     Arthritis Sister     Depression Sister     Arthritis Sister     Arthritis Sister     Arthritis Sister     Arthritis Brother     Arthritis Brother     Arthritis Brother     Thyroid Disease Daughter     No Known Problems Son      Allergies:   Other, Tomato, Influenza vaccines, and Seasonal    Review of Systems   Constitutional: Negative for chills and fever. Allergic/Immunologic: Negative for environmental allergies, food allergies and immunocompromised state. Hematological: Negative for adenopathy. Does not bruise/bleed easily. Psychiatric/Behavioral: Negative for behavioral problems and sleep disturbance. Objective:   Temp 97.9 °F (36.6 °C)   Ht 5' 2\" (1.575 m)   Wt 177 lb (80.3 kg)   LMP 01/16/2008   BMI 32.37 kg/m²     Physical Exam  Constitutional:       General: She is not in acute distress. Appearance: Normal appearance. She is well-developed. She is not toxic-appearing. HENT:      Head: Normocephalic and atraumatic. Right Ear: Hearing and tympanic membrane normal.      Left Ear: Hearing and tympanic membrane normal.      Nose: Nose normal. No nasal deformity. Eyes:      General: Lids are normal.         Right eye: No discharge. Left eye: No discharge. Conjunctiva/sclera: Conjunctivae normal.      Pupils: Pupils are equal, round, and reactive to light. Neck:      Thyroid: No thyroid mass or thyromegaly. Vascular: No JVD. Trachea: Trachea and phonation normal.   Cardiovascular:      Rate and Rhythm: Normal rate and regular rhythm. Pulmonary:      Effort: No accessory muscle usage or respiratory distress. Musculoskeletal:      Cervical back: Full passive range of motion without pain. Comments: FROM all large muscle groups and joints as witnessed when walking to exam table, getting on, and getting off the exam table. Skin:     General: Skin is warm and dry. Findings: No rash. Comments: Infrequently noted fleshy pedunculated lesions on left abdomen and right back. Infrequently and rarely noted stuck on appearing small oval soft brown flake lesions. Neurological:      Mental Status: She is alert. Motor: No tremor or atrophy.       Gait: Gait normal.   Psychiatric:         Speech: Speech normal. Behavior: Behavior normal.         Thought Content: Thought content normal.         No results found for this visit on 04/25/22.     Recent Results (from the past 2016 hour(s))   CBC Auto Differential    Collection Time: 03/07/22  9:05 AM   Result Value Ref Range    WBC 5.4 4.8 - 10.8 K/uL    RBC 4.07 (L) 4.20 - 5.40 M/uL    Hemoglobin 12.8 12.0 - 16.0 g/dL    Hematocrit 39.0 37.0 - 47.0 %    MCV 95.6 82.0 - 100.0 fL    MCH 31.3 27.0 - 31.3 pg    MCHC 32.7 (L) 33.0 - 37.0 %    RDW 14.5 11.5 - 14.5 %    Platelets 934 512 - 973 K/uL    Neutrophils % 59.7 %    Lymphocytes % 27.3 %    Monocytes % 9.4 %    Eosinophils % 3.0 %    Basophils % 0.6 %    Neutrophils Absolute 3.2 1.4 - 6.5 K/uL    Lymphocytes Absolute 1.5 1.0 - 4.8 K/uL    Monocytes Absolute 0.5 0.2 - 0.8 K/uL    Eosinophils Absolute 0.2 0.0 - 0.7 K/uL    Basophils Absolute 0.0 0.0 - 0.2 K/uL   Comprehensive Metabolic Panel    Collection Time: 03/07/22  9:05 AM   Result Value Ref Range    Sodium 140 135 - 144 mEq/L    Potassium 4.7 3.4 - 4.9 mEq/L    Chloride 106 95 - 107 mEq/L    CO2 23 20 - 31 mEq/L    Anion Gap 11 9 - 15 mEq/L    Glucose 119 (H) 70 - 99 mg/dL    BUN 17 8 - 23 mg/dL    CREATININE 0.84 0.50 - 0.90 mg/dL    GFR Non-African American >60.0 >60    GFR  >60.0 >60    Calcium 9.2 8.5 - 9.9 mg/dL    Total Protein 7.1 6.3 - 8.0 g/dL    Albumin 4.2 3.5 - 4.6 g/dL    Total Bilirubin 0.4 0.2 - 0.7 mg/dL    Alkaline Phosphatase 68 40 - 130 U/L    ALT 18 0 - 33 U/L    AST 22 0 - 35 U/L    Globulin 2.9 2.3 - 3.5 g/dL   Lipid Panel    Collection Time: 03/07/22  9:05 AM   Result Value Ref Range    Cholesterol, Total 133 0 - 199 mg/dL    Triglycerides 81 0 - 150 mg/dL    HDL 56 40 - 59 mg/dL    LDL Calculated 61 0 - 129 mg/dL   Hemoglobin A1C    Collection Time: 03/07/22  9:05 AM   Result Value Ref Range    Hemoglobin A1C 6.2 (H) 4.8 - 5.9 %   Microalbumin / Creatinine Urine Ratio    Collection Time: 03/07/22  9:06 AM   Result Value Ref Range Microalbumin, Random Urine 1.70 Not Established mg/dL    Creatinine, Ur 147.2 Not Established mg/dL    Microalbumin Creatinine Ratio 11.5 0.0 - 30.0 mg/G   POCT rapid strep A    Collection Time: 03/14/22  9:48 AM   Result Value Ref Range    Strep A Ag None Detected None Detected   Culture, Throat    Collection Time: 03/14/22  9:21 PM    Specimen: Throat   Result Value Ref Range    Throat Culture       Cult,Throat:  Oral colton, negative for Group A Strep and other beta  Cult,Throat:  hemolytic streptococci  Performed at [address] [place] [address]  [phone]         [] Pt was seen by provider for      Minutes  Counseling and coordination of care was done for all assessment diagnosis listed for today with patient and any family/friend present. More than 50% of this visit was spent coordinating current care, obtaining information for prior records, and counseling for current plan of action. Assessment:       Diagnosis Orders   1. Seborrheic keratoses     2. Acrochordon           No orders of the defined types were placed in this encounter. No orders of the defined types were placed in this encounter. Medication List          Accurate as of April 25, 2022  4:35 PM. If you have any questions, ask your nurse or doctor. CONTINUE taking these medications    Alcohol Swabs Pads  DX: diabetes mellitus. Test 1-3 time(s) daily - Ok to substitute per insurance (1 each = 1 box)     blood glucose monitor kit and supplies  DX: diabetes mellitus. Test 1-3 time(s) daily - Ok to substitute per insurance     blood glucose test strips  DX: diabetes mellitus.  Use 3 time(s) daily - Ok to substitute per insurance     citalopram 20 MG tablet  Commonly known as: CELEXA  TAKE 1 TABLET DAILY     CPAP Machine Misc  by Does not apply route New CPAP 7-10 cm     fexofenadine 180 MG tablet  Commonly known as: ALLEGRA     fluticasone 50 MCG/ACT nasal spray  Commonly known as: FLONASE Lancets Misc  DX: diabetes mellitus. Use 1-3 time(s) daily - Ok to substitute per insurance (1 each = 1 box)     meloxicam 15 MG tablet  Commonly known as: Mobic  Take 1 tablet by mouth daily     metFORMIN 1000 MG tablet  Commonly known as: GLUCOPHAGE  Take 1 tablet by mouth 2 times daily (with meals)     Phytosterols 450 MG Tabs  Take 1 tablet by mouth daily     Respiratory Therapy Supplies Jerilyn  New CPAP mask and supplies     rosuvastatin 10 MG tablet  Commonly known as: CRESTOR  TAKE 1 TABLET DAILY     WOMENS MULTIVITAMIN PO              Plan:   Return in about 1 year (around 4/25/2023) for 15 min skin check. Patient Instructions       Patient Education        Excision of Nonmelanoma Skin Cancer: Care Instructions  Overview     Excision of nonmelanoma skin cancer is a treatment to remove, or excise, basal cell and squamous cell cancers (carcinomas) from your skin. Skin cancer is theabnormal growth of cells in the skin. Most cases of these types of cancer can be cured if they are found and removedearly. If the cancer is not completely removed, it may come back. The doctor uses medicine to numb the area around the cancer. Then the doctor cuts out the cancer along with small amount of healthy skin around it. Thewound is most often closed with stitches. The procedure takes about 30 minutes. You will probably go home soon after. Youmay have a scar. The scar should fade with time. The tissue that was removed will be sent to a lab to be looked at under a microscope. This is done to confirm if the tissue is skin cancer and if all ofit was removed. When you find out that you have cancer, you may feel many emotions and may need some help coping. Seek out family, friends, and counselors for support. You also can do things at home to make yourself feel better while you go through treatment. Call the [facility]erto Nu[facility]ez ([phone]) or visit itswebsite at 55[address] [employer]Select. org for more information.   Follow-up care is a key part of your treatment and safety. Be sure to make and go to all appointments, and call your doctor if you are having problems. It's also a good idea to know your test results and keep alist of the medicines you take. How can you care for yourself at home?  If your doctor told you how to care for your wound, follow your doctor's instructions. If you did not get instructions, follow this general advice:  ? Wash around the wound with clean water 2 times a day. Don't use hydrogen peroxide or alcohol, which can slow healing. ? You may cover the wound with a thin layer of petroleum jelly, such as Vaseline, and a nonstick bandage. ? Apply more petroleum jelly and replace the bandage as needed.  If you had stitches, your doctor will tell you when to come back to have them removed.  Be safe with medicines. Read and follow all instructions on the label. ? If the doctor gave you a prescription medicine for pain, take it as prescribed. ? If you are not taking a prescription pain medicine, ask your doctor if you can take an over-the-counter medicine. When should you call for help? Call your doctor now or seek immediate medical care if:     You have signs of infection, such as:  ? Increased pain, swelling, warmth, or redness near the area. ? Red streaks leading from the wound. ? Pus draining from the wound. ? A fever. Watch closely for changes in your health, and be sure to contact your doctor if:     You see a change in your skin, such as a growth or mole that:  ? Grows bigger. This may happen slowly. ? Changes color. ? Changes shape. ? Starts to bleed easily.      You do not get better as expected. Where can you learn more? Go to https://ConnectFupeLuxul Wirelesseb.[email or web address] org and sign in to your Triparazzi account. Enter K289 in the [employer] box to learn more about \"Excision of Nonmelanoma Skin Cancer: Care Instructions. \"     If you do not have an account, please click on the \"Sign Up Now\" link. Current as of: September 8, 2021               Content Version: 13.2  © 5105-7414 HealthRing[employer], Incorporated. Care instructions adapted under license by [facility] ([place] [facility]). If you have questions about a medical condition or this instruction, always ask your healthcare professional. [address] any warranty or liability for your use of this information. This note was partially created with the assistance of dictation. This may lead to grammatical or spelling errors. Gary Banks M.D.

## 2022-04-25 NOTE — PATIENT INSTRUCTIONS
Patient Education        Excision of Nonmelanoma Skin Cancer: Care Instructions  Overview     Excision of nonmelanoma skin cancer is a treatment to remove, or excise, basal cell and squamous cell cancers (carcinomas) from your skin. Skin cancer is theabnormal growth of cells in the skin. Most cases of these types of cancer can be cured if they are found and removedearly. If the cancer is not completely removed, it may come back. The doctor uses medicine to numb the area around the cancer. Then the doctor cuts out the cancer along with small amount of healthy skin around it. Thewound is most often closed with stitches. The procedure takes about 30 minutes. You will probably go home soon after. Youmay have a scar. The scar should fade with time. The tissue that was removed will be sent to a lab to be looked at under a microscope. This is done to confirm if the tissue is skin cancer and if all ofit was removed. When you find out that you have cancer, you may feel many emotions and may need some help coping. Seek out family, friends, and counselors for support. You also can do things at home to make yourself feel better while you go through treatment. Call the ebookpie (5-861.496.4607) or visit itswebsite at 0721 TierPM. org for more information. Follow-up care is a key part of your treatment and safety. Be sure to make and go to all appointments, and call your doctor if you are having problems. It's also a good idea to know your test results and keep alist of the medicines you take. How can you care for yourself at home?  If your doctor told you how to care for your wound, follow your doctor's instructions. If you did not get instructions, follow this general advice:  ? Wash around the wound with clean water 2 times a day. Don't use hydrogen peroxide or alcohol, which can slow healing.   ? You may cover the wound with a thin layer of petroleum jelly, such as Vaseline, and a nonstick bandage. ? Apply more petroleum jelly and replace the bandage as needed.  If you had stitches, your doctor will tell you when to come back to have them removed.  Be safe with medicines. Read and follow all instructions on the label. ? If the doctor gave you a prescription medicine for pain, take it as prescribed. ? If you are not taking a prescription pain medicine, ask your doctor if you can take an over-the-counter medicine. When should you call for help? Call your doctor now or seek immediate medical care if:     You have signs of infection, such as:  ? Increased pain, swelling, warmth, or redness near the area. ? Red streaks leading from the wound. ? Pus draining from the wound. ? A fever. Watch closely for changes in your health, and be sure to contact your doctor if:     You see a change in your skin, such as a growth or mole that:  ? Grows bigger. This may happen slowly. ? Changes color. ? Changes shape. ? Starts to bleed easily.      You do not get better as expected. Where can you learn more? Go to https://Inotrem.ResourceKraft. org and sign in to your Hashgo account. Enter E787 in the [facility] box to learn more about \"Excision of Nonmelanoma Skin Cancer: Care Instructions. \"     If you do not have an account, please click on the \"Sign Up Now\" link. Current as of: September 8, 2021               Content Version: 13.2  © 3683-2701 Healthwise, Incorporated. Care instructions adapted under license by [facility] ([facility]). If you have questions about a medical condition or this instruction, always ask your healthcare professional. [place] [address] any warranty or liability for your use of this information.

## 2022-04-28 ENCOUNTER — TELEPHONE (OUTPATIENT)
Dept: FAMILY MEDICINE CLINIC | Age: 72
End: 2022-04-28

## 2022-04-28 NOTE — TELEPHONE ENCOUNTER
Pt called in stating her metformin 1000MG tablet is causing her to have severe stomach issues with diarrhea and would like to switch back to the metformin 500MG extended release

## 2022-07-22 DIAGNOSIS — E11.9 TYPE 2 DIABETES MELLITUS WITHOUT COMPLICATION, WITHOUT LONG-TERM CURRENT USE OF INSULIN (HCC): ICD-10-CM

## 2022-07-25 RX ORDER — METFORMIN HYDROCHLORIDE 750 MG/1
750 TABLET, EXTENDED RELEASE ORAL 2 TIMES DAILY
Qty: 180 TABLET | Refills: 1 | Status: SHIPPED | OUTPATIENT
Start: 2022-07-25

## 2022-07-25 NOTE — TELEPHONE ENCOUNTER
Comments:     Last Office Visit (last PCP visit):   3/14/2022    Next Visit Date:  Future Appointments   Date Time Provider Iain Medina   9/19/2022  9:10 AM Migel Brandon, APRN - CNP [address]       **If hasn't been seen in over a year OR hasn't followed up according to last diabetes/ADHD visit, make appointment for patient before sending refill to provider.     Rx requested:  Requested Prescriptions     Pending Prescriptions Disp Refills    metFORMIN (GLUCOPHAGE XR) 750 MG extended release tablet 180 tablet 1     Sig: Take 1 tablet by mouth in the morning and at bedtime

## 2022-08-11 ENCOUNTER — OFFICE VISIT (OUTPATIENT)
Dept: FAMILY MEDICINE CLINIC | Age: 72
End: 2022-08-11
Payer: MEDICARE

## 2022-08-11 ENCOUNTER — HOSPITAL ENCOUNTER (OUTPATIENT)
Age: 72
Setting detail: SPECIMEN
Discharge: HOME | End: 2022-08-11
Payer: MEDICARE

## 2022-08-11 VITALS
BODY MASS INDEX: 32.97 KG/M2 | OXYGEN SATURATION: 95 % | HEART RATE: 82 BPM | WEIGHT: 179.2 LBS | TEMPERATURE: 98.1 F | SYSTOLIC BLOOD PRESSURE: 126 MMHG | HEIGHT: 62 IN | DIASTOLIC BLOOD PRESSURE: 60 MMHG

## 2022-08-11 DIAGNOSIS — J02.9 SORE THROAT: ICD-10-CM

## 2022-08-11 DIAGNOSIS — H10.9 CONJUNCTIVITIS OF BOTH EYES, UNSPECIFIED CONJUNCTIVITIS TYPE: Primary | ICD-10-CM

## 2022-08-11 DIAGNOSIS — J41.0 SIMPLE CHRONIC BRONCHITIS (HCC): ICD-10-CM

## 2022-08-11 LAB — S PYO AG THROAT QL: NORMAL

## 2022-08-11 PROCEDURE — 87880 STREP A ASSAY W/OPTIC: CPT

## 2022-08-11 PROCEDURE — 87070 CULTURE OTHR SPECIMN AEROBIC: CPT

## 2022-08-11 PROCEDURE — 1123F ACP DISCUSS/DSCN MKR DOCD: CPT

## 2022-08-11 PROCEDURE — 99213 OFFICE O/P EST LOW 20 MIN: CPT

## 2022-08-11 RX ORDER — BACITRACIN ZINC AND POLYMYXIN B SULFATE 500; 10000 [USP'U]/G; [USP'U]/G
OINTMENT OPHTHALMIC 2 TIMES DAILY
Qty: 3.5 G | Refills: 0 | Status: SHIPPED | OUTPATIENT
Start: 2022-08-11 | End: 2022-08-21

## 2022-08-11 ASSESSMENT — ENCOUNTER SYMPTOMS
SORE THROAT: 1
CHEST TIGHTNESS: 0
CHOKING: 0
SHORTNESS OF BREATH: 0
EYE DISCHARGE: 0
EYE ITCHING: 0
SINUS PRESSURE: 0
PHOTOPHOBIA: 0
COUGH: 1
EYE REDNESS: 1
TROUBLE SWALLOWING: 0
EYE PAIN: 0
STRIDOR: 0
WHEEZING: 0
RHINORRHEA: 0

## 2022-08-11 NOTE — PROGRESS NOTES
Cardiovascular:  Negative for chest pain and palpitations. Skin:  Negative for pallor, rash and wound. Neurological:  Negative for dizziness, light-headedness and headaches. PMH, Surgical Hx, Family Hx, and Social Hx reviewed and updated. Objective  Vitals:    08/11/22 0907   BP: 126/60   Site: Right Upper Arm   Position: Sitting   Cuff Size: Large Adult   Pulse: 82   Temp: 98.1 °F (36.7 °C)   TempSrc: Temporal   SpO2: 95%   Weight: 179 lb 3.2 oz (81.3 kg)   Height: 5' 2\" (1.575 m)     BP Readings from Last 3 Encounters:   08/11/22 126/60   03/14/22 132/64   01/11/22 139/68     Wt Readings from Last 3 Encounters:   08/11/22 179 lb 3.2 oz (81.3 kg)   04/25/22 177 lb (80.3 kg)   03/14/22 177 lb 3.2 oz (80.4 kg)     Physical Exam  Vitals reviewed. Constitutional:       General: She is not in acute distress. Appearance: Normal appearance. HENT:      Head: Normocephalic and atraumatic. Right Ear: Tympanic membrane normal. No middle ear effusion. Tympanic membrane is not erythematous. Left Ear: Tympanic membrane normal.  No middle ear effusion. Tympanic membrane is not erythematous. Nose: Nose normal.      Mouth/Throat:      Lips: Pink. Mouth: Mucous membranes are moist.      Pharynx: Uvula midline. Oropharyngeal exudate (clear PND) present. No pharyngeal swelling, posterior oropharyngeal erythema or uvula swelling. Tonsils: No tonsillar exudate or tonsillar abscesses. Comments: Cobblestoning to posterior pharynx  Eyes:      General:         Right eye: Discharge (clear, thick) present. No foreign body. Left eye: Discharge (clear, thick) present. No foreign body. Extraocular Movements: Extraocular movements intact. Conjunctiva/sclera:      Right eye: Right conjunctiva is injected. Left eye: Left conjunctiva is not injected. Pupils: Pupils are equal, round, and reactive to light. Comments: Bilateral upper and lower lids erythematous. Cardiovascular:      Rate and Rhythm: Normal rate and regular rhythm. Heart sounds: Normal heart sounds. Pulmonary:      Effort: Pulmonary effort is normal. No respiratory distress. Breath sounds: Normal breath sounds. No decreased air movement. No decreased breath sounds, wheezing, rhonchi or rales. Skin:     General: Skin is warm and dry. Neurological:      Mental Status: She is alert and oriented to person, place, and time. Assessment & Plan   1. Conjunctivitis of both eyes, unspecified conjunctivitis type  -     bacitracin-polymyxin b (POLYSPORIN) [id number] UNIT/GM ophthalmic ointment; Place into both eyes 2 times daily for 10 days Every 12 hours. , Both Eyes, 2 TIMES DAILY Starting Thu 8/11/2022, Until Sun 8/21/2022, For 10 days, Disp-3.5 g, R-0, Normal  -Avoid touching or rubbing eyes as much as possible  -Wash hands after touching eyes to avoid spread  -Continue allergy medication to help avoid allergic conjunctivitis  -Avoid allergy triggers  -Place a cold, wet paper towel over affected eye to help relieve symptoms  -Discussed red flags for when to go to ER   2. Simple chronic bronchitis ([address]   -Pt states she will make an appointment to see Dr. Usama Powers within the next month   -Will follow up with PCP  3. Sore throat  -     POCT rapid strep A- negative  -     Culture, Throat; Future     Orders Placed This Encounter   Procedures    Culture, Throat     Standing Status:   Future     Standing Expiration Date:   8/11/2023    POCT rapid strep A     Orders Placed This Encounter   Medications    bacitracin-polymyxin b (POLYSPORIN) [id number] UNIT/GM ophthalmic ointment     Sig: Place into both eyes 2 times daily for 10 days Every 12 hours. Dispense:  3.5 g     Refill:  0     Return in 1 month (on 9/11/2022) for follow up with PCP/pulmonology . Reviewed with the patient: current clinical status,medications, activities and diet.      Side effects, adverse effects of themedication prescribed today, as well as treatment plan/ rationale and result expectations have been discussed with the patient who expresses understanding and desires to proceed. Close follow up to evaluate treatment results and for coordination of care. I have reviewed the patient's medical history in detail and updated the computerized patient record.     RAOUL Yeager - NP

## 2022-08-14 LAB — THROAT CULTURE: NORMAL

## 2022-08-14 NOTE — RESULT ENCOUNTER NOTE
Please call patient and inform of negative result. Follow up with PCP if further concerns. Thanks!   RICO Trejo

## 2022-09-07 ENCOUNTER — HOSPITAL ENCOUNTER (OUTPATIENT)
Dept: LAB | Age: 72
Discharge: HOME | End: 2022-09-07
Payer: MEDICARE

## 2022-09-07 DIAGNOSIS — E11.9 TYPE 2 DIABETES MELLITUS WITHOUT COMPLICATION, WITHOUT LONG-TERM CURRENT USE OF INSULIN (HCC): ICD-10-CM

## 2022-09-07 LAB
ALBUMIN SERPL-MCNC: 4.2 G/DL (ref 3.5–4.6)
ALP BLD-CCNC: 62 U/L (ref 40–130)
ALT SERPL-CCNC: 24 U/L (ref 0–33)
ANION GAP SERPL CALCULATED.3IONS-SCNC: 12 MEQ/L (ref 9–15)
AST SERPL-CCNC: 28 U/L (ref 0–35)
BASOPHILS ABSOLUTE: 0 K/UL (ref 0–0.2)
BASOPHILS RELATIVE PERCENT: 0.5 %
BILIRUB SERPL-MCNC: 0.3 MG/DL (ref 0.2–0.7)
BUN BLDV-MCNC: 16 MG/DL (ref 8–23)
CALCIUM SERPL-MCNC: 9.1 MG/DL (ref 8.5–9.9)
CHLORIDE BLD-SCNC: 108 MEQ/L (ref 95–107)
CHOLESTEROL, TOTAL: 126 MG/DL (ref 0–199)
CO2: 23 MEQ/L (ref 20–31)
CREAT SERPL-MCNC: 0.82 MG/DL (ref 0.5–0.9)
CREATININE URINE: 164.5 MG/DL
EOSINOPHILS ABSOLUTE: 0.2 K/UL (ref 0–0.7)
EOSINOPHILS RELATIVE PERCENT: 3.4 %
GFR AFRICAN AMERICAN: >60
GFR NON-AFRICAN AMERICAN: >60
GLOBULIN: 2.7 G/DL (ref 2.3–3.5)
GLUCOSE BLD-MCNC: 115 MG/DL (ref 70–99)
HBA1C MFR BLD: 6.1 % (ref 4.8–5.9)
HCT VFR BLD CALC: 36.5 % (ref 37–47)
HDLC SERPL-MCNC: 49 MG/DL (ref 40–59)
HEMOGLOBIN: 12 G/DL (ref 12–16)
LDL CHOLESTEROL CALCULATED: 51 MG/DL (ref 0–129)
LYMPHOCYTES ABSOLUTE: 1.6 K/UL (ref 1–4.8)
LYMPHOCYTES RELATIVE PERCENT: 28.4 %
MCH RBC QN AUTO: 31.6 PG (ref 27–31.3)
MCHC RBC AUTO-ENTMCNC: 32.9 % (ref 33–37)
MCV RBC AUTO: 96 FL (ref 82–100)
MICROALBUMIN UR-MCNC: <1.2 MG/DL
MICROALBUMIN/CREAT UR-RTO: NORMAL MG/G (ref 0–30)
MONOCYTES ABSOLUTE: 0.5 K/UL (ref 0.2–0.8)
MONOCYTES RELATIVE PERCENT: 9.2 %
NEUTROPHILS ABSOLUTE: 3.3 K/UL (ref 1.4–6.5)
NEUTROPHILS RELATIVE PERCENT: 58.5 %
PDW BLD-RTO: 14.5 % (ref 11.5–14.5)
PLATELET # BLD: 189 K/UL (ref 130–400)
POTASSIUM SERPL-SCNC: 4.5 MEQ/L (ref 3.4–4.9)
RBC # BLD: 3.8 M/UL (ref 4.2–5.4)
SODIUM BLD-SCNC: 143 MEQ/L (ref 135–144)
TOTAL PROTEIN: 6.9 G/DL (ref 6.3–8)
TRIGL SERPL-MCNC: 132 MG/DL (ref 0–150)
WBC # BLD: 5.6 K/UL (ref 4.8–10.8)

## 2022-09-07 PROCEDURE — 80053 COMPREHEN METABOLIC PANEL: CPT

## 2022-09-07 PROCEDURE — 80061 LIPID PANEL: CPT

## 2022-09-07 PROCEDURE — 83036 HEMOGLOBIN GLYCOSYLATED A1C: CPT

## 2022-09-07 PROCEDURE — 82570 ASSAY OF URINE CREATININE: CPT

## 2022-09-07 PROCEDURE — 36415 COLL VENOUS BLD VENIPUNCTURE: CPT

## 2022-09-07 PROCEDURE — 85025 COMPLETE CBC W/AUTO DIFF WBC: CPT

## 2022-09-07 PROCEDURE — 82043 UR ALBUMIN QUANTITATIVE: CPT

## 2022-09-12 ENCOUNTER — OFFICE VISIT (OUTPATIENT)
Dept: PULMONOLOGY | Age: 72
End: 2022-09-12
Payer: MEDICARE

## 2022-09-12 VITALS
DIASTOLIC BLOOD PRESSURE: 82 MMHG | HEART RATE: 90 BPM | BODY MASS INDEX: 32.37 KG/M2 | WEIGHT: 177 LBS | SYSTOLIC BLOOD PRESSURE: 128 MMHG | OXYGEN SATURATION: 97 %

## 2022-09-12 DIAGNOSIS — Z87.891 PERSONAL HISTORY OF SMOKING: Primary | ICD-10-CM

## 2022-09-12 DIAGNOSIS — Z87.891 PERSONAL HISTORY OF TOBACCO USE: ICD-10-CM

## 2022-09-12 DIAGNOSIS — F17.200 SMOKING: ICD-10-CM

## 2022-09-12 DIAGNOSIS — E66.9 OBESITY (BMI 30-39.9): ICD-10-CM

## 2022-09-12 DIAGNOSIS — J41.0 SIMPLE CHRONIC BRONCHITIS (HCC): ICD-10-CM

## 2022-09-12 DIAGNOSIS — G47.33 OSA (OBSTRUCTIVE SLEEP APNEA): ICD-10-CM

## 2022-09-12 PROCEDURE — 1123F ACP DISCUSS/DSCN MKR DOCD: CPT | Performed by: INTERNAL MEDICINE

## 2022-09-12 PROCEDURE — G0296 VISIT TO DETERM LDCT ELIG: HCPCS | Performed by: INTERNAL MEDICINE

## 2022-09-12 PROCEDURE — 99214 OFFICE O/P EST MOD 30 MIN: CPT | Performed by: INTERNAL MEDICINE

## 2022-09-12 RX ORDER — FLUOROMETHOLONE 0.1 %
SUSPENSION, DROPS(FINAL DOSAGE FORM)(ML) OPHTHALMIC (EYE)
COMMUNITY
Start: 2022-08-19

## 2022-09-12 ASSESSMENT — ENCOUNTER SYMPTOMS
NAUSEA: 0
CHEST TIGHTNESS: 0
TROUBLE SWALLOWING: 0
RHINORRHEA: 0
VOMITING: 0
DIARRHEA: 0
ABDOMINAL PAIN: 0
VOICE CHANGE: 0
EYE DISCHARGE: 0
SINUS PRESSURE: 0
SHORTNESS OF BREATH: 0
WHEEZING: 0
SORE THROAT: 0
COUGH: 1
EYE ITCHING: 0

## 2022-09-12 NOTE — PROGRESS NOTES
Subjective:     Aditya Patel is a 81 y.o. female who complains today of:     Chief Complaint   Patient presents with    Sleep Apnea    Follow-up     CPAP. Nasal Congestion     Pt. States has cough/nasal drip       HPI  She is using CPAP with  7-10   centimeters of H2O with heated humidity. She is using CPAP for about    7 hours every night. She is using CPAP with   full face Mask. She said  sleep is restful with the CPAP use. She is compliant with CPAP therapy and benefiting with CPAP use. No snoring with CPAP use. No complaint of daytime sleepiness or tiredness with CPAP use. She denies taking naps. No sleepiness with driving. She denies difficulty falling asleep or staying asleep. I reviewed compliance report with patient regarding CPAP therapy. She is using  CPAP for 27 days out of 30 days. Average usage of days used is 7 hours and 15  min , average AHI 4.4  with CPAP use. No C/o shortness of breath   No Wheezing. C/o Cough with  yellow Sputum. No Hemoptysis. No Chest tightness. No Chest pain with radiation  or pleuritic pain. No  leg edema. No Fever or chills. C/o  Rhinorrhea and postnasal drip. She is not using inhaler . Allergies:   Other, Tomato, Influenza vaccines, and Seasonal  Past Medical History:   Diagnosis Date    Allergic rhinitis     Anxiety     History of blood transfusion     blood transfusions with c section x 3    Hyperlipidemia     meds > 2 yrs    Osteoarthritis     Sleep apnea     Type 2 diabetes mellitus 2022     Past Surgical History:   Procedure Laterality Date    ANKLE SURGERY Right     tendon repair    [address]    pt has had 3     COLONOSCOPY      COLONOSCOPY N/A 2020    [facility] [address] [facility] [place], COLON, DIAGNOSTIC      TOTAL KNEE ARTHROPLASTY Left 2019    LEFT TOTAL KNEE ARTHROPLASTY, SUPINE, 23 HR OBS, IRAJ CEMENTED PERSONA performed by Galo Marion MD at [facility] 3/13/2020    RIGHT TOTAL KNEE  ARTHROPLASTY performed by Galo Marion MD at [place] [address] History   Problem Relation Age of Onset    Heart Disease Mother     High Blood Pressure Mother     Vision Loss Mother     Other Mother         leiden factor 5    Diabetes Father     Heart Disease Father     Depression Sister     Diabetes Sister     Obesity Sister     Diabetes Brother     Arthritis Brother     Other Brother         leiden factor 5    Allergy (Severe) Sister     Arthritis Sister     Depression Sister     Arthritis Sister     Depression Sister     Diabetes Sister     Arthritis Sister     Depression Sister     Arthritis Sister     Arthritis Sister     Arthritis Sister     Arthritis Brother     Arthritis Brother     Arthritis Brother     Thyroid Disease Daughter     No Known Problems Son      Social History     Socioeconomic History    Marital status: Single     Spouse name: Not on file    Number of children: 3    Years of education: 18    Highest education level: Master's degree (e.g., MA, MS, Sybil, MEd, MSW, MIRTA)   Occupational History    Occupation: Teacher    Tobacco Use    Smoking status: Former     Packs/day: 0.50     Years: 52.00     Pack years: 26.00     Types: Cigarettes     Start date:      Quit date: 2020     Years since quittin.8    Smokeless tobacco: Never   Vaping Use    Vaping Use: Never used   Substance and Sexual Activity    Alcohol use:  Yes     Alcohol/week: 1.0 standard drink     Types: 1 Shots of liquor per week     Comment: Once per month    Drug use: No    Sexual activity: Not Currently     Partners: Male   Other Topics Concern    Not on file   Social History Narrative    Not on file     Social Determinants of Health     Financial Resource Strain: Not on file   Food Insecurity: Not on file   Transportation Needs: Not on file   Physical Activity: Insufficiently Active    Days of Exercise per Week: 3 days Minutes of Exercise per Session: 30 min   Stress: Not on file   Social Connections: Not on file   Intimate Partner Violence: Not on file   Housing Stability: Not on file         Review of Systems   Constitutional:  Negative for chills, diaphoresis, fatigue and fever. HENT:  Negative for congestion, mouth sores, nosebleeds, postnasal drip, rhinorrhea, sinus pressure, sneezing, sore throat, trouble swallowing and voice change. Eyes:  Negative for discharge, itching and visual disturbance. Respiratory:  Positive for cough (with yellow). Negative for chest tightness, shortness of breath and wheezing. Cardiovascular:  Negative for chest pain, palpitations and leg swelling. Gastrointestinal:  Negative for abdominal pain, diarrhea, nausea and vomiting. Genitourinary:  Negative for difficulty urinating and hematuria. Musculoskeletal:  Negative for arthralgias, joint swelling and myalgias. Skin:  Negative for rash. Allergic/Immunologic: Negative for environmental allergies and food allergies. Neurological:  Negative for dizziness, tremors, weakness and headaches. Psychiatric/Behavioral:  Positive for sleep disturbance. Negative for behavioral problems. :     Vitals:    09/12/22 1005   BP: 128/82   Site: Right Upper Arm   Position: Sitting   Cuff Size: Medium Adult   Pulse: 90   SpO2: 97%   Weight: 177 lb (80.3 kg)     Wt Readings from Last 3 Encounters:   09/12/22 177 lb (80.3 kg)   08/11/22 179 lb 3.2 oz (81.3 kg)   04/25/22 177 lb (80.3 kg)         Physical Exam  Constitutional:       General: She is not in acute distress. Appearance: She is well-developed. She is not diaphoretic. HENT:      Head: Normocephalic and atraumatic. Nose: Nose normal.   Eyes:      Pupils: Pupils are equal, round, and reactive to light. Neck:      Thyroid: No thyromegaly. Vascular: No JVD. Trachea: No tracheal deviation. Cardiovascular:      Rate and Rhythm: Normal rate and regular rhythm. Heart sounds: No murmur heard. No friction rub. No gallop. Pulmonary:      Effort: No respiratory distress. Breath sounds: No wheezing or rales. Chest:      Chest wall: No tenderness. Abdominal:      General: There is no distension. Tenderness: There is no abdominal tenderness. There is no rebound. Musculoskeletal:         General: Normal range of motion. Lymphadenopathy:      Cervical: No cervical adenopathy. Skin:     General: Skin is warm and dry. Neurological:      Mental Status: She is alert and oriented to person, place, and time. Coordination: Coordination normal.   Psychiatric:         Mood and Affect: Mood normal.         Behavior: Behavior normal.       Current Outpatient Medications   Medication Sig Dispense Refill    metFORMIN (GLUCOPHAGE XR) 750 MG extended release tablet Take 1 tablet by mouth in the morning and at bedtime 180 tablet 1    citalopram (CELEXA) 20 MG tablet TAKE 1 TABLET DAILY 90 tablet 4    blood glucose monitor strips DX: diabetes mellitus. Use 3 time(s) daily - Ok to substitute per insurance 100 strip 5    CPAP Machine MISC by Does not apply route New CPAP 7-10 cm 1 each 0    meloxicam (MOBIC) 15 MG tablet Take 1 tablet by mouth daily 90 tablet 0    Alcohol Swabs PADS DX: diabetes mellitus. Test 1-3 time(s) daily - Ok to substitute per insurance (1 each = 1 box) 1 each 5    blood glucose monitor kit and supplies DX: diabetes mellitus. Test 1-3 time(s) daily - Ok to substitute per insurance 1 kit 0    Lancets MISC DX: diabetes mellitus.  Use 1-3 time(s) daily - Ok to substitute per insurance (1 each = 1 box) 1 each 5    rosuvastatin (CRESTOR) 10 MG tablet TAKE 1 TABLET DAILY 90 tablet 4    Phytosterols 450 MG TABS Take 1 tablet by mouth daily 30 tablet 2    Multiple Vitamins-Minerals (WOMENS MULTIVITAMIN PO) Take 1 tablet by mouth daily      fluticasone (FLONASE) 50 MCG/ACT nasal spray 1 spray by Each Nare route daily      fexofenadine (ALLEGRA) 180 MG tablet Take 180 mg by mouth daily      Respiratory Therapy Supplies [facility] New CPAP mask and supplies 1 Device 0    fluorometholone (FML) 0.1 % ophthalmic suspension INSTILL 1 DROP THREE TIMES DAILY INTO EACH EYE FOR 5 DAYS THEN, INSTILL 1 DROP TWICE DAILY INTO EACH EYE FOR 5 DAYS THEN, INSTILL 1 DROP INTO EACH EYE ONCE DAILY FOR 5 DAYS       No current facility-administered medications for this visit. Results for orders placed during the hospital encounter of 20    XR CHEST STANDARD (2 VW)    Narrative  Patient MRN: [id number]    : 1950    Age:  81 years    Gender: Female    Order Date: 2020 5:14 PM.    Exam: XR CHEST (2 VW)    Number of Views: 2    Indication:  Cough    Comparison: None    Findings: Cardiomediastinal silhouette is within normal limits. Lungs are clear without evidence of infiltrate/pneumonia, pneumothorax or pleural effusion    Impression  Impression:  No radiographic evidence of acute cardiopulmonary disease      Assessment/Plan:     1. Personal history of smoking  - CT LUNG SCREENING; Future  - Full PFT Study With Bronchodilator; Future  - XR CHEST STANDARD (2 VW); Future    2. AMANDA (obstructive sleep apnea)  She is using CPAP with  7-10   centimeters of H2O with heated humidity. She is using CPAP for about   7 hours every night. She is using CPAP with   full face Mask. She said  sleep is restful with the CPAP use. She is compliant with CPAP therapy and benefiting with CPAP use. No snoring with CPAP use. I reviewed compliance report with patient regarding CPAP therapy. She is using  CPAP for 27 days out of 30 days. Average usage of days used is 7 hours and 15  min , average AHI 4.4  with CPAP use. 3. Obesity (BMI 30-39. 9)  She is advised try to lose weight. obesity related risk explained to the patient ,  Current weight:  177 lb (80.3 kg) Lbs. BMI:  Body mass index is 32.37 kg/m².   Suggested weight control approaches, including dietary changes , exercise, behavioral

## 2022-09-15 ENCOUNTER — TELEPHONE (OUTPATIENT)
Dept: FAMILY MEDICINE CLINIC | Age: 72
End: 2022-09-15

## 2022-09-15 DIAGNOSIS — Z12.31 ENCOUNTER FOR SCREENING MAMMOGRAM FOR MALIGNANT NEOPLASM OF BREAST: Primary | ICD-10-CM

## 2022-09-15 NOTE — TELEPHONE ENCOUNTER
----- Message from [facility] sent at 9/15/2022 12:47 PM EDT -----  Subject: Referral Request    Reason for referral request? pt needs a new referral for mammogram after   10/12   Provider patient wants to be referred to(if known):     Provider Phone Number(if known):     Additional Information for Provider?   ---------------------------------------------------------------------------  --------------  [address]    [phone]; OK to leave message on voicemail  ---------------------------------------------------------------------------  --------------

## 2022-09-19 ENCOUNTER — OFFICE VISIT (OUTPATIENT)
Dept: FAMILY MEDICINE CLINIC | Age: 72
End: 2022-09-19
Payer: MEDICARE

## 2022-09-19 ENCOUNTER — HOSPITAL ENCOUNTER (OUTPATIENT)
Age: 72
Setting detail: SPECIMEN
Discharge: HOME | End: 2022-09-19
Payer: MEDICARE

## 2022-09-19 VITALS
DIASTOLIC BLOOD PRESSURE: 72 MMHG | SYSTOLIC BLOOD PRESSURE: 132 MMHG | HEIGHT: 62 IN | TEMPERATURE: 95.9 F | WEIGHT: 178.4 LBS | HEART RATE: 90 BPM | OXYGEN SATURATION: 98 % | RESPIRATION RATE: 14 BRPM | BODY MASS INDEX: 32.83 KG/M2

## 2022-09-19 DIAGNOSIS — G89.29 CHRONIC LEFT-SIDED LOW BACK PAIN WITHOUT SCIATICA: ICD-10-CM

## 2022-09-19 DIAGNOSIS — E78.2 MIXED HYPERLIPIDEMIA: ICD-10-CM

## 2022-09-19 DIAGNOSIS — R05.3 CHRONIC COUGH: ICD-10-CM

## 2022-09-19 DIAGNOSIS — M20.42 HAMMER TOE OF LEFT FOOT: ICD-10-CM

## 2022-09-19 DIAGNOSIS — J30.89 ALLERGIC RHINITIS DUE TO OTHER ALLERGIC TRIGGER, UNSPECIFIED SEASONALITY: ICD-10-CM

## 2022-09-19 DIAGNOSIS — F17.200 CURRENT SMOKER: ICD-10-CM

## 2022-09-19 DIAGNOSIS — F41.9 ANXIETY: ICD-10-CM

## 2022-09-19 DIAGNOSIS — J41.0 SIMPLE CHRONIC BRONCHITIS (HCC): ICD-10-CM

## 2022-09-19 DIAGNOSIS — R05.8 SPUTUM PRODUCTION: ICD-10-CM

## 2022-09-19 DIAGNOSIS — M54.50 CHRONIC LEFT-SIDED LOW BACK PAIN WITHOUT SCIATICA: ICD-10-CM

## 2022-09-19 DIAGNOSIS — E11.9 TYPE 2 DIABETES MELLITUS WITHOUT COMPLICATION, WITHOUT LONG-TERM CURRENT USE OF INSULIN (HCC): Primary | ICD-10-CM

## 2022-09-19 DIAGNOSIS — G89.29 CHRONIC RIGHT SHOULDER PAIN: ICD-10-CM

## 2022-09-19 DIAGNOSIS — J34.89 NOSTRIL SORE: ICD-10-CM

## 2022-09-19 DIAGNOSIS — M25.511 CHRONIC RIGHT SHOULDER PAIN: ICD-10-CM

## 2022-09-19 DIAGNOSIS — G47.33 OSA (OBSTRUCTIVE SLEEP APNEA): ICD-10-CM

## 2022-09-19 PROCEDURE — 3044F HG A1C LEVEL LT 7.0%: CPT | Performed by: NURSE PRACTITIONER

## 2022-09-19 PROCEDURE — 99214 OFFICE O/P EST MOD 30 MIN: CPT | Performed by: NURSE PRACTITIONER

## 2022-09-19 PROCEDURE — 87070 CULTURE OTHR SPECIMN AEROBIC: CPT

## 2022-09-19 PROCEDURE — 1123F ACP DISCUSS/DSCN MKR DOCD: CPT | Performed by: NURSE PRACTITIONER

## 2022-09-19 RX ORDER — OLOPATADINE HYDROCHLORIDE 665 UG/1
2 SPRAY NASAL 2 TIMES DAILY
Qty: 1 EACH | Refills: 2 | Status: SHIPPED | OUTPATIENT
Start: 2022-09-19

## 2022-09-19 SDOH — ECONOMIC STABILITY: FOOD INSECURITY: WITHIN THE PAST 12 MONTHS, THE FOOD YOU BOUGHT JUST DIDN'T LAST AND YOU DIDN'T HAVE MONEY TO GET MORE.: NEVER TRUE

## 2022-09-19 SDOH — ECONOMIC STABILITY: FOOD INSECURITY: WITHIN THE PAST 12 MONTHS, YOU WORRIED THAT YOUR FOOD WOULD RUN OUT BEFORE YOU GOT MONEY TO BUY MORE.: NEVER TRUE

## 2022-09-19 ASSESSMENT — SOCIAL DETERMINANTS OF HEALTH (SDOH): HOW HARD IS IT FOR YOU TO PAY FOR THE VERY BASICS LIKE FOOD, HOUSING, MEDICAL CARE, AND HEATING?: NOT HARD AT ALL

## 2022-09-19 NOTE — PROGRESS NOTES
Subjective:     Diabetes Mellitus Type 2: Current symptoms/problems include none. Home blood sugar records:  trend: stable  Any episodes of hypoglycemia? no  Tobacco history: She  reports that she quit smoking about 22 months ago. Her smoking use included cigarettes. She started smoking about 54 years ago. She has a 26.00 pack-year smoking history. She has never used smokeless tobacco.   Known diabetic complications: none  She has joined [employer] through her insurance and has started an exercise program. This has been working well for her. Chronic low back pain: She has found some benefit through 5 stretching exercises. She practices these every day and has not had any recent exacerbation of low back pain symptoms. Hyperlipidemia:  No new myalgias or GI upset on rosuvastatin (Crestor). Sleep apnea/bronchitis/current smoker/productive cough:  Zaheer Valle has obstructive sleep apnea that is treated with CPAP. The CPAP is used  7 hours 7 nights per week. The CPAP use helps the daytime sleepiness and low energy levels. She states that she has established care with [facility] pulmonology and does have a new CPAP machine which has been working well for her. She has had some ongoing issues with nasal congestion and postnasal drip and had been taking Flonase long-term. She developed a sore area inside of her nostril and was told by pulmonology that this was likely secondary to [facility]. She like to discuss a possible alternative to the Flonase nasal spray. She states that she came down with symptoms of illness off and on several weeks ago. Has been having thick yellow sputum especially after smoking. She is not ready for smoking cessation. She is not sure if there is a way to test to see what is causing the thick sputum in her chest.  No fever or chills. Anxiety: She states that she is currently going through Counseling to help work on herself.  Has had some anger triggered after seeing pictures of her former son-in law's sister who created a lot of stress in the past.   She had not realized how much of a trigger this would be and states that things are improving with therapy. She feels that current dose of Celexa is working well for her with no medication side effects. She endorses good support of family and friends. She denies any down or depressed moods. No thoughts of self-harm or harm to others. The five diabetic measures for control:   Hemoglobin A1C (%)   Date Value   09/07/2022 6.1 (H)     LDL Calculated (mg/dL)   Date Value   09/07/2022 51         Blood pressure less than 131/81,   BP Readings from Last 1 Encounters:   09/19/22 132/72     Smoking, non smoker is goal. This pt is  a smoker. This pt does an aspirin a day. Last eye exam was 2022  Last diabetic foot exam was 2022  Last urine microalbumin creatinine ratio was 2022    PMH: This 81 y.o. female  patient is not hypertensive, is borderline diabetic, is hyperlipidemic. She has a history of smoking and has a  family history of heart disease. She is obese. Review of Systems  This patient reports no chest pain or pressure. There is no shortness of breath or cough. The patient reports no nausea or vomiting. There is no heartburn or indigestion. There is no diarrhea or constipation. No black, bloody, mucusy or tarry stool noticed. The patient reports no bloating and no change in appetite. There is no numbness, tingling or swelling in the extremities. Eyes: no rapid change in vision  Ears, nose, mouth, throat, and face: no changes in hearing or sore throat  Respiratory: No shortness of breath or cough. Cardiovascular: no chest pain or pressure. This patient reports no polyuria, polydipsia or episodes of hypoglycemia.   Treatment Adherence:   Medication compliance:  compliant most of the time  Diet compliance:  compliant most of the time  Weight trend: stable  Current exercise: walks 1 time(s) per day  What might prevent you foot  Machelle Yuen, JOSE, Podiatry, Alice/Kathy      10. Chronic left-sided low back pain without sciatica        11. Current smoker        12. Allergic rhinitis due to other allergic trigger, unspecified seasonality  olopatadine (PATANASE) 0.6 % SOLN nassl soln          PLAN: Include orders in the DX section. Diabetes Counseling   Patient was counseled regarding disease risks and adopting healthy behaviors. Patient was provided education materials to assist with self management. Patient was provided log (or received log during previous visit) to record blood pressure, food intake and/or blood sugar. Patient was instructed to keep log up-to-date and to always bring log to all office visits. Follow up: 6 months and as needed. Blood work one week prior as ordered. 1. 9. Diabetes is very well controlled with metformin extended release. No medication side effects reported. Hemoglobin A1c is improved when compared to last check. No low sugar episodes reported. Continue the same. She has been very diligent about eating healthy diet and taking medication as prescribed as well as getting routine physical activity. Had a sister passed away from complications of diabetes in the past.  Discussed that foot examination is normal today. However hammertoe is present and referral to podiatry is given. 2.  Lipid panel is overall stable on statin. No side effects with medication reported. Continue the same. 3.  Anxiety symptoms have been well controlled on current medication with no reported side effects. Continue close follow-up with therapy and notify me if mood becomes unstable prior to next visit. 4.  5.  6.  7.  8.  11.  12.  Recommend changing nasal spray to help avoid additional nasal irritation. Notify me if symptoms or not well managed. She will plan to continue to follow with pulmonology and has been compliant with CPAP therapy.   Recommend sputum culture for chronicity of thick sputum production. Discussed recommendation for smoking cessation but she is not ready at this time. Office to call with culture results when available. 10.  Symptoms have been improving with low back stretching exercises done routinely at home. Notify me if any exacerbation of symptoms. Please note this report has been partially produced using speech recognition software and may cause contain errors related to that system including grammar, punctuation and spelling as well as words and phrases that may seem inappropriate. If there are questions or concerns please feel free to contact me to clarify.       Electronically signed by RAOUL Fountain, 1:00 PM 9/19/22

## 2022-09-19 NOTE — TELEPHONE ENCOUNTER
Comments:     Last Office Visit (last PCP visit):   Visit date not found    Next Visit Date:  Future Appointments   Date Time Provider Iain Jolleyi   9/26/2022  8:00 AM [facility] PFT ROOM [address]   10/12/2022 10:15 AM [address]   11/9/2022 10:00 AM RYAN CT ROOM 1 [facility] RAD       **If hasn't been seen in over a year OR hasn't followed up according to last diabetes/ADHD visit, make appointment for patient before sending refill to provider.     Rx requested:  Requested Prescriptions     Pending Prescriptions Disp Refills    meloxicam (MOBIC) 15 MG tablet [Pharmacy Med Name: Meloxicam 15 MG Oral Tablet] 90 tablet 0     Sig: Take 1 tablet by mouth once daily

## 2022-09-20 ENCOUNTER — TELEPHONE (OUTPATIENT)
Dept: FAMILY MEDICINE CLINIC | Age: 72
End: 2022-09-20

## 2022-09-20 DIAGNOSIS — R05.3 CHRONIC COUGH: ICD-10-CM

## 2022-09-20 DIAGNOSIS — R05.8 SPUTUM PRODUCTION: Primary | ICD-10-CM

## 2022-09-20 LAB — CULTURE, RESPIRATORY: NORMAL

## 2022-09-20 RX ORDER — MELOXICAM 15 MG/1
TABLET ORAL
Qty: 90 TABLET | Refills: 0 | Status: SHIPPED | OUTPATIENT
Start: 2022-09-20

## 2022-09-20 NOTE — TELEPHONE ENCOUNTER
Please find out if this was for the respiratory culture? If so, I have ordered repeat testing and please let patient know.

## 2022-09-20 NOTE — TELEPHONE ENCOUNTER
Received a call from Lab concerning Patient Minor Vicky urine results. It came back contaminated and they were unable to assess.

## 2022-09-26 ENCOUNTER — HOSPITAL ENCOUNTER (OUTPATIENT)
Dept: PULMONOLOGY | Age: 72
Discharge: HOME | End: 2022-09-26
Payer: MEDICARE

## 2022-09-26 DIAGNOSIS — Z87.891 PERSONAL HISTORY OF SMOKING: ICD-10-CM

## 2022-09-26 PROCEDURE — 94726 PLETHYSMOGRAPHY LUNG VOLUMES: CPT

## 2022-09-26 PROCEDURE — 94729 DIFFUSING CAPACITY: CPT | Performed by: INTERNAL MEDICINE

## 2022-09-26 PROCEDURE — 94010 BREATHING CAPACITY TEST: CPT

## 2022-09-26 PROCEDURE — 94726 PLETHYSMOGRAPHY LUNG VOLUMES: CPT | Performed by: INTERNAL MEDICINE

## 2022-09-26 PROCEDURE — 94060 EVALUATION OF WHEEZING: CPT | Performed by: INTERNAL MEDICINE

## 2022-09-26 PROCEDURE — 94729 DIFFUSING CAPACITY: CPT

## 2022-09-30 NOTE — PROCEDURES
[address]                      [address] [place], [address] [facility]                    PULMONARY FUNCTION  Lisa Martinez   81 y.o.   female  Height 62 in  Weight 178 lb      Referring provider   Deejay Rivers MD    Reading provider   Ayanna Zapata MD              Test interpretation:     The quality of the study is good. Spirometry is overall normal.  Bronchodilators were not applied. Normal lung volumes and diffusion capacity. Clinical correlation is recommended.     Ayanna Zapata MD , 9/30/2022 1:58 PM

## 2022-10-17 ENCOUNTER — HOSPITAL ENCOUNTER (OUTPATIENT)
Dept: WOMENS IMAGING | Age: 72
Discharge: HOME | End: 2022-10-19
Payer: MEDICARE

## 2022-10-17 DIAGNOSIS — Z12.31 ENCOUNTER FOR SCREENING MAMMOGRAM FOR MALIGNANT NEOPLASM OF BREAST: ICD-10-CM

## 2022-10-17 PROCEDURE — 77063 BREAST TOMOSYNTHESIS BI: CPT

## 2022-10-18 NOTE — RESULT ENCOUNTER NOTE
Please notify Christine Valdivia that mammogram results are normal. Follow up as scheduled and as needed.

## 2022-10-31 ENCOUNTER — TELEPHONE (OUTPATIENT)
Dept: CARDIOTHORACIC SURGERY | Age: 72
End: 2022-10-31

## 2022-10-31 NOTE — TELEPHONE ENCOUNTER
Physician documentation on smoking history and CT Lung Screening reviewed. All required documentation complete. Patient is a former smoker (2020) with a 26 pack year history ( 0.5 ppd x 52 years) per physician documentation.

## 2022-11-07 DIAGNOSIS — E78.2 MIXED HYPERLIPIDEMIA: ICD-10-CM

## 2022-11-07 RX ORDER — ROSUVASTATIN CALCIUM 10 MG/1
TABLET, COATED ORAL
Qty: 90 TABLET | Refills: 0 | Status: SHIPPED | OUTPATIENT
Start: 2022-11-07

## 2022-11-07 NOTE — TELEPHONE ENCOUNTER
Pharmacy is requesting medication refill.  Please approve or deny this request.    Rx requested:  Requested Prescriptions     Pending Prescriptions Disp Refills    rosuvastatin (CRESTOR) 10 MG tablet [Pharmacy Med Name: Rosuvastatin Calcium 10 MG Oral Tablet] 90 tablet 0     Sig: Take 1 tablet by mouth once daily         Last Office Visit:   9/19/2022      Next Visit Date:  Future Appointments   Date Time Provider Iain Medina   11/8/2022 11:00 AM Tre Mullen DPM MLOX OB  [facility]   11/9/2022 10:00 AM RYAN MEJIA ROOM [address] [facility] RAD

## 2022-11-08 ENCOUNTER — INITIAL CONSULT (OUTPATIENT)
Dept: PODIATRY | Age: 72
End: 2022-11-08
Payer: MEDICARE

## 2022-11-08 VITALS — WEIGHT: 170 LBS | HEIGHT: 62 IN | TEMPERATURE: 97.1 F | BODY MASS INDEX: 31.28 KG/M2

## 2022-11-08 DIAGNOSIS — M79.675 PAIN IN TOES OF BOTH FEET: Primary | ICD-10-CM

## 2022-11-08 DIAGNOSIS — M79.674 PAIN IN TOES OF BOTH FEET: Primary | ICD-10-CM

## 2022-11-08 DIAGNOSIS — M20.42 HAMMERTOE, BILATERAL: ICD-10-CM

## 2022-11-08 DIAGNOSIS — L85.3 XEROSIS OF SKIN: ICD-10-CM

## 2022-11-08 DIAGNOSIS — M20.12 HALLUX ABDUCTO VALGUS, BILATERAL: ICD-10-CM

## 2022-11-08 DIAGNOSIS — E11.42 DIABETIC POLYNEUROPATHY ASSOCIATED WITH TYPE 2 DIABETES MELLITUS (HCC): ICD-10-CM

## 2022-11-08 DIAGNOSIS — M21.621 TAILOR'S BUNIONETTE, BILATERAL: ICD-10-CM

## 2022-11-08 DIAGNOSIS — M20.11 HALLUX ABDUCTO VALGUS, BILATERAL: ICD-10-CM

## 2022-11-08 DIAGNOSIS — M21.622 TAILOR'S BUNIONETTE, BILATERAL: ICD-10-CM

## 2022-11-08 DIAGNOSIS — M20.41 HAMMERTOE, BILATERAL: ICD-10-CM

## 2022-11-08 DIAGNOSIS — L84 CALLUS: ICD-10-CM

## 2022-11-08 PROCEDURE — 99204 OFFICE O/P NEW MOD 45 MIN: CPT | Performed by: PODIATRIST

## 2022-11-08 PROCEDURE — 1123F ACP DISCUSS/DSCN MKR DOCD: CPT | Performed by: PODIATRIST

## 2022-11-08 PROCEDURE — 3044F HG A1C LEVEL LT 7.0%: CPT | Performed by: PODIATRIST

## 2022-11-08 RX ORDER — AMMONIUM LACTATE 12 G/100G
CREAM TOPICAL
Qty: 385 G | Refills: 5 | Status: SHIPPED | OUTPATIENT
Start: 2022-11-08

## 2022-11-08 ASSESSMENT — ENCOUNTER SYMPTOMS
SHORTNESS OF BREATH: 0
NAUSEA: 0
BACK PAIN: 1
VOMITING: 0

## 2022-11-08 NOTE — PROGRESS NOTES
[address]  [facility] [address]  [address]  Dept: [phone]  Loc: [phone]       Donna Laird  ([birth year])    11/8/22    Subjective     Ema Chowdary is 81 y.o. female who complains today of:    Chief Complaint   Patient presents with    Diabetes     Ema Chowdary is seen in consultation at the request of RAOUL Balderas CNP for a diabetic foot evaluation. HPI: Patient presents for diabetic foot exam.  Patient states that her blood glucose is well controlled, most recent hemoglobin A1c was 6.2%. Patient denies a history of diabetic foot ulceration. Claudication. Patient reports that she does wear diabetic shoes on a regular basis, her current pair is more than a year old. Patient does complain of painful hammertoe to the left second toe. Patient has not attempted prior treatment. Patient describes having sharp pain when wearing certain shoes, she states that the second toe is overriding the big toe which pushes up into the top of the shoe. Patient also complains of dry skin and calluses bilaterally. Review of Systems   Constitutional:  Negative for chills and fever. HENT:  Negative for hearing loss. Respiratory:  Negative for shortness of breath. Cardiovascular:  Negative for chest pain. Gastrointestinal:  Negative for nausea and vomiting. Genitourinary:  Negative for difficulty urinating. Musculoskeletal:  Positive for back pain. Negative for gait problem. Skin:  Negative for wound. Neurological:  Negative for numbness. Hematological:  Does not bruise/bleed easily. Psychiatric/Behavioral:  Negative for sleep disturbance. The patient is a diabetic. PCP: RAOUL Balderas CNP   Date last seen: 9/19/22    Allergies:   Other, Tomato, Influenza vaccines, and Seasonal    Current Outpatient Medications on File Prior to Visit Medication Sig Dispense Refill    rosuvastatin (CRESTOR) 10 MG tablet Take 1 tablet by mouth once daily 90 tablet 0    meloxicam (MOBIC) 15 MG tablet Take 1 tablet by mouth once daily 90 tablet 0    olopatadine (PATANASE) 0.6 % SOLN nassl soln 2 sprays by Nasal route 2 times daily 1 each 2    metFORMIN (GLUCOPHAGE XR) 750 MG extended release tablet Take 1 tablet by mouth in the morning and at bedtime 180 tablet 1    citalopram (CELEXA) 20 MG tablet TAKE 1 TABLET DAILY 90 tablet 4    blood glucose monitor strips DX: diabetes mellitus. Use 3 time(s) daily - Ok to substitute per insurance 100 strip 5    CPAP Machine MISC by Does not apply route New CPAP 7-10 cm 1 each 0    Alcohol Swabs PADS DX: diabetes mellitus. Test 1-3 time(s) daily - Ok to substitute per insurance (1 each = 1 box) 1 each 5    blood glucose monitor kit and supplies DX: diabetes mellitus. Test 1-3 time(s) daily - Ok to substitute per insurance 1 kit 0    Lancets MISC DX: diabetes mellitus. Use 1-3 time(s) daily - Ok to substitute per insurance (1 each = 1 box) 1 each 5    Phytosterols 450 MG TABS Take 1 tablet by mouth daily 30 tablet 2    Multiple Vitamins-Minerals (WOMENS MULTIVITAMIN PO) Take 1 tablet by mouth daily      fexofenadine (ALLEGRA) 180 MG tablet Take 180 mg by mouth daily      Respiratory Therapy Supplies [employer] New CPAP mask and supplies 1 Device 0     No current facility-administered medications on file prior to visit.        Past Medical History:   Diagnosis Date    Allergic rhinitis     Anxiety     History of blood transfusion     blood transfusions with c section x 3    Hyperlipidemia     meds > 2 yrs    Osteoarthritis     Sleep apnea     Type 2 diabetes mellitus 2022     Past Surgical History:   Procedure Laterality Date    ANKLE SURGERY Right 2000    tendon repair   [facility]    pt has had 3     COLONOSCOPY      COLONOSCOPY N/A 2020    COLONOSCOPY performed by Christine Tran MD at [address], COLON, DIAGNOSTIC      TOTAL KNEE ARTHROPLASTY Left 2019    LEFT TOTAL KNEE ARTHROPLASTY, SUPINE, 23 HR OBS, [place] CEMENTED PERSONA performed by Adela Pichardo MD at [address] [facility] Right 3/13/2020    RIGHT TOTAL KNEE  ARTHROPLASTY performed by Adela Pichardo MD at [facility] History     Socioeconomic History    Marital status: Single     Spouse name: Not on file    Number of children: 3    Years of education: 25    Highest education level: Master's degree (e.g., MA, MS, Sybil, MEd, MSW, MIRTA)   Occupational History    Occupation: Teacher    Tobacco Use    Smoking status: Former     Packs/day: 0.50     Years: 52.00     Pack years: 26.00     Types: Cigarettes     Start date:      Quit date: 2020     Years since quittin.0    Smokeless tobacco: Never   Vaping Use    Vaping Use: Never used   Substance and Sexual Activity    Alcohol use:  Yes     Alcohol/week: 1.0 standard drink     Types: 1 Shots of liquor per week     Comment: Once per month    Drug use: No    Sexual activity: Not Currently     Partners: Male   Other Topics Concern    Not on file   Social History Narrative    Not on file     Social Determinants of Health     Financial Resource Strain: Low Risk     Difficulty of Paying Living Expenses: Not hard at all   Food Insecurity: No Food Insecurity    Worried About Running Out of Food in the Last Year: Never true    Reese of Food in the Last Year: Never true   Transportation Needs: Not on file   Physical Activity: Insufficiently Active    Days of Exercise per Week: 3 days    Minutes of Exercise per Session: 30 min   Stress: Not on file   Social Connections: Not on file   Intimate Partner Violence: Not on file   Housing Stability: Not on file     Family History   Problem Relation Age of Onset    Heart Disease Mother     High Blood Pressure Mother     Vision Loss Mother    Kristi Jarvis Other Mother leiden factor 5    Diabetes Father     Heart Disease Father     Depression Sister     Diabetes Sister     Obesity Sister     Allergy (Severe) Sister     Arthritis Sister     Depression Sister     Arthritis Sister     Depression Sister     Diabetes Sister     Arthritis Sister     Depression Sister     Arthritis Sister     Arthritis Sister     Arthritis Sister     Diabetes Brother     Arthritis Brother     Other Brother         leiden factor 5    Arthritis Brother     Arthritis Brother     Arthritis Brother     Thyroid Disease Daughter     No Known Problems Son     Breast Cancer Paternal Aunt            Objective:   Vitals:  Temp 97.1 °F (36.2 °C)   Ht 5' 2\" (1.575 m)   Wt 170 lb (77.1 kg)   LMP 01/16/2008   BMI 31.09 kg/m²        Physical Exam  Vitals reviewed. Constitutional:       Appearance: She is obese. HENT:      Head: Normocephalic and atraumatic. Cardiovascular:      Pulses:           Dorsalis pedis pulses are 2+ on the right side and 2+ on the left side. Posterior tibial pulses are 2+ on the right side and 2+ on the left side. Comments: Skin temperature gradient is warm to warm from proximal tibial distal toes bilaterally. Normal hair growth noted bilaterally. No varicosities or telangiectasia noted bilaterally. No changes consistent with ischemia noted bilaterally. Pulmonary:      Effort: Pulmonary effort is normal.   Musculoskeletal:      Right lower leg: No edema. Left lower leg: No edema. Right foot: Decreased range of motion. Deformity and bunion present. Left foot: Decreased range of motion. Deformity and bunion present. Feet:      Right foot:      Protective Sensation: 10 sites tested. 10 sites sensed. Skin integrity: Callus and dry skin present. Toenail Condition: Right toenails are abnormally thick. Left foot:      Protective Sensation: 10 sites tested. 10 sites sensed.       Skin integrity: Callus and dry skin present. Toenail Condition: Left toenails are abnormally thick. Comments: Cavus foot type noted bilaterally. MMT graded 5/5 for all extrinsic foot muscle groups bilaterally. Hallux abductovalgus deformity noted bilaterally. Tailor's bunionette deformity with adductovarus rotation deformity of the fourth and fifth toe noted bilaterally. Reducible flexion deformity noted to PIPJ 2 and 3 bilaterally. The left second toe is overriding the hallux. Decreased ankle joint dorsiflexion noted bilaterally, soft endpoint noted, this improves with knee flexion. Lymphadenopathy:      Comments: Popliteal lymph nodes are soft and nontender. Skin:     General: Skin is warm and dry. Capillary Refill: Capillary refill takes less than 2 seconds. Comments: No open lesions noted bilaterally. Normal skin turgor noted bilaterally. Xerotic skin texture noted bilaterally. Focal hyperkeratotic lesions noted to the bilateral hallux IPJ, first MPJ, and fifth MPJ. Nail plates are well groomed bilaterally, nails 25 on her right foot and 3 and 5 on the left foot are thickened and dystrophic, they are discolored but there is no subungual debris. Neurological:      Mental Status: She is alert and oriented to person, place, and time. Deep Tendon Reflexes: Babinski sign absent on the right side. Babinski sign absent on the left side. Reflex Scores:       Patellar reflexes are 2+ on the right side and 2+ on the left side. Achilles reflexes are 1+ on the right side and 1+ on the left side. Comments: No ankle clonus noted bilaterally. Vibratory sensation is diminished bilaterally. Sharp versus dull discrimination is intact bilaterally. Radiating pain elicited with percussion of the deep peroneal nerve at the dorsal midfoot bilaterally. Psychiatric:         Mood and Affect: Mood normal.         Behavior: Behavior normal.       Assessment:      Diagnosis Orders   1.  Pain in toes of both feet 2. Hammertoe, bilateral        3. Diabetic polyneuropathy associated with type 2 diabetes mellitus ([address]        4. Xerosis of skin        5. Callus        6. Hallux abducto valgus, bilateral        7. Tailor's bunionette, bilateral            Plan:     Diabetes mellitus: I discussed the \"do's and donts\" of diabetes mellitus and diabetic foot care. The patient should adhere to the random glucose monitoring schedule according to their internist/endocrinologist and report any significant fluctuations or problems to them immediately. I emphasized the importance of daily foot checks and applying a moisturizing cream to the feet daily, but not in between the toes. If any ulcerations or signs and symptoms of infection arise, the patient is to call and return immediately for evaluation. Diabetes/ foot deformities: Due to patient's medical conditions and diabetic related risks, diabetic shoes with custom inserts are imperative to the patients treatment plan to decrease the significant morbidities associated with ulceration and amputation. I have prescribed custom molded diabetic inserts and extra depth diabetic shoes. Symptomatic hammertoe deformities: I have had a lengthy discussion with the patient today and discussed the various treatment options available. I have debrided the lesions in full, dispenced some toe strappings/paddings which should be reapplied on a daily basis and have recommended wider/extra depth. Should these measures fail to relieve pain, surgical intervention may be warranted to correct the painful deformity. Xerosis/ calluses: I have prescribed a topical keratolytic cream, the patient is to apply this to areas of dry skin daily. The patient has been instructed to avoid applying the cream between the toes. Orders Placed This Encounter   Medications    ammonium lactate (AMLACTIN) 12 % cream     Sig: Apply topically daily, avoid applying between the toes.      Dispense:  385 g Refill:  5     All questions were answered to the patient's satisfaction. Thank you for allowing me to participate in the care of your patient. Follow up:  Return in about 6 months (around 5/8/2023) for diabetic foot exam.    David Carlson DPM    Level of medical decision making: moderate. Please note that this report has been partially produced using speech recognition software which may cause errors including grammar, punctuation, and spelling or words and phrases that may seem inappropriate. If there are questions or concerns please feel free to contact me for clarification.

## 2022-11-09 ENCOUNTER — HOSPITAL ENCOUNTER (OUTPATIENT)
Dept: CT IMAGING | Age: 72
Discharge: HOME | End: 2022-11-11
Payer: MEDICARE

## 2022-11-09 DIAGNOSIS — Z87.891 PERSONAL HISTORY OF TOBACCO USE: ICD-10-CM

## 2022-11-09 PROCEDURE — 71271 CT THORAX LUNG CANCER SCR C-: CPT

## 2022-11-23 ENCOUNTER — TELEPHONE (OUTPATIENT)
Dept: PODIATRY | Age: 72
End: 2022-11-23

## 2022-12-12 ENCOUNTER — OFFICE VISIT (OUTPATIENT)
Dept: FAMILY MEDICINE CLINIC | Age: 72
End: 2022-12-12
Payer: MEDICARE

## 2022-12-12 VITALS
TEMPERATURE: 97.6 F | WEIGHT: 178.4 LBS | SYSTOLIC BLOOD PRESSURE: 112 MMHG | DIASTOLIC BLOOD PRESSURE: 70 MMHG | BODY MASS INDEX: 32.63 KG/M2 | HEART RATE: 86 BPM | OXYGEN SATURATION: 99 %

## 2022-12-12 DIAGNOSIS — F41.9 ANXIETY: ICD-10-CM

## 2022-12-12 DIAGNOSIS — J30.89 ALLERGIC RHINITIS DUE TO OTHER ALLERGIC TRIGGER, UNSPECIFIED SEASONALITY: ICD-10-CM

## 2022-12-12 DIAGNOSIS — E78.2 MIXED HYPERLIPIDEMIA: ICD-10-CM

## 2022-12-12 DIAGNOSIS — L98.9 EXTERNAL NASAL LESION: ICD-10-CM

## 2022-12-12 DIAGNOSIS — E11.9 TYPE 2 DIABETES MELLITUS WITHOUT COMPLICATION, WITHOUT LONG-TERM CURRENT USE OF INSULIN (HCC): Primary | ICD-10-CM

## 2022-12-12 PROCEDURE — 1123F ACP DISCUSS/DSCN MKR DOCD: CPT | Performed by: FAMILY MEDICINE

## 2022-12-12 PROCEDURE — 3044F HG A1C LEVEL LT 7.0%: CPT | Performed by: FAMILY MEDICINE

## 2022-12-12 PROCEDURE — 99214 OFFICE O/P EST MOD 30 MIN: CPT | Performed by: FAMILY MEDICINE

## 2022-12-12 RX ORDER — CITALOPRAM 20 MG/1
TABLET ORAL
Qty: 90 TABLET | Refills: 4 | Status: SHIPPED | OUTPATIENT
Start: 2022-12-12

## 2022-12-12 RX ORDER — OLOPATADINE HYDROCHLORIDE 665 UG/1
2 SPRAY NASAL 2 TIMES DAILY
Qty: 1 EACH | Refills: 2 | Status: SHIPPED | OUTPATIENT
Start: 2022-12-12

## 2022-12-12 ASSESSMENT — ENCOUNTER SYMPTOMS
CHEST TIGHTNESS: 0
BLOOD IN STOOL: 0
CONSTIPATION: 0
COUGH: 0
VOMITING: 0
DIARRHEA: 0
ABDOMINAL PAIN: 0
APNEA: 0
SHORTNESS OF BREATH: 0
NAUSEA: 0

## 2022-12-12 NOTE — PROGRESS NOTES
Subjective:      Patient ID: Raz Pan is a 81 y.o. female who presents today for:     Chief Complaint   Patient presents with    New Patient       HPI  Patient is a very pleasant 70-year-old female presents today to establish care and to follow-up on chronic medical problems    Diabetes: Last hemoglobin A1c was 6.1 in 2022. Patient denies any polyuria or polydipsia and is adherent to her medication    Anxiety: Well-controlled on Celexa. Patient's been on medication for many years. She denies any suicidal or homicidal ideations    Allergic rhinitis: Symptoms have been suboptimal but stable on her current nasal spray    Nasal lesion: Patient states that these are recurrent and one appeared on her left nare 4 days ago. She has been using bacitracin which has been helpful.   She states that it does not itch and is not painful and has not spread    Past Medical History:   Diagnosis Date    Allergic rhinitis     Anxiety     History of blood transfusion     blood transfusions with c section x 3    Hyperlipidemia     meds > 2 yrs    Osteoarthritis     Sleep apnea     Type 2 diabetes mellitus 2022     Past Surgical History:   Procedure Laterality Date    ANKLE SURGERY Right     tendon repair    [address]    pt has had 3     COLONOSCOPY      COLONOSCOPY N/A 2020    [facility] [address] [facility]      TOTAL KNEE ARTHROPLASTY Left 2019    LEFT TOTAL KNEE ARTHROPLASTY, SUPINE, 23 HR OBS, [place] CEMENTED PERSONA performed by Sebastian Page MD at [address] Right 3/13/2020    RIGHT TOTAL KNEE  ARTHROPLASTY performed by Sebastian Page MD at [facility] History   Problem Relation Age of Onset    Heart Disease Mother     High Blood Pressure Mother     Vision Loss Mother     Other Mother         leiden factor 5    Diabetes Father     Heart Disease Father Depression Sister     Diabetes Sister     Obesity Sister     Allergy (Severe) Sister     Arthritis Sister     Depression Sister     Arthritis Sister     Depression Sister     Diabetes Sister     Arthritis Sister     Depression Sister     Arthritis Sister     Arthritis Sister     Arthritis Sister     Diabetes Brother     Arthritis Brother     Other Brother         leiden factor 5    Arthritis Brother     Arthritis Brother     Arthritis Brother     Thyroid Disease Daughter     No Known Problems Son     Breast Cancer Paternal Aunt      Social History     Socioeconomic History    Marital status: Single     Spouse name: Not on file    Number of children: 3    Years of education: 18    Highest education level: Master's degree (e.g., MA, MS, Sybil, MEd, MSW, MIRTA)   Occupational History    Occupation: Teacher    Tobacco Use    Smoking status: Former     Packs/day: 0.50     Years: 52.00     Pack years: 26.00     Types: Cigarettes     Start date:      Quit date: 2020     Years since quittin.1    Smokeless tobacco: Never   Vaping Use    Vaping Use: Never used   Substance and Sexual Activity    Alcohol use:  Yes     Alcohol/week: 1.0 standard drink     Types: 1 Shots of liquor per week     Comment: Once per month    Drug use: No    Sexual activity: Not Currently     Partners: Male   Other Topics Concern    Not on file   Social History Narrative    Not on file     Social Determinants of Health     Financial Resource Strain: Low Risk     Difficulty of Paying Living Expenses: Not hard at all   Food Insecurity: No Food Insecurity    Worried About Running Out of Food in the Last Year: Never true    Ran Out of Food in the Last Year: Never true   Transportation Needs: Not on file   Physical Activity: Insufficiently Active    Days of Exercise per Week: 3 days    Minutes of Exercise per Session: 30 min   Stress: Not on file   Social Connections: Not on file   Intimate Partner Violence: Not on file   Housing Stability: Not on file     Current Outpatient Medications on File Prior to Visit   Medication Sig Dispense Refill    ammonium lactate (AMLACTIN) 12 % cream Apply topically daily, avoid applying between the toes. 385 g 5    rosuvastatin (CRESTOR) 10 MG tablet Take 1 tablet by mouth once daily 90 tablet 0    meloxicam (MOBIC) 15 MG tablet Take 1 tablet by mouth once daily 90 tablet 0    metFORMIN (GLUCOPHAGE XR) 750 MG extended release tablet Take 1 tablet by mouth in the morning and at bedtime 180 tablet 1    blood glucose monitor strips DX: diabetes mellitus. Use 3 time(s) daily - Ok to substitute per insurance 100 strip 5    CPAP Machine MISC by Does not apply route New CPAP 7-10 cm 1 each 0    Alcohol Swabs PADS DX: diabetes mellitus. Test 1-3 time(s) daily - Ok to substitute per insurance (1 each = 1 box) 1 each 5    blood glucose monitor kit and supplies DX: diabetes mellitus. Test 1-3 time(s) daily - Ok to substitute per insurance 1 kit 0    Lancets MISC DX: diabetes mellitus. Use 1-3 time(s) daily - Ok to substitute per insurance (1 each = 1 box) 1 each 5    Phytosterols 450 MG TABS Take 1 tablet by mouth daily 30 tablet 2    Multiple Vitamins-Minerals (WOMENS MULTIVITAMIN PO) Take 1 tablet by mouth daily      fexofenadine (ALLEGRA) 180 MG tablet Take 180 mg by mouth daily      Respiratory Therapy Supplies [employer] New CPAP mask and supplies 1 Device 0     No current facility-administered medications on file prior to visit. Allergies: Other, Tomato, Influenza vaccines, and Seasonal    Review of Systems   Constitutional:  Negative for activity change, appetite change and fatigue. Respiratory:  Negative for apnea, cough, chest tightness and shortness of breath. Cardiovascular:  Negative for chest pain, palpitations and leg swelling. Gastrointestinal:  Negative for abdominal pain, blood in stool, constipation, diarrhea, nausea and vomiting. Musculoskeletal:  Negative for arthralgias. Skin:  Positive for rash. Neurological:  Negative for seizures and headaches. Psychiatric/Behavioral:  Negative for hallucinations and suicidal ideas. Objective:   /70   Pulse 86   Temp 97.6 °F (36.4 °C) (Infrared)   Wt 178 lb 6.4 oz (80.9 kg)   LMP 01/16/2008   SpO2 99%   BMI 32.63 kg/m²     Physical Exam  Vitals and nursing note reviewed. Constitutional:       General: She is not in acute distress. Appearance: Normal appearance. She is well-developed. She is not diaphoretic. HENT:      Head: Normocephalic and atraumatic. Nose: Nose normal.      Mouth/Throat:      Mouth: Mucous membranes are moist.      Pharynx: Oropharynx is clear. Eyes:      Conjunctiva/sclera: Conjunctivae normal.      Pupils: Pupils are equal, round, and reactive to light. Cardiovascular:      Rate and Rhythm: Normal rate and regular rhythm. Heart sounds: Normal heart sounds. No murmur heard. No friction rub. No gallop. Pulmonary:      Effort: Pulmonary effort is normal. No respiratory distress. Breath sounds: Normal breath sounds. No wheezing or rales. Chest:      Chest wall: No tenderness. Abdominal:      General: Abdomen is flat. Bowel sounds are normal.      Palpations: Abdomen is soft. Tenderness: There is no abdominal tenderness. Musculoskeletal:      Cervical back: Normal range of motion. Skin:     General: Skin is warm and dry. Neurological:      Mental Status: She is alert and oriented to person, place, and time. Psychiatric:         Behavior: Behavior normal.         Thought Content: Thought content normal.         Judgment: Judgment normal.       Assessment & Plan:     1. Type 2 diabetes mellitus without complication, without long-term current use of insulin ([facility])  Controlled: Continue current medication  - CBC with Auto Differential; Future  - Comprehensive Metabolic Panel; Future  - Hemoglobin A1C; Future    2.  Anxiety  Controlled: Continue current medication  - citalopram (CELEXA) 20 MG tablet; TAKE 1 TABLET DAILY  Dispense: 90 tablet; Refill: 4    3. Allergic rhinitis due to other allergic trigger, unspecified seasonality  Stable: Continue current medication  - olopatadine (PATANASE) 0.6 % SOLN nassl soln; 2 sprays by Nasal route 2 times daily  Dispense: 1 each; Refill: 2    4. Mixed hyperlipidemia  Controlled: Continue Crestor    5. External nasal lesion  Unclear whether this may be an early manifestation of herpetic lesion. We will have patient monitor closely and will start acyclovir if it further develops      Return in about 3 months (around 3/12/2023) for F/u DM.     Madina Polo MD

## 2022-12-19 DIAGNOSIS — M54.50 CHRONIC LEFT-SIDED LOW BACK PAIN WITHOUT SCIATICA: ICD-10-CM

## 2022-12-19 DIAGNOSIS — M25.511 CHRONIC RIGHT SHOULDER PAIN: ICD-10-CM

## 2022-12-19 DIAGNOSIS — E11.9 TYPE 2 DIABETES MELLITUS WITHOUT COMPLICATION, WITHOUT LONG-TERM CURRENT USE OF INSULIN (HCC): ICD-10-CM

## 2022-12-19 DIAGNOSIS — G89.29 CHRONIC LEFT-SIDED LOW BACK PAIN WITHOUT SCIATICA: ICD-10-CM

## 2022-12-19 DIAGNOSIS — G89.29 CHRONIC RIGHT SHOULDER PAIN: ICD-10-CM

## 2022-12-19 NOTE — TELEPHONE ENCOUNTER
Pharmacy is requesting medication refill. Please approve or deny this request.    Rx requested:  Requested Prescriptions     Pending Prescriptions Disp Refills    Alcohol Swabs PADS 1 each 5     Sig: DX: diabetes mellitus.  Test 1-3 time(s) daily - Ok to substitute per insurance (1 each = 1 box)         Last Office Visit:   9/19/2022      Next Visit Date:  Future Appointments   Date Time Provider Iain Medina   12/21/2022  9:15 AM Phyllistine Romberg, [address]   3/13/2023  9:30 AM [address], MD Chasidy Monteiro [address]   5/9/2023 10:00 AM Keron Espinal DPM MLOX OB POD [facility] EMERGENCY MEDICAL [place] AT [place]

## 2022-12-20 RX ORDER — MELOXICAM 15 MG/1
15 TABLET ORAL DAILY
Qty: 90 TABLET | Refills: 0 | Status: SHIPPED | OUTPATIENT
Start: 2022-12-20

## 2022-12-20 RX ORDER — BLOOD PRESSURE TEST KIT
KIT MISCELLANEOUS
Qty: 1 EACH | Refills: 5 | Status: SHIPPED | OUTPATIENT
Start: 2022-12-20

## 2022-12-21 ENCOUNTER — OFFICE VISIT (OUTPATIENT)
Dept: PULMONOLOGY | Age: 72
End: 2022-12-21
Payer: MEDICARE

## 2022-12-21 VITALS
DIASTOLIC BLOOD PRESSURE: 78 MMHG | SYSTOLIC BLOOD PRESSURE: 132 MMHG | WEIGHT: 180 LBS | BODY MASS INDEX: 32.92 KG/M2 | OXYGEN SATURATION: 97 % | HEART RATE: 79 BPM

## 2022-12-21 DIAGNOSIS — G47.33 OSA (OBSTRUCTIVE SLEEP APNEA): Primary | ICD-10-CM

## 2022-12-21 DIAGNOSIS — R91.8 LUNG NODULES: ICD-10-CM

## 2022-12-21 DIAGNOSIS — Z87.891 PERSONAL HISTORY OF SMOKING: ICD-10-CM

## 2022-12-21 DIAGNOSIS — E66.9 OBESITY (BMI 30-39.9): ICD-10-CM

## 2022-12-21 DIAGNOSIS — J41.0 SIMPLE CHRONIC BRONCHITIS (HCC): ICD-10-CM

## 2022-12-21 PROCEDURE — 99214 OFFICE O/P EST MOD 30 MIN: CPT | Performed by: INTERNAL MEDICINE

## 2022-12-21 PROCEDURE — 1123F ACP DISCUSS/DSCN MKR DOCD: CPT | Performed by: INTERNAL MEDICINE

## 2022-12-21 RX ORDER — 1.1% SODIUM FLUORIDE PRESCRIPTION DENTAL CREAM 5 MG/G
CREAM DENTAL
COMMUNITY
Start: 2022-09-19

## 2022-12-21 ASSESSMENT — ENCOUNTER SYMPTOMS
SHORTNESS OF BREATH: 0
RHINORRHEA: 0
EYE DISCHARGE: 0
EYE ITCHING: 0
SINUS PRESSURE: 0
DIARRHEA: 0
TROUBLE SWALLOWING: 0
ABDOMINAL PAIN: 0
VOMITING: 0
VOICE CHANGE: 0
COUGH: 1
NAUSEA: 0
SORE THROAT: 0
CHEST TIGHTNESS: 0
WHEEZING: 0

## 2022-12-21 NOTE — PROGRESS NOTES
Subjective:     Stephan Mane is a 81 y.o. female who complains today of:     Chief Complaint   Patient presents with    Follow-up     3m f/u - CT results    Sleep Apnea       HPI  She is smoking 1/2 ppd . CT chest lung screening done . No C/o shortness of breath   No Wheezing. C/o Cough with  white to yellow Sputum. No Hemoptysis. No Chest tightness. No Chest pain with radiation  or pleuritic pain. No  leg edema. No Fever or chills. C/o  Rhinorrhea and postnasal drip. She is not using inhaler .   Spirometry is overall normal.  Bronchodilators were not applied. Normal lung volumes and diffusion capacity. She is using CPAP with  7-10   centimeters of H2O with heated humidity. She is using CPAP for about   7 hours every night. She is using CPAP with   full face Mask. She said  sleep is restful with the CPAP use. She is compliant with CPAP therapy and benefiting with CPAP use. No snoring with CPAP use. No complaint of daytime sleepiness or tiredness with CPAP use. She denies taking naps. No sleepiness with driving. She denies difficulty falling asleep or staying asleep. I reviewed compliance report with patient regarding CPAP therapy. She is using  CPAP for 23 days out of 30 days. Average usage of days used is 6 hours and 12  min , average AHI 3.5  with CPAP use. Allergies:   Other, Tomato, Influenza vaccines, and Seasonal  Past Medical History:   Diagnosis Date    Allergic rhinitis     Anxiety     History of blood transfusion     blood transfusions with c section x 3    Hyperlipidemia     meds > 2 yrs    Osteoarthritis     Sleep apnea     Type 2 diabetes mellitus 2022     Past Surgical History:   Procedure Laterality Date    ANKLE SURGERY Right 2000    tendon repair    [address]    pt has had 3     COLONOSCOPY      COLONOSCOPY N/A 2020    [facility] [address] [facility], COLON, DIAGNOSTIC TOTAL KNEE ARTHROPLASTY Left 2019    LEFT TOTAL KNEE ARTHROPLASTY, SUPINE, 23 HR OBS, IRAJ CEMENTED PERSONA performed by Kailyn Freire MD at [facility] 227 Right 3/13/2020    RIGHT TOTAL KNEE  ARTHROPLASTY performed by Kailyn Freire MD at [facility] 38 History   Problem Relation Age of Onset    Heart Disease Mother     High Blood Pressure Mother     Vision Loss Mother     Other Mother         leiden factor 5    Diabetes Father     Heart Disease Father     Depression Sister     Diabetes Sister     Obesity Sister     Allergy (Severe) Sister     Arthritis Sister     Depression Sister     Arthritis Sister     Depression Sister     Diabetes Sister     Arthritis Sister     Depression Sister     Arthritis Sister     Arthritis Sister     Arthritis Sister     Diabetes Brother     Arthritis Brother     Other Brother         leiden factor 5    Arthritis Brother     Arthritis Brother     Arthritis Brother     Thyroid Disease Daughter     No Known Problems Son     Breast Cancer Paternal Aunt      Social History     Socioeconomic History    Marital status: Single     Spouse name: Not on file    Number of children: 3    Years of education: 18    Highest education level: Master's degree (e.g., MA, MS, Sybil, MEd, MSW, MIRTA)   Occupational History    Occupation: Teacher    Tobacco Use    Smoking status: Former     Packs/day: 0.50     Years: 52.00     Pack years: 26.00     Types: Cigarettes     Start date:      Quit date: 2020     Years since quittin.1    Smokeless tobacco: Never   Vaping Use    Vaping Use: Never used   Substance and Sexual Activity    Alcohol use:  Yes     Alcohol/week: 1.0 standard drink     Types: 1 Shots of liquor per week     Comment: Once per month    Drug use: No    Sexual activity: Not Currently     Partners: Male   Other Topics Concern    Not on file   Social History Narrative    Not on file     Social Determinants of Health     Financial Resource Strain: Low Risk     Difficulty of Paying Living Expenses: Not hard at all   Food Insecurity: No Food Insecurity    Worried About Running Out of Food in the Last Year: Never true    Ran Out of Food in the Last Year: Never true   Transportation Needs: Not on file   Physical Activity: Insufficiently Active    Days of Exercise per Week: 3 days    Minutes of Exercise per Session: 30 min   Stress: Not on file   Social Connections: Not on file   Intimate Partner Violence: Not on file   Housing Stability: Not on file         Review of Systems   Constitutional:  Negative for chills, diaphoresis, fatigue and fever. HENT:  Negative for congestion, mouth sores, nosebleeds, postnasal drip, rhinorrhea, sinus pressure, sneezing, sore throat, trouble swallowing and voice change. Eyes:  Negative for discharge, itching and visual disturbance. Respiratory:  Positive for cough. Negative for chest tightness, shortness of breath and wheezing. Cardiovascular:  Negative for chest pain, palpitations and leg swelling. Gastrointestinal:  Negative for abdominal pain, diarrhea, nausea and vomiting. Genitourinary:  Negative for difficulty urinating and hematuria. Musculoskeletal:  Negative for arthralgias, joint swelling and myalgias. Skin:  Negative for rash. Allergic/Immunologic: Negative for environmental allergies and food allergies. Neurological:  Negative for dizziness, tremors, weakness and headaches. Psychiatric/Behavioral:  Positive for sleep disturbance. Negative for behavioral problems. :     Vitals:    12/21/22 0914   BP: 132/78   Site: Left Upper Arm   Position: Sitting   Cuff Size: Large Adult   Pulse: 79   SpO2: 97%   Weight: 180 lb (81.6 kg)     Wt Readings from Last 3 Encounters:   12/21/22 180 lb (81.6 kg)   12/12/22 178 lb 6.4 oz (80.9 kg)   11/08/22 170 lb (77.1 kg)         Physical Exam  Constitutional:       General: She is not in acute distress. Appearance: She is well-developed. She is obese. She is not diaphoretic. HENT:      Head: Normocephalic and atraumatic. Nose: Nose normal.   Eyes:      Pupils: Pupils are equal, round, and reactive to light. Neck:      Thyroid: No thyromegaly. Vascular: No JVD. Trachea: No tracheal deviation. Cardiovascular:      Rate and Rhythm: Normal rate and regular rhythm. Heart sounds: No murmur heard. No friction rub. No gallop. Pulmonary:      Effort: No respiratory distress. Breath sounds: No wheezing or rales. Chest:      Chest wall: No tenderness. Abdominal:      General: There is no distension. Tenderness: There is no abdominal tenderness. There is no rebound. Musculoskeletal:         General: Normal range of motion. Lymphadenopathy:      Cervical: No cervical adenopathy. Skin:     General: Skin is warm and dry. Neurological:      Mental Status: She is alert and oriented to person, place, and time. Coordination: Coordination normal.   Psychiatric:         Mood and Affect: Mood normal.         Behavior: Behavior normal.       Current Outpatient Medications   Medication Sig Dispense Refill    SF 5000 PLUS 1.1 % CREA       Alcohol Swabs PADS DX: diabetes mellitus. Test 1-3 time(s) daily - Ok to substitute per insurance (1 each = 1 box) 1 each 5    meloxicam (MOBIC) 15 MG tablet Take 1 tablet by mouth daily 90 tablet 0    citalopram (CELEXA) 20 MG tablet TAKE 1 TABLET DAILY 90 tablet 4    olopatadine (PATANASE) 0.6 % SOLN nassl soln 2 sprays by Nasal route 2 times daily 1 each 2    ammonium lactate (AMLACTIN) 12 % cream Apply topically daily, avoid applying between the toes. 385 g 5    rosuvastatin (CRESTOR) 10 MG tablet Take 1 tablet by mouth once daily 90 tablet 0    metFORMIN (GLUCOPHAGE XR) 750 MG extended release tablet Take 1 tablet by mouth in the morning and at bedtime 180 tablet 1    blood glucose monitor strips DX: diabetes mellitus.  Use 3 time(s) daily - Ok to substitute per insurance 100 strip 5    CPAP Machine MISC by Does not apply route New CPAP 7-10 cm 1 each 0    blood glucose monitor kit and supplies DX: diabetes mellitus. Test 1-3 time(s) daily - Ok to substitute per insurance 1 kit 0    Lancets MISC DX: diabetes mellitus. Use 1-3 time(s) daily - Ok to substitute per insurance (1 each = 1 box) 1 each 5    Phytosterols 450 MG TABS Take 1 tablet by mouth daily 30 tablet 2    Multiple Vitamins-Minerals (WOMENS MULTIVITAMIN PO) Take 1 tablet by mouth daily      fexofenadine (ALLEGRA) 180 MG tablet Take 180 mg by mouth daily      Respiratory Therapy Supplies [facility] New CPAP mask and supplies 1 Device 0     No current facility-administered medications for this visit. Results for orders placed during the hospital encounter of 20    XR CHEST STANDARD (2 VW)    Narrative  Patient MRN: [id number]    : 1950    Age:  81 years    Gender: Female    Order Date: 2020 5:14 PM.    Exam: XR CHEST (2 VW)    Number of Views: 2    Indication:  Cough    Comparison: None    Findings: Cardiomediastinal silhouette is within normal limits. Lungs are clear without evidence of infiltrate/pneumonia, pneumothorax or pleural effusion    Impression  Impression:  No radiographic evidence of acute cardiopulmonary disease  ]  Narrative   EXAMINATION:   LOW DOSE SCREENING CT OF THE CHEST WITHOUT CONTRAST       2022 10:02 am       TECHNIQUE:   Low dose lung cancer screening CT of the chest was performed without the   administration of intravenous contrast.  Multiplanar reformatted images    are   provided for review. Automated exposure control, iterative reconstruction,   and/or weight based adjustment of the mA/kV was utilized to reduce the   radiation dose to as low as reasonably achievable. COMPARISON:   None       HISTORY:   ORDERING SYSTEM PROVIDED HISTORY: Personal history of tobacco use   TECHNOLOGIST PROVIDED HISTORY:   Age: Patient is 81 y.o.        Smoking History: Social History   Tobacco Use   Smoking status: Former   Packs/day: 0.50   Years: 52.00   Pack years: 26   Types: Cigarettes   Start date:    Quit date: 2020   Years since quittin.8   Smokeless tobacco: Never   Vaping Use   Vaping Use: Never used   Alcohol use: Yes   Alcohol/week: 1.0 standard drink   Types: 1 Shots of liquor per week   Comment: Once per month   Drug use: No   Pack years: 32       Date of last lung cancer screenin2021   Is there documentation of shared decision making? ->Yes   Is this a low dose CT or a routine CT?->Low Dose CT   Is this the first (baseline) CT or an annual exam?->Annual   Does the patient show any signs or symptoms of lung cancer? ->No   Smoking Status? ->Former   Date quit smoking? (must be within 15 years)->20   Smoking packs per day?->.5       FINDINGS:   Mediastinum:  The thoracic aorta is normal caliber. There is no    pericardial   effusion. No mediastinal or hilar lymphadenopathy is identified. Lungs/Pleura: There are emphysematous changes. There is no pulmonary   infiltrate, or pulmonary mass. There is a stable 4 mm nodule seen within the right lung base laterally on   axial image number 88. There is a stable 3 mm nodule seen within the    right   middle lobe on axial image number 78. There is a 2 mm nodule seen within the base of the lingula laterally. This   nodule is stable. Very minimal biapical pleural parenchymal scarring is noted. There are large degenerative endplate spurs extending from the right   anterolateral aspect of the lower thoracic spine. There is adjacent   pulmonary parenchymal induration most consistent with chronic fibrotic   scarring from the chronic irritation of the lung parenchyma. Upper Abdomen:   Limited images of the upper abdomen demonstrate no acute   abnormality. Soft Tissues/Bones:  Age related degenerative changes of the visualized   osseous structures without focal destructive lesion.            Impression 1. There is no pulmonary infiltrate, mass or suspicious pulmonary nodule. 2. Stable nodule seen within the right lower lobe, right middle lobe and   lingula when compared with the patient's prior study. All the nodules   measure less than 5 mm. LUNG RADS:   Per ACR Lung-RADS Version 1.1       Category 2, Benign appearance or behavior. Management:  Continue annual lung screening with LDCT in 12 months. RECOMMENDATIONS:   If you would like to [facility] your [facility], please contact the Nurse Navigator at   [phone]. Assessment/Plan:     1. AMANDA (obstructive sleep apnea)  She is using CPAP with  7-10   centimeters of H2O with heated humidity. She is using CPAP for about   7 hours every night. She is using CPAP with   full face Mask. She said  sleep is restful with the CPAP use. She is compliant with CPAP therapy and benefiting with CPAP use. New CPAP therapy as before. I reviewed compliance report with patient regarding CPAP therapy. She is using  CPAP for 23 days out of 30 days. Average usage of days used is 6 hours and 12  min , average AHI 3.5  with CPAP use. 2. Obesity (BMI 30-39. 9)  She is advised try to lose weight. obesity related risk explained to the patient ,  Current weight:  180 lb (81.6 kg) Lbs. BMI:  Body mass index is 32.92 kg/m². Suggested weight control approaches, including dietary changes , exercise, behavioral modification. 3. Simple chronic bronchitis (HCC)  No C/o shortness of breath No Wheezing. C/o Cough with  white to yellow Sputum. C/o  Rhinorrhea and postnasal drip. She is not using inhaler . 9/22  Spirometry is overall normal.  Bronchodilators were not applied. Normal lung volumes and diffusion capacity. 4. Personal history of smoking  She is smoking 1/2 ppd . CT chest lung screening done .     5. Lung nodules  CT chest showing stable nodules within the right lower lobe right middle lobe and lingula as compared to prior study. All nodules measure less than 5 mm. Return in about 4 months (around 4/21/2023) for lewis, lung nodule f/u.       Domo Alves MD

## 2023-01-18 ENCOUNTER — PATIENT MESSAGE (OUTPATIENT)
Dept: FAMILY MEDICINE CLINIC | Age: 73
End: 2023-01-18

## 2023-01-18 DIAGNOSIS — E11.9 TYPE 2 DIABETES MELLITUS WITHOUT COMPLICATION, WITHOUT LONG-TERM CURRENT USE OF INSULIN (HCC): ICD-10-CM

## 2023-01-18 RX ORDER — METFORMIN HYDROCHLORIDE 750 MG/1
750 TABLET, EXTENDED RELEASE ORAL 2 TIMES DAILY
Qty: 180 TABLET | Refills: 1 | Status: SHIPPED | OUTPATIENT
Start: 2023-01-18

## 2023-01-18 NOTE — TELEPHONE ENCOUNTER
From: Yuni Hutchins  To: Dr. Donaldson Lo: 1/18/2023 2:12 PM EST  Subject: Prescription refills    Please send refill orders for these medications:  Metformin ER. Thank you!  Radha Laird

## 2023-01-18 NOTE — TELEPHONE ENCOUNTER
Comments:     Last Office Visit (last PCP visit):   12/12/2022    Next Visit Date:  Future Appointments   Date Time Provider Iain Medina   3/13/2023  9:30 AM [address] [place] [address] 140[address]   4/26/2023  9:30 AM Layne Pedraza MD [address] [place] [address]   5/9/2023 10:00 AM Francisco Elizalde DPM MLOX OB POD [facility] EMERGENCY [facility]       **If hasn't been seen in over a year OR hasn't followed up according to last diabetes/ADHD visit, make appointment for patient before sending refill to provider.     Rx requested:  Requested Prescriptions     Pending Prescriptions Disp Refills    metFORMIN (GLUCOPHAGE XR) 750 MG extended release tablet 180 tablet 1     Sig: Take 1 tablet by mouth in the morning and at bedtime

## 2023-01-31 ENCOUNTER — TELEPHONE (OUTPATIENT)
Dept: FAMILY MEDICINE CLINIC | Age: 73
End: 2023-01-31

## 2023-01-31 NOTE — TELEPHONE ENCOUNTER
Sending message to Destini as she is 's patient not a patient of our office. Form scanned please fill out and Dr. Mildred Vizcaino sign and return.

## 2023-01-31 NOTE — TELEPHONE ENCOUNTER
Pt checking on paperwork I. .. has  it been signed - A fax from orthopedics presription footwear  needs to be filled out / re that Dr Kelly Tomlin is in charge of her diabetes care     [phone]

## 2023-01-31 NOTE — TELEPHONE ENCOUNTER
Pt was seen for a diabetic foot exam and has been sent over a new pair of diabetic shoes. Paperwork from Ronney Apley has been faxed over that needs signed today before pt's next appt. Thank you.

## 2023-02-06 ENCOUNTER — TELEPHONE (OUTPATIENT)
Dept: GASTROENTEROLOGY | Age: 73
End: 2023-02-06

## 2023-02-08 ENCOUNTER — TELEPHONE (OUTPATIENT)
Dept: GASTROENTEROLOGY | Age: 73
End: 2023-02-08

## 2023-02-08 DIAGNOSIS — Z12.11 COLON CANCER SCREENING: Primary | ICD-10-CM

## 2023-02-08 NOTE — TELEPHONE ENCOUNTER
Spoke to patient, agreeable to colonoscopy. Patient scheduled 11/8/2023 at 10 am. Miralax Prep mailed to patient. Referral pended to PCP. Info faxed to Braeden Montilla.

## 2023-02-17 ENCOUNTER — OFFICE VISIT (OUTPATIENT)
Dept: FAMILY MEDICINE CLINIC | Age: 73
End: 2023-02-17

## 2023-02-17 VITALS
WEIGHT: 178.8 LBS | HEART RATE: 80 BPM | BODY MASS INDEX: 32.7 KG/M2 | OXYGEN SATURATION: 99 % | TEMPERATURE: 98.2 F | SYSTOLIC BLOOD PRESSURE: 124 MMHG | DIASTOLIC BLOOD PRESSURE: 60 MMHG

## 2023-02-17 DIAGNOSIS — G89.29 CHRONIC PAIN OF BOTH KNEES: ICD-10-CM

## 2023-02-17 DIAGNOSIS — M25.562 CHRONIC PAIN OF BOTH KNEES: ICD-10-CM

## 2023-02-17 DIAGNOSIS — J34.89 INTERNAL NASAL LESION: Primary | ICD-10-CM

## 2023-02-17 DIAGNOSIS — E11.42 DIABETIC POLYNEUROPATHY ASSOCIATED WITH TYPE 2 DIABETES MELLITUS (HCC): ICD-10-CM

## 2023-02-17 DIAGNOSIS — M25.561 CHRONIC PAIN OF BOTH KNEES: ICD-10-CM

## 2023-02-17 DIAGNOSIS — J41.0 SIMPLE CHRONIC BRONCHITIS (HCC): ICD-10-CM

## 2023-02-17 RX ORDER — CEPHALEXIN 500 MG/1
500 CAPSULE ORAL 2 TIMES DAILY
Qty: 14 CAPSULE | Refills: 0 | Status: SHIPPED | OUTPATIENT
Start: 2023-02-17 | End: 2023-02-24

## 2023-02-17 SDOH — ECONOMIC STABILITY: INCOME INSECURITY: HOW HARD IS IT FOR YOU TO PAY FOR THE VERY BASICS LIKE FOOD, HOUSING, MEDICAL CARE, AND HEATING?: NOT HARD AT ALL

## 2023-02-17 SDOH — ECONOMIC STABILITY: HOUSING INSECURITY
IN THE LAST 12 MONTHS, WAS THERE A TIME WHEN YOU DID NOT HAVE A STEADY PLACE TO SLEEP OR SLEPT IN A SHELTER (INCLUDING NOW)?: NO

## 2023-02-17 SDOH — ECONOMIC STABILITY: FOOD INSECURITY: WITHIN THE PAST 12 MONTHS, YOU WORRIED THAT YOUR FOOD WOULD RUN OUT BEFORE YOU GOT MONEY TO BUY MORE.: NEVER TRUE

## 2023-02-17 SDOH — ECONOMIC STABILITY: FOOD INSECURITY: WITHIN THE PAST 12 MONTHS, THE FOOD YOU BOUGHT JUST DIDN'T LAST AND YOU DIDN'T HAVE MONEY TO GET MORE.: NEVER TRUE

## 2023-02-17 ASSESSMENT — PATIENT HEALTH QUESTIONNAIRE - PHQ9
10. IF YOU CHECKED OFF ANY PROBLEMS, HOW DIFFICULT HAVE THESE PROBLEMS MADE IT FOR YOU TO DO YOUR WORK, TAKE CARE OF THINGS AT HOME, OR GET ALONG WITH OTHER PEOPLE: 0
1. LITTLE INTEREST OR PLEASURE IN DOING THINGS: 0
SUM OF ALL RESPONSES TO PHQ QUESTIONS 1-9: 0
5. POOR APPETITE OR OVEREATING: 0
SUM OF ALL RESPONSES TO PHQ QUESTIONS 1-9: 0
SUM OF ALL RESPONSES TO PHQ9 QUESTIONS 1 & 2: 0
4. FEELING TIRED OR HAVING LITTLE ENERGY: 0
6. FEELING BAD ABOUT YOURSELF - OR THAT YOU ARE A FAILURE OR HAVE LET YOURSELF OR YOUR FAMILY DOWN: 0
3. TROUBLE FALLING OR STAYING ASLEEP: 0
SUM OF ALL RESPONSES TO PHQ QUESTIONS 1-9: 0
7. TROUBLE CONCENTRATING ON THINGS, SUCH AS READING THE NEWSPAPER OR WATCHING TELEVISION: 0
8. MOVING OR SPEAKING SO SLOWLY THAT OTHER PEOPLE COULD HAVE NOTICED. OR THE OPPOSITE, BEING SO FIGETY OR RESTLESS THAT YOU HAVE BEEN MOVING AROUND A LOT MORE THAN USUAL: 0
2. FEELING DOWN, DEPRESSED OR HOPELESS: 0
9. THOUGHTS THAT YOU WOULD BE BETTER OFF DEAD, OR OF HURTING YOURSELF: 0
SUM OF ALL RESPONSES TO PHQ QUESTIONS 1-9: 0

## 2023-02-17 ASSESSMENT — ENCOUNTER SYMPTOMS
SHORTNESS OF BREATH: 0
APNEA: 0
CONSTIPATION: 0
RHINORRHEA: 1
NAUSEA: 0
CHEST TIGHTNESS: 0
COUGH: 0
VOMITING: 0
DIARRHEA: 0
ABDOMINAL PAIN: 0
BLOOD IN STOOL: 0

## 2023-02-17 NOTE — PROGRESS NOTES
Subjective:      Patient ID: Sol Vasquez is a 81 y.o. female who presents today for:     Chief Complaint   Patient presents with    Knee Pain     B/l states they feel hot x10 days     Other     Sore nostril L x6 weeks using OTC ointment w/o relief        Knee Pain     Other  Pertinent negatives include no abdominal pain, arthralgias, chest pain, coughing, fatigue, headaches, nausea or vomiting. Patient is a very pleasant 26-year-old female presents today to follow-up. She states that she has been noticing that her knees have been excessively \"hot\" she has a history of bilateral knee replacements. She denies any swelling and states that functionally she has no impairment. She is still able to do her exercise program and yoga without any difficulty. Her last evaluation by orthopedics was in  after her knee replacements. She is on Mobic which she finds helpful. She has been icing at night which has also been helpful. Additionally, patient has had a sore on her inner left nare that has been present for 6 months. She has been using over-the-counter antibiotic ointment without any improvement although she has noticed there has been some improvement over the past 24 hours. She states that there is a fissure in the corner that has not healed and is mildly uncomfortable.   She denies any significant drainage or extension of redness  Past Medical History:   Diagnosis Date    Allergic rhinitis     Anxiety     History of blood transfusion     blood transfusions with c section x 3    Hyperlipidemia     meds > 2 yrs    Osteoarthritis     Sleep apnea     Type 2 diabetes mellitus 2022     Past Surgical History:   Procedure Laterality Date    ANKLE SURGERY Right 2000    tendon repair   [facility]    pt has had 3     COLONOSCOPY      COLONOSCOPY N/A 2020    COLONOSCOPY performed by Josette Gonzalez MD at [facility], COLON, DIAGNOSTIC      TOTAL KNEE ARTHROPLASTY Left 2019    LEFT TOTAL KNEE ARTHROPLASTY, SUPINE, 23 HR OBS, IRAJ CEMENTED PERSONA performed by Roxi Braden MD at [facility] Right 3/13/2020    RIGHT TOTAL KNEE  ARTHROPLASTY performed by Roxi Braden MD at [place] [id number] History   Problem Relation Age of Onset    Heart Disease Mother     High Blood Pressure Mother     Vision Loss Mother     Other Mother         leiden factor 5    Diabetes Father     Heart Disease Father     Depression Sister     Diabetes Sister     Obesity Sister     Allergy (Severe) Sister     Arthritis Sister     Depression Sister     Arthritis Sister     Depression Sister     Diabetes Sister     Arthritis Sister     Depression Sister     Arthritis Sister     Arthritis Sister     Arthritis Sister     Diabetes Brother     Arthritis Brother     Other Brother         leiden factor 5    Arthritis Brother     Arthritis Brother     Arthritis Brother     Thyroid Disease Daughter     No Known Problems Son     Breast Cancer Paternal Aunt      Social History     Socioeconomic History    Marital status: Single     Spouse name: Not on file    Number of children: 3    Years of education: 25    Highest education level: Master's degree (e.g., MA, MS, Sybil, MEd, MSW, MIRTA)   Occupational History    Occupation: Teacher    Tobacco Use    Smoking status: Former     Packs/day: 0.50     Years: 52.00     Pack years: 26.00     Types: Cigarettes     Start date:      Quit date: 2020     Years since quittin.2    Smokeless tobacco: Never   Vaping Use    Vaping Use: Never used   Substance and Sexual Activity    Alcohol use:  Yes     Alcohol/week: 1.0 standard drink     Types: 1 Shots of liquor per week     Comment: Once per month    Drug use: No    Sexual activity: Not Currently     Partners: Male   Other Topics Concern    Not on file   Social History Narrative    Not on file     Social Determinants of Health     Financial Resource Strain: Low Risk     Difficulty of Paying Living Expenses: Not hard at all   Food Insecurity: No Food Insecurity    Worried About Running Out of Food in the Last Year: Never true    Reese of Food in the Last Year: Never true   Transportation Needs: Unknown    Lack of Transportation (Medical): Not on file    Lack of Transportation (Non-Medical): No   Physical Activity: Insufficiently Active    Days of Exercise per Week: 3 days    Minutes of Exercise per Session: 30 min   Stress: Not on file   Social Connections: Not on file   Intimate Partner Violence: Not on file   Housing Stability: Unknown    Unable to Pay for Housing in the Last Year: Not on file    Number of Places Lived in the Last Year: Not on file    Unstable Housing in the Last Year: No     Current Outpatient Medications on File Prior to Visit   Medication Sig Dispense Refill    metFORMIN (GLUCOPHAGE XR) 750 MG extended release tablet Take 1 tablet by mouth in the morning and at bedtime 180 tablet 1    SF 5000 PLUS 1.1 % CREA       Alcohol Swabs PADS DX: diabetes mellitus. Test 1-3 time(s) daily - Ok to substitute per insurance (1 each = 1 box) 1 each 5    meloxicam (MOBIC) 15 MG tablet Take 1 tablet by mouth daily 90 tablet 0    citalopram (CELEXA) 20 MG tablet TAKE 1 TABLET DAILY 90 tablet 4    olopatadine (PATANASE) 0.6 % SOLN nassl soln 2 sprays by Nasal route 2 times daily 1 each 2    ammonium lactate (AMLACTIN) 12 % cream Apply topically daily, avoid applying between the toes. 385 g 5    rosuvastatin (CRESTOR) 10 MG tablet Take 1 tablet by mouth once daily 90 tablet 0    blood glucose monitor strips DX: diabetes mellitus. Use 3 time(s) daily - Ok to substitute per insurance 100 strip 5    CPAP Machine MISC by Does not apply route New CPAP 7-10 cm 1 each 0    blood glucose monitor kit and supplies DX: diabetes mellitus.  Test 1-3 time(s) daily - Ok to substitute per insurance 1 kit 0    Lancets MISC DX: diabetes mellitus. Use 1-3 time(s) daily - Ok to substitute per insurance (1 each = 1 box) 1 each 5    Phytosterols 450 MG TABS Take 1 tablet by mouth daily 30 tablet 2    Multiple Vitamins-Minerals (WOMENS MULTIVITAMIN PO) Take 1 tablet by mouth daily      fexofenadine (ALLEGRA) 180 MG tablet Take 180 mg by mouth daily      Respiratory Therapy Supplies [employer] New CPAP mask and supplies 1 Device 0     No current facility-administered medications on file prior to visit. Allergies: Other, Tomato, Influenza vaccines, and Seasonal    Review of Systems   Constitutional:  Negative for activity change, appetite change and fatigue. HENT:  Positive for rhinorrhea. Respiratory:  Negative for apnea, cough, chest tightness and shortness of breath. Cardiovascular:  Negative for chest pain, palpitations and leg swelling. Gastrointestinal:  Negative for abdominal pain, blood in stool, constipation, diarrhea, nausea and vomiting. Musculoskeletal:  Negative for arthralgias. Neurological:  Negative for seizures and headaches. Psychiatric/Behavioral:  Negative for hallucinations and suicidal ideas. Objective:   /60   Pulse 80   Temp 98.2 °F (36.8 °C) (Infrared)   Wt 178 lb 12.8 oz (81.1 kg)   LMP 01/16/2008   SpO2 99%   BMI 32.70 kg/m²     Physical Exam  Vitals and nursing note reviewed. Constitutional:       General: She is not in acute distress. Appearance: Normal appearance. She is well-developed. She is not diaphoretic. HENT:      Head: Normocephalic and atraumatic. Nose: Nose normal.      Mouth/Throat:      Mouth: Mucous membranes are moist.      Pharynx: Oropharynx is clear. Eyes:      Conjunctiva/sclera: Conjunctivae normal.      Pupils: Pupils are equal, round, and reactive to light. Cardiovascular:      Rate and Rhythm: Normal rate and regular rhythm. Heart sounds: Normal heart sounds. No murmur heard. No friction rub. No gallop.    Pulmonary:      Effort: Pulmonary effort is normal. No respiratory distress. Breath sounds: Normal breath sounds. No wheezing or rales. Chest:      Chest wall: No tenderness. Abdominal:      General: Abdomen is flat. Bowel sounds are normal.      Palpations: Abdomen is soft. Tenderness: There is no abdominal tenderness. Musculoskeletal:      Cervical back: Normal range of motion. Right knee: Normal.      Left knee: Normal.      Right foot: Bunion present. Legs:    Feet:      Right foot:      Skin integrity: Callus present. Left foot:      Skin integrity: Callus present. Skin:     General: Skin is warm and dry. Neurological:      Mental Status: She is alert and oriented to person, place, and time. Psychiatric:         Behavior: Behavior normal.         Thought Content: Thought content normal.         Judgment: Judgment normal.       Assessment & Plan:     1. Internal nasal lesion  Small fissure noted with some surrounding erythema. Given the duration of symptoms will have patient evaluated by dermatology. We will also treat with oral antibiotics as topical treatment does not appear to be effective  - cephALEXin (KEFLEX) 500 MG capsule; Take 1 capsule by mouth 2 times daily for 7 days  Dispense: 14 capsule; Refill: 0  - [facility] - Katiana Milian MD, Dermatoloty, [facility]    2. Chronic pain of both knees  Patient to follow-up with orthopedic surgery    3. Simple chronic bronchitis (HCC)  Stable: Continue current medication    4. Diabetic polyneuropathy associated with type 2 diabetes mellitus ([facility] [address]  Well-controlled: Continue current medication  Encourage patient to have lab work completed    Return if symptoms worsen or fail to improve.     Jose King MD

## 2023-03-01 DIAGNOSIS — J30.89 ALLERGIC RHINITIS DUE TO OTHER ALLERGIC TRIGGER, UNSPECIFIED SEASONALITY: ICD-10-CM

## 2023-03-02 NOTE — TELEPHONE ENCOUNTER
Comments:     Last Office Visit (last PCP visit):   2023    Next Visit Date:  Future Appointments   Date Time Provider Iain Carol   3/13/2023  9:30 AM [address] [place] [address] [address]   2023  9:30 AM Anisa Trinidad MD [address] [place] [address]   2023 10:00 AM Amanda Delgado DPM MLOX OB POD [facility] EMERGENCY [facility] AT [place]       **If hasn't been seen in over a year OR hasn't followed up according to last diabetes/ADHD visit, make appointment for patient before sending refill to provider.     Rx requested:  Requested Prescriptions     Pending Prescriptions Disp Refills    olopatadine (PATANASE) 0.6 % SOLN nassl soln 1 each 2     Si sprays by Nasal route 2 times daily

## 2023-03-02 NOTE — TELEPHONE ENCOUNTER
pharmacy is requesting medication refill. Please approve or deny this request.    Rx requested:  Requested Prescriptions     Pending Prescriptions Disp Refills    meloxicam (MOBIC) 15 MG tablet [Pharmacy Med Name: MELOXICAM TABS 15MG] 90 tablet 0     Sig: TAKE 1 TABLET DAILY       Last Office Visit:   3/6/2018          Next Visit Date:  No future appointments.
Discharged

## 2023-03-06 RX ORDER — OLOPATADINE HYDROCHLORIDE 665 UG/1
2 SPRAY NASAL 2 TIMES DAILY
Qty: 1 EACH | Refills: 2 | Status: SHIPPED | OUTPATIENT
Start: 2023-03-06

## 2023-03-11 ENCOUNTER — HOSPITAL ENCOUNTER (OUTPATIENT)
Dept: LAB | Age: 73
Discharge: HOME | End: 2023-03-11
Payer: MEDICARE

## 2023-03-11 DIAGNOSIS — E11.9 TYPE 2 DIABETES MELLITUS WITHOUT COMPLICATION, WITHOUT LONG-TERM CURRENT USE OF INSULIN (HCC): ICD-10-CM

## 2023-03-11 LAB
ALBUMIN SERPL-MCNC: 4.2 G/DL (ref 3.5–4.6)
ALP BLD-CCNC: 58 U/L (ref 40–130)
ALT SERPL-CCNC: 18 U/L (ref 0–33)
ANION GAP SERPL CALCULATED.3IONS-SCNC: 10 MEQ/L (ref 9–15)
AST SERPL-CCNC: 23 U/L (ref 0–35)
BASOPHILS ABSOLUTE: 0 K/UL (ref 0–0.2)
BASOPHILS RELATIVE PERCENT: 0.6 %
BILIRUB SERPL-MCNC: 0.3 MG/DL (ref 0.2–0.7)
BUN BLDV-MCNC: 19 MG/DL (ref 8–23)
CALCIUM SERPL-MCNC: 9.3 MG/DL (ref 8.5–9.9)
CHLORIDE BLD-SCNC: 101 MEQ/L (ref 95–107)
CO2: 23 MEQ/L (ref 20–31)
CREAT SERPL-MCNC: 0.82 MG/DL (ref 0.5–0.9)
EOSINOPHILS ABSOLUTE: 0.2 K/UL (ref 0–0.7)
EOSINOPHILS RELATIVE PERCENT: 2.9 %
GFR SERPL CREATININE-BSD FRML MDRD: >60 ML/MIN/{1.73_M2}
GLOBULIN: 2.9 G/DL (ref 2.3–3.5)
GLUCOSE BLD-MCNC: 100 MG/DL (ref 70–99)
HBA1C MFR BLD: 6.3 % (ref 4.8–5.9)
HCT VFR BLD CALC: 38.1 % (ref 37–47)
HEMOGLOBIN: 12.8 G/DL (ref 12–16)
LYMPHOCYTES ABSOLUTE: 2.1 K/UL (ref 1–4.8)
LYMPHOCYTES RELATIVE PERCENT: 32.5 %
MCH RBC QN AUTO: 32 PG (ref 27–31.3)
MCHC RBC AUTO-ENTMCNC: 33.5 % (ref 33–37)
MCV RBC AUTO: 95.4 FL (ref 79.4–94.8)
MONOCYTES ABSOLUTE: 0.6 K/UL (ref 0.2–0.8)
MONOCYTES RELATIVE PERCENT: 8.6 %
NEUTROPHILS ABSOLUTE: 3.6 K/UL (ref 1.4–6.5)
NEUTROPHILS RELATIVE PERCENT: 55.4 %
PDW BLD-RTO: 14.5 % (ref 11.5–14.5)
PLATELET # BLD: 197 K/UL (ref 130–400)
POTASSIUM SERPL-SCNC: 4.4 MEQ/L (ref 3.4–4.9)
RBC # BLD: 3.99 M/UL (ref 4.2–5.4)
SODIUM BLD-SCNC: 134 MEQ/L (ref 135–144)
TOTAL PROTEIN: 7.1 G/DL (ref 6.3–8)
WBC # BLD: 6.5 K/UL (ref 4.8–10.8)

## 2023-03-11 PROCEDURE — 83036 HEMOGLOBIN GLYCOSYLATED A1C: CPT

## 2023-03-11 PROCEDURE — 85025 COMPLETE CBC W/AUTO DIFF WBC: CPT

## 2023-03-11 PROCEDURE — 36415 COLL VENOUS BLD VENIPUNCTURE: CPT

## 2023-03-11 PROCEDURE — 80053 COMPREHEN METABOLIC PANEL: CPT

## 2023-03-13 ENCOUNTER — TELEPHONE (OUTPATIENT)
Dept: FAMILY MEDICINE CLINIC | Age: 73
End: 2023-03-13

## 2023-03-13 ENCOUNTER — OFFICE VISIT (OUTPATIENT)
Dept: FAMILY MEDICINE CLINIC | Age: 73
End: 2023-03-13

## 2023-03-13 VITALS
SYSTOLIC BLOOD PRESSURE: 130 MMHG | WEIGHT: 177.4 LBS | OXYGEN SATURATION: 99 % | BODY MASS INDEX: 32.45 KG/M2 | HEART RATE: 88 BPM | DIASTOLIC BLOOD PRESSURE: 76 MMHG | TEMPERATURE: 97.6 F

## 2023-03-13 DIAGNOSIS — J34.89 INTERNAL NASAL LESION: Primary | ICD-10-CM

## 2023-03-13 DIAGNOSIS — R05.8 SPUTUM PRODUCTION: ICD-10-CM

## 2023-03-13 DIAGNOSIS — E78.2 MIXED HYPERLIPIDEMIA: ICD-10-CM

## 2023-03-13 DIAGNOSIS — E11.9 TYPE 2 DIABETES MELLITUS WITHOUT COMPLICATION, WITHOUT LONG-TERM CURRENT USE OF INSULIN (HCC): Primary | ICD-10-CM

## 2023-03-13 DIAGNOSIS — E11.9 TYPE 2 DIABETES MELLITUS WITHOUT COMPLICATION, WITHOUT LONG-TERM CURRENT USE OF INSULIN (HCC): ICD-10-CM

## 2023-03-13 RX ORDER — OMEPRAZOLE 20 MG/1
20 CAPSULE, DELAYED RELEASE ORAL
Qty: 30 CAPSULE | Refills: 0 | Status: SHIPPED | OUTPATIENT
Start: 2023-03-13

## 2023-03-13 NOTE — PROGRESS NOTES
Subjective:      Patient ID: Princess Moreno is a 81 y.o. female who presents today for:     Chief Complaint   Patient presents with    Diabetes     Discuss labs     Other     States after eating a frozen dinner her throat closed off, states since then she has had a lot of fleam. Asking for an ENT referral,  respiratory culture        HPI    Princess Tiffanie quezada 79-year-old female presents today to follow-up on chronic conditions. She states that she had the sensation of a blockage in her throat when she was eating a frozen dinner last week. .  Since then she has produced a lot of clear mucus. She denies any difficulty swallowing, shortness of breath or cough. She would like an ENT referral.  She states that this excess production of phlegm has been present for over 1 year and her prior PCP ordered a respiratory culture however this was unable to be processed due to an insufficient amount. Patient would like to have this reordered. Diabetes: Patient has been adherent to her medication. Last hemoglobin A1c was 6.3. Patient denies any polyuria or polydipsia    Patient has had a nonhealing lesion that is on the inner and edge of her left nare. She was referred to dermatology who stated that she she would need to be referred to ENT.    Past Medical History:   Diagnosis Date    Allergic rhinitis     Anxiety     History of blood transfusion     blood transfusions with c section x 3    Hyperlipidemia     meds > 2 yrs    Osteoarthritis     Sleep apnea     Type 2 diabetes mellitus 2022     Past Surgical History:   Procedure Laterality Date    ANKLE SURGERY Right 2000    tendon repair    [address]    pt has had 3     COLONOSCOPY      COLONOSCOPY N/A 2020    COLONOSCOPY performed by Beryl Su MD at [address], COLON, DIAGNOSTIC      TOTAL KNEE ARTHROPLASTY Left 2019    LEFT TOTAL KNEE ARTHROPLASTY, SUPINE, 23 HR OBS, IRAJ CEMENTED PERSONA performed by Lori Marquis MD at [facility] Right 3/13/2020    RIGHT TOTAL KNEE  ARTHROPLASTY performed by Lori Marquis MD at [facility] History   Problem Relation Age of Onset    Heart Disease Mother     High Blood Pressure Mother     Vision Loss Mother     Other Mother         leiden factor 5    Diabetes Father     Heart Disease Father     Depression Sister     Diabetes Sister     Obesity Sister     Allergy (Severe) Sister     Arthritis Sister     Depression Sister     Arthritis Sister     Depression Sister     Diabetes Sister     Arthritis Sister     Depression Sister     Arthritis Sister     Arthritis Sister     Arthritis Sister     Diabetes Brother     Arthritis Brother     Other Brother         leiden factor 5    Arthritis Brother     Arthritis Brother     Arthritis Brother     Thyroid Disease Daughter     No Known Problems Son     Breast Cancer Paternal Aunt      Social History     Socioeconomic History    Marital status: Single     Spouse name: Not on file    Number of children: 3    Years of education: 18    Highest education level: Master's degree (e.g., MA, MS, Sybil, MEd, MSW, MIRTA)   Occupational History    Occupation: Teacher    Tobacco Use    Smoking status: Former     Packs/day: 0.50     Years: 52.00     Pack years: 26.00     Types: Cigarettes     Start date:      Quit date: 2020     Years since quittin.3    Smokeless tobacco: Never   Vaping Use    Vaping Use: Never used   Substance and Sexual Activity    Alcohol use:  Yes     Alcohol/week: 1.0 standard drink     Types: 1 Shots of liquor per week     Comment: Once per month    Drug use: No    Sexual activity: Not Currently     Partners: Male   Other Topics Concern    Not on file   Social History Narrative    Not on file     Social Determinants of Health     Financial Resource Strain: Low Risk     Difficulty of Paying Living Expenses: Not hard at all   Food Insecurity: No Food Insecurity    Worried About Running Out of Food in the Last Year: Never true    Ran Out of Food in the Last Year: Never true   Transportation Needs: Unknown    Lack of Transportation (Medical): Not on file    Lack of Transportation (Non-Medical): No   Physical Activity: Not on file   Stress: Not on file   Social Connections: Not on file   Intimate Partner Violence: Not on file   Housing Stability: Unknown    Unable to Pay for Housing in the Last Year: Not on file    Number of Places Lived in the Last Year: Not on file    Unstable Housing in the Last Year: No     Current Outpatient Medications on File Prior to Visit   Medication Sig Dispense Refill    rosuvastatin (CRESTOR) 10 MG tablet Take 1 tablet by mouth once daily 90 tablet 1    olopatadine (PATANASE) 0.6 % SOLN nassl soln 2 sprays by Nasal route 2 times daily 1 each 2    metFORMIN (GLUCOPHAGE XR) 750 MG extended release tablet Take 1 tablet by mouth in the morning and at bedtime 180 tablet 1    SF 5000 PLUS 1.1 % CREA       Alcohol Swabs PADS DX: diabetes mellitus. Test 1-3 time(s) daily - Ok to substitute per insurance (1 each = 1 box) 1 each 5    meloxicam (MOBIC) 15 MG tablet Take 1 tablet by mouth daily 90 tablet 0    citalopram (CELEXA) 20 MG tablet TAKE 1 TABLET DAILY 90 tablet 4    ammonium lactate (AMLACTIN) 12 % cream Apply topically daily, avoid applying between the toes. 385 g 5    blood glucose monitor strips DX: diabetes mellitus. Use 3 time(s) daily - Ok to substitute per insurance 100 strip 5    CPAP Machine MISC by Does not apply route New CPAP 7-10 cm 1 each 0    blood glucose monitor kit and supplies DX: diabetes mellitus. Test 1-3 time(s) daily - Ok to substitute per insurance 1 kit 0    Lancets MISC DX: diabetes mellitus.  Use 1-3 time(s) daily - Ok to substitute per insurance (1 each = 1 box) 1 each 5    Phytosterols 450 MG TABS Take 1 tablet by mouth daily 30 tablet 2    Multiple Vitamins-Minerals (WOMENS MULTIVITAMIN PO) Take 1 tablet by mouth daily fexofenadine (ALLEGRA) 180 MG tablet Take 180 mg by mouth daily      Respiratory Therapy Supplies [employer] New CPAP mask and supplies 1 Device 0     No current facility-administered medications on file prior to visit. Allergies: Other, Tomato, Influenza vaccines, and Seasonal    Review of Systems   Constitutional:  Negative for activity change, appetite change and fatigue. Respiratory:  Negative for apnea, cough, chest tightness and shortness of breath. Cardiovascular:  Negative for chest pain, palpitations and leg swelling. Gastrointestinal:  Negative for abdominal pain, blood in stool, constipation, diarrhea, nausea and vomiting. Musculoskeletal:  Negative for arthralgias. Neurological:  Negative for seizures and headaches. Psychiatric/Behavioral:  Negative for hallucinations and suicidal ideas. Objective:   /76   Pulse 88   Temp 97.6 °F (36.4 °C) (Infrared)   Wt 177 lb 6.4 oz (80.5 kg)   LMP 01/16/2008   SpO2 99%   BMI 32.45 kg/m²     Physical Exam  Vitals and nursing note reviewed. Constitutional:       General: She is not in acute distress. Appearance: Normal appearance. She is well-developed. She is not diaphoretic. HENT:      Head: Normocephalic and atraumatic. Nose: Nose normal.        Mouth/Throat:      Mouth: Mucous membranes are moist.      Pharynx: Oropharynx is clear. Eyes:      Conjunctiva/sclera: Conjunctivae normal.      Pupils: Pupils are equal, round, and reactive to light. Cardiovascular:      Rate and Rhythm: Normal rate and regular rhythm. Heart sounds: Normal heart sounds. No murmur heard. No friction rub. No gallop. Pulmonary:      Effort: Pulmonary effort is normal. No respiratory distress. Breath sounds: Normal breath sounds. No wheezing or rales. Chest:      Chest wall: No tenderness. Abdominal:      General: Abdomen is flat. Bowel sounds are normal.      Palpations: Abdomen is soft. Tenderness:  There is no abdominal tenderness. Musculoskeletal:      Cervical back: Normal range of motion. Skin:     General: Skin is warm and dry. Neurological:      Mental Status: She is alert and oriented to person, place, and time. Psychiatric:         Behavior: Behavior normal.         Thought Content: Thought content normal.         Judgment: Judgment normal.       Assessment & Plan:     1. Internal nasal lesion  We will refer to ENT per dermatology recommendations  - Lana Flores MD, Otolaryngology, [facility]    2. Mixed hyperlipidemia  Controlled: Continue current medication    3. Type 2 diabetes mellitus without complication, without long-term current use of insulin (HCC)  Controlled: Continue current medication    4. Sputum production  Chronic: We will obtain respiratory culture and chest x-ray. As there seems to be some association with food we will start patient on a trial of a PPI and monitor for symptom improvement  - Culture, Respiratory; Future  - XR CHEST STANDARD (2 VW); Future  - omeprazole (PRILOSEC) 20 MG delayed release capsule; Take 1 capsule by mouth every morning (before breakfast)  Dispense: 30 capsule; Refill: 0      Return in about 3 months (around 6/13/2023) for F/u DM.     Chaz Chappell MD

## 2023-03-16 ENCOUNTER — TELEPHONE (OUTPATIENT)
Dept: FAMILY MEDICINE CLINIC | Age: 73
End: 2023-03-16

## 2023-03-16 NOTE — TELEPHONE ENCOUNTER
Pt calling asking if we can call Dr Hayder Rivera  at [facility] [phone]  with the diagnosis code for her diabetic shoes once  Dr Shukri Turcios has the code she will order pt her shoes.

## 2023-03-18 ASSESSMENT — ENCOUNTER SYMPTOMS
APNEA: 0
SHORTNESS OF BREATH: 0
DIARRHEA: 0
VOMITING: 0
CHEST TIGHTNESS: 0
ABDOMINAL PAIN: 0
COUGH: 0
CONSTIPATION: 0
NAUSEA: 0
BLOOD IN STOOL: 0

## 2023-03-20 ENCOUNTER — HOSPITAL ENCOUNTER (OUTPATIENT)
Age: 73
Setting detail: SPECIMEN
Discharge: HOME | End: 2023-03-20
Payer: MEDICARE

## 2023-03-20 ENCOUNTER — HOSPITAL ENCOUNTER (OUTPATIENT)
Age: 73
Discharge: HOME | End: 2023-03-22
Payer: MEDICARE

## 2023-03-20 ENCOUNTER — HOSPITAL ENCOUNTER (OUTPATIENT)
Dept: GENERAL RADIOLOGY | Age: 73
Discharge: HOME | End: 2023-03-22
Payer: MEDICARE

## 2023-03-20 DIAGNOSIS — R05.8 SPUTUM PRODUCTION: ICD-10-CM

## 2023-03-20 PROCEDURE — 87070 CULTURE OTHR SPECIMN AEROBIC: CPT

## 2023-03-20 PROCEDURE — 71046 X-RAY EXAM CHEST 2 VIEWS: CPT

## 2023-03-21 DIAGNOSIS — G89.29 CHRONIC LEFT-SIDED LOW BACK PAIN WITHOUT SCIATICA: ICD-10-CM

## 2023-03-21 DIAGNOSIS — M54.50 CHRONIC LEFT-SIDED LOW BACK PAIN WITHOUT SCIATICA: ICD-10-CM

## 2023-03-21 DIAGNOSIS — G89.29 CHRONIC RIGHT SHOULDER PAIN: ICD-10-CM

## 2023-03-21 DIAGNOSIS — M25.511 CHRONIC RIGHT SHOULDER PAIN: ICD-10-CM

## 2023-03-22 LAB — BACTERIA SPEC RESP CULT: NORMAL

## 2023-03-22 RX ORDER — MELOXICAM 15 MG/1
15 TABLET ORAL DAILY
Qty: 90 TABLET | Refills: 0 | Status: SHIPPED | OUTPATIENT
Start: 2023-03-22

## 2023-03-22 NOTE — TELEPHONE ENCOUNTER
Comments:     Last Office Visit (last PCP visit):   3/13/2023    Next Visit Date:  Future Appointments   Date Time Provider Iain Medina   4/26/2023  9:30 AM Wendie Stein, [address]   5/10/2023  9:30 AM JOSE Huynh [facility]   9/13/2023 10:00 AM Ludin Zapata MD [facility] [address]       **If hasn't been seen in over a year OR hasn't followed up according to last diabetes/ADHD visit, make appointment for patient before sending refill to provider.     Rx requested:  Requested Prescriptions     Pending Prescriptions Disp Refills    meloxicam (MOBIC) 15 MG tablet 90 tablet 0     Sig: Take 1 tablet by mouth daily

## 2023-04-26 ENCOUNTER — OFFICE VISIT (OUTPATIENT)
Dept: PULMONOLOGY | Age: 73
End: 2023-04-26
Payer: MEDICARE

## 2023-04-26 VITALS
HEART RATE: 89 BPM | TEMPERATURE: 97.3 F | OXYGEN SATURATION: 95 % | SYSTOLIC BLOOD PRESSURE: 130 MMHG | WEIGHT: 180 LBS | DIASTOLIC BLOOD PRESSURE: 76 MMHG | BODY MASS INDEX: 32.92 KG/M2

## 2023-04-26 DIAGNOSIS — E66.9 OBESITY (BMI 30-39.9): ICD-10-CM

## 2023-04-26 DIAGNOSIS — Z87.891 PERSONAL HISTORY OF SMOKING: ICD-10-CM

## 2023-04-26 DIAGNOSIS — G47.33 OSA (OBSTRUCTIVE SLEEP APNEA): Primary | ICD-10-CM

## 2023-04-26 DIAGNOSIS — R91.8 LUNG NODULES: ICD-10-CM

## 2023-04-26 DIAGNOSIS — J41.0 SIMPLE CHRONIC BRONCHITIS (HCC): ICD-10-CM

## 2023-04-26 PROCEDURE — 1123F ACP DISCUSS/DSCN MKR DOCD: CPT | Performed by: INTERNAL MEDICINE

## 2023-04-26 PROCEDURE — 99215 OFFICE O/P EST HI 40 MIN: CPT | Performed by: INTERNAL MEDICINE

## 2023-04-26 RX ORDER — DOXYCYCLINE HYCLATE 100 MG
100 TABLET ORAL 2 TIMES DAILY
Qty: 20 TABLET | Refills: 0 | Status: SHIPPED | OUTPATIENT
Start: 2023-04-26 | End: 2023-05-06

## 2023-04-26 ASSESSMENT — ENCOUNTER SYMPTOMS
RHINORRHEA: 0
SORE THROAT: 0
SHORTNESS OF BREATH: 1
VOICE CHANGE: 0
DIARRHEA: 0
COUGH: 1
EYE DISCHARGE: 0
ABDOMINAL PAIN: 0
NAUSEA: 0
SINUS PRESSURE: 0
TROUBLE SWALLOWING: 0
CHEST TIGHTNESS: 0
VOMITING: 0
EYE ITCHING: 0

## 2023-04-26 NOTE — PROGRESS NOTES
Subjective:     Tian Lay is a 81 y.o. female who complains today of:     Chief Complaint   Patient presents with    Follow-up     4m f/u on AMANDA. Pt states she feels like she has bronchitis with yellow mucus. She had CXR and sputum testing done for [facility]        HPI   She is using CPAP with  7-10   centimeters of H2O with heated humidity. She is using CPAP for about   7 hours every night. She is using CPAP with   full face Mask. She said  sleep is restful with the CPAP use. She is compliant with CPAP therapy and benefiting with CPAP use. No snoring with CPAP use. No complaint of daytime sleepiness or tiredness with CPAP use. She denies taking naps. No sleepiness with driving. She denies difficulty falling asleep or staying asleep. I reviewed compliance report with patient regarding CPAP therapy. She is using  CPAP for 28 days out of 30 days. Average usage of days used is 6 hours and 12  min , average AHI 3.2 with CPAP use. She is smoking 1/2 ppd . CXR no active disease  No C/o shortness of breath No Wheezing. C/o Cough with  yellow Sputum. No Hemoptysis. No Chest tightness. No Chest pain with radiation  or pleuritic pain. No  leg edema. No Fever or chills. C/o  Rhinorrhea and postnasal drip. CT chest lung screening done . 11/22  There is no pulmonary infiltrate, mass or suspicious pulmonary nodule. Stable nodule seen within the right lower lobe, right middle lobe and lingula when compared with the patient's prior study. All the nodules measure less than 5 mm. Allergies:   Other, Tomato, Influenza vaccines, and Seasonal  Past Medical History:   Diagnosis Date    Allergic rhinitis     Anxiety     History of blood transfusion     blood transfusions with c section x 3    Hyperlipidemia     meds > 2 yrs    Osteoarthritis     Sleep apnea     Type 2 diabetes mellitus 1/11/2022     Past Surgical History:   Procedure Laterality Date    ANKLE SURGERY Right 2000    tendon repair

## 2023-06-22 ENCOUNTER — OFFICE VISIT (OUTPATIENT)
Dept: FAMILY MEDICINE CLINIC | Age: 73
End: 2023-06-22
Payer: MEDICARE

## 2023-06-22 VITALS
BODY MASS INDEX: 33.13 KG/M2 | DIASTOLIC BLOOD PRESSURE: 76 MMHG | HEART RATE: 76 BPM | WEIGHT: 180 LBS | TEMPERATURE: 97.5 F | SYSTOLIC BLOOD PRESSURE: 130 MMHG | HEIGHT: 62 IN | OXYGEN SATURATION: 97 %

## 2023-06-22 DIAGNOSIS — L24.7 IRRITANT CONTACT DERMATITIS DUE TO PLANTS, EXCEPT FOOD: Primary | ICD-10-CM

## 2023-06-22 PROCEDURE — 1123F ACP DISCUSS/DSCN MKR DOCD: CPT

## 2023-06-22 PROCEDURE — 99213 OFFICE O/P EST LOW 20 MIN: CPT

## 2023-06-22 RX ORDER — PREDNISONE 20 MG/1
TABLET ORAL
Qty: 10 TABLET | Refills: 0 | Status: SHIPPED | OUTPATIENT
Start: 2023-06-22 | End: 2023-06-30

## 2023-06-22 ASSESSMENT — ENCOUNTER SYMPTOMS
EYES NEGATIVE: 1
RESPIRATORY NEGATIVE: 1
COLOR CHANGE: 0

## 2023-06-22 NOTE — PROGRESS NOTES
(GLUCOPHAGE XR) 750 MG EXTENDED RELEASE TABLET    Take 1 tablet by mouth in the morning and at bedtime    MULTIPLE VITAMINS-MINERALS (WOMENS MULTIVITAMIN PO)    Take 1 tablet by mouth daily    OLOPATADINE (PATANASE) 0.6 % SOLN NASSL SOLN    2 sprays by Nasal route 2 times daily    OMEPRAZOLE (PRILOSEC) 20 MG DELAYED RELEASE CAPSULE    Take 1 capsule by mouth every morning (before breakfast)    PHYTOSTEROLS 450 MG TABS    Take 1 tablet by mouth daily    RESPIRATORY THERAPY SUPPLIES [facility]    New CPAP mask and supplies    ROSUVASTATIN (CRESTOR) 10 MG TABLET    Take 1 tablet by mouth once daily    SF 5000 PLUS 1.1 % CREA         ALLERGIES     Patient is is allergic to other, tomato, influenza vaccines, and seasonal.  FAMILY HISTORY     Patient'sfamily history includes Allergy (Severe) in her sister; Arthritis in her brother, brother, brother, brother, sister, sister, sister, sister, sister, and sister; Breast Cancer in her paternal aunt; Depression in her sister, sister, sister, and sister; Diabetes in her brother, father, sister, and sister; Heart Disease in her father and mother; High Blood Pressure in her mother; No Known Problems in her son; Obesity in her sister; Other in her brother and mother; Thyroid Disease in her daughter; Vision Loss in her mother. HISTORY     Patient  reports that she quit smoking about 2 years ago. Her smoking use included cigarettes. She started smoking about 55 years ago. She has a 26.00 pack-year smoking history. She has never used smokeless tobacco. She reports current alcohol use of about 1.0 standard drink per week. She reports that she does not use drugs. READY CARE COURSE   No orders of the defined types were placed in this encounter. Labs:  No results found for this visit on 06/22/23. IMAGING:  No orders to display     Scheduled Meds:  Continuous Infusions:  PRN Meds:.

## 2023-07-04 DIAGNOSIS — E11.9 TYPE 2 DIABETES MELLITUS WITHOUT COMPLICATION, WITHOUT LONG-TERM CURRENT USE OF INSULIN (HCC): ICD-10-CM

## 2023-07-05 RX ORDER — METFORMIN HYDROCHLORIDE 750 MG/1
750 TABLET, EXTENDED RELEASE ORAL 2 TIMES DAILY
Qty: 180 TABLET | Refills: 0 | Status: SHIPPED | OUTPATIENT
Start: 2023-07-05

## 2023-07-05 NOTE — TELEPHONE ENCOUNTER
Comments:     Last Office Visit (last PCP visit):   3/13/2023    Next Visit Date:  Future Appointments   Date Time Provider [address]   8/30/2023  9:45 AM Diamond OrlandoDominique   9/13/2023 10:00 AM [address] MD [address]       **If hasn't been seen in over a year OR hasn't followed up according to last diabetes/ADHD visit, make appointment for patient before sending refill to provider.     Rx requested:  Requested Prescriptions     Pending Prescriptions Disp Refills    metFORMIN (GLUCOPHAGE-XR) 750 MG extended release tablet [Pharmacy Med Name: metFORMIN HCl  MG Oral Tablet Extended Release 24 Hour] 180 tablet 0     Sig: TAKE 1 TABLET BY MOUTH IN THE MORNING AND AT BEDTIME

## 2023-08-04 DIAGNOSIS — E78.2 MIXED HYPERLIPIDEMIA: ICD-10-CM

## 2023-08-04 NOTE — TELEPHONE ENCOUNTER
Pharmacy is requesting medication refill.  Please approve or deny this request.    Rx requested:  Requested Prescriptions     Pending Prescriptions Disp Refills    rosuvastatin (CRESTOR) 10 MG tablet [Pharmacy Med Name: Rosuvastatin Calcium 10 MG Oral Tablet] 90 tablet 0     Sig: Take 1 tablet by mouth daily Take 1 tablet by mouth once daily         Last Office Visit:   3/13/2023      Next Visit Date:  Future Appointments   Date Time Provider [address]   8/30/2023  9:45 AM Jumana Lockhart, [address] [facility]   9/13/2023 10:00 AM [address] MD [address]

## 2023-08-05 RX ORDER — ROSUVASTATIN CALCIUM 10 MG/1
10 TABLET, COATED ORAL DAILY
Qty: 90 TABLET | Refills: 0 | Status: SHIPPED | OUTPATIENT
Start: 2023-08-05

## 2023-08-14 ENCOUNTER — PREP FOR PROCEDURE (OUTPATIENT)
Dept: GASTROENTEROLOGY | Age: 73
End: 2023-08-14

## 2023-08-14 PROBLEM — Z12.11 COLON CANCER SCREENING: Status: ACTIVE | Noted: 2023-08-14

## 2023-08-30 ENCOUNTER — PATIENT MESSAGE (OUTPATIENT)
Dept: FAMILY MEDICINE CLINIC | Age: 73
End: 2023-08-30

## 2023-08-30 ENCOUNTER — OFFICE VISIT (OUTPATIENT)
Dept: PULMONOLOGY | Age: 73
End: 2023-08-30
Payer: MEDICARE

## 2023-08-30 VITALS
HEART RATE: 76 BPM | DIASTOLIC BLOOD PRESSURE: 78 MMHG | OXYGEN SATURATION: 98 % | BODY MASS INDEX: 32.92 KG/M2 | TEMPERATURE: 96.9 F | WEIGHT: 180 LBS | SYSTOLIC BLOOD PRESSURE: 132 MMHG

## 2023-08-30 DIAGNOSIS — E11.9 TYPE 2 DIABETES MELLITUS WITHOUT COMPLICATION, WITHOUT LONG-TERM CURRENT USE OF INSULIN (HCC): ICD-10-CM

## 2023-08-30 DIAGNOSIS — E66.9 OBESITY (BMI 30-39.9): ICD-10-CM

## 2023-08-30 DIAGNOSIS — G47.33 OSA (OBSTRUCTIVE SLEEP APNEA): Primary | ICD-10-CM

## 2023-08-30 DIAGNOSIS — Z87.891 PERSONAL HISTORY OF TOBACCO USE: ICD-10-CM

## 2023-08-30 DIAGNOSIS — J41.0 SIMPLE CHRONIC BRONCHITIS (HCC): ICD-10-CM

## 2023-08-30 PROCEDURE — 1123F ACP DISCUSS/DSCN MKR DOCD: CPT | Performed by: INTERNAL MEDICINE

## 2023-08-30 PROCEDURE — 99214 OFFICE O/P EST MOD 30 MIN: CPT | Performed by: INTERNAL MEDICINE

## 2023-08-30 PROCEDURE — G0296 VISIT TO DETERM LDCT ELIG: HCPCS | Performed by: INTERNAL MEDICINE

## 2023-08-30 NOTE — PROGRESS NOTES
exercise, behavioral modification. 4. Simple chronic bronchitis (HCC)  C/o Cough with clear  yellow Sputum. likely due to chronic bronchitis due to smoking     Return in about 3 months (around 11/30/2023) for lewis, Ct chest f/u. Zandra Nick MD        Discussed with the patient the current USPSTF guidelines released March 9, 2021 for screening for lung cancer. For adults aged 48 to 80 years who have a 20 pack-year smoking history and currently smoke or have quit within the past 15 years the grade B recommendation is to:  Screen for lung cancer with low-dose computed tomography (LDCT) every year. Stop screening once a person has not smoked for 15 years or has a health problem that limits life expectancy or the ability to have lung surgery. The patient  reports that she quit smoking about 2 years ago. Her smoking use included cigarettes. She started smoking about 55 years ago. She has a 26.00 pack-year smoking history. She has never used smokeless tobacco.. Discussed with patient the risks and benefits of screening, including over-diagnosis, false positive rate, and total radiation exposure. The patient currently exhibits no signs or symptoms suggestive of lung cancer. Discussed with patient the importance of compliance with yearly annual lung cancer screenings and willingness to undergo diagnosis and treatment if screening scan is positive. In addition, the patient was counseled regarding the importance of remaining smoke free and/or total smoking cessation.     Also reviewed the following if the patient has Medicare that as of February 10, 2022, Medicare only covers LDCT screening in patients aged 53-69 with at least a 20 pack-year smoking history who currently smoke or have quit in the last 15 years

## 2023-08-31 NOTE — TELEPHONE ENCOUNTER
Pt is requesting medication refill. Please approve or deny this request.    Rx requested:  Requested Prescriptions     Pending Prescriptions Disp Refills    Lancets MISC 1 each 5     Sig: DX: diabetes mellitus.  Use 1-3 time(s) daily - Ok to substitute per insurance (1 each = 1 box)         Last Office Visit:   3/13/2023      Next Visit Date:  Future Appointments   Date Time Provider [address]   9/13/2023 10:00 AM [address] MD [address]   12/20/2023 10:00 AM Dequan Barnes MD [facility]

## 2023-09-01 RX ORDER — LANCETS 30 GAUGE
EACH MISCELLANEOUS
Qty: 1 EACH | Refills: 5 | Status: SHIPPED | OUTPATIENT
Start: 2023-09-01

## 2023-09-04 ASSESSMENT — ENCOUNTER SYMPTOMS
RHINORRHEA: 0
SORE THROAT: 0
EYE DISCHARGE: 0
COUGH: 1
WHEEZING: 0
NAUSEA: 0
EYE ITCHING: 0
TROUBLE SWALLOWING: 0
DIARRHEA: 0
VOICE CHANGE: 0
SHORTNESS OF BREATH: 0
CHEST TIGHTNESS: 0
SINUS PRESSURE: 0
ABDOMINAL PAIN: 0
VOMITING: 0

## 2023-09-06 DIAGNOSIS — E11.9 TYPE 2 DIABETES MELLITUS WITHOUT COMPLICATION, WITHOUT LONG-TERM CURRENT USE OF INSULIN (HCC): ICD-10-CM

## 2023-09-06 NOTE — TELEPHONE ENCOUNTER
Comments: please see media attached, pharmacy asking to be sent w/o testing range per insurance     Last Office Visit (last PCP visit):   3/13/2023    Next Visit Date:  Future Appointments   Date Time Provider [address]   9/13/2023 10:00 AM [address] MD [address]   12/20/2023 10:00 AM Margot Felipe MD [facility]       **If hasn't been seen in over a year OR hasn't followed up according to last diabetes/ADHD visit, make appointment for patient before sending refill to provider. Rx requested:  Requested Prescriptions     Pending Prescriptions Disp Refills    Lancets MISC 1 each 5     Sig: DX: diabetes mellitus.  Ok to substitute per insurance (1 each = 1 box)

## 2023-09-07 RX ORDER — LANCETS 30 GAUGE
1 EACH MISCELLANEOUS 2 TIMES DAILY
Qty: 1 EACH | Refills: 5 | Status: SHIPPED | OUTPATIENT
Start: 2023-09-07

## 2023-09-10 DIAGNOSIS — M54.50 CHRONIC LEFT-SIDED LOW BACK PAIN WITHOUT SCIATICA: ICD-10-CM

## 2023-09-10 DIAGNOSIS — G89.29 CHRONIC LEFT-SIDED LOW BACK PAIN WITHOUT SCIATICA: ICD-10-CM

## 2023-09-10 DIAGNOSIS — M25.511 CHRONIC RIGHT SHOULDER PAIN: ICD-10-CM

## 2023-09-10 DIAGNOSIS — G89.29 CHRONIC RIGHT SHOULDER PAIN: ICD-10-CM

## 2023-09-10 SDOH — HEALTH STABILITY: PHYSICAL HEALTH: ON AVERAGE, HOW MANY DAYS PER WEEK DO YOU ENGAGE IN MODERATE TO STRENUOUS EXERCISE (LIKE A BRISK WALK)?: 5 DAYS

## 2023-09-10 SDOH — HEALTH STABILITY: PHYSICAL HEALTH: ON AVERAGE, HOW MANY MINUTES DO YOU ENGAGE IN EXERCISE AT THIS LEVEL?: 50 MIN

## 2023-09-10 ASSESSMENT — LIFESTYLE VARIABLES
HOW OFTEN DO YOU HAVE A DRINK CONTAINING ALCOHOL: 2
HOW OFTEN DO YOU HAVE A DRINK CONTAINING ALCOHOL: MONTHLY OR LESS
HOW MANY STANDARD DRINKS CONTAINING ALCOHOL DO YOU HAVE ON A TYPICAL DAY: 1
HOW OFTEN DO YOU HAVE SIX OR MORE DRINKS ON ONE OCCASION: 1
HOW MANY STANDARD DRINKS CONTAINING ALCOHOL DO YOU HAVE ON A TYPICAL DAY: 1 OR 2

## 2023-09-10 ASSESSMENT — PATIENT HEALTH QUESTIONNAIRE - PHQ9
2. FEELING DOWN, DEPRESSED OR HOPELESS: 0
SUM OF ALL RESPONSES TO PHQ QUESTIONS 1-9: 0
1. LITTLE INTEREST OR PLEASURE IN DOING THINGS: 0
SUM OF ALL RESPONSES TO PHQ9 QUESTIONS 1 & 2: 0
SUM OF ALL RESPONSES TO PHQ QUESTIONS 1-9: 0

## 2023-09-11 RX ORDER — MELOXICAM 15 MG/1
TABLET ORAL
Qty: 90 TABLET | Refills: 0 | Status: SHIPPED | OUTPATIENT
Start: 2023-09-11 | End: 2023-09-13

## 2023-09-11 NOTE — TELEPHONE ENCOUNTER
Comments:     Last Office Visit (last PCP visit):   3/13/2023    Next Visit Date:  Future Appointments   Date Time Provider [address]   9/13/2023 10:00 AM [address] MD [address]   12/20/2023 10:00 AM Tressa Acosta MD [facility]       **If hasn't been seen in over a year OR hasn't followed up according to last diabetes/ADHD visit, make appointment for patient before sending refill to provider.     Rx requested:  Requested Prescriptions     Pending Prescriptions Disp Refills    meloxicam (MOBIC) 15 MG tablet [Pharmacy Med Name: Meloxicam 15 MG Oral Tablet] 90 tablet 0     Sig: Take 1 tablet by mouth once daily

## 2023-09-12 DIAGNOSIS — G89.29 CHRONIC LEFT-SIDED LOW BACK PAIN WITHOUT SCIATICA: ICD-10-CM

## 2023-09-12 DIAGNOSIS — M54.50 CHRONIC LEFT-SIDED LOW BACK PAIN WITHOUT SCIATICA: ICD-10-CM

## 2023-09-12 DIAGNOSIS — M25.511 CHRONIC RIGHT SHOULDER PAIN: ICD-10-CM

## 2023-09-12 DIAGNOSIS — G89.29 CHRONIC RIGHT SHOULDER PAIN: ICD-10-CM

## 2023-09-12 NOTE — TELEPHONE ENCOUNTER
Pharmacy is requesting medication refill.  Please approve or deny this request.    Rx requested:  Requested Prescriptions     Pending Prescriptions Disp Refills    meloxicam (MOBIC) 15 MG tablet [Pharmacy Med Name: Meloxicam 15 MG Oral Tablet] 90 tablet 0     Sig: Take 1 tablet by mouth daily         Last Office Visit:   3/13/2023      Next Visit Date:  Future Appointments   Date Time Provider [address]   9/13/2023 10:00 AM [address] [place] [address] [place]   12/20/2023 10:00 AM Amaya Carter MD [facility]

## 2023-09-13 ENCOUNTER — OFFICE VISIT (OUTPATIENT)
Dept: FAMILY MEDICINE CLINIC | Age: 73
End: 2023-09-13

## 2023-09-13 VITALS
BODY MASS INDEX: 33.53 KG/M2 | WEIGHT: 182.2 LBS | TEMPERATURE: 97.6 F | HEIGHT: 62 IN | DIASTOLIC BLOOD PRESSURE: 80 MMHG | SYSTOLIC BLOOD PRESSURE: 130 MMHG | OXYGEN SATURATION: 99 % | HEART RATE: 67 BPM

## 2023-09-13 DIAGNOSIS — Z72.89 OTHER PROBLEMS RELATED TO LIFESTYLE: ICD-10-CM

## 2023-09-13 DIAGNOSIS — Z23 NEED FOR PROPHYLACTIC VACCINATION AGAINST STREPTOCOCCUS PNEUMONIAE (PNEUMOCOCCUS): ICD-10-CM

## 2023-09-13 DIAGNOSIS — Z00.00 MEDICARE ANNUAL WELLNESS VISIT, SUBSEQUENT: Primary | ICD-10-CM

## 2023-09-13 DIAGNOSIS — Z11.59 NEED FOR HEPATITIS C SCREENING TEST: ICD-10-CM

## 2023-09-13 DIAGNOSIS — M79.644 PAIN OF RIGHT THUMB: ICD-10-CM

## 2023-09-13 PROBLEM — Z12.11 COLON CANCER SCREENING: Status: RESOLVED | Noted: 2023-08-14 | Resolved: 2023-09-13

## 2023-09-13 RX ORDER — MELOXICAM 15 MG/1
15 TABLET ORAL DAILY
Qty: 90 TABLET | Refills: 0 | Status: SHIPPED | OUTPATIENT
Start: 2023-09-13

## 2023-09-13 NOTE — PROGRESS NOTES
Ok to substitute per insurance  Monty Mendez MD   olopatadine (PATANASE) 0.6 % SOLN nassl soln 2 sprays by Nasal route 2 times daily  Monty Mendez MD   SF 5000 PLUS 1.1 % CREA   Historical Provider, MD   Alcohol Swabs PADS DX: diabetes mellitus. Test 1-3 time(s) daily - Ok to substitute per insurance (1 each = 1 box)  Monty Mendez MD   citalopram (CELEXA) 20 MG tablet TAKE 1 TABLET DAILY  Ludin Bowman MD   ammonium lactate (AMLACTIN) 12 % cream Apply topically daily, avoid applying between the toes. Merrill Lemus DPM   CPAP Machine MISC by Does not apply route New CPAP 7-10 cm  Amena Valdez MD   blood glucose monitor kit and supplies DX: diabetes mellitus.  Test 1-3 time(s) daily - Ok to substitute per insurance  RAOUL Christensen - CNP   Phytosterols 450 MG TABS Take 1 tablet by mouth daily  Lynne CombRAOUL Sotelo - CNP   Multiple Vitamins-Minerals (WOMENS MULTIVITAMIN PO) Take 1 tablet by mouth daily  Historical Provider, MD   fexofenadine (ALLEGRA) 180 MG tablet Take 1 tablet by mouth daily  Historical Provider, MD   Respiratory Therapy Supplies [facility] New CPAP mask and supplies  Matthew Titus MD       [facility] (Including outside providers/suppliers regularly involved in providing care):   Patient Care Team:  Monty Mendez MD as PCP - General (Family Medicine)  Monty Mendez MD as PCP - Empaneled Provider     Reviewed and updated this visit:  Allergies  Meds

## 2023-09-13 NOTE — PATIENT INSTRUCTIONS
The [employer] on Aging online. You need 1196-1617 mg of calcium and 5683-8065 IU of vitamin D per day. It is possible to meet your calcium requirement with diet alone, but a vitamin D supplement is usually necessary to meet this goal.  When exposed to the sun, use a sunscreen that protects against both UVA and UVB radiation with an SPF of 30 or greater. Reapply every 2 to 3 hours or after sweating, drying off with a towel, or swimming. Always wear a seat belt when traveling in a car. Always wear a helmet when riding a bicycle or motorcycle.

## 2023-09-14 ENCOUNTER — HOSPITAL ENCOUNTER (OUTPATIENT)
Dept: LAB | Age: 73
Discharge: HOME | End: 2023-09-14
Payer: MEDICARE

## 2023-09-14 ENCOUNTER — HOSPITAL ENCOUNTER (OUTPATIENT)
Dept: GENERAL RADIOLOGY | Age: 73
Discharge: HOME | End: 2023-09-16
Payer: MEDICARE

## 2023-09-14 ENCOUNTER — HOSPITAL ENCOUNTER (OUTPATIENT)
Age: 73
Discharge: HOME | End: 2023-09-16
Payer: MEDICARE

## 2023-09-14 DIAGNOSIS — E78.2 MIXED HYPERLIPIDEMIA: ICD-10-CM

## 2023-09-14 DIAGNOSIS — Z11.59 NEED FOR HEPATITIS C SCREENING TEST: ICD-10-CM

## 2023-09-14 DIAGNOSIS — E11.9 TYPE 2 DIABETES MELLITUS WITHOUT COMPLICATION, WITHOUT LONG-TERM CURRENT USE OF INSULIN (HCC): ICD-10-CM

## 2023-09-14 DIAGNOSIS — Z72.89 OTHER PROBLEMS RELATED TO LIFESTYLE: ICD-10-CM

## 2023-09-14 DIAGNOSIS — M79.644 PAIN OF RIGHT THUMB: ICD-10-CM

## 2023-09-14 LAB
ALBUMIN SERPL-MCNC: 4.2 G/DL (ref 3.5–4.6)
ALP SERPL-CCNC: 56 U/L (ref 40–130)
ALT SERPL-CCNC: 24 U/L (ref 0–33)
ANION GAP SERPL CALCULATED.3IONS-SCNC: 10 MEQ/L (ref 9–15)
AST SERPL-CCNC: 29 U/L (ref 0–35)
BASOPHILS # BLD: 0 K/UL (ref 0–0.2)
BASOPHILS NFR BLD: 0.7 %
BILIRUB SERPL-MCNC: 0.4 MG/DL (ref 0.2–0.7)
BUN SERPL-MCNC: 16 MG/DL (ref 8–23)
CALCIUM SERPL-MCNC: 9 MG/DL (ref 8.5–9.9)
CHLORIDE SERPL-SCNC: 104 MEQ/L (ref 95–107)
CHOLEST SERPL-MCNC: 106 MG/DL (ref 0–199)
CO2 SERPL-SCNC: 24 MEQ/L (ref 20–31)
CREAT SERPL-MCNC: 0.77 MG/DL (ref 0.5–0.9)
CREAT UR-MCNC: 69.8 MG/DL
EOSINOPHIL # BLD: 0.2 K/UL (ref 0–0.7)
EOSINOPHIL NFR BLD: 3.3 %
ERYTHROCYTE [DISTWIDTH] IN BLOOD BY AUTOMATED COUNT: 14.2 % (ref 11.5–14.5)
GLOBULIN SER CALC-MCNC: 2.8 G/DL (ref 2.3–3.5)
GLUCOSE SERPL-MCNC: 105 MG/DL (ref 70–99)
HBA1C MFR BLD: 6.3 % (ref 4.8–5.9)
HCT VFR BLD AUTO: 35.5 % (ref 37–47)
HDLC SERPL-MCNC: 50 MG/DL (ref 40–59)
HGB BLD-MCNC: 12.2 G/DL (ref 12–16)
LDL CHOLESTEROL CALCULATED: 28 MG/DL (ref 0–129)
LYMPHOCYTES # BLD: 1.6 K/UL (ref 1–4.8)
LYMPHOCYTES NFR BLD: 30.1 %
MCH RBC QN AUTO: 32.8 PG (ref 27–31.3)
MCHC RBC AUTO-ENTMCNC: 34.4 % (ref 33–37)
MCV RBC AUTO: 95.2 FL (ref 79.4–94.8)
MICROALBUMIN UR-MCNC: <1.2 MG/DL
MICROALBUMIN/CREAT UR-RTO: NORMAL MG/G (ref 0–30)
MONOCYTES # BLD: 0.5 K/UL (ref 0.2–0.8)
MONOCYTES NFR BLD: 10 %
NEUTROPHILS # BLD: 2.9 K/UL (ref 1.4–6.5)
NEUTS SEG NFR BLD: 55.9 %
PLATELET # BLD AUTO: 170 K/UL (ref 130–400)
POTASSIUM SERPL-SCNC: 4.4 MEQ/L (ref 3.4–4.9)
PROT SERPL-MCNC: 7 G/DL (ref 6.3–8)
RBC # BLD AUTO: 3.73 M/UL (ref 4.2–5.4)
SODIUM SERPL-SCNC: 138 MEQ/L (ref 135–144)
TRIGLYCERIDE, FASTING: 139 MG/DL (ref 0–150)
WBC # BLD AUTO: 5.2 K/UL (ref 4.8–10.8)

## 2023-09-14 PROCEDURE — 86803 HEPATITIS C AB TEST: CPT

## 2023-09-14 PROCEDURE — 82570 ASSAY OF URINE CREATININE: CPT

## 2023-09-14 PROCEDURE — 85025 COMPLETE CBC W/AUTO DIFF WBC: CPT

## 2023-09-14 PROCEDURE — 80053 COMPREHEN METABOLIC PANEL: CPT

## 2023-09-14 PROCEDURE — 83036 HEMOGLOBIN GLYCOSYLATED A1C: CPT

## 2023-09-14 PROCEDURE — 80061 LIPID PANEL: CPT

## 2023-09-14 PROCEDURE — 36415 COLL VENOUS BLD VENIPUNCTURE: CPT

## 2023-09-14 PROCEDURE — 73110 X-RAY EXAM OF WRIST: CPT

## 2023-09-14 PROCEDURE — 82043 UR ALBUMIN QUANTITATIVE: CPT

## 2023-09-15 LAB — HEPATITIS C ANTIBODY: NONREACTIVE

## 2023-09-27 ENCOUNTER — OFFICE VISIT (OUTPATIENT)
Dept: ORTHOPEDIC SURGERY | Age: 73
End: 2023-09-27
Payer: MEDICARE

## 2023-09-27 VITALS
OXYGEN SATURATION: 96 % | HEART RATE: 88 BPM | BODY MASS INDEX: 32.2 KG/M2 | TEMPERATURE: 97 F | WEIGHT: 175 LBS | HEIGHT: 62 IN

## 2023-09-27 DIAGNOSIS — M18.11 ARTHRITIS OF CARPOMETACARPAL (CMC) JOINT OF RIGHT THUMB: Primary | ICD-10-CM

## 2023-09-27 PROCEDURE — 20600 DRAIN/INJ JOINT/BURSA W/O US: CPT | Performed by: PHYSICIAN ASSISTANT

## 2023-09-27 PROCEDURE — 99203 OFFICE O/P NEW LOW 30 MIN: CPT | Performed by: PHYSICIAN ASSISTANT

## 2023-09-27 RX ORDER — LIDOCAINE HYDROCHLORIDE 10 MG/ML
1 INJECTION, SOLUTION INFILTRATION; PERINEURAL ONCE
Status: COMPLETED | OUTPATIENT
Start: 2023-09-27 | End: 2023-09-27

## 2023-09-27 RX ORDER — TRIAMCINOLONE ACETONIDE 40 MG/ML
40 INJECTION, SUSPENSION INTRA-ARTICULAR; INTRAMUSCULAR ONCE
Status: COMPLETED | OUTPATIENT
Start: 2023-09-27 | End: 2023-09-27

## 2023-09-27 RX ADMIN — TRIAMCINOLONE ACETONIDE 40 MG: 40 INJECTION, SUSPENSION INTRA-ARTICULAR; INTRAMUSCULAR at 14:06

## 2023-09-27 RX ADMIN — LIDOCAINE HYDROCHLORIDE 1 ML: 10 INJECTION, SOLUTION INFILTRATION; PERINEURAL at 14:06

## 2023-09-27 ASSESSMENT — ENCOUNTER SYMPTOMS
GASTROINTESTINAL NEGATIVE: 1
EYES NEGATIVE: 1
RESPIRATORY NEGATIVE: 1

## 2023-09-27 NOTE — PROGRESS NOTES
triangular  fibrocartilage is present. There is no fracture, dislocation, worrisome bone destruction, radiodense  foreign bodies, or radiographic evidence of avascular necrosis. IMPRESSION:  Osteoarthritic changes, predominantly of the thumb. Other chronic findings, as noted. Specimen Collected: 09/14/23 13:41 EDT           Physical Exam  Constitutional:       Appearance: Normal appearance. HENT:      Head: Normocephalic and atraumatic. Mouth/Throat:      Mouth: Mucous membranes are moist.   Eyes:      Extraocular Movements: Extraocular movements intact. Musculoskeletal:      Cervical back: Normal range of motion. Comments: Right hand-. After the first [facility] joint. No CMC grind. Tenderness with palpation directly over for [facility] joint. Finkelstein's is negative. No thenar wasting. Sensation is intact distally to light touch. Capillary refill is brisk. Skin:     General: Skin is warm and dry. Neurological:      General: No focal deficit present. Mental Status: She is oriented to person, place, and time. Psychiatric:         Mood and Affect: Mood normal.     Procedure-after sterile prep and under aseptic technique 1 cc 1% lidocaine 1 cc 4 mg/L Kenalog injected into the first CMC joint. Patient tolerated procedure well     I explained to the patient she does not degenerative arthritis of her CMC joint. Injected today with cortisone. We discussed occupational therapy. She may use topical anti-inflammatory creams like to see her back in about a month. On this date 9/27/2023 I have spent 35 minutes reviewing previous notes, test results and face to face with the patient discussing the diagnosis and importance of compliance with the treatment plan as well as documenting on the day of the visit.   The duration of time required for the cortisone injection for [facility] joint is not included the aforementioned 35 minutes      An electronic signature was used to authenticate

## 2023-09-29 DIAGNOSIS — E78.2 MIXED HYPERLIPIDEMIA: ICD-10-CM

## 2023-09-29 RX ORDER — ROSUVASTATIN CALCIUM 10 MG/1
10 TABLET, COATED ORAL DAILY
Qty: 90 TABLET | Refills: 0 | Status: SHIPPED | OUTPATIENT
Start: 2023-09-29

## 2023-09-29 NOTE — TELEPHONE ENCOUNTER
Patient is requesting medication refill.  Please approve or deny this request.    Rx requested:  Requested Prescriptions     Pending Prescriptions Disp Refills    rosuvastatin (CRESTOR) 10 MG tablet [Pharmacy Med Name: Rosuvastatin Calcium 10 MG Oral Tablet] 90 tablet 0     Sig: Take 1 tablet by mouth once daily         Last Office Visit:   9/13/2023      Next Visit Date:  Future Appointments   Date Time Provider [address] [place] [address]   10/25/2023  9:00 AM [facility] [place]   12/20/2023 10:00 AM Jaquan Iqbal, [address] [place] [address]   9/16/2024  9:00 AM [address] MD [address] [place]

## 2023-09-30 DIAGNOSIS — E11.9 TYPE 2 DIABETES MELLITUS WITHOUT COMPLICATION, WITHOUT LONG-TERM CURRENT USE OF INSULIN (HCC): ICD-10-CM

## 2023-10-02 RX ORDER — METFORMIN HYDROCHLORIDE 750 MG/1
750 TABLET, EXTENDED RELEASE ORAL 2 TIMES DAILY
Qty: 180 TABLET | Refills: 0 | Status: SHIPPED | OUTPATIENT
Start: 2023-10-02

## 2023-10-02 NOTE — TELEPHONE ENCOUNTER
Comments:     Last Office Visit (last PCP visit):   9/13/2023    Next Visit Date:  Future Appointments   Date Time Provider [address]   10/25/2023  9:00 AM [address] [place]   12/20/2023 10:00 AM Sangita Barron, [address]   9/16/2024  9:00 AM [address] MD [address] [place]       **If hasn't been seen in over a year OR hasn't followed up according to last diabetes/ADHD visit, make appointment for patient before sending refill to provider.     Rx requested:  Requested Prescriptions     Pending Prescriptions Disp Refills    metFORMIN (GLUCOPHAGE-XR) 750 MG extended release tablet [Pharmacy Med Name: metFORMIN HCl  MG Oral Tablet Extended Release 24 Hour] 180 tablet 0     Sig: TAKE 1 TABLET BY MOUTH IN THE MORNING AND AT BEDTIME

## 2023-10-19 ENCOUNTER — OFFICE VISIT (OUTPATIENT)
Dept: FAMILY MEDICINE CLINIC | Age: 73
End: 2023-10-19
Payer: MEDICARE

## 2023-10-19 ENCOUNTER — HOSPITAL ENCOUNTER (OUTPATIENT)
Age: 73
Setting detail: SPECIMEN
Discharge: HOME | End: 2023-10-19
Payer: MEDICARE

## 2023-10-19 VITALS
SYSTOLIC BLOOD PRESSURE: 134 MMHG | OXYGEN SATURATION: 99 % | HEART RATE: 68 BPM | BODY MASS INDEX: 33.11 KG/M2 | DIASTOLIC BLOOD PRESSURE: 78 MMHG | WEIGHT: 181 LBS | TEMPERATURE: 97.6 F

## 2023-10-19 DIAGNOSIS — F41.9 ANXIETY: ICD-10-CM

## 2023-10-19 DIAGNOSIS — J06.9 UPPER RESPIRATORY TRACT INFECTION, UNSPECIFIED TYPE: ICD-10-CM

## 2023-10-19 DIAGNOSIS — G89.29 CHRONIC RIGHT SHOULDER PAIN: ICD-10-CM

## 2023-10-19 DIAGNOSIS — M25.511 CHRONIC RIGHT SHOULDER PAIN: ICD-10-CM

## 2023-10-19 DIAGNOSIS — J30.89 ALLERGIC RHINITIS DUE TO OTHER ALLERGIC TRIGGER, UNSPECIFIED SEASONALITY: ICD-10-CM

## 2023-10-19 DIAGNOSIS — E78.2 MIXED HYPERLIPIDEMIA: ICD-10-CM

## 2023-10-19 DIAGNOSIS — E11.9 TYPE 2 DIABETES MELLITUS WITHOUT COMPLICATION, WITHOUT LONG-TERM CURRENT USE OF INSULIN (HCC): ICD-10-CM

## 2023-10-19 DIAGNOSIS — M54.50 CHRONIC LEFT-SIDED LOW BACK PAIN WITHOUT SCIATICA: ICD-10-CM

## 2023-10-19 DIAGNOSIS — J06.9 UPPER RESPIRATORY TRACT INFECTION, UNSPECIFIED TYPE: Primary | ICD-10-CM

## 2023-10-19 DIAGNOSIS — G89.29 CHRONIC LEFT-SIDED LOW BACK PAIN WITHOUT SCIATICA: ICD-10-CM

## 2023-10-19 PROCEDURE — 1123F ACP DISCUSS/DSCN MKR DOCD: CPT | Performed by: FAMILY MEDICINE

## 2023-10-19 PROCEDURE — 3044F HG A1C LEVEL LT 7.0%: CPT | Performed by: FAMILY MEDICINE

## 2023-10-19 PROCEDURE — 87070 CULTURE OTHR SPECIMN AEROBIC: CPT

## 2023-10-19 PROCEDURE — 99213 OFFICE O/P EST LOW 20 MIN: CPT | Performed by: FAMILY MEDICINE

## 2023-10-19 RX ORDER — MELOXICAM 15 MG/1
15 TABLET ORAL DAILY
Qty: 90 TABLET | Refills: 1 | Status: SHIPPED | OUTPATIENT
Start: 2023-10-19

## 2023-10-19 RX ORDER — METFORMIN HYDROCHLORIDE 750 MG/1
750 TABLET, EXTENDED RELEASE ORAL 2 TIMES DAILY
Qty: 180 TABLET | Refills: 1 | Status: SHIPPED | OUTPATIENT
Start: 2023-10-19

## 2023-10-19 RX ORDER — AMOXICILLIN AND CLAVULANATE POTASSIUM 875; 125 MG/1; MG/1
1 TABLET, FILM COATED ORAL 2 TIMES DAILY
Qty: 20 TABLET | Refills: 0 | Status: SHIPPED | OUTPATIENT
Start: 2023-10-19 | End: 2023-10-29

## 2023-10-19 RX ORDER — ROSUVASTATIN CALCIUM 10 MG/1
10 TABLET, COATED ORAL DAILY
Qty: 90 TABLET | Refills: 1 | Status: SHIPPED | OUTPATIENT
Start: 2023-10-19

## 2023-10-19 RX ORDER — CITALOPRAM 20 MG/1
TABLET ORAL
Qty: 90 TABLET | Refills: 4 | Status: SHIPPED | OUTPATIENT
Start: 2023-10-19

## 2023-10-19 RX ORDER — PHYTOSTEROL 450 MG
1 TABLET ORAL DAILY
Qty: 30 TABLET | Refills: 2 | Status: CANCELLED | OUTPATIENT
Start: 2023-10-19

## 2023-10-19 RX ORDER — OLOPATADINE HYDROCHLORIDE 665 UG/1
2 SPRAY NASAL 2 TIMES DAILY
Qty: 1 EACH | Refills: 2 | Status: SHIPPED | OUTPATIENT
Start: 2023-10-19

## 2023-10-19 ASSESSMENT — ENCOUNTER SYMPTOMS
EYE PAIN: 0
STRIDOR: 0
SORE THROAT: 1
CHEST TIGHTNESS: 0
RHINORRHEA: 1
SINUS PRESSURE: 1
EYE DISCHARGE: 0
VOICE CHANGE: 0
COUGH: 1
PHOTOPHOBIA: 0
APNEA: 0
ABDOMINAL PAIN: 0
FACIAL SWELLING: 0
WHEEZING: 0
EYE ITCHING: 0
CHOKING: 0
ABDOMINAL DISTENTION: 0
SHORTNESS OF BREATH: 0
TROUBLE SWALLOWING: 0
EYE REDNESS: 0

## 2023-10-19 NOTE — PROGRESS NOTES
is not in acute distress. Appearance: Normal appearance. She is well-developed. She is not diaphoretic. HENT:      Head: Normocephalic and atraumatic. Nose: Nose normal.      Mouth/Throat:      Mouth: Mucous membranes are moist.      Pharynx: Oropharynx is clear. Eyes:      Conjunctiva/sclera: Conjunctivae normal.      Pupils: Pupils are equal, round, and reactive to light. Cardiovascular:      Rate and Rhythm: Normal rate and regular rhythm. Heart sounds: Normal heart sounds. No murmur heard. No friction rub. No gallop. Pulmonary:      Effort: Pulmonary effort is normal. No respiratory distress. Breath sounds: Normal breath sounds. No wheezing or rales. Chest:      Chest wall: No tenderness. Abdominal:      General: Abdomen is flat. Bowel sounds are normal.      Palpations: Abdomen is soft. Tenderness: There is no abdominal tenderness. Musculoskeletal:      Cervical back: Normal range of motion. Skin:     General: Skin is warm and dry. Neurological:      Mental Status: She is alert and oriented to person, place, and time. Psychiatric:         Behavior: Behavior normal.         Thought Content: Thought content normal.         Judgment: Judgment normal.         Assessment & Plan:     1. Upper respiratory tract infection, unspecified type  Symptoms could reflect sinusitis versus lower respiratory tract symptoms. Given history and duration will treat with Augmentin, follow-up on throat culture and obtain a chest x-ray if symptoms or not improved  - amoxicillin-clavulanate (AUGMENTIN) 875-125 MG per tablet; Take 1 tablet by mouth 2 times daily for 10 days  Dispense: 20 tablet; Refill: 0  - Culture, Throat; Future  - XR CHEST STANDARD (2 VW); Future    2. Mixed hyperlipidemia  - rosuvastatin (CRESTOR) 10 MG tablet; Take 1 tablet by mouth daily  Dispense: 90 tablet; Refill: 1    3.  Type 2 diabetes mellitus without complication, without long-term current use of insulin

## 2023-10-22 LAB — BACTERIA THROAT AEROBE CULT: NORMAL

## 2023-10-23 ENCOUNTER — HOSPITAL ENCOUNTER (OUTPATIENT)
Age: 73
Discharge: HOME | End: 2023-10-25
Payer: MEDICARE

## 2023-10-23 ENCOUNTER — HOSPITAL ENCOUNTER (OUTPATIENT)
Dept: GENERAL RADIOLOGY | Age: 73
Discharge: HOME | End: 2023-10-25
Payer: MEDICARE

## 2023-10-23 DIAGNOSIS — J06.9 UPPER RESPIRATORY TRACT INFECTION, UNSPECIFIED TYPE: ICD-10-CM

## 2023-10-23 PROCEDURE — 71046 X-RAY EXAM CHEST 2 VIEWS: CPT

## 2023-10-24 ENCOUNTER — OFFICE VISIT (OUTPATIENT)
Dept: FAMILY MEDICINE CLINIC | Age: 73
End: 2023-10-24

## 2023-10-24 VITALS
WEIGHT: 181 LBS | TEMPERATURE: 97.2 F | SYSTOLIC BLOOD PRESSURE: 116 MMHG | HEART RATE: 86 BPM | BODY MASS INDEX: 33.11 KG/M2 | DIASTOLIC BLOOD PRESSURE: 70 MMHG | OXYGEN SATURATION: 99 %

## 2023-10-24 DIAGNOSIS — J06.9 UPPER RESPIRATORY TRACT INFECTION, UNSPECIFIED TYPE: Primary | ICD-10-CM

## 2023-10-24 RX ORDER — FLUTICASONE PROPIONATE 50 MCG
1 SPRAY, SUSPENSION (ML) NASAL DAILY
Qty: 1 EACH | Refills: 0 | Status: SHIPPED | OUTPATIENT
Start: 2023-10-24

## 2023-10-24 RX ORDER — BENZONATATE 100 MG/1
100 CAPSULE ORAL 3 TIMES DAILY PRN
Qty: 30 CAPSULE | Refills: 0 | Status: SHIPPED | OUTPATIENT
Start: 2023-10-24

## 2023-10-25 ENCOUNTER — OFFICE VISIT (OUTPATIENT)
Dept: ORTHOPEDIC SURGERY | Age: 73
End: 2023-10-25
Payer: MEDICARE

## 2023-10-25 VITALS
HEIGHT: 62 IN | OXYGEN SATURATION: 98 % | TEMPERATURE: 97.3 F | HEART RATE: 88 BPM | WEIGHT: 181 LBS | BODY MASS INDEX: 33.31 KG/M2

## 2023-10-25 DIAGNOSIS — M18.11 ARTHRITIS OF CARPOMETACARPAL (CMC) JOINT OF RIGHT THUMB: Primary | ICD-10-CM

## 2023-10-25 PROCEDURE — 1123F ACP DISCUSS/DSCN MKR DOCD: CPT | Performed by: PHYSICIAN ASSISTANT

## 2023-10-25 PROCEDURE — 99214 OFFICE O/P EST MOD 30 MIN: CPT | Performed by: PHYSICIAN ASSISTANT

## 2023-10-25 ASSESSMENT — ENCOUNTER SYMPTOMS
RESPIRATORY NEGATIVE: 1
EYES NEGATIVE: 1
GASTROINTESTINAL NEGATIVE: 1

## 2023-10-25 NOTE — PROGRESS NOTES
Other chronic findings, as noted. Specimen Collected: 09/14/23 13:41 EDT Last Resulted: 09/14/23 13:46 EDT               Physical Exam  Musculoskeletal:      Comments: Right hand-step-off at the first [facility] joint. No CMC grind. Finkelstein's is negative. Hand grasp is strong and equal.  Sensation is intact distally light touch. Capillary refill is brisk. Explained the patient she does have [facility] arthritis of the thumb. Injected with cortisone she is doing well. We discussed glucosamine/chondroitin with MSM. We discussed topical anti-inflammatory creams. We discussed arthritis gloves. I offered occupational therapy and she likely declined. She will follow-up with me as her symptoms dictate       On this date 10/25/2023 I have spent 30 minutes reviewing previous notes, test results and face to face with the patient discussing the diagnosis and importance of compliance with the treatment plan as well as documenting on the day of the visit. An electronic signature was used to authenticate this note.     --RAJENDRA Davenport

## 2023-10-27 ASSESSMENT — ENCOUNTER SYMPTOMS
NAUSEA: 0
EYE REDNESS: 0
EYE DISCHARGE: 0
STRIDOR: 0
CHOKING: 0
SINUS PRESSURE: 1
PHOTOPHOBIA: 0
VOICE CHANGE: 0
CONSTIPATION: 0
TROUBLE SWALLOWING: 0
VOMITING: 0
SHORTNESS OF BREATH: 0
APNEA: 0
WHEEZING: 0
EYE ITCHING: 0
ABDOMINAL DISTENTION: 0
RHINORRHEA: 1
DIARRHEA: 0
COUGH: 0
SORE THROAT: 1
ABDOMINAL PAIN: 0
BLOOD IN STOOL: 0
FACIAL SWELLING: 0
CHEST TIGHTNESS: 0
EYE PAIN: 0

## 2023-10-27 NOTE — PROGRESS NOTES
times daily DX: diabetes mellitus. Ok to substitute per insurance (1 each = 1 box) 1 each 5    blood glucose monitor strips DX: diabetes mellitus. Use 3 time(s) daily - Ok to substitute per insurance 100 strip 5    SF 5000 PLUS 1.1 % CREA       Alcohol Swabs PADS DX: diabetes mellitus. Test 1-3 time(s) daily - Ok to substitute per insurance (1 each = 1 box) 1 each 5    ammonium lactate (AMLACTIN) 12 % cream Apply topically daily, avoid applying between the toes. 385 g 5    CPAP Machine MISC by Does not apply route New CPAP 7-10 cm 1 each 0    blood glucose monitor kit and supplies DX: diabetes mellitus. Test 1-3 time(s) daily - Ok to substitute per insurance 1 kit 0    Phytosterols 450 MG TABS Take 1 tablet by mouth daily 30 tablet 2    Multiple Vitamins-Minerals (WOMENS MULTIVITAMIN PO) Take 1 tablet by mouth daily      fexofenadine (ALLEGRA) 180 MG tablet Take 1 tablet by mouth daily      Respiratory Therapy Supplies [facility] New CPAP mask and supplies 1 Device 0     No current facility-administered medications on file prior to visit. Allergies: Other, Tomato, Influenza vaccines, and Seasonal    Review of Systems   Constitutional:  Negative for activity change, appetite change, chills, diaphoresis, fatigue, fever and unexpected weight change. HENT:  Positive for congestion, postnasal drip, rhinorrhea, sinus pressure and sore throat. Negative for dental problem, drooling, ear discharge, ear pain, facial swelling, hearing loss, mouth sores, nosebleeds, sneezing, tinnitus, trouble swallowing and voice change. Eyes:  Negative for photophobia, pain, discharge, redness, itching and visual disturbance. Respiratory:  Negative for apnea, cough, choking, chest tightness, shortness of breath, wheezing and stridor. Cardiovascular:  Negative for chest pain, palpitations and leg swelling.    Gastrointestinal:  Negative for abdominal distention, abdominal pain, blood in stool, constipation, diarrhea, nausea and

## 2023-12-20 ENCOUNTER — OFFICE VISIT (OUTPATIENT)
Dept: PULMONOLOGY | Age: 73
End: 2023-12-20
Payer: MEDICARE

## 2023-12-20 VITALS
WEIGHT: 181 LBS | HEART RATE: 78 BPM | BODY MASS INDEX: 33.11 KG/M2 | SYSTOLIC BLOOD PRESSURE: 130 MMHG | OXYGEN SATURATION: 98 % | DIASTOLIC BLOOD PRESSURE: 80 MMHG

## 2023-12-20 DIAGNOSIS — Z87.891 PERSONAL HISTORY OF TOBACCO USE: ICD-10-CM

## 2023-12-20 DIAGNOSIS — G47.33 OSA (OBSTRUCTIVE SLEEP APNEA): ICD-10-CM

## 2023-12-20 DIAGNOSIS — E66.9 OBESITY (BMI 30-39.9): ICD-10-CM

## 2023-12-20 DIAGNOSIS — J41.0 SIMPLE CHRONIC BRONCHITIS (HCC): Primary | ICD-10-CM

## 2023-12-20 PROCEDURE — 1123F ACP DISCUSS/DSCN MKR DOCD: CPT | Performed by: INTERNAL MEDICINE

## 2023-12-20 PROCEDURE — 99214 OFFICE O/P EST MOD 30 MIN: CPT | Performed by: INTERNAL MEDICINE

## 2023-12-20 RX ORDER — DOXYCYCLINE HYCLATE 100 MG
100 TABLET ORAL 2 TIMES DAILY
Qty: 20 TABLET | Refills: 0 | Status: SHIPPED | OUTPATIENT
Start: 2023-12-20 | End: 2023-12-30

## 2023-12-20 RX ORDER — BENZONATATE 100 MG/1
100 CAPSULE ORAL 3 TIMES DAILY PRN
Qty: 30 CAPSULE | Refills: 3 | Status: SHIPPED | OUTPATIENT
Start: 2023-12-20

## 2023-12-20 NOTE — PROGRESS NOTES
performed by Stepan Jordan MD at [facility], DIAGNOSTIC      TOTAL KNEE ARTHROPLASTY Left 2/8/2019    LEFT TOTAL KNEE ARTHROPLASTY, SUPINE, 23 HR OBS, Wyatt Shelling CEMENTED PERSONA performed by Nicol Beck MD at [address] [place] [address] Right 3/13/2020    RIGHT TOTAL KNEE  ARTHROPLASTY performed by Nicol Beck MD at [address] [place] [address] History   Problem Relation Age of Onset    Heart Disease Mother     High Blood Pressure Mother     Vision Loss Mother     Other Mother         leiden factor 5    Diabetes Father     Heart Disease Father     Depression Sister     Diabetes Sister     Obesity Sister     Allergy (Severe) Sister     Arthritis Sister     Depression Sister     Arthritis Sister     Depression Sister     Diabetes Sister     Arthritis Sister     Depression Sister     Arthritis Sister     Arthritis Sister     Arthritis Sister     Diabetes Brother     Arthritis Brother     Other Brother         leiden factor 5    Arthritis Brother     Arthritis Brother     Arthritis Brother     Breast Cancer Paternal Aunt     Thyroid Disease Daughter     No Known Problems Son      Social History     Socioeconomic History    Marital status: Single     Spouse name: Not on file    Number of children: 3    Years of education: 18    Highest education level: Master's degree (e.g., MA, MS, Sybil, MEd, MSW, MIRTA)   Occupational History    Occupation: Teacher    Tobacco Use    Smoking status: Former     Current packs/day: 0.00     Average packs/day: 0.5 packs/day for 52.8 years (26.4 ttl pk-yrs)     Types: Cigarettes     Start date: 28     Quit date: 11/2/2020     Years since quitting: 3.1    Smokeless tobacco: Never   Vaping Use    Vaping Use: Never used   Substance and Sexual Activity    Alcohol use:  Yes     Alcohol/week: 1.0 standard drink of alcohol     Types: 1 Shots of liquor per week     Comment: Once per month    Drug use: No    Sexual activity: Not Currently     Partners: Male   Other Topics

## 2024-02-12 ENCOUNTER — TELEPHONE (OUTPATIENT)
Dept: FAMILY MEDICINE CLINIC | Age: 74
End: 2024-02-12

## 2024-02-12 NOTE — TELEPHONE ENCOUNTER
Patient could not reach [facility] - stopped in.  Needs orders for radiology as discussed with Dr Wheatley so she can have the testing done and needs also to know where it needs done at.    She can be reached at [phone]

## 2024-02-13 DIAGNOSIS — Z87.891 PERSONAL HISTORY OF TOBACCO USE: Primary | ICD-10-CM

## 2024-05-06 ENCOUNTER — OFFICE VISIT (OUTPATIENT)
Dept: FAMILY MEDICINE CLINIC | Age: 74
End: 2024-05-06
Payer: MEDICARE

## 2024-05-06 VITALS
DIASTOLIC BLOOD PRESSURE: 86 MMHG | OXYGEN SATURATION: 98 % | SYSTOLIC BLOOD PRESSURE: 138 MMHG | HEART RATE: 89 BPM | BODY MASS INDEX: 32.57 KG/M2 | HEIGHT: 62 IN | WEIGHT: 177 LBS | TEMPERATURE: 97.4 F

## 2024-05-06 DIAGNOSIS — J06.9 UPPER RESPIRATORY TRACT INFECTION, UNSPECIFIED TYPE: Primary | ICD-10-CM

## 2024-05-06 DIAGNOSIS — J30.89 ALLERGIC RHINITIS DUE TO OTHER ALLERGIC TRIGGER, UNSPECIFIED SEASONALITY: ICD-10-CM

## 2024-05-06 DIAGNOSIS — E11.9 TYPE 2 DIABETES MELLITUS WITHOUT COMPLICATION, WITHOUT LONG-TERM CURRENT USE OF INSULIN (HCC): ICD-10-CM

## 2024-05-06 PROBLEM — J30.9 ALLERGIC RHINITIS: Status: ACTIVE | Noted: 2024-05-06

## 2024-05-06 PROCEDURE — 99204 OFFICE O/P NEW MOD 45 MIN: CPT | Performed by: INTERNAL MEDICINE

## 2024-05-06 PROCEDURE — 1123F ACP DISCUSS/DSCN MKR DOCD: CPT | Performed by: INTERNAL MEDICINE

## 2024-05-06 RX ORDER — DOXYCYCLINE HYCLATE 100 MG
100 TABLET ORAL 2 TIMES DAILY
Qty: 14 TABLET | Refills: 0 | Status: SHIPPED | OUTPATIENT
Start: 2024-05-06 | End: 2024-05-13

## 2024-05-06 RX ORDER — OLOPATADINE HYDROCHLORIDE 665 UG/1
2 SPRAY NASAL 2 TIMES DAILY
Qty: 1 EACH | Refills: 2 | Status: SHIPPED | OUTPATIENT
Start: 2024-05-06 | End: 2024-05-06

## 2024-05-06 RX ORDER — OLOPATADINE HYDROCHLORIDE 2 MG/ML
1 SOLUTION/ DROPS OPHTHALMIC DAILY
Qty: 5 ML | Refills: 1 | Status: SHIPPED | OUTPATIENT
Start: 2024-05-06 | End: 2024-06-05

## 2024-05-06 SDOH — ECONOMIC STABILITY: FOOD INSECURITY: WITHIN THE PAST 12 MONTHS, YOU WORRIED THAT YOUR FOOD WOULD RUN OUT BEFORE YOU GOT MONEY TO BUY MORE.: NEVER TRUE

## 2024-05-06 SDOH — ECONOMIC STABILITY: FOOD INSECURITY: WITHIN THE PAST 12 MONTHS, THE FOOD YOU BOUGHT JUST DIDN'T LAST AND YOU DIDN'T HAVE MONEY TO GET MORE.: NEVER TRUE

## 2024-05-06 SDOH — ECONOMIC STABILITY: INCOME INSECURITY: HOW HARD IS IT FOR YOU TO PAY FOR THE VERY BASICS LIKE FOOD, HOUSING, MEDICAL CARE, AND HEATING?: NOT HARD AT ALL

## 2024-05-06 ASSESSMENT — PATIENT HEALTH QUESTIONNAIRE - PHQ9
SUM OF ALL RESPONSES TO PHQ QUESTIONS 1-9: 0
6. FEELING BAD ABOUT YOURSELF - OR THAT YOU ARE A FAILURE OR HAVE LET YOURSELF OR YOUR FAMILY DOWN: NOT AT ALL
8. MOVING OR SPEAKING SO SLOWLY THAT OTHER PEOPLE COULD HAVE NOTICED. OR THE OPPOSITE, BEING SO FIGETY OR RESTLESS THAT YOU HAVE BEEN MOVING AROUND A LOT MORE THAN USUAL: NOT AT ALL
2. FEELING DOWN, DEPRESSED OR HOPELESS: NOT AT ALL
SUM OF ALL RESPONSES TO PHQ9 QUESTIONS 1 & 2: 0
SUM OF ALL RESPONSES TO PHQ QUESTIONS 1-9: 0
9. THOUGHTS THAT YOU WOULD BE BETTER OFF DEAD, OR OF HURTING YOURSELF: NOT AT ALL
SUM OF ALL RESPONSES TO PHQ QUESTIONS 1-9: 0
5. POOR APPETITE OR OVEREATING: NOT AT ALL
3. TROUBLE FALLING OR STAYING ASLEEP: NOT AT ALL
4. FEELING TIRED OR HAVING LITTLE ENERGY: NOT AT ALL
1. LITTLE INTEREST OR PLEASURE IN DOING THINGS: NOT AT ALL
10. IF YOU CHECKED OFF ANY PROBLEMS, HOW DIFFICULT HAVE THESE PROBLEMS MADE IT FOR YOU TO DO YOUR WORK, TAKE CARE OF THINGS AT HOME, OR GET ALONG WITH OTHER PEOPLE: NOT DIFFICULT AT ALL
SUM OF ALL RESPONSES TO PHQ QUESTIONS 1-9: 0
7. TROUBLE CONCENTRATING ON THINGS, SUCH AS READING THE NEWSPAPER OR WATCHING TELEVISION: NOT AT ALL

## 2024-05-06 ASSESSMENT — ENCOUNTER SYMPTOMS
SHORTNESS OF BREATH: 0
DIARRHEA: 0
WHEEZING: 0
COUGH: 1
BLOOD IN STOOL: 0
VOICE CHANGE: 0
NAUSEA: 0
CONSTIPATION: 0
ABDOMINAL PAIN: 0
CHEST TIGHTNESS: 0
SINUS PAIN: 0

## 2024-05-06 ASSESSMENT — COLUMBIA-SUICIDE SEVERITY RATING SCALE - C-SSRS
1. WITHIN THE PAST MONTH, HAVE YOU WISHED YOU WERE DEAD OR WISHED YOU COULD GO TO SLEEP AND NOT WAKE UP?: NO
2. HAVE YOU ACTUALLY HAD ANY THOUGHTS OF KILLING YOURSELF?: NO
6. HAVE YOU EVER DONE ANYTHING, STARTED TO DO ANYTHING, OR PREPARED TO DO ANYTHING TO END YOUR LIFE?: NO

## 2024-05-06 NOTE — PROGRESS NOTES
[facility]  [address]  [address]  [facility] [address]  Dept: [phone]  Dept Fax: [phone]  Loc: [phone]     2024    Visit type: Acute care    Reason for Visit: Cough (Productive cough/yellow colored mucus x10 days. )       ASSESSMENT/PLAN   1. Upper respiratory tract infection, unspecified type  2. Allergic rhinitis due to other allergic trigger, unspecified seasonality  3. Type 2 diabetes mellitus without complication, without long-term current use of insulin ([facility])    No follow-ups on file.  Orders Placed This Encounter   Medications    DISCONTD: olopatadine (PATANASE) 0.6 % SOLN nassl soln     Si sprays by Nasal route 2 times daily     Dispense:  1 each     Refill:  2    olopatadine (PATADAY) 0.2 % SOLN ophthalmic solution     Sig: Place 1 drop into both eyes daily     Dispense:  5 mL     Refill:  1    doxycycline hyclate (VIBRA-TABS) 100 MG tablet     Sig: Take 1 tablet by mouth 2 times daily for 7 days     Dispense:  14 tablet     Refill:  0        Subjective        Subjective    HPI:  Patient: Valeria Laird is a 73 y.o. female with a past medical history of diabetes chronic bronchitis continue to smoke hyperlipidemia allergic rhinitis complaining of coughing no chest pain no shortness of no wheezing no fever for couple of days      Review of Systems   Constitutional:  Negative for appetite change, chills, diaphoresis, fatigue and unexpected weight change.   HENT:  Negative for congestion, hearing loss, sinus pain and voice change.    Eyes:  Negative for visual disturbance.   Respiratory:  Positive for cough. Negative for chest tightness, shortness of breath and wheezing.    Cardiovascular:  Negative for chest pain, palpitations and leg swelling.   Gastrointestinal:  Negative for abdominal pain, blood in stool, constipation, diarrhea and nausea.   Endocrine: Negative for cold intolerance, heat intolerance and polyuria.

## 2024-05-07 RX ORDER — OLOPATADINE HYDROCHLORIDE 665 UG/1
2 SPRAY NASAL 2 TIMES DAILY
Qty: 1 EACH | Refills: 1 | Status: SHIPPED | OUTPATIENT
Start: 2024-05-07

## 2024-05-07 RX ORDER — BENZONATATE 100 MG/1
100 CAPSULE ORAL 3 TIMES DAILY PRN
Qty: 30 CAPSULE | Refills: 0 | Status: SHIPPED | OUTPATIENT
Start: 2024-05-07 | End: 2024-05-17

## 2024-05-10 DIAGNOSIS — E78.2 MIXED HYPERLIPIDEMIA: ICD-10-CM

## 2024-05-10 RX ORDER — ROSUVASTATIN CALCIUM 10 MG/1
10 TABLET, COATED ORAL DAILY
Qty: 90 TABLET | Refills: 1 | Status: SHIPPED | OUTPATIENT
Start: 2024-05-10

## 2024-05-10 NOTE — TELEPHONE ENCOUNTER
Comments:     Last Office Visit (last PCP visit):   10/24/2023    Next Visit Date:  Future Appointments   Date Time Provider Department Center   6/19/2024  8:45 AM Makadia, Clifton P, MD [facility]   9/16/2024  9:00 AM Ludin Woods MD [facility]         Rx requested:  Requested Prescriptions     Pending Prescriptions Disp Refills    rosuvastatin (CRESTOR) 10 MG tablet 90 tablet 1     Sig: Take 1 tablet by mouth daily

## 2024-05-15 DIAGNOSIS — E11.9 TYPE 2 DIABETES MELLITUS WITHOUT COMPLICATION, WITHOUT LONG-TERM CURRENT USE OF INSULIN (HCC): ICD-10-CM

## 2024-05-15 RX ORDER — METFORMIN HYDROCHLORIDE 750 MG/1
TABLET, EXTENDED RELEASE ORAL
Qty: 360 TABLET | Refills: 0 | Status: SHIPPED | OUTPATIENT
Start: 2024-05-15

## 2024-05-15 NOTE — TELEPHONE ENCOUNTER
Comments:     Last Office Visit (last PCP visit):   10/24/2023    Next Visit Date:  Future Appointments   Date Time Provider Department Center   6/19/2024  8:45 AM Makadia, Clifton P, MD [facility]   9/16/2024  9:00 AM Ludin Woods MD [facility]         Rx requested:  Requested Prescriptions     Pending Prescriptions Disp Refills    metFORMIN (GLUCOPHAGE-XR) 750 MG extended release tablet [Pharmacy Med Name: metformin  mg tablet,extended release 24 hr] 360 tablet 0     Sig: Take one tablet by mouth twice a day in the morning and at bedtime

## 2024-05-30 ENCOUNTER — PATIENT MESSAGE (OUTPATIENT)
Dept: FAMILY MEDICINE CLINIC | Age: 74
End: 2024-05-30

## 2024-05-30 DIAGNOSIS — E11.9 TYPE 2 DIABETES MELLITUS WITHOUT COMPLICATION, WITHOUT LONG-TERM CURRENT USE OF INSULIN (HCC): ICD-10-CM

## 2024-05-31 RX ORDER — METFORMIN HYDROCHLORIDE 750 MG/1
750 TABLET, EXTENDED RELEASE ORAL 2 TIMES DAILY
Qty: 48 TABLET | Refills: 0 | Status: SHIPPED | OUTPATIENT
Start: 2024-05-31

## 2024-05-31 NOTE — TELEPHONE ENCOUNTER
From: Valeria Laird  To: Dr. Ludin Woods  Sent: 5/30/2024 1:48 PM EDT  Subject: Vacation refill    Doctor Chuck,  I am in [place] and I didn’t bring enough Metformin. Bad math. I need a prescription for 24 days. Could you send a prescription to [facility] at [address] [place] [address], please. The phone number is [phone]. I’m sorry for the inconvenience. Valeria Laird

## 2024-06-18 ENCOUNTER — HOSPITAL ENCOUNTER (OUTPATIENT)
Dept: CT IMAGING | Age: 74
Discharge: HOME | End: 2024-06-20
Payer: MEDICARE

## 2024-06-18 DIAGNOSIS — Z87.891 PERSONAL HISTORY OF TOBACCO USE: ICD-10-CM

## 2024-06-18 PROCEDURE — 71271 CT THORAX LUNG CANCER SCR C-: CPT

## 2024-06-19 ENCOUNTER — OFFICE VISIT (OUTPATIENT)
Dept: PULMONOLOGY | Age: 74
End: 2024-06-19
Payer: MEDICARE

## 2024-06-19 VITALS
BODY MASS INDEX: 32.37 KG/M2 | WEIGHT: 177 LBS | HEART RATE: 75 BPM | OXYGEN SATURATION: 95 % | SYSTOLIC BLOOD PRESSURE: 134 MMHG | DIASTOLIC BLOOD PRESSURE: 70 MMHG

## 2024-06-19 DIAGNOSIS — M54.50 CHRONIC LEFT-SIDED LOW BACK PAIN WITHOUT SCIATICA: ICD-10-CM

## 2024-06-19 DIAGNOSIS — R93.89 ABNORMAL CT OF THE CHEST: ICD-10-CM

## 2024-06-19 DIAGNOSIS — M25.511 CHRONIC RIGHT SHOULDER PAIN: ICD-10-CM

## 2024-06-19 DIAGNOSIS — G89.29 CHRONIC LEFT-SIDED LOW BACK PAIN WITHOUT SCIATICA: ICD-10-CM

## 2024-06-19 DIAGNOSIS — G47.33 OSA (OBSTRUCTIVE SLEEP APNEA): Primary | ICD-10-CM

## 2024-06-19 DIAGNOSIS — E66.9 OBESITY (BMI 30-39.9): ICD-10-CM

## 2024-06-19 DIAGNOSIS — G89.29 CHRONIC RIGHT SHOULDER PAIN: ICD-10-CM

## 2024-06-19 DIAGNOSIS — Z87.891 PERSONAL HISTORY OF TOBACCO USE: ICD-10-CM

## 2024-06-19 DIAGNOSIS — J41.0 SIMPLE CHRONIC BRONCHITIS (HCC): ICD-10-CM

## 2024-06-19 PROCEDURE — 1123F ACP DISCUSS/DSCN MKR DOCD: CPT | Performed by: INTERNAL MEDICINE

## 2024-06-19 PROCEDURE — 99214 OFFICE O/P EST MOD 30 MIN: CPT | Performed by: INTERNAL MEDICINE

## 2024-06-19 NOTE — TELEPHONE ENCOUNTER
Pharmacy is requesting medication refill. Please approve or deny this request.    Rx requested:  Requested Prescriptions     Pending Prescriptions Disp Refills    meloxicam (MOBIC) 15 MG tablet [Pharmacy Med Name: meloxicam 15 mg tablet] 90 tablet 0     Sig: TAKE 1 TABLET BY MOUTH ONCE DAILY         Last Office Visit:   10/24/2023      Next Visit Date:  Future Appointments   Date Time Provider Department Center   9/16/2024  9:00 AM [facility]   12/18/2024  9:45 AM [facility]

## 2024-06-19 NOTE — PROGRESS NOTES
Subjective:             Valeria Laird is a 74 y.o. female who complains today of:     Chief Complaint   Patient presents with    Follow-up     6m f/u on [facility], CT results       HPI  She is using CPAP with  7-10 centimeters of H2O with heated humidity.  She is using CPAP for about  7-8  hours every night.  She is using CPAP with full face Mask.  She said  sleep is restful with the CPAP use.  She is compliant with CPAP therapy and benefiting with CPAP use.  No snoring with CPAP use.  No complaint of daytime sleepiness or tiredness with CPAP use.  She denies taking naps.  No sleepiness with driving.   She denies difficulty falling asleep or staying asleep.     I reviewed compliance report with patient regarding CPAP therapy. She is using  CPAP for 27 days out of 30 days. Average usage of days used is 8 hours and 10  min , average AHI 2.4 with CPAP use.         She is smoking 1/2 ppd . CXR 10/23/23 no active disease  No C/o shortness of breath No Wheezing. C/o Cough with yellow Sputum chronic  No Hemoptysis. No Chest tightness. No Chest pain with radiation  or pleuritic pain.  No  leg edema. No Fever or chills. C/o  Rhinorrhea and postnasal drip.done .        Ct chest 6/8/24  IMPRESSION:  Chronic changes as described above without acute pulmonary process.  No  isolated dominant nodule or mass.     LUNG RADS:  Lung rads category 1 benign appearance follow-up recommended CT chest 12  months or annual screening.  11/22  There is no pulmonary infiltrate, mass or suspicious pulmonary nodule.Stable nodule seen within the right lower lobe, right middle lobe and lingula when compared with the patient's prior study.  All the nodules measure less than 5 mm.    Allergies:  Other, Tomato, Influenza vaccines, and Seasonal  Past Medical History:   Diagnosis Date    Allergic rhinitis     Anxiety     History of blood transfusion     blood transfusions with c section x 3    Hyperlipidemia     meds > 2 yrs    Osteoarthritis

## 2024-06-25 RX ORDER — MELOXICAM 15 MG/1
15 TABLET ORAL DAILY
Qty: 90 TABLET | Refills: 0 | Status: SHIPPED | OUTPATIENT
Start: 2024-06-25

## 2024-07-19 ENCOUNTER — OFFICE VISIT (OUTPATIENT)
Dept: FAMILY MEDICINE CLINIC | Age: 74
End: 2024-07-19

## 2024-07-19 ENCOUNTER — HOSPITAL ENCOUNTER (OUTPATIENT)
Dept: LAB | Age: 74
Discharge: HOME | End: 2024-07-19

## 2024-07-19 VITALS
BODY MASS INDEX: 30.91 KG/M2 | SYSTOLIC BLOOD PRESSURE: 106 MMHG | HEART RATE: 63 BPM | OXYGEN SATURATION: 98 % | TEMPERATURE: 97.9 F | WEIGHT: 169 LBS | DIASTOLIC BLOOD PRESSURE: 70 MMHG

## 2024-07-19 DIAGNOSIS — R79.9 ABNORMAL FINDING OF BLOOD CHEMISTRY, UNSPECIFIED: ICD-10-CM

## 2024-07-19 DIAGNOSIS — F41.9 ANXIETY: ICD-10-CM

## 2024-07-19 DIAGNOSIS — R20.0 NUMBNESS AND TINGLING OF BOTH FEET: ICD-10-CM

## 2024-07-19 DIAGNOSIS — R20.2 NUMBNESS AND TINGLING OF BOTH FEET: ICD-10-CM

## 2024-07-19 DIAGNOSIS — Z12.31 ENCOUNTER FOR SCREENING MAMMOGRAM FOR MALIGNANT NEOPLASM OF BREAST: ICD-10-CM

## 2024-07-19 DIAGNOSIS — R20.0 NUMBNESS AND TINGLING OF BOTH FEET: Primary | ICD-10-CM

## 2024-07-19 DIAGNOSIS — R20.2 NUMBNESS AND TINGLING OF BOTH FEET: Primary | ICD-10-CM

## 2024-07-19 LAB
ALBUMIN SERPL-MCNC: 4.4 G/DL (ref 3.5–4.6)
ALP SERPL-CCNC: 69 U/L (ref 40–130)
ALT SERPL-CCNC: 19 U/L (ref 0–33)
ANION GAP SERPL CALCULATED.3IONS-SCNC: 14 MEQ/L (ref 9–15)
AST SERPL-CCNC: 29 U/L (ref 0–35)
BASOPHILS # BLD: 0 K/UL (ref 0–0.2)
BASOPHILS NFR BLD: 0.5 %
BILIRUB SERPL-MCNC: 0.5 MG/DL (ref 0.2–0.7)
BUN SERPL-MCNC: 16 MG/DL (ref 8–23)
CALCIUM SERPL-MCNC: 9.5 MG/DL (ref 8.5–9.9)
CHLORIDE SERPL-SCNC: 104 MEQ/L (ref 95–107)
CO2 SERPL-SCNC: 22 MEQ/L (ref 20–31)
CREAT SERPL-MCNC: 0.81 MG/DL (ref 0.5–0.9)
EOSINOPHIL # BLD: 0.2 K/UL (ref 0–0.7)
EOSINOPHIL NFR BLD: 2.6 %
ERYTHROCYTE [DISTWIDTH] IN BLOOD BY AUTOMATED COUNT: 14.9 % (ref 11.5–14.5)
GLOBULIN SER CALC-MCNC: 2.7 G/DL (ref 2.3–3.5)
GLUCOSE SERPL-MCNC: 106 MG/DL (ref 70–99)
HCT VFR BLD AUTO: 36.9 % (ref 37–47)
HGB BLD-MCNC: 12 G/DL (ref 12–16)
LYMPHOCYTES # BLD: 1.4 K/UL (ref 1–4.8)
LYMPHOCYTES NFR BLD: 24.4 %
MAGNESIUM SERPL-MCNC: 1.9 MG/DL (ref 1.7–2.4)
MCH RBC QN AUTO: 31.7 PG (ref 27–31.3)
MCHC RBC AUTO-ENTMCNC: 32.5 % (ref 33–37)
MCV RBC AUTO: 97.4 FL (ref 79.4–94.8)
MONOCYTES # BLD: 0.5 K/UL (ref 0.2–0.8)
MONOCYTES NFR BLD: 8.2 %
NEUTROPHILS # BLD: 3.8 K/UL (ref 1.4–6.5)
NEUTS SEG NFR BLD: 64.1 %
PLATELET # BLD AUTO: 220 K/UL (ref 130–400)
POTASSIUM SERPL-SCNC: 4.4 MEQ/L (ref 3.4–4.9)
PROT SERPL-MCNC: 7.1 G/DL (ref 6.3–8)
RBC # BLD AUTO: 3.79 M/UL (ref 4.2–5.4)
SODIUM SERPL-SCNC: 140 MEQ/L (ref 135–144)
T4 FREE SERPL-MCNC: 0.97 NG/DL (ref 0.84–1.68)
TSH SERPL-MCNC: 2.03 UIU/ML (ref 0.44–3.86)
WBC # BLD AUTO: 5.9 K/UL (ref 4.8–10.8)

## 2024-07-20 LAB
FERRITIN: 92 NG/ML (ref 13–150)
FOLATE: >20 NG/ML (ref 4.8–24.2)
IRON % SATURATION: 21 % (ref 20–55)
IRON: 68 UG/DL (ref 37–145)
TOTAL IRON BINDING CAPACITY: 328 UG/DL (ref 250–450)
UNSATURATED IRON BINDING CAPACITY: 260 UG/DL (ref 112–347)
VITAMIN B-12: 398 PG/ML (ref 232–1245)

## 2024-07-21 LAB — NUCLEAR IGG SER QL IA: NORMAL

## 2024-08-16 DIAGNOSIS — E11.9 TYPE 2 DIABETES MELLITUS WITHOUT COMPLICATION, WITHOUT LONG-TERM CURRENT USE OF INSULIN (HCC): ICD-10-CM

## 2024-08-16 RX ORDER — BLOOD SUGAR DIAGNOSTIC
STRIP MISCELLANEOUS
Qty: 100 STRIP | Refills: 3 | Status: SHIPPED | OUTPATIENT
Start: 2024-08-16

## 2024-08-16 NOTE — TELEPHONE ENCOUNTER
Pharmacy is requesting medication refill. Please approve or deny this request.    Rx requested:  Requested Prescriptions     Pending Prescriptions Disp Refills    blood glucose test strips (ACCU-CHEK GUIDE) strip [Pharmacy Med Name: Accu-Chek Guide test strips] 100 strip 3     Sig: use 1 strip to check glucose 3 times daily         Last Office Visit:   7/19/2024      Next Visit Date:  Future Appointments   Date Time Provider Department Center   8/21/2024  2:30 PM Kevin David MD [facility]   9/16/2024  9:00 AM Ludin Woods MD [facility]   10/9/2024  2:00 PM Zandra Workman MD [facility]   10/25/2024 11:00 AM [place] MAMMOGRAPHY ROOM 1 [facility]   12/18/2024  9:45 AM Clifton Wheatley MD [place] [facility]

## 2024-08-21 ENCOUNTER — HOSPITAL ENCOUNTER (OUTPATIENT)
Dept: NEUROLOGY | Age: 74
Discharge: HOME | End: 2024-08-21
Payer: MEDICARE

## 2024-08-21 DIAGNOSIS — R20.0 NUMBNESS AND TINGLING OF BOTH FEET: ICD-10-CM

## 2024-08-21 DIAGNOSIS — R20.2 NUMBNESS AND TINGLING OF BOTH FEET: ICD-10-CM

## 2024-08-21 PROCEDURE — 95886 MUSC TEST DONE W/N TEST COMP: CPT

## 2024-08-21 PROCEDURE — 95910 NRV CNDJ TEST 7-8 STUDIES: CPT

## 2024-08-21 NOTE — PROCEDURES
Please feel free to call me if I can be of any further assistance regarding this patient's evaluation.          JOHN PAREKH MD      D:  08/21/2024 15:14:03     T:  08/21/2024 15:53:00     JOSEPH/ONOFRE  Job #:  [id number]     Doc#:  [id number]

## 2024-08-29 DIAGNOSIS — E11.9 TYPE 2 DIABETES MELLITUS WITHOUT COMPLICATION, WITHOUT LONG-TERM CURRENT USE OF INSULIN (HCC): ICD-10-CM

## 2024-08-29 NOTE — TELEPHONE ENCOUNTER
Pt is requesting medication refill. Please approve or deny this request.    Rx requested:  Requested Prescriptions     Pending Prescriptions Disp Refills    metFORMIN (GLUCOPHAGE-XR) 750 MG extended release tablet 48 tablet 0     Sig: Take 1 tablet by mouth 2 times daily         Last Office Visit:   7/19/2024      Next Visit Date:  Future Appointments   Date Time Provider Department Center   9/16/2024  9:00 AM Ludin Woods MD [facility] [place] [facility]   10/9/2024  2:00 PM [place], Zandra T, MD [facility]   10/25/2024 11:00 AM [place] MAMMOGRAPHY ROOM 1 [facility]   12/18/2024  9:45 AM Clifton Wheatley MD [place] [facility] Chavez

## 2024-08-30 RX ORDER — METFORMIN HYDROCHLORIDE 750 MG/1
750 TABLET, EXTENDED RELEASE ORAL 2 TIMES DAILY
Qty: 48 TABLET | Refills: 0 | Status: SHIPPED | OUTPATIENT
Start: 2024-08-30

## 2024-09-13 DIAGNOSIS — M25.511 CHRONIC RIGHT SHOULDER PAIN: ICD-10-CM

## 2024-09-13 DIAGNOSIS — G89.29 CHRONIC RIGHT SHOULDER PAIN: ICD-10-CM

## 2024-09-13 DIAGNOSIS — G89.29 CHRONIC LEFT-SIDED LOW BACK PAIN WITHOUT SCIATICA: ICD-10-CM

## 2024-09-13 DIAGNOSIS — M54.50 CHRONIC LEFT-SIDED LOW BACK PAIN WITHOUT SCIATICA: ICD-10-CM

## 2024-09-13 RX ORDER — MELOXICAM 15 MG/1
15 TABLET ORAL DAILY
Qty: 90 TABLET | Refills: 0 | Status: SHIPPED | OUTPATIENT
Start: 2024-09-13

## 2024-09-16 ENCOUNTER — OFFICE VISIT (OUTPATIENT)
Dept: FAMILY MEDICINE CLINIC | Age: 74
End: 2024-09-16
Payer: MEDICARE

## 2024-09-16 VITALS
OXYGEN SATURATION: 99 % | TEMPERATURE: 97.2 F | DIASTOLIC BLOOD PRESSURE: 60 MMHG | WEIGHT: 173 LBS | HEART RATE: 86 BPM | BODY MASS INDEX: 31.83 KG/M2 | SYSTOLIC BLOOD PRESSURE: 138 MMHG | HEIGHT: 62 IN

## 2024-09-16 DIAGNOSIS — Z83.2 FAMILY HISTORY OF FACTOR V LEIDEN MUTATION: ICD-10-CM

## 2024-09-16 DIAGNOSIS — E11.42 DIABETIC POLYNEUROPATHY ASSOCIATED WITH TYPE 2 DIABETES MELLITUS (HCC): ICD-10-CM

## 2024-09-16 DIAGNOSIS — E11.9 TYPE 2 DIABETES MELLITUS WITHOUT COMPLICATION, WITHOUT LONG-TERM CURRENT USE OF INSULIN (HCC): ICD-10-CM

## 2024-09-16 DIAGNOSIS — Z00.00 MEDICARE ANNUAL WELLNESS VISIT, SUBSEQUENT: Primary | ICD-10-CM

## 2024-09-16 DIAGNOSIS — M79.671 RIGHT FOOT PAIN: ICD-10-CM

## 2024-09-16 DIAGNOSIS — M54.16 LUMBAR RADICULOPATHY: ICD-10-CM

## 2024-09-16 PROCEDURE — G0439 PPPS, SUBSEQ VISIT: HCPCS | Performed by: FAMILY MEDICINE

## 2024-09-16 PROCEDURE — 99214 OFFICE O/P EST MOD 30 MIN: CPT | Performed by: FAMILY MEDICINE

## 2024-09-16 PROCEDURE — 1123F ACP DISCUSS/DSCN MKR DOCD: CPT | Performed by: FAMILY MEDICINE

## 2024-09-16 ASSESSMENT — PATIENT HEALTH QUESTIONNAIRE - PHQ9
8. MOVING OR SPEAKING SO SLOWLY THAT OTHER PEOPLE COULD HAVE NOTICED. OR THE OPPOSITE, BEING SO FIGETY OR RESTLESS THAT YOU HAVE BEEN MOVING AROUND A LOT MORE THAN USUAL: NOT AT ALL
3. TROUBLE FALLING OR STAYING ASLEEP: NOT AT ALL
1. LITTLE INTEREST OR PLEASURE IN DOING THINGS: NOT AT ALL
6. FEELING BAD ABOUT YOURSELF - OR THAT YOU ARE A FAILURE OR HAVE LET YOURSELF OR YOUR FAMILY DOWN: NOT AT ALL
SUM OF ALL RESPONSES TO PHQ QUESTIONS 1-9: 0
7. TROUBLE CONCENTRATING ON THINGS, SUCH AS READING THE NEWSPAPER OR WATCHING TELEVISION: NOT AT ALL
2. FEELING DOWN, DEPRESSED OR HOPELESS: NOT AT ALL
SUM OF ALL RESPONSES TO PHQ9 QUESTIONS 1 & 2: 0
4. FEELING TIRED OR HAVING LITTLE ENERGY: NOT AT ALL
9. THOUGHTS THAT YOU WOULD BE BETTER OFF DEAD, OR OF HURTING YOURSELF: NOT AT ALL
5. POOR APPETITE OR OVEREATING: NOT AT ALL
SUM OF ALL RESPONSES TO PHQ QUESTIONS 1-9: 0
10. IF YOU CHECKED OFF ANY PROBLEMS, HOW DIFFICULT HAVE THESE PROBLEMS MADE IT FOR YOU TO DO YOUR WORK, TAKE CARE OF THINGS AT HOME, OR GET ALONG WITH OTHER PEOPLE: NOT DIFFICULT AT ALL
SUM OF ALL RESPONSES TO PHQ QUESTIONS 1-9: 0
SUM OF ALL RESPONSES TO PHQ QUESTIONS 1-9: 0

## 2024-09-16 ASSESSMENT — ENCOUNTER SYMPTOMS
SINUS PRESSURE: 0
ABDOMINAL DISTENTION: 0
CONSTIPATION: 0
SHORTNESS OF BREATH: 0
VOMITING: 0
PHOTOPHOBIA: 0
APNEA: 0
BACK PAIN: 1
NAUSEA: 0
BLOOD IN STOOL: 0
RHINORRHEA: 0
EYE PAIN: 0
TROUBLE SWALLOWING: 0
DIARRHEA: 0
ABDOMINAL PAIN: 0
EYE DISCHARGE: 0
FACIAL SWELLING: 0
CHOKING: 0
COLOR CHANGE: 0
EYE ITCHING: 0
COUGH: 0
EYE REDNESS: 0
WHEEZING: 0
ANAL BLEEDING: 0
CHEST TIGHTNESS: 0

## 2024-09-16 ASSESSMENT — LIFESTYLE VARIABLES
HOW MANY STANDARD DRINKS CONTAINING ALCOHOL DO YOU HAVE ON A TYPICAL DAY: 1 OR 2
HOW OFTEN DO YOU HAVE A DRINK CONTAINING ALCOHOL: MONTHLY OR LESS

## 2024-09-18 ENCOUNTER — HOSPITAL ENCOUNTER (OUTPATIENT)
Dept: LAB | Age: 74
Discharge: HOME | End: 2024-09-18
Payer: MEDICARE

## 2024-09-18 DIAGNOSIS — Z83.2 FAMILY HISTORY OF FACTOR V LEIDEN MUTATION: ICD-10-CM

## 2024-09-18 DIAGNOSIS — E11.9 TYPE 2 DIABETES MELLITUS WITHOUT COMPLICATION, WITHOUT LONG-TERM CURRENT USE OF INSULIN (HCC): ICD-10-CM

## 2024-09-18 LAB
ALBUMIN SERPL-MCNC: 4.2 G/DL (ref 3.5–4.6)
ALP SERPL-CCNC: 60 U/L (ref 40–130)
ALT SERPL-CCNC: 26 U/L (ref 0–33)
ANION GAP SERPL CALCULATED.3IONS-SCNC: 9 MEQ/L (ref 9–15)
AST SERPL-CCNC: 23 U/L (ref 0–35)
BASOPHILS # BLD: 0 K/UL (ref 0–0.2)
BASOPHILS NFR BLD: 0.4 %
BILIRUB SERPL-MCNC: <0.2 MG/DL (ref 0.2–0.7)
BUN SERPL-MCNC: 14 MG/DL (ref 8–23)
CALCIUM SERPL-MCNC: 9 MG/DL (ref 8.5–9.9)
CHLORIDE SERPL-SCNC: 107 MEQ/L (ref 95–107)
CHOLEST SERPL-MCNC: 125 MG/DL (ref 0–199)
CO2 SERPL-SCNC: 24 MEQ/L (ref 20–31)
CREAT SERPL-MCNC: 0.86 MG/DL (ref 0.5–0.9)
CREAT UR-MCNC: 124.9 MG/DL
EOSINOPHIL # BLD: 0.2 K/UL (ref 0–0.7)
EOSINOPHIL NFR BLD: 3.1 %
ERYTHROCYTE [DISTWIDTH] IN BLOOD BY AUTOMATED COUNT: 14.6 % (ref 11.5–14.5)
ESTIMATED AVERAGE GLUCOSE: 134 MG/DL
GLOBULIN SER CALC-MCNC: 2.8 G/DL (ref 2.3–3.5)
GLUCOSE SERPL-MCNC: 109 MG/DL (ref 70–99)
HBA1C MFR BLD: 6.3 % (ref 4–6)
HCT VFR BLD AUTO: 35 % (ref 37–47)
HDLC SERPL-MCNC: 49 MG/DL (ref 40–59)
HGB BLD-MCNC: 11.7 G/DL (ref 12–16)
LDLC SERPL CALC-MCNC: 55 MG/DL (ref 0–129)
LYMPHOCYTES # BLD: 1.6 K/UL (ref 1–4.8)
LYMPHOCYTES NFR BLD: 30.3 %
MCH RBC QN AUTO: 32.5 PG (ref 27–31.3)
MCHC RBC AUTO-ENTMCNC: 33.4 % (ref 33–37)
MCV RBC AUTO: 97.2 FL (ref 79.4–94.8)
MICROALBUMIN UR-MCNC: 1.7 MG/DL
MICROALBUMIN/CREAT UR-RTO: 13.6 MG/G (ref 0–30)
MONOCYTES # BLD: 0.5 K/UL (ref 0.2–0.8)
MONOCYTES NFR BLD: 9.1 %
NEUTROPHILS # BLD: 3.1 K/UL (ref 1.4–6.5)
NEUTS SEG NFR BLD: 56.7 %
PLATELET # BLD AUTO: 212 K/UL (ref 130–400)
POTASSIUM SERPL-SCNC: 4.5 MEQ/L (ref 3.4–4.9)
PROT SERPL-MCNC: 7 G/DL (ref 6.3–8)
RBC # BLD AUTO: 3.6 M/UL (ref 4.2–5.4)
SODIUM SERPL-SCNC: 140 MEQ/L (ref 135–144)
TRIGL SERPL-MCNC: 104 MG/DL (ref 0–150)
WBC # BLD AUTO: 5.4 K/UL (ref 4.8–10.8)

## 2024-09-18 PROCEDURE — 85025 COMPLETE CBC W/AUTO DIFF WBC: CPT

## 2024-09-18 PROCEDURE — 83036 HEMOGLOBIN GLYCOSYLATED A1C: CPT

## 2024-09-18 PROCEDURE — 36415 COLL VENOUS BLD VENIPUNCTURE: CPT

## 2024-09-18 PROCEDURE — 80061 LIPID PANEL: CPT

## 2024-09-18 PROCEDURE — 80053 COMPREHEN METABOLIC PANEL: CPT

## 2024-09-18 PROCEDURE — 82570 ASSAY OF URINE CREATININE: CPT

## 2024-09-18 PROCEDURE — 82043 UR ALBUMIN QUANTITATIVE: CPT

## 2024-09-18 PROCEDURE — 81241 F5 GENE: CPT

## 2024-09-19 ENCOUNTER — HOSPITAL ENCOUNTER (OUTPATIENT)
Dept: GENERAL RADIOLOGY | Age: 74
Discharge: HOME | End: 2024-09-21
Payer: MEDICARE

## 2024-09-19 ENCOUNTER — HOSPITAL ENCOUNTER (OUTPATIENT)
Age: 74
Discharge: HOME | End: 2024-09-21
Payer: MEDICARE

## 2024-09-19 DIAGNOSIS — M54.16 LUMBAR RADICULOPATHY: ICD-10-CM

## 2024-09-19 PROCEDURE — 72110 X-RAY EXAM L-2 SPINE 4/>VWS: CPT

## 2024-09-22 ENCOUNTER — HOSPITAL ENCOUNTER (OUTPATIENT)
Dept: MRI IMAGING | Age: 74
Discharge: HOME | End: 2024-09-24
Attending: FAMILY MEDICINE
Payer: MEDICARE

## 2024-09-22 DIAGNOSIS — M54.16 LUMBAR RADICULOPATHY: ICD-10-CM

## 2024-09-22 PROCEDURE — 72148 MRI LUMBAR SPINE W/O DYE: CPT

## 2024-09-23 LAB — F5 P.R506Q BLD/T QL: NEGATIVE

## 2024-09-25 DIAGNOSIS — M54.16 LUMBAR RADICULOPATHY: Primary | ICD-10-CM

## 2024-09-25 DIAGNOSIS — M48.061 SPINAL STENOSIS OF LUMBAR REGION, UNSPECIFIED WHETHER NEUROGENIC CLAUDICATION PRESENT: ICD-10-CM

## 2024-10-04 NOTE — TELEPHONE ENCOUNTER
Patient called in stating that her blood glucose   machine is saying that she needs to replace the battery everyday and that   she is doing the test wrong. The date and time does not work. This has   been going on for 2 months. What should she do? Does she needs a script   for a new machine? Please call patient to advise. Patient is scheduled for an appointment. Courtesy refill provided.

## 2024-10-09 ENCOUNTER — OFFICE VISIT (OUTPATIENT)
Dept: GASTROENTEROLOGY | Age: 74
End: 2024-10-09
Payer: MEDICARE

## 2024-10-09 VITALS — BODY MASS INDEX: 31.65 KG/M2 | HEART RATE: 88 BPM | HEIGHT: 62 IN | WEIGHT: 172 LBS | OXYGEN SATURATION: 96 %

## 2024-10-09 DIAGNOSIS — Z12.11 ENCOUNTER FOR SCREENING COLONOSCOPY: Primary | ICD-10-CM

## 2024-10-09 PROCEDURE — 99203 OFFICE O/P NEW LOW 30 MIN: CPT | Performed by: SPECIALIST

## 2024-10-09 PROCEDURE — 1123F ACP DISCUSS/DSCN MKR DOCD: CPT | Performed by: SPECIALIST

## 2024-10-09 RX ORDER — SODIUM, POTASSIUM,MAG SULFATES 17.5-3.13G
SOLUTION, RECONSTITUTED, ORAL ORAL
Qty: 354 ML | Refills: 0 | Status: SHIPPED | OUTPATIENT
Start: 2024-10-09

## 2024-10-09 RX ORDER — ROSUVASTATIN CALCIUM 10 MG/1
TABLET, COATED ORAL
COMMUNITY

## 2024-10-09 RX ORDER — MELOXICAM 15 MG/1
TABLET ORAL
COMMUNITY

## 2024-10-09 ASSESSMENT — ENCOUNTER SYMPTOMS
ANAL BLEEDING: 0
ABDOMINAL PAIN: 0
RECTAL PAIN: 0
ABDOMINAL DISTENTION: 0
GASTROINTESTINAL NEGATIVE: 1
NAUSEA: 0
VOMITING: 0
DIARRHEA: 0
RESPIRATORY NEGATIVE: 1
CONSTIPATION: 0
EYES NEGATIVE: 1
BLOOD IN STOOL: 0

## 2024-10-09 NOTE — PROGRESS NOTES
Gastroenterology Clinic Follow up Visit    Valeria Riverooliverio  [id number]  Chief Complaint   Patient presents with    Follow-up     Has prep from last colon but had to reschedule.        HPI and A/P at last visit summarized below:  Patient is here to schedule colonoscopy, maternal grandmother had colon cancer.  Reports no abdominal pain no nausea no emesis no rectal bleeding, patient reports having diarrhea especially after eating certain foods such as corn and tomato.  No weight loss.  Surgical history includes bilateral knee replacement surgery    Review of Systems   Constitutional: Negative.    HENT: Negative.     Eyes: Negative.    Respiratory: Negative.     Cardiovascular: Negative.         No cardiac issues   Gastrointestinal: Negative.  Negative for abdominal distention, abdominal pain, anal bleeding, blood in stool, constipation, diarrhea, nausea, rectal pain and vomiting.   Endocrine: Negative.         Diabetes, hypercholesterolemia   Genitourinary: Negative.    Musculoskeletal: Negative.         Arthritis   Skin: Negative.    Allergic/Immunologic: Negative for food allergies.   Neurological: Negative.    Hematological: Negative.    Psychiatric/Behavioral: Negative.          Past medical history, past surgical history, medication list, social and familyhistory reviewed    Pulse 88, height 1.575 m (5' 2\"), weight 78 kg (172 lb), last menstrual period 01/16/2008, SpO2 96%, not currently breastfeeding.    Physical Exam  Constitutional:       Appearance: She is well-developed.   HENT:      Head: Normocephalic and atraumatic.   Eyes:      Conjunctiva/sclera: Conjunctivae normal.      Pupils: Pupils are equal, round, and reactive to light.   Cardiovascular:      Rate and Rhythm: Normal rate.   Pulmonary:      Effort: Pulmonary effort is normal.   Abdominal:      General: Bowel sounds are normal.      Palpations: Abdomen is soft.      Comments: Soft nontender no palpable mass   Musculoskeletal:         General:

## 2024-10-10 ENCOUNTER — PREP FOR PROCEDURE (OUTPATIENT)
Dept: GASTROENTEROLOGY | Age: 74
End: 2024-10-10

## 2024-10-10 ENCOUNTER — TELEPHONE (OUTPATIENT)
Dept: GASTROENTEROLOGY | Age: 74
End: 2024-10-10

## 2024-10-10 DIAGNOSIS — Z12.11 ENCOUNTER FOR SCREENING COLONOSCOPY: ICD-10-CM

## 2024-10-10 RX ORDER — SODIUM CHLORIDE 0.9 % (FLUSH) 0.9 %
5-40 SYRINGE (ML) INJECTION PRN
Status: CANCELLED | OUTPATIENT
Start: 2024-10-12

## 2024-10-10 RX ORDER — SODIUM CHLORIDE 0.9 % (FLUSH) 0.9 %
5-40 SYRINGE (ML) INJECTION EVERY 12 HOURS SCHEDULED
Status: CANCELLED | OUTPATIENT
Start: 2024-10-12

## 2024-10-10 RX ORDER — SODIUM CHLORIDE 9 MG/ML
INJECTION, SOLUTION INTRAVENOUS PRN
Status: CANCELLED | OUTPATIENT
Start: 2024-10-12

## 2024-10-10 RX ORDER — SODIUM CHLORIDE 9 MG/ML
INJECTION, SOLUTION INTRAVENOUS CONTINUOUS
Status: CANCELLED | OUTPATIENT
Start: 2024-10-12

## 2024-10-10 NOTE — TELEPHONE ENCOUNTER
Left a message to schedule a colon in [place] this Sat with Dr. Workman. Suprep was sent to the patient pharmacy.

## 2024-10-11 ENCOUNTER — TELEPHONE (OUTPATIENT)
Dept: FAMILY MEDICINE CLINIC | Age: 74
End: 2024-10-11

## 2024-10-11 NOTE — TELEPHONE ENCOUNTER
Blood sugar read at 98 this morning. Patient currently on clear liquid diet for colonoscopy prep tomorrow. Patient would like advice on whether she should take her metformin. She contacted GI and they deferred to PCP. Please advise.

## 2024-10-12 ENCOUNTER — ANESTHESIA EVENT (OUTPATIENT)
Dept: OPERATING ROOM | Age: 74
End: 2024-10-12
Payer: MEDICARE

## 2024-10-12 ENCOUNTER — ANESTHESIA (OUTPATIENT)
Dept: OPERATING ROOM | Age: 74
End: 2024-10-12
Payer: MEDICARE

## 2024-10-12 ENCOUNTER — HOSPITAL ENCOUNTER (OUTPATIENT)
Age: 74
Setting detail: OUTPATIENT SURGERY
Discharge: HOME | End: 2024-10-12
Attending: SPECIALIST | Admitting: SPECIALIST
Payer: MEDICARE

## 2024-10-12 VITALS
BODY MASS INDEX: 31.28 KG/M2 | TEMPERATURE: 97.4 F | OXYGEN SATURATION: 93 % | HEART RATE: 67 BPM | SYSTOLIC BLOOD PRESSURE: 113 MMHG | DIASTOLIC BLOOD PRESSURE: 80 MMHG | WEIGHT: 170 LBS | HEIGHT: 62 IN | RESPIRATION RATE: 18 BRPM

## 2024-10-12 DIAGNOSIS — Z12.11 ENCOUNTER FOR SCREENING COLONOSCOPY: ICD-10-CM

## 2024-10-12 LAB
GLUCOSE BLD-MCNC: 119 MG/DL (ref 70–99)
PERFORMED ON: ABNORMAL

## 2024-10-12 PROCEDURE — 3700000000 HC ANESTHESIA ATTENDED CARE: Performed by: SPECIALIST

## 2024-10-12 PROCEDURE — 88305 TISSUE EXAM BY PATHOLOGIST: CPT

## 2024-10-12 PROCEDURE — 2580000003 HC RX 258: Performed by: STUDENT IN AN ORGANIZED HEALTH CARE EDUCATION/TRAINING PROGRAM

## 2024-10-12 PROCEDURE — 7100000010 HC PHASE II RECOVERY - FIRST 15 MIN: Performed by: SPECIALIST

## 2024-10-12 PROCEDURE — 2709999900 HC NON-CHARGEABLE SUPPLY: Performed by: SPECIALIST

## 2024-10-12 PROCEDURE — 6360000002 HC RX W HCPCS: Performed by: STUDENT IN AN ORGANIZED HEALTH CARE EDUCATION/TRAINING PROGRAM

## 2024-10-12 PROCEDURE — 7100000011 HC PHASE II RECOVERY - ADDTL 15 MIN: Performed by: SPECIALIST

## 2024-10-12 PROCEDURE — 2580000003 HC RX 258: Performed by: SPECIALIST

## 2024-10-12 PROCEDURE — 45380 COLONOSCOPY AND BIOPSY: CPT | Performed by: SPECIALIST

## 2024-10-12 PROCEDURE — 3609027000 HC COLONOSCOPY: Performed by: SPECIALIST

## 2024-10-12 RX ORDER — NALOXONE HYDROCHLORIDE 0.4 MG/ML
INJECTION, SOLUTION INTRAMUSCULAR; INTRAVENOUS; SUBCUTANEOUS PRN
Status: CANCELLED | OUTPATIENT
Start: 2024-10-12

## 2024-10-12 RX ORDER — SODIUM CHLORIDE 9 MG/ML
INJECTION, SOLUTION INTRAVENOUS PRN
Status: DISCONTINUED | OUTPATIENT
Start: 2024-10-12 | End: 2024-10-12 | Stop reason: HOSPADM

## 2024-10-12 RX ORDER — SODIUM CHLORIDE 0.9 % (FLUSH) 0.9 %
5-40 SYRINGE (ML) INJECTION EVERY 12 HOURS SCHEDULED
Status: CANCELLED | OUTPATIENT
Start: 2024-10-12

## 2024-10-12 RX ORDER — PROPOFOL 10 MG/ML
INJECTION, EMULSION INTRAVENOUS
Status: DISCONTINUED | OUTPATIENT
Start: 2024-10-12 | End: 2024-10-12 | Stop reason: SDUPTHER

## 2024-10-12 RX ORDER — SODIUM CHLORIDE 0.9 % (FLUSH) 0.9 %
5-40 SYRINGE (ML) INJECTION PRN
Status: DISCONTINUED | OUTPATIENT
Start: 2024-10-12 | End: 2024-10-12 | Stop reason: HOSPADM

## 2024-10-12 RX ORDER — LIDOCAINE HYDROCHLORIDE 10 MG/ML
1 INJECTION, SOLUTION EPIDURAL; INFILTRATION; INTRACAUDAL; PERINEURAL
Status: DISCONTINUED | OUTPATIENT
Start: 2024-10-12 | End: 2024-10-12 | Stop reason: HOSPADM

## 2024-10-12 RX ORDER — SODIUM CHLORIDE 9 MG/ML
INJECTION, SOLUTION INTRAVENOUS
Status: DISCONTINUED | OUTPATIENT
Start: 2024-10-12 | End: 2024-10-12 | Stop reason: SDUPTHER

## 2024-10-12 RX ORDER — SODIUM CHLORIDE 9 MG/ML
INJECTION, SOLUTION INTRAVENOUS
Status: COMPLETED
Start: 2024-10-12 | End: 2024-10-12

## 2024-10-12 RX ORDER — SODIUM CHLORIDE 0.9 % (FLUSH) 0.9 %
5-40 SYRINGE (ML) INJECTION EVERY 12 HOURS SCHEDULED
Status: DISCONTINUED | OUTPATIENT
Start: 2024-10-12 | End: 2024-10-12 | Stop reason: HOSPADM

## 2024-10-12 RX ORDER — SODIUM CHLORIDE 0.9 % (FLUSH) 0.9 %
5-40 SYRINGE (ML) INJECTION PRN
Status: CANCELLED | OUTPATIENT
Start: 2024-10-12

## 2024-10-12 RX ORDER — SODIUM CHLORIDE 9 MG/ML
INJECTION, SOLUTION INTRAVENOUS PRN
Status: CANCELLED | OUTPATIENT
Start: 2024-10-12

## 2024-10-12 RX ORDER — ONDANSETRON 2 MG/ML
4 INJECTION INTRAMUSCULAR; INTRAVENOUS
Status: CANCELLED | OUTPATIENT
Start: 2024-10-12 | End: 2024-10-13

## 2024-10-12 RX ORDER — SODIUM CHLORIDE 9 MG/ML
INJECTION, SOLUTION INTRAVENOUS CONTINUOUS
Status: DISCONTINUED | OUTPATIENT
Start: 2024-10-12 | End: 2024-10-12 | Stop reason: HOSPADM

## 2024-10-12 RX ADMIN — SODIUM CHLORIDE: 9 INJECTION, SOLUTION INTRAVENOUS at 11:59

## 2024-10-12 RX ADMIN — PROPOFOL 50 MG: 10 INJECTION, EMULSION INTRAVENOUS at 12:32

## 2024-10-12 RX ADMIN — PROPOFOL 50 MG: 10 INJECTION, EMULSION INTRAVENOUS at 12:39

## 2024-10-12 RX ADMIN — SODIUM CHLORIDE: 9 INJECTION, SOLUTION INTRAVENOUS at 12:07

## 2024-10-12 RX ADMIN — PROPOFOL 50 MG: 10 INJECTION, EMULSION INTRAVENOUS at 12:36

## 2024-10-12 ASSESSMENT — PAIN - FUNCTIONAL ASSESSMENT: PAIN_FUNCTIONAL_ASSESSMENT: 0-10

## 2024-10-12 ASSESSMENT — PAIN SCALES - GENERAL
PAINLEVEL_OUTOF10: 0
PAINLEVEL_OUTOF10: 0

## 2024-10-12 NOTE — ANESTHESIA POSTPROCEDURE EVALUATION
Department of Anesthesiology  Postprocedure Note    Patient: Valeria Laird  MRN: [id number]  YOB: 1950  Date of evaluation: 10/12/2024    Procedure Summary       Date: 10/12/24 Room / Location: [address]    Anesthesia Start: 1230 Anesthesia Stop:     Procedure: COLORECTAL CANCER SCREENING, NOT HIGH RISK (Rectum) Diagnosis:       Encounter for screening colonoscopy      (Encounter for screening colonoscopy [Z12.11])    Surgeons: Zandra Workman MD Responsible Provider: Price Zapata DO    Anesthesia Type: MAC ASA Status: 3            Anesthesia Type: MAC    Chandana Phase I: Chandana Score: 10    Chandana Phase II:      Anesthesia Post Evaluation    Patient location during evaluation: bedside  Patient participation: complete - patient participated  Level of consciousness: awake and awake and alert  Pain score: 0  Airway patency: patent  Nausea & Vomiting: no nausea and no vomiting  Cardiovascular status: blood pressure returned to baseline and hemodynamically stable  Respiratory status: acceptable  Hydration status: euvolemic  Pain management: adequate        No notable events documented.

## 2024-10-12 NOTE — H&P
Patient Name: Valeria Laird  : 1950  MRN: [id number]  DATE: 10/12/24      ENDOSCOPY  History and Physical    Procedure:    [] Diagnostic Colonoscopy       [x] Screening Colonoscopy  [] EGD      [] ERCP      [] EUS       [] Other    [x] Previous office notes/History and Physical reviewed from the patients chart. Please see EMR for further details of HPI. I have examined the patient's status immediately prior to the procedure and:      Indications/HPI:    []Abdominal Pain  []Cancer- GI/Lung  []Fhx of colon CA/polyps  []History of Polyps  [][place]ena’s   []Melena  []Abnormal Imaging  []Dysphagia    []Persistent Pneumonia  []Anemia  []Food Impaction  []History of Polyps  []GI Bleed  []Pulmonary nodule/Mass  []Change in bowel habits []Heartburn/Reflux  []Rectal Bleed (BRBPR)  []Chest Pain - Non Cardiac []Heme (+) Stoo  l[]Ulcers  []Constipation  []Hemoptysis   []Varices  []Diarrhea  []Hypoxemia  []Nausea/Vomiting  []Screening   []Crohns/Colitis  []Other: Family history of colon cancer    Anesthesia:   [x] MAC [] Moderate Sedation   [] General   [] None     ROS: 12 pt Review of Symptoms was negative unless mentioned above    Medications:   Prior to Admission medications    Medication Sig Start Date End Date Taking? Authorizing Provider   meloxicam (MOBIC) 15 MG tablet    Yes Andrea Albert MD   rosuvastatin (CRESTOR) 10 MG tablet    Yes Andrea Albert MD   sodium-potassium-mag sulfate (SUPREP) 17.5-3.13-1.6 GM/177ML SOLN solution As directed 10/9/24  Yes Zandra Workman MD   meloxicam (MOBIC) 15 MG tablet TAKE 1 TABLET BY MOUTH EVERY DAY 24  Yes Ludin Woods MD   metFORMIN (GLUCOPHAGE-XR) 750 MG extended release tablet Take 1 tablet by mouth 2 times daily 24  Yes Ludin Woods MD   blood glucose test strips (ACCU-CHEK GUIDE) strip use 1 strip to check glucose 3 times daily 24  Yes Ludin Woods MD   NONFORMULARY B COMPLEX WITH VIT C   Yes Andrea Albert MD   [place]

## 2024-10-12 NOTE — ANESTHESIA PRE PROCEDURE
ammonium lactate (AMLACTIN) 12 % cream Apply topically daily, avoid applying between the toes. 11/8/22  Yes Arie Martinez DPM   CPAP Machine MISC by Does not apply route New CPAP 7-10 cm 3/10/22  Yes Clifton Wheatley MD   blood glucose monitor kit and supplies DX: diabetes mellitus. Test 1-3 time(s) daily - Ok to substitute per insurance 8/5/21  Yes Elvi Brandon APRN - CNP   Phytosterols 450 MG TABS Take 1 tablet by mouth daily 5/19/20  Yes Elvi Brandon APRN - CNP   Multiple Vitamins-Minerals (WOMENS MULTIVITAMIN PO) Take 1 tablet by mouth daily   Yes ProviderAndrea MD   fexofenadine (ALLEGRA) 180 MG tablet Take 1 tablet by mouth daily   Yes Provider, MD Andrea   Respiratory Therapy Supplies [facility] New CPAP mask and supplies 2/27/18  Yes Joseline Beltran MD   SF 5000 PLUS 1.1 % CREA  9/19/22   ProviderAndrea MD       Current medications:    Current Facility-Administered Medications   Medication Dose Route Frequency Provider Last Rate Last Admin    0.9 % sodium chloride infusion   IntraVENous Continuous Zandra Workman MD        sodium chloride flush 0.9 % injection 5-40 mL  5-40 mL IntraVENous 2 times per day Zandra Workman MD        sodium chloride flush 0.9 % injection 5-40 mL  5-40 mL IntraVENous PRN Zandra Workman MD        0.9 % sodium chloride infusion   IntraVENous PRN Zandra Workman MD        lidocaine PF 1 % injection 1 mL  1 mL IntraDERmal Once PRN Price Zapata K, DO        sodium chloride flush 0.9 % injection 5-40 mL  5-40 mL IntraVENous 2 times per day Vern Zapataolas K, DO        sodium chloride flush 0.9 % injection 5-40 mL  5-40 mL IntraVENous PRN Vern Zapataolas K, DO        0.9 % sodium chloride infusion   IntraVENous PRN Vern Zapataolas K, DO         Facility-Administered Medications Ordered in Other Encounters   Medication Dose Route Frequency Provider Last Rate Last Admin    0.9 % sodium chloride infusion   IntraVENous Continuous PRN Jodi,

## 2024-10-14 ENCOUNTER — INITIAL CONSULT (OUTPATIENT)
Age: 74
End: 2024-10-14
Payer: MEDICARE

## 2024-10-14 VITALS
WEIGHT: 169.4 LBS | BODY MASS INDEX: 31.17 KG/M2 | SYSTOLIC BLOOD PRESSURE: 120 MMHG | TEMPERATURE: 97.2 F | DIASTOLIC BLOOD PRESSURE: 65 MMHG | HEIGHT: 62 IN

## 2024-10-14 DIAGNOSIS — M43.16 SPONDYLOLISTHESIS OF LUMBAR REGION: ICD-10-CM

## 2024-10-14 DIAGNOSIS — M48.062 SPINAL STENOSIS OF LUMBAR REGION WITH NEUROGENIC CLAUDICATION: Primary | ICD-10-CM

## 2024-10-14 PROCEDURE — 99204 OFFICE O/P NEW MOD 45 MIN: CPT | Performed by: NEUROLOGICAL SURGERY

## 2024-10-14 PROCEDURE — 99203 OFFICE O/P NEW LOW 30 MIN: CPT | Performed by: NEUROLOGICAL SURGERY

## 2024-10-14 ASSESSMENT — ENCOUNTER SYMPTOMS
SHORTNESS OF BREATH: 0
BACK PAIN: 1
COUGH: 0
ABDOMINAL DISTENTION: 0
ABDOMINAL PAIN: 0
TROUBLE SWALLOWING: 0
EYE PAIN: 0

## 2024-10-14 NOTE — PROGRESS NOTES
Patient Name: Valeria Laird : 1950        Date: 10/14/2024      Type of Appt: Consult    Reason for appt: C: Lumbar radiculopathy. Spinal stenosis of lumbar region, unspecified whether neurogenic claudication present     Referred by: TESSIE LEIVA     Studies done: 24 XRAY OF LUMBAR SPINE AT [facility]  24 MRI OF LUMBAR SPINE AT [facility]    Physical therapy: YES OR NO    Smoking: Yes or No    REVIEW OF SYSTEMS:    Rash   Difficulty Urinating Nausea     Fever   Headaches  Bruising/Bleeding Easily     Hearing loss  Constipation  Sleep Disturbance    Shortness of breath Diarrhea  Neck Pain  Back Pain   
MD   sodium-potassium-mag sulfate (SUPREP) 17.5-3.13-1.6 GM/177ML SOLN solution As directed 10/9/24   Zandra Workman MD   SF 5000 PLUS 1.1 % CREA  9/19/22   Provider, MD Andrea   Alcohol Swabs PADS DX: diabetes mellitus. Test 1-3 time(s) daily - Ok to substitute per insurance (1 each = 1 box) 12/20/22   Ludin Woods MD           Allergies:   Allergies   Allergen Reactions    Molds & Smuts Cough    Other Itching and Swelling     [employer] laundry detergent    Tomato Itching    Influenza Vaccines Other (See Comments)     Severe migraine    Seasonal Other (See Comments)     Tree pollen, mold, dander causes sinus congestion  Other reaction(s): Other - comment required, Runny Nose  Watery eyes  Watery eyes         Social History:      TOBACCO:   reports that she quit smoking about 3 years ago. Her smoking use included cigarettes. She started smoking about 56 years ago. She has a 26.4 pack-year smoking history. She has never used smokeless tobacco.  ETOH:   reports that she does not currently use alcohol.  RECREATIONAL DRUG USE:   Social History     Substance and Sexual Activity   Drug Use No       Family History:           Problem Relation Age of Onset    Heart Disease Mother     High Blood Pressure Mother     Vision Loss Mother     Other Mother         leiden factor 5    Diabetes Father     Heart Disease Father     Depression Sister     Diabetes Sister     Obesity Sister     Allergy (Severe) Sister     Arthritis Sister     Depression Sister     Arthritis Sister     Depression Sister     Diabetes Sister     Arthritis Sister     Depression Sister     Arthritis Sister     Arthritis Sister     Arthritis Sister     Diabetes Brother     Arthritis Brother     Other Brother         leiden factor 5    Arthritis Brother     Arthritis Brother     Arthritis Brother     Breast Cancer Paternal Aunt     Colon Cancer Maternal Grandmother     Thyroid Disease Daughter     No Known Problems Son              Review of Systems

## 2024-10-25 ENCOUNTER — HOSPITAL ENCOUNTER (OUTPATIENT)
Dept: WOMENS IMAGING | Age: 74
Discharge: HOME | End: 2024-10-27
Attending: FAMILY MEDICINE
Payer: MEDICARE

## 2024-10-25 DIAGNOSIS — Z12.31 ENCOUNTER FOR SCREENING MAMMOGRAM FOR MALIGNANT NEOPLASM OF BREAST: ICD-10-CM

## 2024-10-25 PROCEDURE — 77067 SCR MAMMO BI INCL CAD: CPT

## 2024-10-30 DIAGNOSIS — R92.8 ABNORMAL MAMMOGRAM OF RIGHT BREAST: Primary | ICD-10-CM

## 2024-11-05 ENCOUNTER — HOSPITAL ENCOUNTER (OUTPATIENT)
Dept: ULTRASOUND IMAGING | Age: 74
Discharge: HOME | End: 2024-11-07
Payer: MEDICARE

## 2024-11-05 ENCOUNTER — TELEPHONE (OUTPATIENT)
Dept: WOMENS IMAGING | Age: 74
End: 2024-11-05

## 2024-11-05 ENCOUNTER — HOSPITAL ENCOUNTER (OUTPATIENT)
Dept: WOMENS IMAGING | Age: 74
Discharge: HOME | End: 2024-11-07
Payer: MEDICARE

## 2024-11-05 DIAGNOSIS — R92.8 ABNORMAL MAMMOGRAM: Primary | ICD-10-CM

## 2024-11-05 DIAGNOSIS — R92.8 ABNORMAL MAMMOGRAM OF RIGHT BREAST: ICD-10-CM

## 2024-11-05 DIAGNOSIS — R92.8 ABNORMAL MAMMOGRAM: ICD-10-CM

## 2024-11-05 DIAGNOSIS — R92.8 ABNORMAL FINDINGS ON DIAGNOSTIC IMAGING OF BREAST: Primary | ICD-10-CM

## 2024-11-05 PROCEDURE — G0279 TOMOSYNTHESIS, MAMMO: HCPCS

## 2024-11-05 PROCEDURE — 76642 ULTRASOUND BREAST LIMITED: CPT

## 2024-11-05 NOTE — TELEPHONE ENCOUNTER
11/05/24  Spoke with patient on phone regarding her mammogram and ultrasound results today and recommendation to have right breast bx. Reviewed results and reviewed procedure for biopsy. Questions answered. Patient will be following up with Dr. ABDELRAHMAN Yusuf. Encouraged to  call with any questions or concerns.  Patient has phone number to call.

## 2024-11-09 PROBLEM — Z12.11 ENCOUNTER FOR SCREENING COLONOSCOPY: Status: RESOLVED | Noted: 2024-10-10 | Resolved: 2024-11-09

## 2024-11-11 ENCOUNTER — HOSPITAL ENCOUNTER (OUTPATIENT)
Dept: PHYSICAL THERAPY | Age: 74
Setting detail: THERAPIES SERIES
Discharge: HOME | End: 2024-11-11
Payer: MEDICARE

## 2024-11-11 PROCEDURE — 97110 THERAPEUTIC EXERCISES: CPT

## 2024-11-11 PROCEDURE — 97162 PT EVAL MOD COMPLEX 30 MIN: CPT

## 2024-11-11 NOTE — PROGRESS NOTES
Physical Therapy: Initial Evaluation    Patient: Valeria Laird (74 y.o. female)   Examination Date: 2024  Plan of Care Certification Period: 2024 to 24  Progress Note Counter: Pt to be out ot town 24 to 24   :  1950 ;    Confirmed: Yes MRN: [id number]  CSN: [id number]   Insurance: Payor: T MEDICARE / Plan: AETNA MEDICARE-ADVANTAGE PPO / Product Type: Medicare /   Insurance ID: [id number] - (Medicare Managed) Secondary Insurance (if applicable):    Referring Physician: Phu Hubbard MD Borsellino   PCP: Ludin Woods MD Visits to Date/Visits Approved:     No Show/Cancelled Appts: 0 / 0     Medical Diagnosis: Spinal stenosis of lumbar region with neurogenic claudication [M48.062]  Spondylolisthesis of lumbar region [M43.16] Spinal stensis of lumbar region with neurogenic claudication and spondylolitheis lumbar  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     PERTINENT MEDICAL HISTORY   Patient Assessed for Rehabilitation Services: Yes  Self reported health status:: Good    Medical History: Chart Reviewed: Yes   Past Medical History:   Diagnosis Date    Allergic rhinitis     Anxiety     History of blood transfusion     blood transfusions with c section x 3    Hyperlipidemia     meds > 2 yrs    Osteoarthritis     Sleep apnea     Type 2 diabetes mellitus 2022     Surgical History:   Past Surgical History:   Procedure Laterality Date    ANKLE SURGERY Right 2000    tendon repair     SECTION   &  &     pt has had 3     COLONOSCOPY      COLONOSCOPY N/A 2020    COLONOSCOPY performed by Zandra Workman MD at [facility] OR    COLONOSCOPY N/A 10/12/2024    COLORECTAL CANCER SCREENING, NOT HIGH RISK performed by Zandra Workman MD at [facility] OR    ENDOSCOPY, COLON, DIAGNOSTIC      TOTAL KNEE ARTHROPLASTY Left 2019    LEFT TOTAL KNEE ARTHROPLASTY, SUPINE, 23 HR OBS,

## 2024-11-12 ENCOUNTER — HOSPITAL ENCOUNTER (OUTPATIENT)
Dept: ULTRASOUND IMAGING | Age: 74
Discharge: HOME | End: 2024-11-14
Payer: MEDICARE

## 2024-11-12 ENCOUNTER — HOSPITAL ENCOUNTER (OUTPATIENT)
Dept: WOMENS IMAGING | Age: 74
Discharge: HOME | End: 2024-11-14
Attending: RADIOLOGY
Payer: MEDICARE

## 2024-11-12 VITALS — SYSTOLIC BLOOD PRESSURE: 169 MMHG | HEART RATE: 67 BPM | DIASTOLIC BLOOD PRESSURE: 78 MMHG | RESPIRATION RATE: 20 BRPM

## 2024-11-12 DIAGNOSIS — R92.8 ABNORMAL MAMMOGRAM: ICD-10-CM

## 2024-11-12 PROCEDURE — A4648 IMPLANTABLE TISSUE MARKER: HCPCS

## 2024-11-12 PROCEDURE — 77065 DX MAMMO INCL CAD UNI: CPT

## 2024-11-12 PROCEDURE — 2500000003 HC RX 250 WO HCPCS: Performed by: RADIOLOGY

## 2024-11-12 RX ORDER — LIDOCAINE HYDROCHLORIDE AND EPINEPHRINE 10; 10 MG/ML; UG/ML
20 INJECTION, SOLUTION INFILTRATION; PERINEURAL ONCE
Status: COMPLETED | OUTPATIENT
Start: 2024-11-12 | End: 2024-11-12

## 2024-11-12 RX ORDER — LIDOCAINE HYDROCHLORIDE 20 MG/ML
20 INJECTION, SOLUTION INFILTRATION; PERINEURAL ONCE
Status: COMPLETED | OUTPATIENT
Start: 2024-11-12 | End: 2024-11-12

## 2024-11-12 RX ADMIN — LIDOCAINE HYDROCHLORIDE,EPINEPHRINE BITARTRATE 2 ML: 10; .01 INJECTION, SOLUTION INFILTRATION; PERINEURAL at 10:06

## 2024-11-12 RX ADMIN — LIDOCAINE HYDROCHLORIDE 2 ML: 20 INJECTION, SOLUTION INFILTRATION; PERINEURAL at 10:05

## 2024-11-12 ASSESSMENT — PAIN SCALES - GENERAL
PAINLEVEL_OUTOF10: 0
PAINLEVEL_OUTOF10: 0

## 2024-11-12 NOTE — SEDATION DOCUMENTATION
US Guided Core Biopsy of Right  Breast      0937 Patient ambulated, gait steady, into [facility] pre procedure room. Reviewed procedure with patient and patient verbalizes understanding.  Consent obtained. Allergies, history, and medications reviewed. Vitals signs taken, VSS.     0948 Assisted patient into ultrasound room and onto ultrasound cart.    0959 Tech scanning and Dr. Dietrich  looking at scans and talking with patient.  Area of concern marked.     0959 Timeout Completed.       Site cleansed with chloraprep x 3. After 3 minute dry time, draped with sterile towels around area, and sterile probe cover applied over probe.      1005 Using ultrasound guidance, Lidocaine 2%  2ml  injected into site to numb area, see eMar.   1006 Using 22G spinal needle, Dr. Dietrich injected lidocaine 1% with epi 2ml  deeper into biopsy site, see eMar.     Small incision made using scalpel.    Using Tigermed Biopsy Instrument Kit 14g x 10cm lot [id number] exp 04/28/2027, samples of tissue taken. Patient tolerated well. Tissue collected at 1015 and into formalin at marked right breast     Total 4 samples obtained.     Dr. Dietrich  then inserted  marker SenoMark UltraCor  enhanced heart lot [id number] exp 06/01/2027  Compression held to site for 5 minutes and then slowly released. Site soft, no bleeding.     Dressing of steri strips, gauze and large tegaderm applied.      Post biopsy mammogram ordered per verbal order from       Specimen order placed per [facility] protocol.      Patient assisted off ultrasound cart and into consultation room. Steady gait. Post bx mamm completed.    1027 Vitals taken. VSS.    1107 Discharge instructions reviewed with patient and patient verbalizes understanding. Biopsy site soft. Dressing clean, dry, and intact. Chest area wrapped in ace wrap. Ice packs given for home. Denies any pain. Patient left [facility] with steady gait. Encouraged to call with any questions or concerns.

## 2024-11-12 NOTE — DISCHARGE INSTRUCTIONS
Post Procedure Instructions for Breast Biopsies    You have had a breast biopsy of the Right  Breast at [facility], which was ordered by Dr. ABDELRAHMAN Yusuf    Activity  You may return to work or other activities the day of your biopsy, providing these activites do not require any heavy lifting or strenuous activity. We do recommend that you take the rest of the day following your biopsy just to rest.    Diet  You may resume your normal diet    Medications  1. Continue taking your normal medications  2. You may take a mild pain reliever, as needed, such as Tylenol (Acetaminophen), Advil (Ibuprofen) or Motrin    Dressing  1. May remove the ace wrap in the morning and take a shower. Pat the dressing dry. Then put on snug fitting bra.  2. Keep the ice pack on for 15 minutes at least 2 times today  3. You may shower and pat the dressing dry tomorrow.   On Thursday you may remove the dressing. The biopsy site should have started to heal at this point. At your discretion you can put a band-aid on it.    Other Instructions  1. You may have some bruising following the biopsy, that is normal because of the compression and it will heal and go away  2. For severyal days or even a couple of weeks, you may feel mild tenderness, \"twinges\", or even a small bump at the biopsy site. This is normal. Applying moist heat to the area may bring relief. This will disappear with time    If you develop any of the following symptoms, please contact your referring physician.  1. Active bleeding-hold compression to the site. If it does not stop call the radiology department  2. If you develop redness or heat at the biopsy site or a fever 5-7 days following your biopsy this could be a sign of an early infection    Physician performing exam: Dr. ABDELRAMHAN Dietrich    Please keep follow up appointment with Dr. Angeli Yusuf on 12/11/24 at 8:15 AM at the [place] [address] in [facility]

## 2024-11-13 ENCOUNTER — HOSPITAL ENCOUNTER (OUTPATIENT)
Dept: PHYSICAL THERAPY | Age: 74
Setting detail: THERAPIES SERIES
Discharge: HOME | End: 2024-11-13
Payer: MEDICARE

## 2024-11-13 PROCEDURE — 97140 MANUAL THERAPY 1/> REGIONS: CPT

## 2024-11-13 PROCEDURE — 97530 THERAPEUTIC ACTIVITIES: CPT

## 2024-11-13 PROCEDURE — 97110 THERAPEUTIC EXERCISES: CPT

## 2024-11-13 ASSESSMENT — PAIN DESCRIPTION - ORIENTATION: ORIENTATION: LEFT;LOWER

## 2024-11-13 ASSESSMENT — PAIN DESCRIPTION - LOCATION: LOCATION: BACK;BUTTOCKS

## 2024-11-13 ASSESSMENT — PAIN DESCRIPTION - DESCRIPTORS: DESCRIPTORS: ACHING;TIGHTNESS

## 2024-11-13 ASSESSMENT — PAIN DESCRIPTION - PAIN TYPE: TYPE: CHRONIC PAIN

## 2024-11-13 NOTE — PROGRESS NOTES
Physical Therapy  Physical Therapy: Daily Note   Patient: Valeria Laird (74 y.o. female)   Examination Date: 2024  Plan of Care/Certification Expiration Date: 24    Progress Note Counter: Pt to be out ot town 24 to 24     :  1950 # of Visits since SOC:   2   MRN: [id number]  CSN: [id number] Start of Care Date:   2024   Insurance: Payor: formerly [facility] MEDICARE / Plan: AET MEDICARE-ADVANTAGE PPO / Product Type: Medicare /   Insurance ID: [id number] - (Medicare Managed) Secondary Insurance (if applicable):    Referring Physician: Phu Hubbard MD Borsellino   PCP: Ludin Woods MD Visits to Date/Visits Approved:     No Show/Cancelled Appts: 0 / 0     Medical Diagnosis: No admission diagnoses are documented for this encounter.    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        SUBJECTIVE EXAMINATION   Pain Level: Pain Screening  Patient Currently in Pain: Yes  Pain Assessment: 0-10  Pain Level:  (1-2/10 L low back and glute)  Pain Type: Chronic pain  Pain Location: Back, Buttocks  Pain Orientation: Left, Lower  Pain Descriptors: Aching, Tightness    Patient Comments: Subjective: Pt report having 1-2/10 left buttock pain.  Pt. reports she does hinge program with her insurance for the last two years.  Back and sciatica programs.  Reports she is on level 50.    HEP Compliance: Good        OBJECTIVE EXAMINATION   Restrictions:  No data recorded No data recorded No data recorded              TREATMENT     Exercises:      Treatment Reasoning    Exercise 1: Supine HS stretch hold 30 sec x 3  Exercise 2: reviewed piriformis stretch  Exercise 4: *SKTC x 3 H30 x 1, x 1 DKTC educated can aggravate symptoms to be careful  Exercise 5: prone lying pillow under abdomen x 2 -3 min  Exercise 6: supine IT band sstretch 30 sec x 3 B with sheet                          Therapeutic Activities: (CPT 87300) Treatment Reasoning

## 2024-11-15 ENCOUNTER — TELEPHONE (OUTPATIENT)
Age: 74
End: 2024-11-15

## 2024-11-15 NOTE — TELEPHONE ENCOUNTER
I made a appointment for patient on 12/30 and she would like to know if she can get an order for diabetic shoes before her appointment please advise

## 2024-11-16 DIAGNOSIS — E11.9 TYPE 2 DIABETES MELLITUS WITHOUT COMPLICATION, WITHOUT LONG-TERM CURRENT USE OF INSULIN (HCC): ICD-10-CM

## 2024-11-17 RX ORDER — BLOOD SUGAR DIAGNOSTIC
STRIP MISCELLANEOUS
Qty: 100 STRIP | Refills: 0 | Status: SHIPPED | OUTPATIENT
Start: 2024-11-17

## 2024-11-18 ENCOUNTER — HOSPITAL ENCOUNTER (OUTPATIENT)
Dept: PHYSICAL THERAPY | Age: 74
Setting detail: THERAPIES SERIES
Discharge: HOME | End: 2024-11-18
Payer: MEDICARE

## 2024-11-18 PROCEDURE — 97110 THERAPEUTIC EXERCISES: CPT

## 2024-11-18 PROCEDURE — 97140 MANUAL THERAPY 1/> REGIONS: CPT

## 2024-11-18 NOTE — TELEPHONE ENCOUNTER
Patient is requesting medication refill. Please approve or deny this request.    Rx requested:  Requested Prescriptions     Pending Prescriptions Disp Refills    Lancets MISC 1 each 5     Sig: Inject 1 each into the skin 2 times daily DX: diabetes mellitus. Ok to substitute per insurance (1 each = 1 box)         Last Office Visit:   9/16/2024      Next Visit Date:  Future Appointments   Date Time Provider Department Center   11/18/2024 11:00 AM Mary Bradshaw, PTA RICARDO  PT [facility]   12/9/2024 10:30 AM Jamee Mckeon, PT R[facility]  [facility]   12/11/2024  8:15 AM Angeli Yusuf MD MLOX OBLN BS [facility]   12/18/2024  9:45 AM [facility]   12/30/2024  8:30 AM Arie Martinez DPM MLOX OP POD [facility]

## 2024-11-18 NOTE — PROGRESS NOTES
1-2  Plan weeks: 4-6  Current Treatment Recommendations: Strengthening, Functional mobility training, Manual, Pain management, Home exercise program, Modalities, Therapeutic activities  Modalities: Heat/Cold, E-stim - unattended, Ultrasound   Additional Comments: KT tape       Therapy Time  Individual Time In:     1100    Individual Time Out:  1145  Minutes:  45        Electronically signed by Mary Bradshaw PTA  on 11/18/2024 at 11:52 AM   POC NOTE

## 2024-11-19 RX ORDER — LANCETS 30 GAUGE
1 EACH MISCELLANEOUS 2 TIMES DAILY
Qty: 1 EACH | Refills: 5 | Status: SHIPPED | OUTPATIENT
Start: 2024-11-19

## 2024-11-22 ENCOUNTER — TELEPHONE (OUTPATIENT)
Age: 74
End: 2024-11-22

## 2024-11-22 NOTE — TELEPHONE ENCOUNTER
Patient calling with concerns and questions before her 12/11 new patient appt with Dr Yusuf.    Please give patient a call at earliest convenience

## 2024-11-26 NOTE — TELEPHONE ENCOUNTER
I was returning the patient's call. I left a detailed message with her appointment date/time/location. I left the office phone number [phone] choose option 2. I asked the patient to please call the office with questions/concerns.

## 2024-11-27 ENCOUNTER — TELEPHONE (OUTPATIENT)
Dept: WOMENS IMAGING | Age: 74
End: 2024-11-27

## 2024-11-27 NOTE — TELEPHONE ENCOUNTER
11/27/24  Left message on pt's phone to please call back regarding pathology report. Also reminded patient of appt with Dr. Yusuf on 12/11/24 at 815 at the [place] office [address] in [facility]. Phone numbers given. Will try caling again.

## 2024-11-29 ENCOUNTER — TELEPHONE (OUTPATIENT)
Dept: WOMENS IMAGING | Age: 74
End: 2024-11-29

## 2024-11-29 NOTE — TELEPHONE ENCOUNTER
11/29/24  Spoke with patient and reviewed her pathology results with her. Time spent talking with her and assured her that Dr. ABDELRAHMAN Yusuf would go over in great detail her report and follow up. Patient is scheduled to see Dr. Yusuf on 12/11/24  at 815 in [place]. Patient is out of town returning on the 6th.  Patient is aware I will notify Dr. Yusuf's office of her return date in case they can see her sooner than the 11th. Patient has no questions at this time. Encouraged her to call with any questions or concerns.

## 2024-12-09 ENCOUNTER — HOSPITAL ENCOUNTER (OUTPATIENT)
Dept: PHYSICAL THERAPY | Age: 74
Setting detail: THERAPIES SERIES
Discharge: HOME | End: 2024-12-09
Payer: MEDICARE

## 2024-12-09 PROCEDURE — 97530 THERAPEUTIC ACTIVITIES: CPT

## 2024-12-09 ASSESSMENT — PAIN DESCRIPTION - ORIENTATION: ORIENTATION: LEFT

## 2024-12-09 ASSESSMENT — PAIN SCALES - GENERAL: PAINLEVEL_OUTOF10: 3

## 2024-12-09 ASSESSMENT — PAIN DESCRIPTION - DESCRIPTORS: DESCRIPTORS: ACHING

## 2024-12-09 ASSESSMENT — PAIN DESCRIPTION - LOCATION: LOCATION: BACK

## 2024-12-09 ASSESSMENT — PAIN DESCRIPTION - PAIN TYPE: TYPE: CHRONIC PAIN

## 2024-12-09 NOTE — PROGRESS NOTES
Physical Therapy: Recheck Note   Patient: Valeria Laird (74 y.o. female)   Examination Date: 2024  Plan of Care/Certification Expiration Date: 25    Progress Note Counter: Pt was out ot town 24 to 24; Recheck completed on 24 and since only 4 visits at that time, plan to continue 1-2x per week for an additional 4-6 weeks or 8 more visits and then DC to HEP     :  1950 # of Visits since SOC:   4   MRN: [id number]  CSN: [id number] Start of Care Date:   2024   Ins[facility]: [facility] [place] [facility] / Plan: AETNA MEDICARE-ADVANTAGE PPO / Product Type: Medicare /   Insurance ID: [id number] - (Medicare Managed) Secondary Insurance (if applicable):    Referring Physician: Phu Hubbard MD [place]   PCP: Ludin Woods MD Visits to Date/Visits Approved:     No Show/Cancelled Appts: 0 / 0     Medical Diagnosis: No admission diagnoses are documented for this encounter.    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        SUBJECTIVE EXAMINATION   Pain Level: Pain Screening  Patient Currently in Pain: Yes  Pain Assessment: 0-10  Pain Level: 3  Pain Type: Chronic pain  Pain Location: Back  Pain Orientation: Left  Pain Descriptors: Aching    Patient Comments: Subjective: Pt reports feeling fatigued and having left side low back/sacral pain 3/10    HEP Compliance: Good        OBJECTIVE EXAMINATION   Restrictions:  No data recorded No data recorded No data recorded          Left Strength  Right Strength         Strength LLE  L Hip Flexion: 4+/5  L Hip Extension: 4+/5  L Hip ABduction: 4+/5  L Hip ADduction: 4+/5  L Hip Internal Rotation: 4+/5  L Hip External Rotation: 4+/5  L Knee Flexion: 4+/5  L Knee Extension: 4+/5  L Ankle Dorsiflexion: 5/5  L Ankle Plantar Flexion: 5/5    Strength RLE  R Hip Flexion: 4+/5  R Hip Extension: 4+/5  R Hip ABduction: 4+/5  R Hip ADduction: 4+/5  R Hip Internal Rotation: 4/5,

## 2024-12-11 ENCOUNTER — PREP FOR PROCEDURE (OUTPATIENT)
Dept: SURGERY | Age: 74
End: 2024-12-11

## 2024-12-11 ENCOUNTER — OFFICE VISIT (OUTPATIENT)
Dept: SURGERY | Age: 74
End: 2024-12-11
Payer: MEDICARE

## 2024-12-11 VITALS
BODY MASS INDEX: 31.73 KG/M2 | RESPIRATION RATE: 14 BRPM | WEIGHT: 172.4 LBS | DIASTOLIC BLOOD PRESSURE: 70 MMHG | SYSTOLIC BLOOD PRESSURE: 138 MMHG | TEMPERATURE: 97.1 F | HEIGHT: 62 IN | HEART RATE: 82 BPM | OXYGEN SATURATION: 96 %

## 2024-12-11 DIAGNOSIS — C50.411 CARCINOMA OF UPPER-OUTER QUADRANT OF RIGHT BREAST IN FEMALE, ESTROGEN RECEPTOR POSITIVE (HCC): Primary | ICD-10-CM

## 2024-12-11 DIAGNOSIS — Z17.0 CARCINOMA OF UPPER-OUTER QUADRANT OF RIGHT BREAST IN FEMALE, ESTROGEN RECEPTOR POSITIVE (HCC): Primary | ICD-10-CM

## 2024-12-11 DIAGNOSIS — E66.811 OBESITY, CLASS I, BMI 30-34.9: ICD-10-CM

## 2024-12-11 DIAGNOSIS — Z85.3 PERSONAL HISTORY OF MALIGNANT NEOPLASM OF BREAST: ICD-10-CM

## 2024-12-11 PROCEDURE — 1159F MED LIST DOCD IN RCRD: CPT | Performed by: SURGERY

## 2024-12-11 PROCEDURE — 1125F AMNT PAIN NOTED PAIN PRSNT: CPT | Performed by: SURGERY

## 2024-12-11 PROCEDURE — 99205 OFFICE O/P NEW HI 60 MIN: CPT | Performed by: SURGERY

## 2024-12-11 PROCEDURE — 1123F ACP DISCUSS/DSCN MKR DOCD: CPT | Performed by: SURGERY

## 2024-12-11 RX ORDER — SODIUM CHLORIDE 0.9 % (FLUSH) 0.9 %
5-40 SYRINGE (ML) INJECTION PRN
OUTPATIENT
Start: 2024-12-11

## 2024-12-11 RX ORDER — SODIUM CHLORIDE 0.9 % (FLUSH) 0.9 %
5-40 SYRINGE (ML) INJECTION EVERY 12 HOURS SCHEDULED
OUTPATIENT
Start: 2024-12-11

## 2024-12-11 RX ORDER — SODIUM CHLORIDE 9 MG/ML
INJECTION, SOLUTION INTRAVENOUS PRN
OUTPATIENT
Start: 2024-12-11

## 2024-12-11 ASSESSMENT — ENCOUNTER SYMPTOMS
VOMITING: 0
SHORTNESS OF BREATH: 0
ABDOMINAL PAIN: 0
SORE THROAT: 0
CHEST TIGHTNESS: 0
COLOR CHANGE: 0
BACK PAIN: 1
COUGH: 0
NAUSEA: 0

## 2024-12-11 NOTE — PATIENT INSTRUCTIONS
Patient Education        Learning About Breast Cancer Treatments  Your Care Instructions     Breast cancer means abnormal cells grow out of control in one or both breasts. These cancer cells can spread from the breast to nearby lymph nodes and other tissues. They can also spread to other parts of the body. The type and stage of cancer you have is based on:  Where in the breast the cancer started.  The genetics of those cancer cells.  How far cancer has spread within the breast, to nearby tissues, or to other organs.  What the cancer cells look like under a microscope.  Whether there is cancer in the nearby lymph nodes.  Tests that help find the stage of your cancer can help choose the right treatment for you. These tests can include a breast biopsy, lymph node biopsy, blood tests, and X-rays. You may need other tests as well, such as a bone, CT, or MRI scan. Whether you have more tests depends on your symptoms and the stage of the cancer.  When you find out that you have cancer, you may feel many emotions and may need some help coping. Seek out family, friends, and counselors for support. You also can do things at home to make yourself feel better while you go through treatment. Call the American Cancer Society (1-816.505.3496) or visit its website at www.cancer.org for more information.  How is breast cancer treated?  Your doctor may combine treatments. This is a common way to treat breast cancer. Treatment depends on what type and stage of cancer you have. You may have:  Surgery to remove the cancer.  Radiation. This uses high-dose X-rays to kill cancer cells and shrink tumors.  Chemotherapy. This uses medicine to kill cancer cells.  Hormone therapy. This uses medicines such as tamoxifen. It limits the effect of the hormone estrogen. This hormone can help some types of breast cancer cells to grow.  Targeted therapy. This uses medicines such as trastuzumab (Herceptin) to help keep cancer from growing or

## 2024-12-11 NOTE — PROGRESS NOTES
NEW BREAST PATIENT       Patient accepts doctor student to be present/perform exam.    SERVICE DATE: 24  SERVICE TIME:  8:30 AM EST    REFERRED BY:  Ludin Woods MD  REASON FOR TODAY'S VISIT:    Chief Complaint   Patient presents with    New Patient     BIRADS 4 right breast, did not feel any changes until the biopsy, denies skin changes, breast pain, nipple drainage/retraction bilateral breasts.      CHAPERONE WAS OFFERED, PATIENT RESPONDED: no    HISTORY AND CHIEF COMPLAINT:  Valeria Laird is a 74 y.o.  female who is here for a New Patient (BIRADS 4 right breast, did not feel any changes until the biopsy, denies skin changes, breast pain, nipple drainage/retraction bilateral breasts.)  She has no complaints.      BREAST HISTORY  Her past breast history (prior to this encounter) is as follows:  Abnormal mammogram:   Yes, BIRADS 4 Right Breast  Abnormal Breast US:  Yes, BIRADS 4 Right Breast  Breast biopsy:    Yes, Right Breast Biopsy completed 2024  Breast cysts:    No  Breast surgery:    No  Breast cancer              No  History of Breastfeeding: Yes   Currently Breastfeeding: No      RISK FACTORS FOR BREAST CANCER:  Family History of Breast Cancer: No.  History of ovarian cancer: Paternal Aunt Dx DX about age 60  Ashkenazi Ancestry: no  Age at the birth of first child: 28  Age at the onset of menses: 14  Age at menopause: 50's   Hormonal therapy: no  Postmenopausal obesity: BMI 31.52    BRA SIZE: 38C    Past Medical History:   Diagnosis Date    Allergic rhinitis     Anxiety     History of blood transfusion     blood transfusions with c section x 3    Hyperlipidemia     meds > 2 yrs    Osteoarthritis     Sleep apnea     Type 2 diabetes mellitus 2022     Past Surgical History:   Procedure Laterality Date    ANKLE SURGERY Right 2000    tendon repair     SECTION   &  &     pt has had 3     COLONOSCOPY      COLONOSCOPY N/A 2020    COLONOSCOPY

## 2024-12-12 ENCOUNTER — HOSPITAL ENCOUNTER (OUTPATIENT)
Dept: PHYSICAL THERAPY | Age: 74
Setting detail: THERAPIES SERIES
Discharge: HOME | End: 2024-12-12
Payer: MEDICARE

## 2024-12-12 ENCOUNTER — TELEPHONE (OUTPATIENT)
Dept: SURGERY | Age: 74
End: 2024-12-12

## 2024-12-12 PROCEDURE — 97140 MANUAL THERAPY 1/> REGIONS: CPT

## 2024-12-12 PROCEDURE — 97110 THERAPEUTIC EXERCISES: CPT

## 2024-12-12 NOTE — PROGRESS NOTES
Long Term Goals Completed by 4-6 weeks from date of evaluation Current Status Goal Status   Pt reports having 0-1/10 low back pain for 75% of her day Pt reports varies between varies from 0/10 to 7/10 at times Not Met   Increase pts core and B LE strengh to 4+/5 to 5/5 so that pt can be on her feet for longer periods of time with less pain Pt good with her strength and can be on her feet for >1 hour is moving but if standing still only able to tolerate 10-15 min In progress   Pt able to ambulated >440'x 1 without AD with equal step length B LE indep and pain 2/10 or less Pt ambulated >440'x1 wtih equal step length and stance time with between 2-3/10 Partially met, In progress   Improve pts Oswestry disability score from 34% on evaluation to <15% by time of DC Recheck 12/9/24 Oswestry:30% Not Met, In progress   Pt indep with an advanced HEP to progress with her mobility and decrease her pain Pt progressing with her HEP In progress                                                TREATMENT PLAN   Plan Frequency: 1-2  Plan weeks: 4-6 from recheck  Current Treatment Recommendations: Strengthening, Functional mobility training, Manual, Pain management, Home exercise program, Modalities, Therapeutic activities  Modalities: Heat/Cold, E-stim - unattended, Ultrasound           Therapy Time  Individual Time In:  845       Individual Time Out:  935  Minutes:  50        Electronically signed by Cheko Win PTA  on 12/12/2024 at 12:51 PM   POC NOTE

## 2024-12-12 NOTE — TELEPHONE ENCOUNTER
I contacted the patient to communicate her pre-admission testing appointment date and time. The patient is scheduled for her pre-admission testing in 1/6/2025 at 8:00 am. The patient confirmed date, time and location. We discussed expectations of the appointment. The patient does not have any further questions at this time.

## 2024-12-16 ENCOUNTER — HOSPITAL ENCOUNTER (OUTPATIENT)
Dept: PHYSICAL THERAPY | Age: 74
Setting detail: THERAPIES SERIES
Discharge: HOME | End: 2024-12-16
Payer: MEDICARE

## 2024-12-16 DIAGNOSIS — M54.50 CHRONIC LEFT-SIDED LOW BACK PAIN WITHOUT SCIATICA: ICD-10-CM

## 2024-12-16 DIAGNOSIS — G89.29 CHRONIC LEFT-SIDED LOW BACK PAIN WITHOUT SCIATICA: ICD-10-CM

## 2024-12-16 DIAGNOSIS — M25.511 CHRONIC RIGHT SHOULDER PAIN: ICD-10-CM

## 2024-12-16 DIAGNOSIS — E11.9 TYPE 2 DIABETES MELLITUS WITHOUT COMPLICATION, WITHOUT LONG-TERM CURRENT USE OF INSULIN (HCC): ICD-10-CM

## 2024-12-16 DIAGNOSIS — G89.29 CHRONIC RIGHT SHOULDER PAIN: ICD-10-CM

## 2024-12-16 PROCEDURE — G0283 ELEC STIM OTHER THAN WOUND: HCPCS

## 2024-12-16 PROCEDURE — 97140 MANUAL THERAPY 1/> REGIONS: CPT

## 2024-12-16 PROCEDURE — 97530 THERAPEUTIC ACTIVITIES: CPT

## 2024-12-16 NOTE — PROGRESS NOTES
4-6 weeks from date of evaluation Current Status Goal Status   Pt reports having 0-1/10 low back pain for 75% of her day Pt reports varies between varies from 0/10 to 7/10 at times Not Met   Increase pts core and B LE strengh to 4+/5 to 5/5 so that pt can be on her feet for longer periods of time with less pain Pt good with her strength and can be on her feet for >1 hour is moving but if standing still only able to tolerate 10-15 min In progress   Pt able to ambulated >440'x 1 without AD with equal step length B LE indep and pain 2/10 or less Pt ambulated >440'x1 wtih equal step length and stance time with between 2-3/10 Partially met, In progress   Improve pts Oswestry disability score from 34% on evaluation to <15% by time of DC Recheck 12/9/24 Oswestry:30% Not Met, In progress   Pt indep with an advanced HEP to progress with her mobility and decrease her pain Pt progressing with her HEP In progress                                                TREATMENT PLAN   Plan Frequency: 1-2  Plan weeks: 4-6 from recheck  Current Treatment Recommendations: Strengthening, Functional mobility training, Manual, Pain management, Home exercise program, Modalities, Therapeutic activities  Modalities: Heat/Cold, E-stim - unattended, Ultrasound           Therapy Time  Individual Time In:      11:15pm    Individual Time Out:  12:10pm   Minutes:  55 min         Electronically signed by Jamee Mckeon PT  on 12/16/2024 at 3:19 PM

## 2024-12-16 NOTE — TELEPHONE ENCOUNTER
Comments:     Last Office Visit (last PCP visit):   9/16/2024    Next Visit Date:  Future Appointments   Date Time Provider Department Center   12/18/2024  9:45 AM [facility]   12/18/2024 11:15 AM Jamee Mckeon PT MALZ  PT MALZ [place]   12/23/2024  8:45 AM OlMary martino, PTA MALZ  PT MALZ [place]   1/2/2025  9:30 AM Mary Bradshaw PTA MALZ  PT MALZ [place]   1/6/2025  8:00 AM MLOZ PAT RM 1 NP MLOZ PAT MOLZ [place]   1/6/2025  9:30 AM SCHEDULE, MLOZ RAD ONC NURSE MLOZ RAD ONC [place] [facility]   1/7/2025  2:00 PM SCHEDULE, MLOX ELYRIA BREAST BIRDIE NURSE/MA MLOX ELY BS [facility]   1/9/2025  8:00 AM [place] NUCLEAR MEDICINE ROOM 2 MLOZ NUC MED MOLZ Fac RAD   1/22/2025  9:00 AM Angeli Yusuf MD MLOX OBLN [facility]       **If hasn't been seen in over a year OR hasn't followed up according to last diabetes/ADHD visit, make appointment for patient before sending refill to provider.    Rx requested:  Requested Prescriptions     Pending Prescriptions Disp Refills    meloxicam (MOBIC) 15 MG tablet [Pharmacy Med Name: meloxicam 15 mg tablet] 90 tablet 0     Sig: TAKE 1 TABLET BY MOUTH EVERY DAY    metFORMIN (GLUCOPHAGE-XR) 750 MG extended release tablet [Pharmacy Med Name: metformin  mg tablet,extended release 24 hr] 360 tablet 0     Sig: TAKE 1 TABLET BY MOUTH TWICE DAILY IN THE MORNING & AT BEDTIME

## 2024-12-17 DIAGNOSIS — E11.9 TYPE 2 DIABETES MELLITUS WITHOUT COMPLICATION, WITHOUT LONG-TERM CURRENT USE OF INSULIN (HCC): ICD-10-CM

## 2024-12-17 RX ORDER — MELOXICAM 15 MG/1
15 TABLET ORAL DAILY
Qty: 90 TABLET | Refills: 0 | Status: SHIPPED | OUTPATIENT
Start: 2024-12-17

## 2024-12-17 RX ORDER — BLOOD SUGAR DIAGNOSTIC
STRIP MISCELLANEOUS
Qty: 100 STRIP | Refills: 0 | Status: SHIPPED | OUTPATIENT
Start: 2024-12-17

## 2024-12-17 RX ORDER — METFORMIN HYDROCHLORIDE 750 MG/1
TABLET, EXTENDED RELEASE ORAL
Qty: 360 TABLET | Refills: 0 | Status: SHIPPED | OUTPATIENT
Start: 2024-12-17

## 2024-12-17 RX ORDER — LANCETS 30 GAUGE
1 EACH MISCELLANEOUS 2 TIMES DAILY
Qty: 1 EACH | Refills: 5 | Status: SHIPPED | OUTPATIENT
Start: 2024-12-17

## 2024-12-17 NOTE — TELEPHONE ENCOUNTER
Comments:     Last Office Visit (last PCP visit):   9/16/2024    Next Visit Date:  Future Appointments   Date Time Provider Department Center   12/18/2024  9:45 AM [facility]   12/18/2024 11:15 AM Jamee Mckeon, PT MALZ  PT MALZ [place]   12/23/2024  8:45 AM OlMary martino, PTA MALZ  PT MALZ [place]   1/2/2025  9:30 AM Mary Bradshaw PTA MALZ  PT MALZ [place]   1/6/2025  8:00 AM MLOZ PAT RM 1 NP MLOZ PAT MOLZ [place]   1/6/2025  9:30 AM SCHEDULE, MLOZ RAD ONC NURSE MLOZ RAD ONC [place] [facility]   1/7/2025  2:00 PM SCHEDULE, MLOX ELYRIA BREAST BIRDIE NURSE/MA MLOX ELY BS [facility]   1/9/2025  8:00 AM [place] NUCLEAR MEDICINE ROOM 2 MLOZ NUC MED MOLZ Fac RAD   1/22/2025  9:00 AM Angeli Yusuf MD MLOX OBLN [facility]       **If hasn't been seen in over a year OR hasn't followed up according to last diabetes/ADHD visit, make appointment for patient before sending refill to provider.    Rx requested:  Requested Prescriptions     Pending Prescriptions Disp Refills    Lancets MISC 1 each 5     Sig: Inject 1 each into the skin 2 times daily DX: diabetes mellitus. Ok to substitute per insurance (1 each = 1 box)               
TM RED

## 2024-12-17 NOTE — TELEPHONE ENCOUNTER
Comments:     Last Office Visit (last PCP visit):   9/16/2024    Next Visit Date:  Future Appointments   Date Time Provider Department Center   12/18/2024  9:45 AM [facility]   12/18/2024 11:15 AM Jamee Mckeon, PT MALZ  PT MALZ [place]   12/23/2024  8:45 AM OlMary martino, PTA MALZ  PT MALZ [place]   1/2/2025  9:30 AM Mary Bradshaw PTA MALZ  PT MALZ [place]   1/6/2025  8:00 AM MLOZ PAT RM 1 NP MLOZ PAT MOLZ [place]   1/6/2025  9:30 AM SCHEDULE, MLOZ RAD ONC NURSE MLOZ RAD ONC [place] [facility]   1/7/2025  2:00 PM SCHEDULE, MLOX ELYRIA BREAST BIRDIE NURSE/MA MLOX ELY BS [facility]   1/9/2025  8:00 AM RY[place] NUCLEAR MEDICINE ROOM 2 MLOZ NUC MED MOLZ Fac RAD   1/22/2025  9:00 AM Angeli Yusuf MD MLOX OBLN [facility]       **If hasn't been seen in over a year OR hasn't followed up according to last diabetes/ADHD visit, make appointment for patient before sending refill to provider.    Rx requested:  Requested Prescriptions     Pending Prescriptions Disp Refills    blood glucose test strips (ACCU-CHEK GUIDE) strip 100 strip 0     Sig: use 1 strip to check glucose 3 times daily

## 2024-12-18 ENCOUNTER — OFFICE VISIT (OUTPATIENT)
Dept: PULMONOLOGY | Age: 74
End: 2024-12-18
Payer: MEDICARE

## 2024-12-18 ENCOUNTER — HOSPITAL ENCOUNTER (OUTPATIENT)
Dept: PHYSICAL THERAPY | Age: 74
Setting detail: THERAPIES SERIES
Discharge: HOME | End: 2024-12-18
Payer: MEDICARE

## 2024-12-18 VITALS
SYSTOLIC BLOOD PRESSURE: 120 MMHG | OXYGEN SATURATION: 96 % | DIASTOLIC BLOOD PRESSURE: 78 MMHG | WEIGHT: 171 LBS | BODY MASS INDEX: 31.27 KG/M2 | HEART RATE: 94 BPM

## 2024-12-18 DIAGNOSIS — E66.9 OBESITY (BMI 30-39.9): ICD-10-CM

## 2024-12-18 DIAGNOSIS — Z87.891 PERSONAL HISTORY OF TOBACCO USE: Primary | ICD-10-CM

## 2024-12-18 DIAGNOSIS — G47.33 OSA (OBSTRUCTIVE SLEEP APNEA): ICD-10-CM

## 2024-12-18 DIAGNOSIS — J41.0 SIMPLE CHRONIC BRONCHITIS (HCC): ICD-10-CM

## 2024-12-18 DIAGNOSIS — R93.89 ABNORMAL CT OF THE CHEST: ICD-10-CM

## 2024-12-18 PROCEDURE — G0296 VISIT TO DETERM LDCT ELIG: HCPCS | Performed by: INTERNAL MEDICINE

## 2024-12-18 PROCEDURE — 97110 THERAPEUTIC EXERCISES: CPT

## 2024-12-18 PROCEDURE — 99214 OFFICE O/P EST MOD 30 MIN: CPT | Performed by: INTERNAL MEDICINE

## 2024-12-18 PROCEDURE — 1123F ACP DISCUSS/DSCN MKR DOCD: CPT | Performed by: INTERNAL MEDICINE

## 2024-12-18 PROCEDURE — 1159F MED LIST DOCD IN RCRD: CPT | Performed by: INTERNAL MEDICINE

## 2024-12-18 ASSESSMENT — PAIN SCALES - GENERAL
PAINLEVEL_OUTOF10: 2
PAINLEVEL_OUTOF10: 2

## 2024-12-18 ASSESSMENT — PAIN DESCRIPTION - ORIENTATION: ORIENTATION: LEFT

## 2024-12-18 ASSESSMENT — PAIN DESCRIPTION - LOCATION: LOCATION: BUTTOCKS

## 2024-12-18 ASSESSMENT — PAIN DESCRIPTION - PAIN TYPE: TYPE: CHRONIC PAIN

## 2024-12-18 NOTE — PROGRESS NOTES
Subjective:             Valeria Laird is a 74 y.o. female who complains today of:     Chief Complaint   Patient presents with    Follow-up     6m f/u on AMANDA       HPI  She is using CPAP with  7-10 centimeters of H2O with heated humidity.  She is using CPAP for about  7-8  hours every night.  She is using CPAP with full face Mask.  She said  sleep is restful with the CPAP use.  She is compliant with CPAP therapy and benefiting with CPAP use.  No snoring with CPAP use.  No complaint of daytime sleepiness or tiredness with CPAP use.  She denies taking naps.  No sleepiness with driving.   She denies difficulty falling asleep or staying asleep.     I reviewed compliance report with patient regarding CPAP therapy. She is using  CPAP for 26 days out of 30 days. Average usage of days used is 8 hours and 2  min , average AHI 2.9 with CPAP use.         She is smoking 1/2 ppd . CXR 10/23/23 no active disease  No C/o shortness of breath No Wheezing. C/o Cough with yellow Sputum , chronic  No Hemoptysis. No Chest tightness. No Chest pain with radiation  or pleuritic pain.  No  leg edema. No Fever or chills.no  C/o  Rhinorrhea and postnasal drip        Ct chest 6/8/24  IMPRESSION:  Chronic changes as described above without acute pulmonary process.  No  isolated dominant nodule or mass.     LUNG RADS:  Lung rads category 1 benign appearance follow-up recommended CT chest 12  months or annual screening.  11/22  There is no pulmonary infiltrate, mass or suspicious pulmonary nodule.Stable nodule seen within the right lower lobe, right middle lobe and lingula when compared with the patient's prior study.  All the nodules measure less than 5 mm.    Allergies:  Molds & smuts, Other, Tomato, Influenza vaccines, and Seasonal  Past Medical History:   Diagnosis Date    Allergic rhinitis     Anxiety     History of blood transfusion     blood transfusions with c section x 3    Hyperlipidemia     meds > 2 yrs    Osteoarthritis

## 2024-12-18 NOTE — PATIENT INSTRUCTIONS
follow-up, he or she will help you understand what to do next.  After a lung cancer screening, you can go back to your usual activities right away.  A lung cancer screening test can't tell if you have lung cancer. If your results are positive, your doctor can't tell whether an abnormal finding is a harmless nodule, cancer, or something else without doing more tests.  What can you do to help prevent lung cancer?  Some lung cancers can't be prevented. But if you smoke, quitting smoking is the best step you can take to prevent lung cancer. If you want to quit, your doctor can recommend medicines or other ways to help.  Follow-up care is a key part of your treatment and safety. Be sure to make and go to all appointments, and call your doctor if you are having problems. It's also a good idea to know your test results and keep a list of the medicines you take.  Where can you learn more?  Go to https://www.[employer].net/patientEd and enter Q940 to learn more about \"Learning About Lung Cancer Screening.\"  Current as of: October 25, 2023  Content Version: 14.2  © 2024 [employer].   Care instructions adapted under license by [employer]. If you have questions about a medical condition or this instruction, always ask your healthcare professional. [employer] disclaims any warranty or liability for your use of this information.

## 2024-12-18 NOTE — PROGRESS NOTES
Physical Therapy: Daily Note   Patient: Valeria Laird (74 y.o. female)   Examination Date: 2024  Plan of Care/Certification Expiration Date: 25    Progress Note Counter: Pt was out ot town 24 to 24; Recheck completed on 24 and since only 4 visits at that time, plan to continue 1-2x per week for an additional 4-6 weeks or 8 more visits and then DC to HEP     :  1950 # of Visits since SOC:   7   MRN: [id number]  CSN: [id number] Start of Care Date:   2024   Insurance: Payor: T MEDICARE / Plan: AETNA MEDICARE-ADVANTAGE PPO / Product Type: Medicare /   Insurance ID: [id number] - (Medicare Managed) Secondary Insurance (if applicable):    Referring Physician: Phu Hubbard MD [place]   PCP: Ludin Woods MD Visits to Date/Visits Approved:     No Show/Cancelled Appts: 0 / 0     Medical Diagnosis: No admission diagnoses are documented for this encounter.    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    HEP Compliance: Good        OBJECTIVE EXAMINATION   Restrictions:  No data recorded No data recorded No data recorded      TREATMENT     Exercises:      Treatment Reasoning    Exercise 1: Supine HS stretch hold 30 sec x 3  Exercise 3: *pelvic tilt x 10H 5 sec  Exercise 6: supine IT band stretch 30 sec x 3 B with sheet  Exercise 7: PPT with bridge and ball squeeze x 10 hold 3 sec  Exercise 8: *PPT with hip ABD GTB x 10 hold 5 sec  Exercise 9: PPT with alt march on blue bolster with abdominal bracing x 10 hold 1

## 2024-12-20 LAB
Lab: NEGATIVE
Lab: NORMAL

## 2024-12-23 ENCOUNTER — TELEPHONE (OUTPATIENT)
Dept: SURGERY | Age: 74
End: 2024-12-23

## 2024-12-23 ENCOUNTER — HOSPITAL ENCOUNTER (OUTPATIENT)
Dept: PHYSICAL THERAPY | Age: 74
Setting detail: THERAPIES SERIES
Discharge: HOME | End: 2024-12-23
Payer: MEDICARE

## 2024-12-23 PROCEDURE — 97110 THERAPEUTIC EXERCISES: CPT

## 2024-12-23 PROCEDURE — 97140 MANUAL THERAPY 1/> REGIONS: CPT

## 2024-12-23 ASSESSMENT — PAIN SCALES - GENERAL: PAINLEVEL_OUTOF10: 2

## 2024-12-23 ASSESSMENT — PAIN DESCRIPTION - ORIENTATION: ORIENTATION: LEFT

## 2024-12-23 ASSESSMENT — PAIN DESCRIPTION - LOCATION: LOCATION: BUTTOCKS

## 2024-12-23 ASSESSMENT — PAIN DESCRIPTION - DESCRIPTORS: DESCRIPTORS: ACHING

## 2024-12-23 ASSESSMENT — PAIN DESCRIPTION - PAIN TYPE: TYPE: CHRONIC PAIN

## 2024-12-23 NOTE — PROGRESS NOTES
week Met                                                                       Long Term Goals Completed by 4-6 weeks from date of evaluation Current Status Goal Status   Pt reports having 0-1/10 low back pain for 75% of her day Pt reports varies between varies from 0/10 to 7/10 at times Not Met   Increase pts core and B LE strengh to 4+/5 to 5/5 so that pt can be on her feet for longer periods of time with less pain Pt good with her strength and can be on her feet for >1 hour is moving but if standing still only able to tolerate 10-15 min In progress   Pt able to ambulated >440'x 1 without AD with equal step length B LE indep and pain 2/10 or less Pt. ambulated 440' x 1 with no inc in pain, focus on upright posture and forward gaze. Met   Improve pts Oswestry disability score from 34% on evaluation to <15% by time of DC Recheck 12/9/24 Oswestry:30% Not Met, In progress   Pt indep with an advanced HEP to progress with her mobility and decrease her pain Pt progressing with her HEP In progress                                                TREATMENT PLAN   Plan Frequency: 1-2  Plan weeks: 4-6 from recheck  Current Treatment Recommendations: Strengthening, Functional mobility training, Manual, Pain management, Home exercise program, Modalities, Therapeutic activities  Modalities: Heat/Cold, E-stim - unattended, Ultrasound   Additional Comments: KT tape       Therapy Time  Individual Time In:       845  Individual Time Out:  930  Minutes:  45        Electronically signed by Mary Bradshaw PTA  on 12/23/2024 at 9:52 AM   POC NOTE

## 2024-12-23 NOTE — TELEPHONE ENCOUNTER
I was calling the patient with negative genetic testing results. LMOM for the patient to return a call to the office.

## 2024-12-24 NOTE — TELEPHONE ENCOUNTER
Patient was informed that her genetic test results are negative for a deleterious mutation. The patient was informed to continue the current treatment / follow up plan as previously recommended by Dr. Yusuf. She will receive a copy of the test results in the mail. In the information packet, there is information to contact a genetic counselor. This is free of charge and Dr. Yusuf highly recommends all her patients to contact them.The patient verbalized understanding of her negative genetic testing results and has no questions at this time.

## 2024-12-25 ASSESSMENT — ENCOUNTER SYMPTOMS
WHEEZING: 0
EYE DISCHARGE: 0
SORE THROAT: 0
VOICE CHANGE: 0
DIARRHEA: 0
TROUBLE SWALLOWING: 0
SHORTNESS OF BREATH: 0
COUGH: 0
EYE ITCHING: 0
SINUS PRESSURE: 0
VOMITING: 0
ABDOMINAL PAIN: 0
RHINORRHEA: 0
CHEST TIGHTNESS: 0
NAUSEA: 0

## 2024-12-30 DIAGNOSIS — F41.9 ANXIETY: ICD-10-CM

## 2024-12-30 RX ORDER — CITALOPRAM HYDROBROMIDE 20 MG/1
TABLET ORAL
Qty: 90 TABLET | Refills: 4 | Status: SHIPPED | OUTPATIENT
Start: 2024-12-30

## 2024-12-30 NOTE — TELEPHONE ENCOUNTER
Comments:     Last Office Visit (last PCP visit):   9/16/2024    Next Visit Date:  Future Appointments   Date Time Provider Department Center   1/2/2025  9:30 AM Mary Bradshaw PTA [facility]  [facility]   1/6/2025  8:00 AM MLOZ PAT RM 1 NP MLOZ PAT [facility] [place]   1/6/2025  9:30 AM SCHEDULE, MLOZ RAD ONC NURSE [facility] RAD ONC [place] [facility]   1/7/2025  2:00 PM SCHEDULE, MLOX ELYRIA BREAST BIRDIE NURSE/MA MLOX ELY BS [facility] [place]   1/9/2025  8:00 AM [place] NUCLEAR MEDICINE ROOM 2 MLOZ NUC MED [facility] Fac RAD   1/22/2025  9:00 AM Angeli Yusuf MD MLOX OBLN BS [facility] [place]   6/18/2025 10:30 AM [facility]         Rx requested:  Requested Prescriptions     Pending Prescriptions Disp Refills    citalopram (CELEXA) 20 MG tablet 90 tablet 4     Sig: TAKE 1 TABLET DAILY

## 2025-01-02 ENCOUNTER — HOSPITAL ENCOUNTER (OUTPATIENT)
Dept: PHYSICAL THERAPY | Age: 75
Setting detail: THERAPIES SERIES
Discharge: HOME | End: 2025-01-02
Payer: MEDICARE

## 2025-01-02 PROCEDURE — 97110 THERAPEUTIC EXERCISES: CPT

## 2025-01-02 PROCEDURE — 97140 MANUAL THERAPY 1/> REGIONS: CPT

## 2025-01-02 ASSESSMENT — PAIN DESCRIPTION - PAIN TYPE: TYPE: CHRONIC PAIN

## 2025-01-02 ASSESSMENT — PAIN DESCRIPTION - LOCATION: LOCATION: BUTTOCKS

## 2025-01-02 ASSESSMENT — PAIN SCALES - GENERAL: PAINLEVEL_OUTOF10: 2

## 2025-01-02 ASSESSMENT — PAIN DESCRIPTION - ORIENTATION: ORIENTATION: LEFT

## 2025-01-02 NOTE — PROGRESS NOTES
Physical Therapy  Physical Therapy: Daily Note   Patient: Valeria Laird (74 y.o. female)   Examination Date: 2025  Plan of Care/Certification Expiration Date: 25    Progress Note Counter: Pt was out ot town 24 to 24; Recheck completed on 24 and since only 4 visits at that time, plan to continue 1-2x per week for an additional 4-6 weeks or 8 more visits and then DC to HEP     :  1950 # of Visits since SOC:   9   MRN: [id number]  CSN: [id number] Start of Care Date:   2024   Insurance: Payor: [facility] MEDICARE / Plan: [employer] MEDICARE-ADVANTAGE PPO / Product Type: Medicare /   Insurance ID: [id number] - (Medicare Managed) Secondary Insurance (if applicable):    Referring Physician: Phu Hubbard MD [place]   PCP: Ludin Woods MD Visits to Date/Visits Approved:     No Show/Cancelled Appts: 0 / 0     Medical Diagnosis: No admission diagnoses are documented for this encounter.    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 reports she has 2/10 L buttock radiating to L knee and outer thigh.    HEP Compliance: Good        OBJECTIVE EXAMINATION   Restrictions:  No data recorded No data recorded No data recorded              TREATMENT     Exercises:      Treatment Reasoning    Exercise 1: Supine HS stretch hold 30 sec x 3  Exercise 6: supine IT band stretch 30 sec x 3 B with sheet  Exercise 7: PPT with bridge and ball squeeze 2 x 10 hold 3 sec  Exercise 8: *PPT with hip ABD BTB 2 x10 hold 5 sec  Exercise 9: prone alt hip ext with pillow under abdomen x 10 VC for abd bracing  Exercise 10: prone swimmer x 3 inc pain deferred  Exercise 11: prone super man review pt. doing

## 2025-01-03 DIAGNOSIS — F41.9 ANXIETY: ICD-10-CM

## 2025-01-05 RX ORDER — CITALOPRAM HYDROBROMIDE 20 MG/1
TABLET ORAL
Qty: 90 TABLET | Refills: 3 | OUTPATIENT
Start: 2025-01-05

## 2025-01-06 ENCOUNTER — HOSPITAL ENCOUNTER (OUTPATIENT)
Dept: PREADMISSION TESTING | Age: 75
Discharge: HOME | End: 2025-01-10
Payer: MEDICARE

## 2025-01-06 ENCOUNTER — HOSPITAL ENCOUNTER (OUTPATIENT)
Dept: RADIATION ONCOLOGY | Age: 75
Discharge: HOME | End: 2025-01-06
Payer: MEDICARE

## 2025-01-06 ENCOUNTER — PATIENT MESSAGE (OUTPATIENT)
Age: 75
End: 2025-01-06

## 2025-01-06 ENCOUNTER — HOSPITAL ENCOUNTER (OUTPATIENT)
Dept: PHYSICAL THERAPY | Age: 75
Setting detail: THERAPIES SERIES
Discharge: HOME | End: 2025-01-06
Payer: MEDICARE

## 2025-01-06 VITALS
SYSTOLIC BLOOD PRESSURE: 157 MMHG | OXYGEN SATURATION: 97 % | BODY MASS INDEX: 30.79 KG/M2 | WEIGHT: 173.8 LBS | TEMPERATURE: 97.3 F | HEART RATE: 78 BPM | DIASTOLIC BLOOD PRESSURE: 88 MMHG | RESPIRATION RATE: 18 BRPM

## 2025-01-06 VITALS
SYSTOLIC BLOOD PRESSURE: 144 MMHG | WEIGHT: 174.6 LBS | HEIGHT: 63 IN | HEART RATE: 86 BPM | RESPIRATION RATE: 16 BRPM | OXYGEN SATURATION: 98 % | TEMPERATURE: 97.5 F | BODY MASS INDEX: 30.94 KG/M2 | DIASTOLIC BLOOD PRESSURE: 80 MMHG

## 2025-01-06 DIAGNOSIS — F41.9 ANXIETY: ICD-10-CM

## 2025-01-06 DIAGNOSIS — Z17.0 CARCINOMA OF UPPER-OUTER QUADRANT OF RIGHT BREAST IN FEMALE, ESTROGEN RECEPTOR POSITIVE (HCC): ICD-10-CM

## 2025-01-06 DIAGNOSIS — C50.411 CARCINOMA OF UPPER-OUTER QUADRANT OF RIGHT BREAST IN FEMALE, ESTROGEN RECEPTOR POSITIVE (HCC): ICD-10-CM

## 2025-01-06 DIAGNOSIS — Z72.0 TOBACCO ABUSE: Primary | ICD-10-CM

## 2025-01-06 PROBLEM — M25.562 CHRONIC PAIN OF BOTH KNEES: Status: RESOLVED | Noted: 2018-01-04 | Resolved: 2025-01-06

## 2025-01-06 PROBLEM — G89.29 CHRONIC PAIN OF BOTH KNEES: Status: RESOLVED | Noted: 2018-01-04 | Resolved: 2025-01-06

## 2025-01-06 PROBLEM — M25.561 CHRONIC PAIN OF BOTH KNEES: Status: RESOLVED | Noted: 2018-01-04 | Resolved: 2025-01-06

## 2025-01-06 LAB
ABO/RH: NORMAL
ANTIBODY SCREEN: NORMAL

## 2025-01-06 PROCEDURE — 83036 HEMOGLOBIN GLYCOSYLATED A1C: CPT

## 2025-01-06 PROCEDURE — 99497 ADVNCD CARE PLAN 30 MIN: CPT | Performed by: RADIOLOGY

## 2025-01-06 PROCEDURE — 86901 BLOOD TYPING SEROLOGIC RH(D): CPT

## 2025-01-06 PROCEDURE — 99212 OFFICE O/P EST SF 10 MIN: CPT | Performed by: RADIOLOGY

## 2025-01-06 PROCEDURE — 99406 BEHAV CHNG SMOKING 3-10 MIN: CPT | Performed by: RADIOLOGY

## 2025-01-06 PROCEDURE — 86900 BLOOD TYPING SEROLOGIC ABO: CPT

## 2025-01-06 PROCEDURE — 99205 OFFICE O/P NEW HI 60 MIN: CPT | Performed by: RADIOLOGY

## 2025-01-06 PROCEDURE — 86850 RBC ANTIBODY SCREEN: CPT

## 2025-01-06 RX ORDER — ACETAMINOPHEN 160 MG
2000 TABLET,DISINTEGRATING ORAL DAILY
COMMUNITY

## 2025-01-06 RX ORDER — CITALOPRAM HYDROBROMIDE 20 MG/1
TABLET ORAL
Qty: 90 TABLET | Refills: 3 | OUTPATIENT
Start: 2025-01-06

## 2025-01-06 ASSESSMENT — ENCOUNTER SYMPTOMS
BACK PAIN: 1
SORE THROAT: 0
SHORTNESS OF BREATH: 0
EYE PAIN: 0
CONSTIPATION: 0
EYE REDNESS: 0
EYE ITCHING: 0
APNEA: 1
TROUBLE SWALLOWING: 0
RHINORRHEA: 0
SINUS PAIN: 0
DIARRHEA: 0
PHOTOPHOBIA: 0
EYE DISCHARGE: 0
WHEEZING: 0
VOMITING: 0
COUGH: 0
ABDOMINAL PAIN: 0
FACIAL SWELLING: 0
CHOKING: 0
CHEST TIGHTNESS: 0
NAUSEA: 0
ABDOMINAL DISTENTION: 0
SINUS PRESSURE: 0

## 2025-01-06 NOTE — PROGRESS NOTES
ALIYA met with pt and her daughter, Tea, as she was seen for consulation in radiation oncology for treatment of breast cancer. Both present as extremely pleasant, and they appear to enjoy a mutually supportive relationship with one another. Pt endorsed a distress of 4/10 today and she is requesting information about a Living Will.    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 She states she has another son and daughter, who both live out of state. Pt states she is the 5th of 12 children within her family of origin, and she describes close relationships (though physically distant) relationships with her 8 surviving siblings. Either she or Tea report they are able to provide transportation to treatment.    Pt reports she has completed a Health Care Power of , but she would also wish to complete the Living Will.  SW provided her with the documents along with a brief explanation of the content with instructions for completing. She was also made aware she may contact SW for assistance.    Supportive services at the cancer center were discussed. Pt was provided with ALIYA business card and invited to contact ALIYA with questions or concerns. ALIYA will meet with pt again for pre-breast surgery education and further support on 1/7/2025.

## 2025-01-06 NOTE — PROGRESS NOTES
Physical Therapy  Physical Therapy: Daily Note   Patient: Valeria Laird (74 y.o. female)   Examination Date: 2025  Plan of Care/Certification Expiration Date: 25    Progress Note Counter: Pt was out ot town 24 to 24; Recheck completed on 24 and since only 4 visits at that time, plan to continue 1-2x per week for an additional 4-6 weeks or 8 more visits and then DC to HEP     :  1950 # of Visits since SOC:   10   MRN: [id number]  CSN: [id number] Start of Care Date:   2024   Insurance: Payor: [employer] MEDICARE / Plan: [employer] MEDIC[employer]ADVANTAGE PPO / Product Type: Medicare /   Insurance ID: [id number] - (Medicare Managed) Secondary Insurance (if applicable):    Referring Physician: Phu Hubbard MD [place]   PCP: Ludin Woods MD Visits to Date/Visits Approved:     No Show/Cancelled Appts: 0 / 1     Medical Diagnosis: No admission diagnoses are documented for this encounter.    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 cancelled at MD appointment.      Individual Time In:         Individual Time Out:    Minutes:  0        Electronically signed by Mary Bradshaw PTA  on 2025 at 4:47 PM   POC NOTE

## 2025-01-06 NOTE — PROGRESS NOTES
NURSING ASSESSMENT     Date: 2025        Patient Name: Valeria Laird     YOB: 1950      Age:  74 y.o.      MRN: [id number]       Chaperone [x] Yes   [] No  daughter, wendi    Advance Directives:   Do you currently have completed advance directives (living will)? [x] Yes   [] No         *If yes, please bring us a copy for your records.  *If no, would you like info or assistance in completing advance directives (living will)?   [] Yes   [x] No    Pain Score:   Pain Score (1-10):  2  Pain Location: left SI  Pain Duration: daily   Pain Management/Control: PT for about 6 weeks now, massage, walking, tylenol      Is pain affecting your ability to take care of yourself or move throughout your home?                        [] Yes   [x] No    General: No Problems  Patient has gained weight [] Yes   [x] No  Patient has lost weight [] Yes   [x] No  How much weight in pounds and over what length of time:     Eyes (Ophthalmic): wears corrective lenses     Skin (Dermatological): No Problems     ENT: No Problems     Respiratory: smokes 1/2 ppd     Cardiovascular: No Problems      Device   [] Yes   [x] No   Copy of Card Obtained [] Yes   [x] No    Gastrointestinal: No Problems    Genito-Urinary: No Problems     Breast: right breast IDC, will have lumpectomy on 25     Musculoskeletal: SI joint pain    Neurological: left foot numb on occasion      Hematological and Lymphatic: No Problems     Endocrine: Diabetes Mellitus    Gyn History:   /Para: 3/3  Age of Menarche: 14  Age of Menopause late 50's  Vaginal Bleeding:  no  First Pregnancy: 27  Breast Feeding:  all   Last Menstrual period: late 50's  Oral Contraceptives: no     Number of years:   Hormone Replacement therapy no     Number of Years:   Last Pap Smear: many years  Last Mammogram 10/25/24    A 10-point review of systems  has been conducted and pertinent positives have been   recorded. All other review of systems are negative    Was the  patient admitted during the course of treatment OR within 30 days of treatment? N/a      PALLIATIVE CARE SCREENING TOOL    Patient Scenarios               (Score 1 Point Each)  The Patient has  A chronic illness with uncontrolled symptoms (e.g. pain, nausea, vomiting, shortness of breath, anorexia)   []  Unresolved psychosocial or spiritual issues                                                                                                     []  Frequent visits to the Emergency Department  (>1 x per month or >2 in last 3 months)                                 []  More than one hospital admission in past 90 days                  []   Complex care requirements (e.g. functional dependency, complex home support for ventilator/antibiotics/feedings, patient in SNF/LTAC/nursing home) []  No advance directives in place                                                                                                                         []  TOTAL FOR SECTION #1- 0      Basic Disease Processes             (Score 3 Points Overall)  Locally advanced or metastatic cancer           []  Non-cancer life-limiting illness (COPD, CHF, advanced dementia, stroke)      []  Total score for section # 2-   0      Concomitant Disease Processes            (Score 1 Point Overall)  COPD               []   Renal Disease              []  CHF               []  Liver Disease              []  Other significant comorbidities (e.g. failure to thrive, feeding intolerance, functional decline)    []  Total score for section # 3- 0      Physician to complete #4, #5, & #6      Symptom Assessment             (Score 1 Point Each)  Severe/Uncontrolled Pain (e.g. patients who are opiate naïve and/or only taking OTC medications OR patients prescribed opiate by non-palliative care provider and pain not adequately controlled OR patient on long-term opiates for chronic pain and high risk for tolerance/abuse) []  Anorexia/failure to

## 2025-01-07 ENCOUNTER — NURSE ONLY (OUTPATIENT)
Dept: SURGERY | Age: 75
End: 2025-01-07

## 2025-01-07 ENCOUNTER — HOSPITAL ENCOUNTER (OUTPATIENT)
Dept: RADIATION ONCOLOGY | Age: 75
Discharge: HOME | End: 2025-01-07
Payer: MEDICARE

## 2025-01-07 ENCOUNTER — HOSPITAL ENCOUNTER (OUTPATIENT)
Dept: OCCUPATIONAL THERAPY | Age: 75
Setting detail: THERAPIES SERIES
Discharge: HOME | End: 2025-01-07
Payer: MEDICARE

## 2025-01-07 ENCOUNTER — CLINICAL DOCUMENTATION (OUTPATIENT)
Dept: RADIATION ONCOLOGY | Age: 75
End: 2025-01-07

## 2025-01-07 DIAGNOSIS — Z17.0 CARCINOMA OF UPPER-OUTER QUADRANT OF RIGHT BREAST IN FEMALE, ESTROGEN RECEPTOR POSITIVE (HCC): Primary | ICD-10-CM

## 2025-01-07 DIAGNOSIS — C50.411 CARCINOMA OF UPPER-OUTER QUADRANT OF RIGHT BREAST IN FEMALE, ESTROGEN RECEPTOR POSITIVE (HCC): Primary | ICD-10-CM

## 2025-01-07 LAB
ESTIMATED AVERAGE GLUCOSE: 128 MG/DL
HBA1C MFR BLD: 6.1 % (ref 4–6)

## 2025-01-07 PROCEDURE — 97166 OT EVAL MOD COMPLEX 45 MIN: CPT

## 2025-01-07 RX ORDER — CITALOPRAM HYDROBROMIDE 20 MG/1
TABLET ORAL
Qty: 90 TABLET | Refills: 4 | Status: SHIPPED | OUTPATIENT
Start: 2025-01-07

## 2025-01-07 NOTE — PROGRESS NOTES
SW met with pt and her daughter, Tea, as she was seen for pre-breast surgery education and support. It is noted that SW met with pt on 1/6/2025, please see note of that date for further social history.      Pt brought in Advance Directive documents,with request to discuss and complete Living Will.  Pt states that she believes Tea is her Health Care Power of , and she will check her records at home to verify documents. She was requested to provide copy for records.  SW provided pt with original and additional copies of her Living Will, and document was also scanned into Epic chart.    Potential Barriers to Treatment:  Pt lives alone, but daughter is local and supportive.    Supportive services at the cancer center were discussed. Pt was provided with a Taking Time booklet from the [employer] as well as a program guide for the [facility] through [facility].  Pt has retained SW business card from yesterday's meeting. She was invited to contact SW should she have further question or concern, to which she was agreeable.

## 2025-01-07 NOTE — TELEPHONE ENCOUNTER
Comments: Correct pharmacy pended     Last Office Visit (last PCP visit):   9/16/2024    Next Visit Date:  Future Appointments   Date Time Provider Department Center   1/7/2025  2:00 PM SCHEDULE, QUINTEN ESCOBAR BREAST BIRDIE NURSE/MA QUINTEN CASH BS [facility]   1/7/2025  4:00 PM SCHEDULE, MLO[facility] RAD ONC NURSE [facility] RAD ONC [facility]   1/8/2025 12:00 PM Jamee Mckeon, PT MALZ  PT [facility] [place]   1/9/2025  7:00 AM [place] MAMMO ROOM 1 MLOZ WOMENS [facility] Fac RAD   1/9/2025  8:00 AM [place] NUCLEAR MEDICINE ROOM 2 M[facility]Z NUC MED [facility] Fac RAD   1/9/2025  1:00 PM [place] MAMMO ROOM 2 MLOZ WOMENS [facility] Fac RAD   1/22/2025  9:00 AM Angeli Yusuf MD MLOX OBLVENUS BS [facility]   1/28/2025 10:00 AM SCHEDULE, MLOZ RAD ONC NURSE [facility] RAD ONC [facility]   6/18/2025 10:30 AM [facility]ley MD [place] [facility]         Rx requested:  Requested Prescriptions     Pending Prescriptions Disp Refills    citalopram (CELEXA) 20 MG tablet 90 tablet 4     Sig: TAKE 1 TABLET DAILY

## 2025-01-07 NOTE — PROGRESS NOTES
AISHWARYA BREAST BRACHYTHERAPY PRE-PROCEDURE PATIENT INSTRUCTIONS       If on Anti-Coagulants, discontinue them 7 days prior to the procedure and may be resumed 48 hours after the completion of the treatments and removal of the AISHWARYA Catheter.    Over the counter NSAIDS taken on occasion (like naproxen, advil, etc) should also not be taken within this time frame (1 week) and may be resumed 48 hours after the completion of the treatments and removal of the AISHWARYA Catheter.    Two hours prior to you scheduled appointment for the insertion of the AISHWARYA Catheter, take your first dose of the prescribed antibiotic.    We recommend that you purchase 2 front opening sport bras that is 1 or 2 sizes larger than your everyday bra.      You may eat a regular breakfast the day of the AISHWARYA catheter insertion procedure.    You will receive further instructions on the day of the AISHWARYA catheter insertion.      [] PT verbalized understanding of above questions

## 2025-01-07 NOTE — PROGRESS NOTES
Occupational Therapy Lymphedema Prehab Screen    Date: 2025  Patient Name: Valeria Laird        MRN: [id number]  Account: [id number]   : 1950  (74 y.o.)    [x]   Patient's date of birth was confirmed    Chart Review:  Diagnosis:    R lumpectomy w/ SLND  Past Medical History:   Diagnosis Date    Allergic rhinitis     Anxiety     History of blood transfusion     blood transfusions with c section x 3    Hyperlipidemia     meds > 2 yrs    Osteoarthritis     Sleep apnea     Type 2 diabetes mellitus 2022     Past Surgical History:   Procedure Laterality Date    ANKLE SURGERY Right 2000    tendon repair     SECTION   &  &     pt has had 3     COLONOSCOPY N/A 2020    COLONOSCOPY performed by Zandra Workman MD at [facility] OR    COLONOSCOPY N/A 10/12/2024    COLORECTAL CANCER SCREENING, NOT HIGH RISK performed by Zandra Workman MD at [facility] OR    TOTAL KNEE ARTHROPLASTY Left 2019    LEFT TOTAL KNEE ARTHROPLASTY, SUPINE, 23 HR OBS, IRAJ CEMENTED PERSONA performed by Adalid Garcia MD at [facility] OR    TOTAL KNEE ARTHROPLASTY Right 2020    RIGHT TOTAL KNEE  ARTHROPLASTY performed by Adalid Garcia MD at [facility] OR    US BREAST BIOPSY W LOC DEVICE 1ST LESION RIGHT Right 2024    US BREAST BIOPSY W LOC DEVICE 1ST LESION RIGHT 2024 [facility] ULTRASOUND       Strength:   WNL    ROM:    WNL    Observation:       Patient is R hand dominant.    Circumferential Measurements    Location Rt UE (cm)   Date: 2025 Lt UE (cm)   Date:   2025   3rd DIP/ PIP 5.3/7 5.2/6.8   10 cm from distal 3rd finger 21 21.5   15 cm 21.5 22   20 cm 16.5 17   30 cm 23.5 23.5   40 cm 26 26   50 cm 32 32   60 cm 36.5 36                  QuickDASH  Open a tight or new jar: Mild Difficulty  Do heavy household chores (e.g., wash walls, floors): Mild Difficulty  Sheri a shopping bag or briefcase: No Difficulty  Wash your back: No Difficulty  Use a knife to cut food: No

## 2025-01-08 ENCOUNTER — HOSPITAL ENCOUNTER (OUTPATIENT)
Dept: PHYSICAL THERAPY | Age: 75
Setting detail: THERAPIES SERIES
Discharge: HOME | End: 2025-01-08
Payer: MEDICARE

## 2025-01-08 PROCEDURE — 97530 THERAPEUTIC ACTIVITIES: CPT

## 2025-01-08 ASSESSMENT — PAIN DESCRIPTION - ORIENTATION: ORIENTATION: LEFT

## 2025-01-08 ASSESSMENT — PAIN DESCRIPTION - DESCRIPTORS: DESCRIPTORS: ACHING

## 2025-01-08 ASSESSMENT — PAIN DESCRIPTION - LOCATION: LOCATION: BACK

## 2025-01-08 ASSESSMENT — PAIN SCALES - GENERAL: PAINLEVEL_OUTOF10: 4

## 2025-01-08 ASSESSMENT — PAIN DESCRIPTION - PAIN TYPE: TYPE: CHRONIC PAIN

## 2025-01-08 NOTE — PROGRESS NOTES
Physical Therapy: Discharge Note   Patient: Valeria Laird (74 y.o. female)   Examination Date: 2025  Plan of Care/Certification Expiration Date: 25    Progress Note Counter: DC to HEP on 25     :  1950 # of Visits since SOC:   10   MRN: [id number]  CSN: [id number] Start of Care Date:   2024   Insurance: Payor: [facility] MEDICARE / Plan: [facility] MEDICARE-ADVANTAGE PPO / Product Type: Medicare /   Insurance ID: [id number] - (Medicare Managed) Secondary Insurance (if applicable):    Referring Physician: Phu Hubbard MD [place]   PCP: Ludin Woods MD Visits to Date/Visits Approved: 10 / 12    No Show/Cancelled Appts: 0      Medical Diagnosis: No admission diagnoses are documented for this encounter.    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        SUBJECTIVE EXAMINATION   Pain Level: Pain Screening  Patient Currently in Pain: Yes  Pain Level: 4  Pain Type: Chronic pain  Pain Location: Back  Pain Orientation: Left  Pain Descriptors: Aching    Patient Comments: Subjective: Pt reports 3-4/10 low back pain    HEP Compliance: Good        OBJECTIVE EXAMINATION   Restrictions:  No data recorded No data recorded No data recorded          Left Strength  Right Strength         Strength LLE  L Hip Flexion: 4+/5, 5/5  L Hip Extension: 4+/5  L Hip ABduction: 4+/5  L Hip ADduction: 4+/5  L Hip Internal Rotation: 4+/5  L Hip External Rotation: 4+/5  L Knee Flexion: 4+/5, 5/5  L Knee Extension: 4+/5, 5/5  L Ankle Dorsiflexion: 5/5  L Ankle Plantar Flexion: 5/5    Strength RLE  R Hip Flexion: 4+/5, 5/5  R Hip Extension: 4+/5, 5/5  R Hip ABduction: 4+/5  R Hip ADduction: 4+/5  R Hip Internal Rotation: 4+/5  R Hip External Rotation: 4+/5  R Knee Flexion: 4+/5  R Knee Extension: 4+/5  R Ankle Dorsiflexion: 5/5  R Ankle Plantar flexion: 5/5     4 to 4+/5 core     TREATMENT         Pt Education: Additional Comments: DC to HEP

## 2025-01-09 ENCOUNTER — HOSPITAL ENCOUNTER (OUTPATIENT)
Dept: WOMENS IMAGING | Age: 75
Setting detail: OUTPATIENT SURGERY
Discharge: HOME | End: 2025-01-11
Attending: SURGERY
Payer: MEDICARE

## 2025-01-09 ENCOUNTER — ANESTHESIA (OUTPATIENT)
Dept: OPERATING ROOM | Age: 75
End: 2025-01-09
Payer: MEDICARE

## 2025-01-09 ENCOUNTER — HOSPITAL ENCOUNTER (OUTPATIENT)
Age: 75
Setting detail: OUTPATIENT SURGERY
Discharge: HOME | End: 2025-01-09
Attending: SURGERY | Admitting: SURGERY
Payer: MEDICARE

## 2025-01-09 ENCOUNTER — APPOINTMENT (OUTPATIENT)
Dept: WOMENS IMAGING | Age: 75
End: 2025-01-09
Attending: SURGERY
Payer: MEDICARE

## 2025-01-09 ENCOUNTER — ANESTHESIA EVENT (OUTPATIENT)
Dept: OPERATING ROOM | Age: 75
End: 2025-01-09
Payer: MEDICARE

## 2025-01-09 ENCOUNTER — APPOINTMENT (OUTPATIENT)
Dept: NUCLEAR MEDICINE | Age: 75
End: 2025-01-09
Attending: SURGERY
Payer: MEDICARE

## 2025-01-09 VITALS
OXYGEN SATURATION: 93 % | RESPIRATION RATE: 14 BRPM | WEIGHT: 174 LBS | TEMPERATURE: 97.4 F | SYSTOLIC BLOOD PRESSURE: 156 MMHG | HEIGHT: 63 IN | BODY MASS INDEX: 30.83 KG/M2 | DIASTOLIC BLOOD PRESSURE: 81 MMHG | HEART RATE: 75 BPM

## 2025-01-09 DIAGNOSIS — C50.411 MALIGNANT NEOPLASM OF UPPER-OUTER QUADRANT OF RIGHT BREAST, ESTROGEN RECEPTOR POSITIVE (HCC): ICD-10-CM

## 2025-01-09 DIAGNOSIS — Z17.0 MALIGNANT NEOPLASM OF UPPER-OUTER QUADRANT OF RIGHT BREAST, ESTROGEN RECEPTOR POSITIVE (HCC): ICD-10-CM

## 2025-01-09 DIAGNOSIS — Z17.0 CARCINOMA OF UPPER-OUTER QUADRANT OF RIGHT BREAST IN FEMALE, ESTROGEN RECEPTOR POSITIVE (HCC): ICD-10-CM

## 2025-01-09 DIAGNOSIS — C50.411 CARCINOMA OF UPPER-OUTER QUADRANT OF RIGHT BREAST IN FEMALE, ESTROGEN RECEPTOR POSITIVE (HCC): ICD-10-CM

## 2025-01-09 LAB
GLUCOSE BLD-MCNC: 116 MG/DL (ref 70–99)
GLUCOSE BLD-MCNC: 146 MG/DL (ref 70–99)
PERFORMED ON: ABNORMAL
PERFORMED ON: ABNORMAL

## 2025-01-09 PROCEDURE — 3600000004 HC SURGERY LEVEL 4 BASE: Performed by: SURGERY

## 2025-01-09 PROCEDURE — 6360000002 HC RX W HCPCS: Performed by: NURSE ANESTHETIST, CERTIFIED REGISTERED

## 2025-01-09 PROCEDURE — 76942 ECHO GUIDE FOR BIOPSY: CPT | Performed by: ANESTHESIOLOGY

## 2025-01-09 PROCEDURE — 7100000011 HC PHASE II RECOVERY - ADDTL 15 MIN: Performed by: SURGERY

## 2025-01-09 PROCEDURE — 6360000002 HC RX W HCPCS: Performed by: SURGERY

## 2025-01-09 PROCEDURE — 2720000010 HC SURG SUPPLY STERILE: Performed by: SURGERY

## 2025-01-09 PROCEDURE — 7100000001 HC PACU RECOVERY - ADDTL 15 MIN: Performed by: SURGERY

## 2025-01-09 PROCEDURE — A9520 TC99 TILMANOCEPT DIAG 0.5MCI: HCPCS | Performed by: SURGERY

## 2025-01-09 PROCEDURE — 7100000010 HC PHASE II RECOVERY - FIRST 15 MIN: Performed by: SURGERY

## 2025-01-09 PROCEDURE — 38900 IO MAP OF SENT LYMPH NODE: CPT | Performed by: SURGERY

## 2025-01-09 PROCEDURE — 38792 RA TRACER ID OF SENTINL NODE: CPT

## 2025-01-09 PROCEDURE — 2500000003 HC RX 250 WO HCPCS: Performed by: SURGERY

## 2025-01-09 PROCEDURE — 3600000014 HC SURGERY LEVEL 4 ADDTL 15MIN: Performed by: SURGERY

## 2025-01-09 PROCEDURE — 2580000003 HC RX 258: Performed by: SURGERY

## 2025-01-09 PROCEDURE — 6360000002 HC RX W HCPCS: Performed by: ANESTHESIOLOGY

## 2025-01-09 PROCEDURE — 3700000001 HC ADD 15 MINUTES (ANESTHESIA): Performed by: SURGERY

## 2025-01-09 PROCEDURE — 38525 BIOPSY/REMOVAL LYMPH NODES: CPT | Performed by: SURGERY

## 2025-01-09 PROCEDURE — 76098 X-RAY EXAM SURGICAL SPECIMEN: CPT

## 2025-01-09 PROCEDURE — 88307 TISSUE EXAM BY PATHOLOGIST: CPT

## 2025-01-09 PROCEDURE — 19301 PARTIAL MASTECTOMY: CPT | Performed by: SURGERY

## 2025-01-09 PROCEDURE — 78195 LYMPH SYSTEM IMAGING: CPT

## 2025-01-09 PROCEDURE — 76998 US GUIDE INTRAOP: CPT | Performed by: SURGERY

## 2025-01-09 PROCEDURE — 6370000000 HC RX 637 (ALT 250 FOR IP): Performed by: STUDENT IN AN ORGANIZED HEALTH CARE EDUCATION/TRAINING PROGRAM

## 2025-01-09 PROCEDURE — 2709999900 HC NON-CHARGEABLE SUPPLY: Performed by: SURGERY

## 2025-01-09 PROCEDURE — 7100000000 HC PACU RECOVERY - FIRST 15 MIN: Performed by: SURGERY

## 2025-01-09 PROCEDURE — 6360000002 HC RX W HCPCS: Performed by: RADIOLOGY

## 2025-01-09 PROCEDURE — C1819 TISSUE LOCALIZATION-EXCISION: HCPCS

## 2025-01-09 PROCEDURE — 3430000000 HC RX DIAGNOSTIC RADIOPHARMACEUTICAL: Performed by: SURGERY

## 2025-01-09 PROCEDURE — 3700000000 HC ANESTHESIA ATTENDED CARE: Performed by: SURGERY

## 2025-01-09 PROCEDURE — 88305 TISSUE EXAM BY PATHOLOGIST: CPT

## 2025-01-09 PROCEDURE — 88342 IMHCHEM/IMCYTCHM 1ST ANTB: CPT

## 2025-01-09 DEVICE — CLIP INT SM TI EZ LD LIG SYS WECK HORIZON: Type: IMPLANTABLE DEVICE | Site: BREAST | Status: FUNCTIONAL

## 2025-01-09 RX ORDER — SODIUM CHLORIDE 0.9 % (FLUSH) 0.9 %
5-40 SYRINGE (ML) INJECTION EVERY 12 HOURS SCHEDULED
Status: DISCONTINUED | OUTPATIENT
Start: 2025-01-09 | End: 2025-01-09 | Stop reason: HOSPADM

## 2025-01-09 RX ORDER — ROPIVACAINE HYDROCHLORIDE 5 MG/ML
INJECTION, SOLUTION EPIDURAL; INFILTRATION; PERINEURAL
Status: COMPLETED | OUTPATIENT
Start: 2025-01-09 | End: 2025-01-09

## 2025-01-09 RX ORDER — SODIUM CHLORIDE 0.9 % (FLUSH) 0.9 %
5-40 SYRINGE (ML) INJECTION PRN
Status: DISCONTINUED | OUTPATIENT
Start: 2025-01-09 | End: 2025-01-09 | Stop reason: HOSPADM

## 2025-01-09 RX ORDER — MIDAZOLAM HYDROCHLORIDE 1 MG/ML
INJECTION, SOLUTION INTRAMUSCULAR; INTRAVENOUS
Status: DISCONTINUED | OUTPATIENT
Start: 2025-01-09 | End: 2025-01-09 | Stop reason: SDUPTHER

## 2025-01-09 RX ORDER — PROPOFOL 10 MG/ML
INJECTION, EMULSION INTRAVENOUS
Status: DISCONTINUED | OUTPATIENT
Start: 2025-01-09 | End: 2025-01-09 | Stop reason: SDUPTHER

## 2025-01-09 RX ORDER — FENTANYL CITRATE 0.05 MG/ML
50 INJECTION, SOLUTION INTRAMUSCULAR; INTRAVENOUS EVERY 10 MIN PRN
Status: DISCONTINUED | OUTPATIENT
Start: 2025-01-09 | End: 2025-01-09 | Stop reason: HOSPADM

## 2025-01-09 RX ORDER — MEPERIDINE HYDROCHLORIDE 25 MG/ML
12.5 INJECTION INTRAMUSCULAR; INTRAVENOUS; SUBCUTANEOUS
Status: DISCONTINUED | OUTPATIENT
Start: 2025-01-09 | End: 2025-01-09 | Stop reason: HOSPADM

## 2025-01-09 RX ORDER — ACETAMINOPHEN 500 MG
1000 TABLET ORAL ONCE
Status: COMPLETED | OUTPATIENT
Start: 2025-01-09 | End: 2025-01-09

## 2025-01-09 RX ORDER — ONDANSETRON 2 MG/ML
4 INJECTION INTRAMUSCULAR; INTRAVENOUS
Status: DISCONTINUED | OUTPATIENT
Start: 2025-01-09 | End: 2025-01-09 | Stop reason: HOSPADM

## 2025-01-09 RX ORDER — DEXAMETHASONE SODIUM PHOSPHATE 4 MG/ML
INJECTION, SOLUTION INTRA-ARTICULAR; INTRALESIONAL; INTRAMUSCULAR; INTRAVENOUS; SOFT TISSUE
Status: DISCONTINUED | OUTPATIENT
Start: 2025-01-09 | End: 2025-01-09 | Stop reason: SDUPTHER

## 2025-01-09 RX ORDER — ONDANSETRON 2 MG/ML
INJECTION INTRAMUSCULAR; INTRAVENOUS
Status: DISCONTINUED | OUTPATIENT
Start: 2025-01-09 | End: 2025-01-09 | Stop reason: SDUPTHER

## 2025-01-09 RX ORDER — OXYCODONE HYDROCHLORIDE 5 MG/1
5 TABLET ORAL
Status: DISCONTINUED | OUTPATIENT
Start: 2025-01-09 | End: 2025-01-09 | Stop reason: HOSPADM

## 2025-01-09 RX ORDER — NALOXONE HYDROCHLORIDE 0.4 MG/ML
INJECTION, SOLUTION INTRAMUSCULAR; INTRAVENOUS; SUBCUTANEOUS PRN
Status: DISCONTINUED | OUTPATIENT
Start: 2025-01-09 | End: 2025-01-09 | Stop reason: HOSPADM

## 2025-01-09 RX ORDER — DIPHENHYDRAMINE HYDROCHLORIDE 50 MG/ML
12.5 INJECTION INTRAMUSCULAR; INTRAVENOUS
Status: DISCONTINUED | OUTPATIENT
Start: 2025-01-09 | End: 2025-01-09 | Stop reason: HOSPADM

## 2025-01-09 RX ORDER — SODIUM CHLORIDE 9 MG/ML
INJECTION, SOLUTION INTRAVENOUS PRN
Status: DISCONTINUED | OUTPATIENT
Start: 2025-01-09 | End: 2025-01-09 | Stop reason: HOSPADM

## 2025-01-09 RX ORDER — LIDOCAINE HYDROCHLORIDE 20 MG/ML
INJECTION, SOLUTION INFILTRATION; PERINEURAL
Status: DISCONTINUED | OUTPATIENT
Start: 2025-01-09 | End: 2025-01-09 | Stop reason: SDUPTHER

## 2025-01-09 RX ORDER — LIDOCAINE HYDROCHLORIDE 10 MG/ML
INJECTION, SOLUTION INFILTRATION; PERINEURAL
Status: COMPLETED | OUTPATIENT
Start: 2025-01-09 | End: 2025-01-09

## 2025-01-09 RX ORDER — METOCLOPRAMIDE HYDROCHLORIDE 5 MG/ML
10 INJECTION INTRAMUSCULAR; INTRAVENOUS
Status: DISCONTINUED | OUTPATIENT
Start: 2025-01-09 | End: 2025-01-09 | Stop reason: HOSPADM

## 2025-01-09 RX ORDER — LIDOCAINE HYDROCHLORIDE 20 MG/ML
20 INJECTION, SOLUTION INFILTRATION; PERINEURAL ONCE
Status: COMPLETED | OUTPATIENT
Start: 2025-01-09 | End: 2025-01-09

## 2025-01-09 RX ADMIN — MIDAZOLAM HYDROCHLORIDE 2 MG: 1 INJECTION, SOLUTION INTRAMUSCULAR; INTRAVENOUS at 09:17

## 2025-01-09 RX ADMIN — SODIUM CHLORIDE: 9 INJECTION, SOLUTION INTRAVENOUS at 08:55

## 2025-01-09 RX ADMIN — TILMANOCEPT 1.9 MILLICURIE: KIT at 08:02

## 2025-01-09 RX ADMIN — ACETAMINOPHEN 1000 MG: 500 TABLET ORAL at 12:02

## 2025-01-09 RX ADMIN — ONDANSETRON 4 MG: 2 INJECTION, SOLUTION INTRAMUSCULAR; INTRAVENOUS at 10:21

## 2025-01-09 RX ADMIN — LIDOCAINE HYDROCHLORIDE 4 ML: 20 INJECTION, SOLUTION INFILTRATION; PERINEURAL at 07:49

## 2025-01-09 RX ADMIN — DEXAMETHASONE SODIUM PHOSPHATE 4 MG: 4 INJECTION INTRA-ARTICULAR; INTRALESIONAL; INTRAMUSCULAR; INTRAVENOUS; SOFT TISSUE at 09:31

## 2025-01-09 RX ADMIN — CEFAZOLIN 2000 MG: 2 INJECTION, POWDER, FOR SOLUTION INTRAMUSCULAR; INTRAVENOUS at 09:35

## 2025-01-09 RX ADMIN — LIDOCAINE HYDROCHLORIDE 5 ML: 10 INJECTION, SOLUTION INFILTRATION; PERINEURAL at 09:18

## 2025-01-09 RX ADMIN — LIDOCAINE HYDROCHLORIDE 50 MG: 20 INJECTION, SOLUTION INFILTRATION; PERINEURAL at 09:31

## 2025-01-09 RX ADMIN — PROPOFOL 130 MG: 10 INJECTION, EMULSION INTRAVENOUS at 09:31

## 2025-01-09 RX ADMIN — ROPIVACAINE HYDROCHLORIDE 20 ML: 5 INJECTION, SOLUTION EPIDURAL; INFILTRATION; PERINEURAL at 09:18

## 2025-01-09 RX ADMIN — FENTANYL CITRATE 50 MCG: 0.05 INJECTION, SOLUTION INTRAMUSCULAR; INTRAVENOUS at 10:49

## 2025-01-09 ASSESSMENT — PAIN DESCRIPTION - LOCATION
LOCATION: BREAST
LOCATION: BACK

## 2025-01-09 ASSESSMENT — PAIN SCALES - GENERAL
PAINLEVEL_OUTOF10: 4
PAINLEVEL_OUTOF10: 2
PAINLEVEL_OUTOF10: 0
PAINLEVEL_OUTOF10: 4

## 2025-01-09 ASSESSMENT — PAIN DESCRIPTION - DESCRIPTORS: DESCRIPTORS: ACHING

## 2025-01-09 ASSESSMENT — PAIN DESCRIPTION - ORIENTATION: ORIENTATION: RIGHT

## 2025-01-09 NOTE — ANESTHESIA PROCEDURE NOTES
Peripheral Block    Patient location during procedure: pre-op  Reason for block: post-op pain management and at surgeon's request  Start time: 1/9/2025 9:18 AM  End time: 1/9/2025 9:23 AM  Staffing  Performed: anesthesiologist   Anesthesiologist: Carmen Stanley MD  Performed by: Carmen Stanley MD  Authorized by: Carmen Stanley MD    Preanesthetic Checklist  Completed: patient identified, IV checked, site marked, risks and benefits discussed, surgical/procedural consents, equipment checked, pre-op evaluation, timeout performed, anesthesia consent given, oxygen available and monitors applied/VS acknowledged  Peripheral Block   Patient position: supine  Prep: ChloraPrep  Provider prep: mask and sterile gloves (Sterile probe cover)  Patient monitoring: cardiac monitor, continuous pulse ox, frequent blood pressure checks and IV access  Block type: PECS II and PECS I  Laterality: right  Injection technique: single-shot  Guidance: ultrasound guided  Local infiltration: lidocaine  Infiltration strength: 1 %  Local infiltration: lidocaine  Dose: 1 mL    Needle   Needle type: combined needle/nerve stimulator   Needle gauge: 22 G  Needle localization: anatomical landmarks and ultrasound guidance  Needle length: 5 cm  Assessment   Injection assessment: negative aspiration for heme, no paresthesia on injection and local visualized surrounding nerve on ultrasound  Paresthesia pain: immediately resolved  Slow fractionated injection: yes  Hemodynamics: stable    Additional Notes  Ultrasound guidance used to view needle for placement.    Ultrasound image stored,  printed and saved in patient chart.    Sterile probe cover used    Medications Administered  lidocaine injection 1% - Perineural   5 mL - 1/9/2025 9:18:00 AM  ropivacaine (NAROPIN) injection 0.5% - Perineural   20 mL - 1/9/2025 9:18:00 AM

## 2025-01-09 NOTE — SEDATION DOCUMENTATION
NEEDLE LOC OF Right  BREAST    0700 Patient arrived to [facility] from Short Stay via wheelchair. Reviewed procedure with patient and patient verbalizes understanding.  Allergies, history, and medications reviewed. Consent obtained. Vitals signs taken, VSS.     0722 Assisted patient into mammography room and into procedural chair and films taken.      Dr. Dietrich talking with patient and looking at films.  Area of right breast confirmed at E1/2 and 21/2.    0745 Timeout completed.     Right breast cleansed with chloraprep x 3 and dry after 3 minutes.     0749 Dr. Dietrich injected lidocaine 2% 4 ml into right breast to numb area, see eMar.      Dr. Dietrich inserted Axtell BLN 20gG x 5cm lot [id number] exp 03/09/2027 into right breast site.  Films taken to confirm placement. Pt tolerated well.      0759 Veronique Cooper from[employer] arrived. Dr. Dietrich injected Tc99 lymphoseek 1.9mCi x 4 to areolar area of right  breast at 802 for sentinel node imaging.  Area massaged and gauze and paper tape applied over site. Nuclear med tech provided radioactive diagnostic agent and discarded syringes.     Plastic cup and paper tape applied over Axtell needle to protect end of needle.  Patient tolerated all well    0821 Patient assisted out of mamm room and into wheelchair, VSS. Pt taken to Nuclear medicine if sentinel node done.

## 2025-01-09 NOTE — DISCHARGE INSTRUCTIONS
1. Apply ice to the affected area as needed for 24-48 hours  2. Remove the dressing in 48 hours  3. May shower in 48 hours (No swimming or hot tubs for two weeks)  4. Pain medication as prescribed  5. Wear a bra to bed for 5-7 days  6. Avoid strenuous exercise that will make the breast move excessively for 5-7 days  7. If not scheduled yet, call the office for a post-operative appointment for 7-14 days from surgery.   8. Call if wound is red, hot and painful or fever over 101 F  9. Please refer to pre-op teaching booklet for detailed instructions.     Lumpectomy: What to Expect at Home  Your Recovery     Breast-conserving surgery (lumpectomy) removes the cancer and just enough tissue to get all the cancer.  For 1 or 2 days after the surgery, you will probably feel tired and have some pain. The skin around the cut (incision) may feel firm, swollen, and tender, and be bruised. Tenderness should go away in about 2 or 3 days, and the bruising within 2 weeks. Firmness and swelling may last for 3 to 6 months.  You may feel a soft lump in your breast that gradually turns hard. This is the incision healing. It is not cancer.  Women should wear a well-fitted and supportive bra, even during the night, for 1 week. You will probably be able to go back to work or your normal routine in 1 to 3 weeks after the surgery. This may depend on whether you have more treatment.  Your doctor may have removed some lymph nodes in your armpit to see if the cancer has spread. If so, you may feel either numbness or tingling (\"pins and needles\") in your armpit or on the inside of your upper arm. This should improve over the next several weeks. Some people have numbness for a longer time.  When you find out that you have cancer, you may feel many emotions and may need some help coping. Seek out family, friends, and counselors for support. You also can do things at home to make yourself feel better while you go through treatment. Call the [employer]

## 2025-01-09 NOTE — ANESTHESIA POSTPROCEDURE EVALUATION
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    Chandana Phase I: Chandana Score: 5    Chandana Phase II:      Anesthesia Post Evaluation    Patient location during evaluation: PACU  Level of consciousness: awake  Pain score: 0  Airway patency: patent  Nausea & Vomiting: no vomiting and no nausea  Cardiovascular status: hemodynamically stable  Respiratory status: acceptable  Hydration status: stable  Pain management: adequate and satisfactory to patient        No notable events documented.

## 2025-01-09 NOTE — H&P
UPDATED HISTORY AND PHYSICAL EXAMINATION    SERVICE DATE:  1/9/2025   SERVICE TIME:  9:18 AM    PHYSICAL EXAM MUST BE COMPLETED ON ADMISSION    The History and Physical (completed in the past 30 days) has been reviewed and the patient has been examined. The contents accurately reflect the patient's condition with the following additions or revisions since the H&P was completed.    Examination indicates no changes.  This H&P can be found in the Epic.        SIGNATURE: FEI MERRILL MD PATIENT NAME: Valeria Riverooliverio   DATE: 1/9/25 MRN: [id number]   TIME: 9:18 AM EST PHONE: [phone]

## 2025-01-10 ENCOUNTER — TELEPHONE (OUTPATIENT)
Dept: SURGERY | Age: 75
End: 2025-01-10

## 2025-01-10 NOTE — TELEPHONE ENCOUNTER
I was calling the patient post Right Breast Lumpectomy and SLNB. LMOM for the patient to call the office at [phone] option # 2 if she has an questions or concerns.

## 2025-01-10 NOTE — OP NOTE
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     COMPLICATIONS: No complications.     FINDINGS: clip in specimen       SPECIMEN:   ID Type Source Tests Collected by Time Destination   A : RIGHT BREAST MASS RED PAINT SUPERIOR YELLOW PAINT LATERAL Tissue Breast SURGICAL PATHOLOGY Fei Yusuf MD 1/9/2025 0836    B : RIGHT BREAST SUPERIOR MARGIN INK MARKS NEW MARGIN Tissue Breast SURGICAL PATHOLOGY Fei Yusuf MD 1/9/2025 0836    C : RIGHT BREAST MEDIAL MARGIN INK MARKS NEW MARGIN Tissue Breast SURGICAL PATHOLOGY Fei Yusuf MD 1/9/2025 0836    D : RIGHT BREAST INFERIOR MARGIN INK MARKS NEW MARGIN Tissue Breast SURGICAL PATHOLOGY Fei Yusuf MD 1/9/2025 0828    E : RIGHT BREAST LATERAL MARGIN INK MARKS NEW MARGIN Tissue Breast SURGICAL PATHOLOGY Fei Yusuf MD 1/9/2025 0836    F : RIGHT BREAST POSTERIOR MARGIN INK MARKS NEW MARGIN Tissue Breast SURGICAL PATHOLOGY Fei Yusuf MD 1/9/2025 0836    G : RIGHT SENTINEL LYMPH NODES Tissue Lymph Node SURGICAL PATHOLOGY Fei Yusuf MD 1/9/2025 0954         DRAINS: No drains were placed.     Venodynes placed preoperatively    PREOPERATIVE ANTIBIOTICS:  ANCEF 2 GRAMS     Cancer Staging   Carcinoma of upper-outer quadrant of right breast in female, estrogen receptor positive (HCC)  Staging form:  irrigated, and inspected for hemostasis. Surgical clips placed in the wound bed.  . The incision was closed with interrupted 3-0 Vicryl and running 4-0 Monocryl suture.  The incision was covered by Steri-Strips, 4 x 4s, paper tape and a surgical bra. The patient tolerated the procedure well and was transferred to recovery room in stable condition.     I performed this procedure with a surgical assistant.     [place] Node Biopsy for Breast Cancer - Right  Operation performed with curative intent. Yes   Tracer(s) used to identify sentinel nodes in the upfront surgery (non-neoadjuvant) setting (select all that apply). Dye and Radioactive tracer   Tracer(s) used to identify sentinel nodes in the neoadjuvant setting (select all that apply). N/A   All nodes (colored or non-colored) present at the end of a dye-filled lymphatic channel were removed. Yes   All significantly radioactive nodes were removed. Yes   All palpably suspicious nodes were removed. Yes   Biopsy-proven positive nodes marked with clips prior to chemotherapy were identified and removed. N/A            SIGNATURE: FEI MERRILL MD PATIENT NAME: Valeria Laird   DATE: 1/10/25 MRN: [id number]   TIME: 10:28 AM EST PHONE:  [phone]

## 2025-01-10 NOTE — ANESTHESIA PRE PROCEDURE
Weight:       Height:                                                  BP Readings from Last 3 Encounters:   01/09/25 (!) 156/81   01/06/25 (!) 157/88   01/06/25 (!) 144/80       NPO Status: Time of last liquid consumption: 2000                        Time of last solid consumption: 2000                        Date of last liquid consumption: 01/08/25                        Date of last solid food consumption: 01/08/25    BMI:   Wt Readings from Last 3 Encounters:   01/09/25 78.9 kg (174 lb)   01/06/25 78.8 kg (173 lb 12.8 oz)   01/06/25 79.2 kg (174 lb 9.6 oz)     Body mass index is 30.82 kg/m².    CBC:   Lab Results   Component Value Date/Time    WBC 5.4 09/18/2024 08:34 AM    RBC 3.60 09/18/2024 08:34 AM    HGB 11.7 09/18/2024 08:34 AM    HCT 35.0 09/18/2024 08:34 AM    MCV 97.2 09/18/2024 08:34 AM    RDW 14.6 09/18/2024 08:34 AM     09/18/2024 08:34 AM       CMP:   Lab Results   Component Value Date/Time     09/18/2024 08:34 AM    K 4.5 09/18/2024 08:34 AM    K 4.2 03/14/2020 04:47 AM     09/18/2024 08:34 AM    CO2 24 09/18/2024 08:34 AM    BUN 14 09/18/2024 08:34 AM    CREATININE 0.86 09/18/2024 08:34 AM    GFRAA >60.0 09/07/2022 08:47 AM    LABGLOM 71.6 09/18/2024 08:34 AM    LABGLOM >60.0 09/14/2023 09:06 AM    GLUCOSE 109 09/18/2024 08:34 AM    CALCIUM 9.0 09/18/2024 08:34 AM    BILITOT <0.2 09/18/2024 08:34 AM    ALKPHOS 60 09/18/2024 08:34 AM    AST 23 09/18/2024 08:34 AM    ALT 26 09/18/2024 08:34 AM       POC Tests:   Recent Labs     01/09/25  1056   POCGLU 146*       Coags:   Lab Results   Component Value Date/Time    PROTIME 10.2 01/22/2019 11:56 AM    INR 1.0 01/22/2019 11:56 AM       HCG (If Applicable): No results found for: \"PREGTESTUR\", \"PREGSERUM\", \"HCG\", \"HCGQUANT\"     ABGs: No results found for: \"PHART\", \"PO2ART\", \"WQF8TVM\", \"XAR7HFL\", \"BEART\", \"N7VTUDKF\"     Type & Screen (If Applicable):  Lab Results   Component Value Date    ABORH A POS 01/06/2025    LABANTI NEG

## 2025-01-11 DIAGNOSIS — E78.2 MIXED HYPERLIPIDEMIA: ICD-10-CM

## 2025-01-12 ENCOUNTER — HOSPITAL ENCOUNTER (EMERGENCY)
Age: 75
Discharge: HOME | End: 2025-01-12
Payer: MEDICARE

## 2025-01-12 ENCOUNTER — APPOINTMENT (OUTPATIENT)
Dept: CT IMAGING | Age: 75
End: 2025-01-12
Payer: MEDICARE

## 2025-01-12 ENCOUNTER — APPOINTMENT (OUTPATIENT)
Dept: GENERAL RADIOLOGY | Age: 75
End: 2025-01-12
Payer: MEDICARE

## 2025-01-12 VITALS
TEMPERATURE: 97.8 F | OXYGEN SATURATION: 92 % | DIASTOLIC BLOOD PRESSURE: 82 MMHG | BODY MASS INDEX: 30.83 KG/M2 | RESPIRATION RATE: 16 BRPM | HEART RATE: 76 BPM | SYSTOLIC BLOOD PRESSURE: 145 MMHG | HEIGHT: 63 IN | WEIGHT: 174 LBS

## 2025-01-12 DIAGNOSIS — R42 DIZZINESS: Primary | ICD-10-CM

## 2025-01-12 DIAGNOSIS — R11.2 NAUSEA AND VOMITING, UNSPECIFIED VOMITING TYPE: ICD-10-CM

## 2025-01-12 DIAGNOSIS — I10 HYPERTENSION, UNSPECIFIED TYPE: ICD-10-CM

## 2025-01-12 LAB
ALBUMIN SERPL-MCNC: 4.5 G/DL (ref 3.5–4.6)
ALP SERPL-CCNC: 64 U/L (ref 40–130)
ALT SERPL-CCNC: 21 U/L (ref 0–33)
ANION GAP SERPL CALCULATED.3IONS-SCNC: 11 MEQ/L (ref 9–15)
AST SERPL-CCNC: 21 U/L (ref 0–35)
BASOPHILS # BLD: 0 K/UL (ref 0–0.2)
BASOPHILS NFR BLD: 0.3 %
BILIRUB SERPL-MCNC: 0.3 MG/DL (ref 0.2–0.7)
BILIRUB UR QL STRIP: NEGATIVE
BUN SERPL-MCNC: 16 MG/DL (ref 8–23)
CALCIUM SERPL-MCNC: 9.5 MG/DL (ref 8.5–9.9)
CHLORIDE SERPL-SCNC: 102 MEQ/L (ref 95–107)
CLARITY UR: CLEAR
CO2 SERPL-SCNC: 26 MEQ/L (ref 20–31)
COLOR UR: YELLOW
CREAT SERPL-MCNC: 0.78 MG/DL (ref 0.5–0.9)
EOSINOPHIL # BLD: 0.1 K/UL (ref 0–0.7)
EOSINOPHIL NFR BLD: 1 %
ERYTHROCYTE [DISTWIDTH] IN BLOOD BY AUTOMATED COUNT: 13.7 % (ref 11.5–14.5)
GLOBULIN SER CALC-MCNC: 2.6 G/DL (ref 2.3–3.5)
GLUCOSE SERPL-MCNC: 152 MG/DL (ref 70–99)
GLUCOSE UR STRIP-MCNC: NEGATIVE MG/DL
HCT VFR BLD AUTO: 38.5 % (ref 37–47)
HGB BLD-MCNC: 12.7 G/DL (ref 12–16)
HGB UR QL STRIP: NEGATIVE
KETONES UR STRIP-MCNC: NEGATIVE MG/DL
LACTATE BLDV-SCNC: 1.2 MMOL/L (ref 0.5–2.2)
LEUKOCYTE ESTERASE UR QL STRIP: NEGATIVE
LIPASE SERPL-CCNC: 23 U/L (ref 12–95)
LYMPHOCYTES # BLD: 1.2 K/UL (ref 1–4.8)
LYMPHOCYTES NFR BLD: 11.6 %
MAGNESIUM SERPL-MCNC: 1.8 MG/DL (ref 1.7–2.4)
MCH RBC QN AUTO: 32.1 PG (ref 27–31.3)
MCHC RBC AUTO-ENTMCNC: 33 % (ref 33–37)
MCV RBC AUTO: 97.2 FL (ref 79.4–94.8)
MONOCYTES # BLD: 0.6 K/UL (ref 0.2–0.8)
MONOCYTES NFR BLD: 6.3 %
NEUTROPHILS # BLD: 8 K/UL (ref 1.4–6.5)
NEUTS SEG NFR BLD: 80.4 %
NITRITE UR QL STRIP: NEGATIVE
PH UR STRIP: 6.5 [PH] (ref 5–9)
PLATELET # BLD AUTO: 227 K/UL (ref 130–400)
POTASSIUM SERPL-SCNC: 4.6 MEQ/L (ref 3.4–4.9)
PROT SERPL-MCNC: 7.1 G/DL (ref 6.3–8)
PROT UR STRIP-MCNC: NEGATIVE MG/DL
RBC # BLD AUTO: 3.96 M/UL (ref 4.2–5.4)
SODIUM SERPL-SCNC: 139 MEQ/L (ref 135–144)
SP GR UR STRIP: 1.01 (ref 1–1.03)
TROPONIN, HIGH SENSITIVITY: <6 NG/L (ref 0–19)
TROPONIN, HIGH SENSITIVITY: <6 NG/L (ref 0–19)
URINE REFLEX TO CULTURE: NORMAL
UROBILINOGEN UR STRIP-ACNC: 0.2 E.U./DL
WBC # BLD AUTO: 10 K/UL (ref 4.8–10.8)

## 2025-01-12 PROCEDURE — 83690 ASSAY OF LIPASE: CPT

## 2025-01-12 PROCEDURE — 36415 COLL VENOUS BLD VENIPUNCTURE: CPT

## 2025-01-12 PROCEDURE — 83735 ASSAY OF MAGNESIUM: CPT

## 2025-01-12 PROCEDURE — 83605 ASSAY OF LACTIC ACID: CPT

## 2025-01-12 PROCEDURE — 2580000003 HC RX 258

## 2025-01-12 PROCEDURE — 96374 THER/PROPH/DIAG INJ IV PUSH: CPT

## 2025-01-12 PROCEDURE — 96361 HYDRATE IV INFUSION ADD-ON: CPT

## 2025-01-12 PROCEDURE — 6360000002 HC RX W HCPCS

## 2025-01-12 PROCEDURE — 84484 ASSAY OF TROPONIN QUANT: CPT

## 2025-01-12 PROCEDURE — 80053 COMPREHEN METABOLIC PANEL: CPT

## 2025-01-12 PROCEDURE — 70450 CT HEAD/BRAIN W/O DYE: CPT

## 2025-01-12 PROCEDURE — 99285 EMERGENCY DEPT VISIT HI MDM: CPT

## 2025-01-12 PROCEDURE — 93005 ELECTROCARDIOGRAM TRACING: CPT

## 2025-01-12 PROCEDURE — 85025 COMPLETE CBC W/AUTO DIFF WBC: CPT

## 2025-01-12 PROCEDURE — 81003 URINALYSIS AUTO W/O SCOPE: CPT

## 2025-01-12 PROCEDURE — 71045 X-RAY EXAM CHEST 1 VIEW: CPT

## 2025-01-12 RX ORDER — ONDANSETRON 2 MG/ML
4 INJECTION INTRAMUSCULAR; INTRAVENOUS ONCE
Status: COMPLETED | OUTPATIENT
Start: 2025-01-12 | End: 2025-01-12

## 2025-01-12 RX ORDER — AMLODIPINE BESYLATE 5 MG/1
5 TABLET ORAL ONCE
Status: DISCONTINUED | OUTPATIENT
Start: 2025-01-12 | End: 2025-01-12

## 2025-01-12 RX ORDER — 0.9 % SODIUM CHLORIDE 0.9 %
1000 INTRAVENOUS SOLUTION INTRAVENOUS ONCE
Status: COMPLETED | OUTPATIENT
Start: 2025-01-12 | End: 2025-01-12

## 2025-01-12 RX ADMIN — ONDANSETRON 4 MG: 2 INJECTION, SOLUTION INTRAMUSCULAR; INTRAVENOUS at 19:24

## 2025-01-12 RX ADMIN — SODIUM CHLORIDE 1000 ML: 9 INJECTION, SOLUTION INTRAVENOUS at 19:22

## 2025-01-12 ASSESSMENT — PAIN - FUNCTIONAL ASSESSMENT: PAIN_FUNCTIONAL_ASSESSMENT: NONE - DENIES PAIN

## 2025-01-12 ASSESSMENT — LIFESTYLE VARIABLES
HOW MANY STANDARD DRINKS CONTAINING ALCOHOL DO YOU HAVE ON A TYPICAL DAY: PATIENT DOES NOT DRINK
HOW OFTEN DO YOU HAVE A DRINK CONTAINING ALCOHOL: NEVER

## 2025-01-12 NOTE — ED PROVIDER NOTES
[facility] EMERGENCY DEPARTMENT  EMERGENCY DEPARTMENT ENCOUNTER      Pt Name: Valeria Laird  MRN: [id number]  Birthdate 1950  Date of evaluation: 1/12/2025  Provider: David Adame PA-C  6:22 PM EST    CHIEF COMPLAINT       Chief Complaint   Patient presents with    Dizziness     Dizziness with vomiting         HISTORY OF PRESENT ILLNESS   (Location/Symptom, Timing/Onset, Context/Setting, Quality, Duration, Modifying Factors, Severity)  Note limiting factors.   Valeria Laird is a 74 y.o. female who presents to the emergency department with dizziness since today. She has nausea and vomiting. Cold sweats does not feel strong enough to ambulate. She lives alone.She denies headache, chest pain, shortness of breath, abd pain, cough, congestion, diarrhea, no urinary frequency or dysuria . No ill contacts. She states that the dizziness is like the room is spinning and unsteady feeling. Never had anything like this before.  She states that she ate pizza and then's chicken soup today and once she had the soup she felt extremely dizzy and started vomiting.    Just had lumpectomy 3 days ago.  Does not have history of HTN. No cardiac history or thinners.  Had a blood pressure of 158/88 on 1/6/2025    HPI    Nursing Notes were reviewed.    REVIEW OF SYSTEMS    (2-9 systems for level 4, 10 or more for level 5)     Review of Systems   Constitutional:  Positive for fatigue. Negative for chills and fever.   HENT:  Negative for congestion, rhinorrhea and sore throat.    Eyes:  Negative for visual disturbance.   Respiratory:  Negative for cough.    Cardiovascular:  Negative for chest pain.   Gastrointestinal:  Positive for diarrhea, nausea and vomiting. Negative for abdominal pain.   Genitourinary:  Negative for dysuria.   Musculoskeletal:  Negative for back pain and myalgias.   Neurological:  Positive for dizziness and weakness. Negative for syncope and headaches.       Except as noted above the remainder of the

## 2025-01-12 NOTE — ED TRIAGE NOTES
Patient arrived via private car due to suddenly becoming dizzy after eating chicken noodle soup and then started vomiting. Patient states that she is unsteady due to the dizziness. Patient just had lymphnodes removed from the R breast, but has been doing well since that. Family at the bedside

## 2025-01-13 LAB
EKG ATRIAL RATE: 66 BPM
EKG P AXIS: 43 DEGREES
EKG P-R INTERVAL: 152 MS
EKG Q-T INTERVAL: 436 MS
EKG QRS DURATION: 80 MS
EKG QTC CALCULATION (BAZETT): 457 MS
EKG R AXIS: 11 DEGREES
EKG T AXIS: 5 DEGREES
EKG VENTRICULAR RATE: 66 BPM

## 2025-01-13 PROCEDURE — 93010 ELECTROCARDIOGRAM REPORT: CPT | Performed by: INTERNAL MEDICINE

## 2025-01-13 RX ORDER — ROSUVASTATIN CALCIUM 10 MG/1
10 TABLET, COATED ORAL DAILY
Qty: 90 TABLET | Refills: 1 | Status: SHIPPED | OUTPATIENT
Start: 2025-01-13

## 2025-01-13 NOTE — ED NOTES
DATE: 2023  PATIENT: Nova Silva  YOB: 1949  MRN: [id number]  PCP: Savanna Andre PA-C  PRIMARY HEME/ONC PROVIDER: Dr. Robin Wallace    CHIEF COMPLAINS: eating better.  Has more respiratory symptoms.    HISTORY OF PRESENT ILLNESS  Nova Silva is a 73 year old female well known to me and being treated for breast cancer, was sent from the infusion center after being tachycardic despite IV fluid hydration.    She has T2N1 triple positive breast cancer for which she is receiving chemotherapy - TCHP and cycle 1 was received on 2/15/23 and 3 days later was significantly weak, diarrhea, nauseous, unable to eat and was given compazine, imodium, dexamethasone as meds at home to help with her symptoms.     She continued to feel unwell and hence was brought in for hydration yesterday. She was still weak, hypotensive and tachycardic and hence the nurse sent her to the ER.    She has not been eating, she had diarrhea as a result of the chemotherapy.    Since admission, she was also found to have CURRY and has received supportive meds. She is slowly improving.             PAST MEDICAL HISTORY  Past Medical History:   Diagnosis Date   • Asthma    • Graves disease     no meds from    • History of colonoscopy 09/15/2020    repeat in 5 years   • Hypertension    • Malignant neoplasm (CMS/HCC)     left breast        SURGICAL HISTORY  Past Surgical History:   Procedure Laterality Date   • Extracapsular cataract removal w insert io lens prosth wo ecp Bilateral    • Eye surgery     • Hysterectomy     • Myomectomy          SOCIAL HISTORY  Social History     Tobacco Use   • Smoking status: Former     Types: Cigarettes     Quit date:      Years since quittin.1   • Smokeless tobacco: Never   Vaping Use   • Vaping Use: never used   Substance Use Topics   • Alcohol use: Yes     Comment: ocassionally   • Drug use: Never       FAMILY HISTORY    Family History   Problem Relation Age of Onset   • Cancer  Patient relates that she is feeling much better and is thankful for care received.  Denies any further dizziness.  D/C instructions explained.  Patient and daughter both voiced understanding.  Patient is ambulatory from ED with upright and steady gait.  No distress observed.   Mother    • Bilateral breast cancer Mother    • Cancer, Breast Sister         ALLERGIES  ALLERGIES:  Shellfish allergy   (food or med), Azithromycin, and Pollen extract    MEDICATIONS  Medications Prior to Admission   Medication Sig Dispense Refill   • prochlorperazine (COMPAZINE) 10 MG tablet Take 1 tablet by mouth every 6 hours as needed for Nausea or Vomiting. 30 tablet 5   • dexAMETHasone (DECADRON) 4 MG tablet Take 1 tablet by mouth 2 times daily. Take for 3 days with each chemotherapy cycle.  Start the day before each docetaxel treatment. 10 tablet 0   • loperamide (IMODIUM) 2 MG capsule Take 4 mg by mouth at first loose stool, then 2 mg every 2 hours until diarrhea-free for 12 hours. 30 capsule 6   • amoxicillin (AMOXIL) 500 MG capsule Take 500 mg by mouth in the morning and 500 mg at noon and 500 mg in the evening.     • ibuprofen (MOTRIN) 600 MG tablet TAKE ONETABLET EVERY 6 HOURS AS NEEDED     • penicillin v potassium (VEETID) 500 MG tablet Take 500 mg by mouth 4 times daily.     • acetaminophen-codeine (TYLENOL/CODEINE #3) 300-30 MG per tablet Take 1 tablet by mouth every 4 hours as needed (pain). 7 tablet 0   • amLODIPine (NORVASC) 10 MG tablet Take 10 mg by mouth daily.     • ALBUTEROL SULFATE HFA IN Inhale 2 puffs into the lungs.     • HYDROcodone-acetaminophen (NORCO) 5-325 MG per tablet as needed.     • lisinopril (ZESTRIL) 10 MG tablet Take 10 mg by mouth.         Current Facility-Administered Medications   Medication Dose Route Frequency Provider Last Rate Last Admin   • guaiFENesin-codeine (GUAIFENESIN AC) 100-10 MG/5ML liquid 5 mL  5 mL Oral Q4H PRN Veto Hanson MD   5 mL at 02/25/23 1558   • guaiFENesin-DM (ROBITUSSIN DM) 100-10 MG/5ML syrup 10 mL  10 mL Oral Q4H PRN Severiano Lofton MD   10 mL at 02/24/23 2036   • potassium CHLORIDE (KLOR-CON M) fabiola ER tablet 40 mEq  40 mEq Oral Once Regis Mason MD       • pantoprazole (PROTONIX INJECT) injection 40 mg  40 mg Intravenous Daily Severiano Lofton  MD   40 mg at 02/25/23 0818   • albuterol inhaler 2 puff  2 puff Inhalation Q4H Resp PRN Severiano Lofton MD   2 puff at 02/25/23 0057   • amLODIPine (NORVASC) tablet 10 mg  10 mg Oral Daily Severiano Lofton MD   10 mg at 02/25/23 0818   • loperamide (IMODIUM) capsule 2 mg  2 mg Oral PRN Robin Wallace MD       • cefTRIAXone (ROCEPHIN) syringe 1,000 mg  1,000 mg Intravenous Nightly Severiano Lofton MD   1,000 mg at 02/24/23 2028   • sodium chloride 0.9 % flush bag 25 mL  25 mL Intravenous PRN Regis Mason MD       • sodium chloride (PF) 0.9 % injection 2 mL  2 mL Intracatheter 2 times per day Regis Mason MD   2 mL at 02/25/23 0818   • Potassium Standard Replacement Protocol (Levels 3.5 and lower)   Does not apply See Admin Instructions Regis Mason MD       • Potassium Replacement (Levels 3.6 - 4)   Does not apply See Admin Instructions Regis Mason MD       • Magnesium Standard Replacement Protocol   Does not apply See Admin Instructions Regis Mason MD       • ondansetron (ZOFRAN) injection 4 mg  4 mg Intravenous BID PRN Regis Mason MD   4 mg at 02/24/23 0039   • acetaminophen (TYLENOL) tablet 650 mg  650 mg Oral Q4H PRN Regis Mason MD       • traMADol (ULTRAM) tablet 50 mg  50 mg Oral Q6H PRN Regis Mason MD   50 mg at 02/22/23 2235   • polyethylene glycol (MIRALAX) packet 17 g  17 g Oral Daily PRN Regis Mason MD       • docusate sodium-sennosides (SENOKOT S) 50-8.6 MG 1 tablet  1 tablet Oral BID PRN Regis Mason MD       • aluminum-magnesium hydroxide-simethicone (MAALOX) 200-200-20 MG/5ML suspension 20 mL  20 mL Oral Q4H PRN Regis Mason MD       • sodium chloride 0.9 % flush bag 25 mL  25 mL Intravenous PRN Regis Mason MD       • heparin (porcine) injection 5,000 Units  5,000 Units Subcutaneous 3 times per day Regis Mason MD   5,000 Units at 02/23/23 1327   • sodium chloride 0.9% infusion   Intravenous Continuous Severiano Lofton  mL/hr at 02/24/23 1640 Rate Verify at  02/24/23 1640   • metoCLOPramide (REGLAN) injection 10 mg  10 mg Intravenous Q6H PRN Regis Mason MD           REVIEW OF SYSTEMS  Review of Systems   Constitutional: Positive for appetite change and fatigue. Negative for chills, fever and unexpected weight change.   HENT: Negative for facial swelling, mouth sores, nosebleeds, sinus pain, trouble swallowing and voice change.    Eyes: Negative for visual disturbance.   Respiratory: Negative for chest tightness and shortness of breath.    Gastrointestinal: Positive for diarrhea and nausea. Negative for anal bleeding, constipation, rectal pain and vomiting.   Endocrine: Negative for cold intolerance and heat intolerance.   Genitourinary: Negative for difficulty urinating, dysuria, flank pain and hematuria.   Musculoskeletal: Negative for arthralgias, back pain, joint swelling and neck pain.   Skin: Negative for color change and rash.   Neurological: Positive for dizziness, weakness and light-headedness. Negative for speech difficulty and headaches.   Hematological: Negative for adenopathy. Does not bruise/bleed easily.   Psychiatric/Behavioral: Negative for dysphoric mood, self-injury, sleep disturbance and suicidal ideas. The patient is not nervous/anxious.          PHYSICAL EXAM  Temp:  [97.2 °F (36.2 °C)-99.7 °F (37.6 °C)] 99.7 °F (37.6 °C)  Heart Rate:  [66-98] 98  Resp:  [18-20] 18  BP: (121-155)/(71-90) 136/74     Physical Exam  Constitutional:       General: She is not in acute distress.     Appearance: She is well-developed. She is not ill-appearing.   HENT:      Head: Normocephalic and atraumatic.      Mouth/Throat:      Pharynx: No oropharyngeal exudate.      Neck: Neck supple.   Eyes:      General: No scleral icterus.        Right eye: No discharge.         Left eye: No discharge.      Conjunctiva/sclera: Conjunctivae normal.   Neck:      Thyroid: No thyromegaly.   Pulmonary:      Effort: Pulmonary effort is normal. No respiratory distress.      Breath  sounds: Normal breath sounds. No wheezing or rales.   Chest:      Chest wall: No tenderness.   Abdominal:      General: Bowel sounds are normal. There is no distension.      Palpations: Abdomen is soft. There is no mass.      Tenderness: There is no abdominal tenderness. There is no guarding or rebound.   Musculoskeletal:      Right lower leg: No edema.      Left lower leg: No edema.   Lymphadenopathy:      Cervical: No cervical adenopathy.   Skin:     General: Skin is warm and dry.      Coloration: Skin is not pale.      Findings: No erythema or rash.   Neurological:      Mental Status: She is alert and oriented to person, place, and time.      Coordination: Coordination normal.   Psychiatric:         Behavior: Behavior normal.         Thought Content: Thought content normal.         Judgment: Judgment normal.            LABS:  Recent Labs   Lab 02/25/23  0546 02/24/23  0512 02/22/23  1629 02/14/23  0938 12/28/22  0957   WBC 27.2* 22.2* 8.5 8.7 10.4   HGB 9.7* 10.9* 12.5 12.3 12.3   HCT 30.4* 33.7* 37.8 39.0 39.8   MCV 84.9 84.5 81.6 87.2 87.9   PLT 83* 94* 76* 221 234   Absolute Neutrophils  --   --   --  4.1 4.7     Recent Labs   Lab 02/25/23  0546 02/24/23  0512   Sodium 137 135   Potassium 4.0 4.6   Chloride 105 102   BUN 19 30*   Creatinine 1.09* 1.38*   Glucose 104* 126*   Calcium 7.3* 7.6*   Albumin  --  2.7*   Globulin  --  3.0   Bilirubin, Total  --  0.2   GOT/AST  --  31   Alkaline Phosphatase  --  66   GPT  --  53     Recent Labs   Lab 12/28/22  0957   LD, Total 213           IMPRESSION/PLAN:    Breast cancer:   Early stage, triple positive. Needs chemotherpay as its node positive and her2 positive.   TO proceed ot cycel 2 once improved.     Chemotherapy induced nausea and vomiting:   Symptoms improved today.   Can hold meds and use prn antiemetcs.    Chemotherapy associated diarrhea:   Imodium with each loose stool.    Thrombocytopenia:   Chemotherapy induced. Continue to monitor.    Hold hepatin when  platelets below 50K.    Leucocytosis:   From neulasta and dexamethasone.    CURRY:   Dehydration from poor feeding and nausea.   Encourage PO fluids and IV saline at 125 ml/hr.   Renal function improving.      Thank you for allowing me to participate in the care of the patient. If I can be of further help, please don't hesitate to contact me.      Robin Wallace MD  [facility]    This medical document is intended for peer to peer communication.  It is written in medical language that may contain abbreviations or verbiage that are unfamiliar.  It may appear blunt or direct. This document  was completed using a voice recognition software.  Grammatical errors, random word insertions, pronoun errors and incomplete sentences can be an occasional consequence of this system due to software limitations, ambient noise and hardware issues. Portions of this note have also been copied to ensure clinical continually and have again been edited where necessary.  Any formal questions or concerns about the text or information contained in this note should be directly addressed to the provider for clarification.

## 2025-01-13 NOTE — ED NOTES
Patient ambulates without incident. She states that she feels better and has no complaints of weakness or dizziness

## 2025-01-13 NOTE — CONSULTS
1/9/2025.    DISCUSSION/PLAN:   I discussed the risks, benefits, rationale of radiation therapy to the disease in the breast with primary goal to enhance local regional control and minimize recurrence risk.  I discussed the rationale of brachytherapy using our AISHWARYA catheter which is demonstrated equivalent local regional control and cosmesis to extremity radiation therapy.  I reviewed the workflow of AISHWARYA mediated breast brachytherapy which involves surgical implantation of the catheter under ultrasound guidance at a site parallel to the plane of the seroma approximately 2 to 4 weeks after lumpectomy.  I am anticipating that the patient will qualify for 3 treatments delivered over 2 days.  I then shifted my discussion to side effects. Acute side effects were reviewed and include but not limited to fatigue, potential for bleeding, potential for infection, potential for pain, and potential for losing from the seroma cavity. Long-term side effects include but not limited to fatigue, potential for fibrosis of the skin of the breast at the site of former catheter insertion, potential for rib fracture, and remote potential for a secondary malignancy.  The final size of the catheter will be determined by the size of the seroma as measured on the day of CT simulation.  The patient had several questions regarding this all of which were answered to their apparent satisfaction.  They wish to move forward and informed consent was signed today.  I will coordinate with breast surgery regarding the timing of lumpectomy so that the patient can return 2 to 4 weeks later for insertion of catheter.  In the interim they know that we remain available should they have any additional questions or concerns.    The patient is scheduled to have a lumpectomy and sentinel lymph node biopsy on 1/9/2025.  We will follow-up with the pathology to make a final determination of whether or not she is a candidate for AISHWARYA based accelerated partial

## 2025-01-13 NOTE — TELEPHONE ENCOUNTER
Comments:     Last Office Visit (last PCP visit):   9/16/2024    Next Visit Date:  Future Appointments   Date Time Provider Department Center   1/22/2025  9:00 AM Yusuf, Angeli, MD [facility]   1/28/2025 10:00 AM SCHEDULE, [facility] NURSE [facility]   6/18/2025 10:30 AM Clifton Wheatley MD [facility]       **If hasn't been seen in over a year OR hasn't followed up according to last diabetes/ADHD visit, make appointment for patient before sending refill to provider.    Rx requested:  Requested Prescriptions     Pending Prescriptions Disp Refills    rosuvastatin (CRESTOR) 10 MG tablet [Pharmacy Med Name: rosuvastatin 10 mg tablet] 90 tablet 1     Sig: TAKE 1 TABLET BY MOUTH DAILY

## 2025-01-13 NOTE — DISCHARGE INSTRUCTIONS
Please follow-up with your primary care doctor regarding hypertension and ER visit today.  Please return to the emergency department your symptoms worsen.  Please be sure to take your blood pressure at home and record the findings twice a day once in the morning once at night.  Try to reduce your sodium intake and increase your fluids.

## 2025-01-14 ENCOUNTER — TELEPHONE (OUTPATIENT)
Age: 75
End: 2025-01-14

## 2025-01-14 NOTE — TELEPHONE ENCOUNTER
Patient states since her surgery on 1/9/25 her bp has been elevated. Today readings were 176/108, 171/99. She is not currently on any BP medications.    Moved ED follow up to 1/17/25 with Bho. Does Chuck have any other recommendations?    Please advise    Callback [phone]

## 2025-01-16 ENCOUNTER — TELEPHONE (OUTPATIENT)
Dept: SURGERY | Age: 75
End: 2025-01-16

## 2025-01-16 NOTE — TELEPHONE ENCOUNTER
----- Message from Dr. Angeli Yusuf MD sent at 1/16/2025  8:06 AM EST -----  Regarding: call with path, neg margins    ----- Message -----  From: Moe Velasquez Incoming Lab Results From Soft  Sent: 1/15/2025  11:01 AM EST  To: Angeli Yusuf MD

## 2025-01-16 NOTE — PROGRESS NOTES
Subjective  Valeria Laird 1950 is a 74 y.o. female who presents today with:  Chief Complaint   Patient presents with    Follow-up     Pt was seen in ED on 1/12/25   Pt states feeling better but stressed with all the going on   Pt having high \"s at home        HPI  History of Present Illness  The patient presents for evaluation of hypertension, anxiety, diabetes, and spinal stenosis.    She reports experiencing significant stress, which she attributes to her recent successful breast cancer surgery. She describes a persistent inability to relax her shoulders. She recalls an incident where she consumed a high-salt meal, including pizza and soup, after which she experienced balance loss, dizziness, and vomiting. This episode led to an emergency room visit where her blood pressure was found to be elevated. She was subsequently informed by Dr. Woods's office that her symptoms were likely due to hypertension. She reports no history of sleep apnea but uses a CPAP machine nightly. She also reports no issues with hand movement. She has resumed her regular diet and is preparing her own meals. She does not believe the surgery caused her physical stress, although she acknowledges feeling anxious about the procedure. She reports no use of inhalers for several years.    She experiences mild headaches but reports no visual disturbances. She is currently on citalopram 20 mg for anxiety, which she reports as persistent throughout the day. She has not previously been prescribed buspirone.    She has a history of diabetes, which is well-controlled, and she has made significant dietary changes and engages in daily physical activity, including walking for approximately 30 minutes and participating in an exercise program.    She also has a history of spinal stenosis, which causes her chronic pain.    Supplemental Information  She has a history of diabetes, which is well-controlled, and she has made significant

## 2025-01-16 NOTE — TELEPHONE ENCOUNTER
I informed the patient of her right breast lumpectomy clear surgical margins and negative LN. The patient verbalized understanding of her pathology results.I verified the patient's post operative appointment date/time/location. The patient has no questions at this time. The patient verbalized understanding to call the office with any questions or concerns.

## 2025-01-16 NOTE — TELEPHONE ENCOUNTER
I was able to review the records and it appears that had previously been on lisinopril years ago.  If we can have her come to the office to verify blood pressure we may be able to start this medication sooner

## 2025-01-17 ENCOUNTER — OFFICE VISIT (OUTPATIENT)
Age: 75
End: 2025-01-17

## 2025-01-17 VITALS
OXYGEN SATURATION: 96 % | HEIGHT: 63 IN | WEIGHT: 168 LBS | HEART RATE: 101 BPM | SYSTOLIC BLOOD PRESSURE: 120 MMHG | BODY MASS INDEX: 29.77 KG/M2 | DIASTOLIC BLOOD PRESSURE: 66 MMHG

## 2025-01-17 DIAGNOSIS — J41.0 SIMPLE CHRONIC BRONCHITIS (HCC): ICD-10-CM

## 2025-01-17 DIAGNOSIS — F41.8 SITUATIONAL ANXIETY: ICD-10-CM

## 2025-01-17 DIAGNOSIS — I10 HYPERTENSION, UNSPECIFIED TYPE: Primary | ICD-10-CM

## 2025-01-17 DIAGNOSIS — E11.42 DIABETIC POLYNEUROPATHY ASSOCIATED WITH TYPE 2 DIABETES MELLITUS (HCC): ICD-10-CM

## 2025-01-17 RX ORDER — BUSPIRONE HYDROCHLORIDE 5 MG/1
5 TABLET ORAL 2 TIMES DAILY
Qty: 60 TABLET | Refills: 0 | Status: SHIPPED | OUTPATIENT
Start: 2025-01-17 | End: 2025-02-16

## 2025-01-17 RX ORDER — LISINOPRIL 10 MG/1
10 TABLET ORAL DAILY
Qty: 30 TABLET | Refills: 2 | Status: SHIPPED | OUTPATIENT
Start: 2025-01-17

## 2025-01-17 SDOH — ECONOMIC STABILITY: FOOD INSECURITY: WITHIN THE PAST 12 MONTHS, THE FOOD YOU BOUGHT JUST DIDN'T LAST AND YOU DIDN'T HAVE MONEY TO GET MORE.: NEVER TRUE

## 2025-01-17 SDOH — ECONOMIC STABILITY: FOOD INSECURITY: WITHIN THE PAST 12 MONTHS, YOU WORRIED THAT YOUR FOOD WOULD RUN OUT BEFORE YOU GOT MONEY TO BUY MORE.: NEVER TRUE

## 2025-01-17 ASSESSMENT — PATIENT HEALTH QUESTIONNAIRE - PHQ9
SUM OF ALL RESPONSES TO PHQ QUESTIONS 1-9: 0
2. FEELING DOWN, DEPRESSED OR HOPELESS: NOT AT ALL
SUM OF ALL RESPONSES TO PHQ QUESTIONS 1-9: 0
SUM OF ALL RESPONSES TO PHQ QUESTIONS 1-9: 0
SUM OF ALL RESPONSES TO PHQ9 QUESTIONS 1 & 2: 0
SUM OF ALL RESPONSES TO PHQ QUESTIONS 1-9: 0
1. LITTLE INTEREST OR PLEASURE IN DOING THINGS: NOT AT ALL

## 2025-01-17 ASSESSMENT — ENCOUNTER SYMPTOMS
SINUS PAIN: 0
SORE THROAT: 0
BACK PAIN: 1
VOMITING: 0
SINUS PRESSURE: 0
CHEST TIGHTNESS: 0
SHORTNESS OF BREATH: 0
COUGH: 0
NAUSEA: 0
ABDOMINAL PAIN: 0
DIARRHEA: 0

## 2025-01-17 ASSESSMENT — VISUAL ACUITY: OU: 1

## 2025-01-22 ENCOUNTER — OFFICE VISIT (OUTPATIENT)
Dept: SURGERY | Age: 75
End: 2025-01-22

## 2025-01-22 VITALS
TEMPERATURE: 97.4 F | SYSTOLIC BLOOD PRESSURE: 122 MMHG | DIASTOLIC BLOOD PRESSURE: 60 MMHG | RESPIRATION RATE: 12 BRPM | OXYGEN SATURATION: 99 % | BODY MASS INDEX: 30.33 KG/M2 | HEART RATE: 88 BPM | HEIGHT: 63 IN | WEIGHT: 171.2 LBS

## 2025-01-22 DIAGNOSIS — N64.9 BREAST DISORDER: Primary | ICD-10-CM

## 2025-01-22 DIAGNOSIS — Z17.0 CARCINOMA OF UPPER-OUTER QUADRANT OF RIGHT BREAST IN FEMALE, ESTROGEN RECEPTOR POSITIVE (HCC): ICD-10-CM

## 2025-01-22 DIAGNOSIS — C50.411 CARCINOMA OF UPPER-OUTER QUADRANT OF RIGHT BREAST IN FEMALE, ESTROGEN RECEPTOR POSITIVE (HCC): ICD-10-CM

## 2025-01-22 PROCEDURE — 99024 POSTOP FOLLOW-UP VISIT: CPT | Performed by: SURGERY

## 2025-01-27 RX ORDER — SULFAMETHOXAZOLE AND TRIMETHOPRIM 800; 160 MG/1; MG/1
1 TABLET ORAL 2 TIMES DAILY
Qty: 10 TABLET | Refills: 0 | Status: SHIPPED | OUTPATIENT
Start: 2025-01-27 | End: 2025-02-01

## 2025-01-28 ENCOUNTER — HOSPITAL ENCOUNTER (OUTPATIENT)
Dept: RADIATION ONCOLOGY | Age: 75
Discharge: HOME | End: 2025-01-28
Payer: MEDICARE

## 2025-01-28 DIAGNOSIS — C50.411 CARCINOMA OF UPPER-OUTER QUADRANT OF RIGHT BREAST IN FEMALE, ESTROGEN RECEPTOR POSITIVE (HCC): Primary | ICD-10-CM

## 2025-01-28 DIAGNOSIS — Z17.0 CARCINOMA OF UPPER-OUTER QUADRANT OF RIGHT BREAST IN FEMALE, ESTROGEN RECEPTOR POSITIVE (HCC): Primary | ICD-10-CM

## 2025-01-28 PROCEDURE — 19499 UNLISTED PROCEDURE BREAST: CPT | Performed by: RADIOLOGY

## 2025-01-28 PROCEDURE — 77334 RADIATION TREATMENT AID(S): CPT | Performed by: RADIOLOGY

## 2025-01-28 PROCEDURE — 77470 SPECIAL RADIATION TREATMENT: CPT | Performed by: RADIOLOGY

## 2025-01-28 PROCEDURE — 77370 RADIATION PHYSICS CONSULT: CPT | Performed by: RADIOLOGY

## 2025-01-28 PROCEDURE — 77290 THER RAD SIMULAJ FIELD CPLX: CPT | Performed by: RADIOLOGY

## 2025-01-28 PROCEDURE — 19296 PLACE PO BREAST CATH FOR RAD: CPT | Performed by: RADIOLOGY

## 2025-01-28 NOTE — PROCEDURES
[facility]           Radiation Oncology      [id number] [place], [place] [address]        O: [phone]        F: [phone]       [facility]                   Dr. Dylon Gillespie MD PhD    PROCEDURE NOTE     Date of Service: 2025  Patient ID: Valeria Laird   : 1950  MRN: [id number]   Acct Number: [id number]     Valeria Laird  74 y.o.   1950    REFERRING PROVIDER: Paramjit    PCP:  Ludin Woods MD    PREOPERATIVE DIAGNOSIS:   1. Carcinoma of upper-outer quadrant of right breast in female, estrogen receptor positive (HCC)      POSTOPERATIVE DIAGNOSIS:   1. Carcinoma of upper-outer quadrant of right breast in female, estrogen receptor positive (HCC)      PROCEDURE PERFORMED: Placement of an expandable catheter into the right breast, using image guidance, to be used for interstitial radioelement application.    SURGEON: Dylon Gillespie MD, PhD           ASSISTANT: Rivera Luna RN and Amara Lozada RN  ANESTHESIA:  2% Lidocaine/Epinephrine              ESTIMATED BLOOD LOSS: 5 cc    STAGING: Cancer Staging   Carcinoma of upper-outer quadrant of right breast in female, estrogen receptor positive (HCC)  Staging form: Breast, AJCC 8th Edition  - Clinical: Stage IA (cT1, cN0, cM0, G2, ER+, WA+, HER2-) - Signed by Angeli Yusuf MD on 2024  - Pathologic: Stage IA (pT1b, pN0(sn), cM0, G2, ER+, WA+, HER2-) - Signed by Dylon Gillespie MD on 2025      PROCEDURE: The patient was taken to CT to have a scan of the thorax for initial evaluation of seroma in the breast in order to localize the best path for insertion of catheter into the lumpectomy cavity.The patient was then taken to the procedure room and placed in the supine position, with body rotated slightly away from the side of involvement. A pillow was placed under the ipsilateral side of breast cancer involvement to help facilitate this rotated positioning. Using ultrasound guidance, the size and shape

## 2025-01-28 NOTE — PROGRESS NOTES
[facility]  RADIATION ONCOLOGY  AKOSUA APPLICATOR INSERTION PROCEDURE     Procedural time out     Verified patient name and : 1950  Pre-procedure VS: 97.3 F,154/77, 90, 18 and 96% on room air.     Breast: [x] RT [] LT    Quadrant: []Upper inner [x]Upper Outer []Lower inner  [] Lower Outer    Physician : Dr. Gillespie    Nursing: Amara Lozada RN and Rivera Luna RN  Procedure start time: 10:55 am   Akosua Applicator Size:      [] Akosua 6-1 mini      [] Akosua 6-1      [x] Akosua 8-1      [] Akosua 10-1  Procedure end time: 11:26 am  PT tolerated procedure: [x]Well  [] Other-  Akosua Catheter Dressing:   [x] Insertion site cleansed (Chloro-prep swabs X 3) and allowed the site to air dry.   [x] Light coating of ointment applied to the skin at the Akosua placement site   [x]Applied a split drain sponge around the base and the top of the Akosua applicator   [x]Wrapped the exposed portion of the Akosua Applicator in an ABD pad   [x]Cushion as necessary for comfort and placed bra back on patient to hold dressings in place.  PT Vital signs:98.6, 133/65, 74,18 and 97% on room air.  PT given / reviewed Akosua Applicator instructions [x]   While your Akosua Catheter is in place there a few things that you need to be aware of:  Do not shower.  Take sponge baths and wash hair in the sink.  Keep the insertion site clean and dry at all times.  Wear your bra at all times.  Nursing will change your dressing and your bra after your treatments.    Alert your doctor for the following:  Fever greater than 100.5 degrees  Redness or red streaks on the treated breast  Sudden or extreme pain, redness, or swelling in the treatment area.  Increased drainage or discharge  Yellow, greenish, or foul-smelling wound drainage  Other notes:

## 2025-01-29 ENCOUNTER — HOSPITAL ENCOUNTER (OUTPATIENT)
Dept: RADIATION ONCOLOGY | Age: 75
Discharge: HOME | End: 2025-01-29
Payer: MEDICARE

## 2025-01-29 DIAGNOSIS — C50.411 CARCINOMA OF UPPER-OUTER QUADRANT OF RIGHT BREAST IN FEMALE, ESTROGEN RECEPTOR POSITIVE (HCC): Primary | ICD-10-CM

## 2025-01-29 DIAGNOSIS — Z17.0 CARCINOMA OF UPPER-OUTER QUADRANT OF RIGHT BREAST IN FEMALE, ESTROGEN RECEPTOR POSITIVE (HCC): Primary | ICD-10-CM

## 2025-01-29 PROCEDURE — 77280 THER RAD SIMULAJ FIELD SMPL: CPT | Performed by: RADIOLOGY

## 2025-01-29 PROCEDURE — 77370 RADIATION PHYSICS CONSULT: CPT | Performed by: RADIOLOGY

## 2025-01-29 PROCEDURE — 77317 BRACHYTX ISODOSE INTERMED: CPT | Performed by: RADIOLOGY

## 2025-01-29 PROCEDURE — 77771 HDR RDNCL NTRSTL/ICAV BRCHTX: CPT | Performed by: RADIOLOGY

## 2025-01-29 PROCEDURE — C1717 BRACHYTX, NON-STR,HDR IR-192: HCPCS | Performed by: RADIOLOGY

## 2025-01-29 PROCEDURE — C1728 CATH, BRACHYTX SEED ADM: HCPCS | Performed by: RADIOLOGY

## 2025-01-29 NOTE — PROGRESS NOTES
[facility]cer Center  Radiation Oncology   Akosua applicator Dressing Change      Akosua Catheter Dressing:   [x] Insertion site cleansed (Chloro-prep swabs X 3) and allowed the site to air dry.   [x] Light coating of ointment applied to the skin at the Akosua placement site   [x]Applied a split drain sponge around the base and the top of the Akosua applicator   [x]Wrapped the exposed portion of the Akosua Applicator in an ABD pad   [x]Cushion as necessary for comfort and placed bra back on patient to hold dressings in place.    [x] Insertion site without redness, pain, warmth, or drainage  Other assessment notes: Small amount of serosanguineous drainage on split dressing at start of treatment. Removed clear dressing marker for some serous fluid collection with approximately 2\" linear area of excoriation. Patient denies any pain or itchiness to the area.

## 2025-01-30 ENCOUNTER — HOSPITAL ENCOUNTER (OUTPATIENT)
Dept: RADIATION ONCOLOGY | Age: 75
Discharge: HOME | End: 2025-01-30
Payer: MEDICARE

## 2025-01-30 VITALS
SYSTOLIC BLOOD PRESSURE: 172 MMHG | DIASTOLIC BLOOD PRESSURE: 83 MMHG | HEART RATE: 75 BPM | OXYGEN SATURATION: 95 % | RESPIRATION RATE: 18 BRPM | TEMPERATURE: 97.5 F

## 2025-01-30 DIAGNOSIS — Z17.0 CARCINOMA OF UPPER-OUTER QUADRANT OF RIGHT BREAST IN FEMALE, ESTROGEN RECEPTOR POSITIVE (HCC): Primary | ICD-10-CM

## 2025-01-30 DIAGNOSIS — C50.411 CARCINOMA OF UPPER-OUTER QUADRANT OF RIGHT BREAST IN FEMALE, ESTROGEN RECEPTOR POSITIVE (HCC): Primary | ICD-10-CM

## 2025-01-30 DIAGNOSIS — C50.411 CARCINOMA OF UPPER-OUTER QUADRANT OF RIGHT BREAST IN FEMALE, ESTROGEN RECEPTOR POSITIVE (HCC): ICD-10-CM

## 2025-01-30 DIAGNOSIS — C50.411 MALIGNANT NEOPLASM OF UPPER-OUTER QUADRANT OF RIGHT BREAST IN FEMALE, ESTROGEN RECEPTOR POSITIVE (HCC): Primary | ICD-10-CM

## 2025-01-30 DIAGNOSIS — Z17.0 CARCINOMA OF UPPER-OUTER QUADRANT OF RIGHT BREAST IN FEMALE, ESTROGEN RECEPTOR POSITIVE (HCC): ICD-10-CM

## 2025-01-30 DIAGNOSIS — Z17.0 MALIGNANT NEOPLASM OF UPPER-OUTER QUADRANT OF RIGHT BREAST IN FEMALE, ESTROGEN RECEPTOR POSITIVE (HCC): Primary | ICD-10-CM

## 2025-01-30 PROCEDURE — 77771 HDR RDNCL NTRSTL/ICAV BRCHTX: CPT | Performed by: RADIOLOGY

## 2025-01-30 PROCEDURE — 77280 THER RAD SIMULAJ FIELD SMPL: CPT | Performed by: RADIOLOGY

## 2025-01-30 PROCEDURE — C1728 CATH, BRACHYTX SEED ADM: HCPCS | Performed by: RADIOLOGY

## 2025-01-30 PROCEDURE — C1717 BRACHYTX, NON-STR,HDR IR-192: HCPCS | Performed by: RADIOLOGY

## 2025-01-30 PROCEDURE — NBSRV NON-BILLABLE SERVICE: Performed by: RADIOLOGY

## 2025-01-30 PROCEDURE — 77427 RADIATION TX MANAGEMENT X5: CPT | Performed by: RADIOLOGY

## 2025-01-30 NOTE — PROGRESS NOTES
[facility]           Radiation Oncology      [id number] [place], [place] [address]        O: [phone]        F: [phone]       [facility]                   Dr. Dylon Gillespie MD PhD    ON TREATMENT VISIT (OTV) NOTE     Date of Service: 2025  Patient ID: Valeria Laird   : 1950  MRN: [id number]   Acct Number: [id number]     DIAGNOSIS:   Cancer Staging   Carcinoma of upper-outer quadrant of right breast in female, estrogen receptor positive (HCC)  Staging form: Breast, AJCC 8th Edition  - Clinical: Stage IA (cT1, cN0, cM0, G2, ER+, ND+, HER2-) - Signed by Angeli Yusuf MD on 2024  - Pathologic: Stage IA (pT1b, pN0(sn), cM0, G2, ER+, ND+, HER2-) - Signed by Dylon Gillespie MD on 2025      Treatment Area: Breast     Current Total Dose(cGy): 2250 cGy  Current Fraction: 3  Final/Cumulative Rx. Dose (cGy): ***    Patient was seen today for weekly visit.     Wt Readings from Last 3 Encounters:   25 77.7 kg (171 lb 3.2 oz)   25 76.2 kg (168 lb)   25 78.9 kg (174 lb)       BP (!) 172/83   Pulse 75   Temp 97.5 °F (36.4 °C)   Resp 18   LMP 2008   SpO2 95%     Lab Results   Component Value Date    WBC 10.0 2025    HGB 12.7 2025    HCT 38.5 2025     2025       Comfort Alteration  Fatigue:None, able to perform daily activities    Pain Location: Denies   Pain Intensity (Current): 0 No Pain  Pain Treatment: No treatment scheduled  Pain Relief: No relief  Hot Flashes/Flushes: None    Emotional Alteration:   Coping: effective    Nutritional Alteration  Anorexia: none   Nausea: No nausea noted  Vomiting: No vomiting   Dyspepsia/Heartburn: None    Skin Alteration   Skin reaction: Open blister near adjacent to AISHWARYA insertion site. Instructed to use neosporin or Aquaphor to the site daily.    Ventilation Alteration  Cough:  Smoker's cough at baseline  Hemoptysis: None  Dyspnea: Normal  Mucous Quantity/Quality:

## 2025-01-30 NOTE — PROGRESS NOTES
[facility]cer Center  Radiation Oncology   Akosua applicator Dressing Change      Akosua Catheter Dressing:   [x] Insertion site cleansed (Chloro-prep swabs X 3) and allowed the site to air dry.   [x] Light coating of ointment applied to the skin at the Akosua placement site   [x]Applied a split drain sponge around the base and the top of the Akosua applicator   [x]Wrapped the exposed portion of the Akosua Applicator in an ABD pad   [x]Cushion as necessary for comfort and placed bra back on patient to hold dressings in place.    [x] Insertion site without redness, pain, warmth, or drainage  Other assessment notes: Scant amount of serous drainage on old dressing upon removal. Denies any pain  or itchiness to linear smooth ruptured blister approximately 2\" long.

## 2025-01-30 NOTE — PROGRESS NOTES
[facility]           Radiation Oncology      [address] [id number]        O: [phone]        F: [phone]       [facility]                   Dr. Dylno Gillespie MD PhD    RADIATION TREATMENT SUMMARY NOTE     Date of Service: 2025  Patient ID: Valeria Laird   : 1950  MRN: [id number]   Acct Number: [id number]       Patient Name:  Valeria Laird,  1950,  74 y.o., female       Referring Physician: Angeli Yusuf MD  [address] [place] [address]      PCP: Ludin Woods MD       Diagnosis:      Carcinoma of upper-outer quadrant of right breast in female, estrogen receptor positive (HCC)           Recent History:  ***    Site Start TX Last TX ED Fractions Dose Fx Dose Technique   Rt Breast 25 1 3 2250 cGy 750 cGy Intracavitary Brachy               Concurrent therapy:      ***    Technique:                 ***      Treatment course:       ***    Plan:  ***         Dylon Gillespie MD PhD  Radiation Oncologist, [facility]    Department of Radiation Oncology  [facility]

## 2025-02-20 ENCOUNTER — HOSPITAL ENCOUNTER (OUTPATIENT)
Dept: RADIATION ONCOLOGY | Age: 75
Discharge: HOME | End: 2025-02-20
Payer: MEDICARE

## 2025-02-20 VITALS
OXYGEN SATURATION: 98 % | SYSTOLIC BLOOD PRESSURE: 143 MMHG | BODY MASS INDEX: 29.75 KG/M2 | DIASTOLIC BLOOD PRESSURE: 79 MMHG | TEMPERATURE: 97.3 F | WEIGHT: 167.9 LBS

## 2025-02-20 DIAGNOSIS — Z17.0 CARCINOMA OF UPPER-OUTER QUADRANT OF RIGHT BREAST IN FEMALE, ESTROGEN RECEPTOR POSITIVE: Primary | ICD-10-CM

## 2025-02-20 DIAGNOSIS — C50.411 CARCINOMA OF UPPER-OUTER QUADRANT OF RIGHT BREAST IN FEMALE, ESTROGEN RECEPTOR POSITIVE: Primary | ICD-10-CM

## 2025-02-20 PROCEDURE — 99212 OFFICE O/P EST SF 10 MIN: CPT | Performed by: RADIOLOGY

## 2025-02-20 NOTE — PROGRESS NOTES
NURSING ASSESSMENT     Date: 2/20/2025        Patient Name: Valeria Laird     YOB: 1950      Age:  74 y.o.      MRN: [id number]       Chaperone [] Yes   [x] No      Advance Directives:   Do you currently have completed advance directives (living will)? [x] Yes   [] No         *If yes, please bring us a copy for your records.  *If no, would you like info or assistance in completing advance directives (living will)?   [] Yes   [] No    Pain Score: ongoing lower back back  Pain Score (1-10):  Uses arnica ointment. Salon pas and mobic  Pain Location:  lower back  Pain Duration: intermittent   Pain Management/Control: 100% at times      Is pain affecting your ability to take care of yourself or move throughout your home?                        [] Yes   [x] No    General: No Problems  Patient has gained weight [] Yes   [x] No  Patient has lost weight [] Yes   [x] No  How much weight in pounds and over what length of time:     Eyes (Ophthalmic): No Problem     Skin (Dermatological): Reports incision is almost healed. She is still noticing scant clear yellow drainage.      ENT: No Problems     Respiratory: Reports ongoing dry \"smokers cough\"     Cardiovascular: No Problems      Device   [] Yes   [x] No   Copy of Card Obtained [] Yes   [x] No    Gastrointestinal: No Problems    Genito-Urinary: No Problems     Breast: patient started daily anastrozole 2 weeks ago.      Musculoskeletal: Back Pain    Neurological: No Problems      Hematological and Lymphatic: No Problems     Endocrine: Diabetes Mellitus      A 10-point review of systems  has been conducted and pertinent positives have been   recorded. All other review of systems are negative    Was the patient admitted during the course of treatment OR within 30 days of treatment? none    If yes:n/a  Date of Admission:n/a  Hospital:n/a    Additional Comments:

## 2025-02-28 ENCOUNTER — HOSPITAL ENCOUNTER (OUTPATIENT)
Dept: GENERAL RADIOLOGY | Age: 75
End: 2025-02-28
Attending: FAMILY MEDICINE
Payer: MEDICARE

## 2025-02-28 ENCOUNTER — HOSPITAL ENCOUNTER (OUTPATIENT)
Dept: LAB | Age: 75
Discharge: HOME | End: 2025-02-28
Payer: MEDICARE

## 2025-02-28 ENCOUNTER — OFFICE VISIT (OUTPATIENT)
Age: 75
End: 2025-02-28

## 2025-02-28 ENCOUNTER — HOSPITAL ENCOUNTER (OUTPATIENT)
Age: 75
Discharge: HOME | End: 2025-02-28
Payer: MEDICARE

## 2025-02-28 VITALS
RESPIRATION RATE: 16 BRPM | HEART RATE: 99 BPM | DIASTOLIC BLOOD PRESSURE: 80 MMHG | WEIGHT: 170.6 LBS | BODY MASS INDEX: 31.39 KG/M2 | HEIGHT: 62 IN | SYSTOLIC BLOOD PRESSURE: 130 MMHG | OXYGEN SATURATION: 94 %

## 2025-02-28 DIAGNOSIS — M54.16 LUMBAR RADICULOPATHY: ICD-10-CM

## 2025-02-28 DIAGNOSIS — I10 HYPERTENSION, UNSPECIFIED TYPE: Primary | ICD-10-CM

## 2025-02-28 DIAGNOSIS — I10 HYPERTENSION, UNSPECIFIED TYPE: ICD-10-CM

## 2025-02-28 DIAGNOSIS — F41.8 SITUATIONAL ANXIETY: ICD-10-CM

## 2025-02-28 LAB
ANION GAP SERPL CALCULATED.3IONS-SCNC: 10 MEQ/L (ref 9–15)
BUN SERPL-MCNC: 19 MG/DL (ref 8–23)
CALCIUM SERPL-MCNC: 9.8 MG/DL (ref 8.5–9.9)
CHLORIDE SERPL-SCNC: 102 MEQ/L (ref 95–107)
CO2 SERPL-SCNC: 27 MEQ/L (ref 20–31)
CREAT SERPL-MCNC: 0.85 MG/DL (ref 0.5–0.9)
GLUCOSE SERPL-MCNC: 92 MG/DL (ref 70–99)
POTASSIUM SERPL-SCNC: 4.9 MEQ/L (ref 3.4–4.9)
SODIUM SERPL-SCNC: 139 MEQ/L (ref 135–144)

## 2025-02-28 PROCEDURE — 36415 COLL VENOUS BLD VENIPUNCTURE: CPT

## 2025-02-28 PROCEDURE — 72110 X-RAY EXAM L-2 SPINE 4/>VWS: CPT

## 2025-02-28 PROCEDURE — 80048 BASIC METABOLIC PNL TOTAL CA: CPT

## 2025-02-28 RX ORDER — TIZANIDINE 2 MG/1
2 TABLET ORAL 2 TIMES DAILY PRN
Qty: 30 TABLET | Refills: 0 | Status: SHIPPED | OUTPATIENT
Start: 2025-02-28

## 2025-02-28 RX ORDER — ANASTROZOLE 1 MG/1
1 TABLET ORAL DAILY
COMMUNITY
Start: 2025-02-22

## 2025-02-28 ASSESSMENT — ENCOUNTER SYMPTOMS
CONSTIPATION: 0
CHEST TIGHTNESS: 0
ABDOMINAL PAIN: 0
NAUSEA: 0
COUGH: 0
SHORTNESS OF BREATH: 0
VOMITING: 0
DIARRHEA: 0
BACK PAIN: 1
BLOOD IN STOOL: 0
APNEA: 0

## 2025-02-28 NOTE — PROGRESS NOTES
Subjective:      Patient ID: Valeria Laird is a 74 y.o. female who presents today for:  History of Present Illness  The patient presents for evaluation of back pain, anxiety, hypertension, and cancer.    She has been experiencing back pain for several years, which she has managed through exercise. She was previously referred to a neurosurgeon due to leg numbness, which occurred bilaterally while she was driving approximately 3 weeks ago. The numbness has since resolved, but she continues to experience constant, severe back pain, described as similar to an electric shock. The pain is so intense that it interferes with her daily activities, including sitting and using the restroom. She reports no current numbness in her legs or hands but continues to experience constant pain. She has not previously used muscle relaxers and reports no difficulty controlling her bowels or bladder. She also reports no shooting sensations down her legs. She maintains an exercise program, which includes stretching, under the supervision of a physical therapist.. She has been informed by neurosurgery that she is at level \"3 of 3\" and has been advised to undergo surgery. She expresses fear about the prospect of back surgery. She has been using Salonpas patches and Tylenol for pain management and continues to take meloxicam.    She reports an episode of vomiting and loss of balance after consuming a meal with high salt content, which led to a visit to the emergency room. Despite receiving saline solutions, the cause of her symptoms could not be identified. She was prescribed medication for anxiety and blood pressure, but she is uncertain about the reason for these prescriptions. She was subsequently seen by another provider, who initiated antihypertensive therapy. She has been monitoring her blood pressure at home, which was recorded as 130/80 today.  She denies any chest pain, shortness of breath or lower extremity edema    She reports

## 2025-02-28 NOTE — PROGRESS NOTES
Patient Name: Valeria Laird : 1950        Date: 3/12/2025      Type of Appt: Follow up    Reason for appt: C: Lumbar radiculopathy     Pt last seen by  Dr Hubbard 10/14/2024    Studies done: 25 Xray of Lumbar Spine at [facility]     Conservative Treatments (Have you ever tried any)  Physical Therapy in the last 6 months to a year: No  NSAID's: Yes  Narcotics: Yes  Muscle relaxants: Yes  Epidural injections: No    Any Blood Thinners: [] Yes   [x]  No        [] Aspirin   [] Eliquis   [] Xarelto   [] Pletal   [] Plavix    [] Warfarin   [] Coumadin      Diabetic:  [x] Yes   [] No   If yes, prescribed insulin:  [] Yes   [x]  No     Weight loss medications:   [] Yes  [x] No     [] Ozempic   [] Wegovy    [] Trulicity    [] Mounjaro         Smoking : [x] Yes   []  No

## 2025-03-03 DIAGNOSIS — M54.16 LUMBAR RADICULOPATHY: ICD-10-CM

## 2025-03-08 ENCOUNTER — RESULTS FOLLOW-UP (OUTPATIENT)
Dept: GENERAL RADIOLOGY | Age: 75
End: 2025-03-08

## 2025-03-12 ENCOUNTER — INITIAL CONSULT (OUTPATIENT)
Age: 75
End: 2025-03-12
Payer: MEDICARE

## 2025-03-12 ENCOUNTER — OFFICE VISIT (OUTPATIENT)
Age: 75
End: 2025-03-12
Payer: MEDICARE

## 2025-03-12 ENCOUNTER — TELEPHONE (OUTPATIENT)
Age: 75
End: 2025-03-12

## 2025-03-12 VITALS
BODY MASS INDEX: 31.28 KG/M2 | WEIGHT: 170 LBS | HEIGHT: 62 IN | DIASTOLIC BLOOD PRESSURE: 86 MMHG | SYSTOLIC BLOOD PRESSURE: 136 MMHG

## 2025-03-12 VITALS — WEIGHT: 170 LBS | TEMPERATURE: 97.3 F | BODY MASS INDEX: 31.28 KG/M2 | HEIGHT: 62 IN

## 2025-03-12 DIAGNOSIS — M46.1 BILATERAL SACROILIITIS: ICD-10-CM

## 2025-03-12 DIAGNOSIS — M54.16 LUMBAR RADICULOPATHY: Primary | ICD-10-CM

## 2025-03-12 DIAGNOSIS — M48.062 SPINAL STENOSIS OF LUMBAR REGION WITH NEUROGENIC CLAUDICATION: Primary | ICD-10-CM

## 2025-03-12 PROCEDURE — 99205 OFFICE O/P NEW HI 60 MIN: CPT | Performed by: PAIN MEDICINE

## 2025-03-12 PROCEDURE — 1159F MED LIST DOCD IN RCRD: CPT | Performed by: NEUROLOGICAL SURGERY

## 2025-03-12 PROCEDURE — 1123F ACP DISCUSS/DSCN MKR DOCD: CPT | Performed by: NEUROLOGICAL SURGERY

## 2025-03-12 PROCEDURE — 1123F ACP DISCUSS/DSCN MKR DOCD: CPT | Performed by: PAIN MEDICINE

## 2025-03-12 PROCEDURE — 1125F AMNT PAIN NOTED PAIN PRSNT: CPT | Performed by: PAIN MEDICINE

## 2025-03-12 PROCEDURE — 99204 OFFICE O/P NEW MOD 45 MIN: CPT | Performed by: NEUROLOGICAL SURGERY

## 2025-03-12 PROCEDURE — 99214 OFFICE O/P EST MOD 30 MIN: CPT | Performed by: NEUROLOGICAL SURGERY

## 2025-03-12 PROCEDURE — 1159F MED LIST DOCD IN RCRD: CPT | Performed by: PAIN MEDICINE

## 2025-03-12 PROCEDURE — 1160F RVW MEDS BY RX/DR IN RCRD: CPT | Performed by: PAIN MEDICINE

## 2025-03-12 PROCEDURE — 1125F AMNT PAIN NOTED PAIN PRSNT: CPT | Performed by: NEUROLOGICAL SURGERY

## 2025-03-12 PROCEDURE — 99203 OFFICE O/P NEW LOW 30 MIN: CPT | Performed by: PAIN MEDICINE

## 2025-03-12 RX ORDER — TIZANIDINE 2 MG/1
2 TABLET ORAL EVERY 8 HOURS PRN
Qty: 30 TABLET | Refills: 0 | Status: SHIPPED | OUTPATIENT
Start: 2025-03-12

## 2025-03-12 RX ORDER — PREGABALIN 50 MG/1
50 CAPSULE ORAL 3 TIMES DAILY
Qty: 90 CAPSULE | Refills: 0 | Status: SHIPPED | OUTPATIENT
Start: 2025-03-12 | End: 2025-04-11

## 2025-03-12 ASSESSMENT — ENCOUNTER SYMPTOMS
EYES NEGATIVE: 1
BACK PAIN: 1
RESPIRATORY NEGATIVE: 1
ABDOMINAL DISTENTION: 0
SHORTNESS OF BREATH: 0
GASTROINTESTINAL NEGATIVE: 1
COUGH: 0
ALLERGIC/IMMUNOLOGIC NEGATIVE: 1
TROUBLE SWALLOWING: 0
ABDOMINAL PAIN: 0
EYE PAIN: 0

## 2025-03-12 NOTE — PATIENT INSTRUCTIONS
Beginning April 1, all patients visiting the medical office building, with the exception of those for [address], will be required to check in at the central check-in desk located in the main lobby.

## 2025-03-12 NOTE — PROGRESS NOTES
NEUROSURGERY and SPINE FOLLOW-UP NOTE      Patient Name: Valeria Laird  Patient : 1950  MRN: [id number]       PCP: Ludin Woods MD      History of Present Ilness: 74 y.o. presents with f/u. Was dx with breast cancer- had lumpectomy 2 months ago and also had rad tx less than 2 months ago. Still with severe LBP, constant and having pain down back of LE's to knees, also numbness and weakness.  Smoking 1/2 PPD.  Symptoms are quite severe and significant affecting her life.  She has been doing muscle relaxants and NSAIDs.  She denies any neck pain or mid back pain or any arm symptoms.    Chief Complaint   Patient presents with    Back Pain          Conservative Treatments:  Physical Therapy: Yes  NSAID's: Yes  Narcotics: No  Muscle relaxants: Yes  Epidural injections: No      Past Medical History:        Diagnosis Date    Allergic rhinitis     Anxiety     History of blood transfusion     blood transfusions with c section x 3    Hyperlipidemia     meds > 2 yrs    Osteoarthritis     Sleep apnea     Type 2 diabetes mellitus 2022       Past Surgical History:        Procedure Laterality Date    ANKLE SURGERY Right     tendon repair    BREAST BIOPSY Right 2025    Right Breast Needle Localized Lumpectomy & [place] Lymph Node Biopsy performed by Angeli Yusuf MD at [facility] [place]     SECTION   &  &     pt has had 3     COLONOSCOPY N/A 2020    COLONOSCOPY performed by Zandra Workman MD at [facility] OR    COLONOSCOPY N/A 10/12/2024    COLORECTAL CANCER SCREENING, NOT HIGH RISK performed by Zandra Workman MD at [facility] OR    TOTAL KNEE ARTHROPLASTY Left 2019    LEFT TOTAL KNEE ARTHROPLASTY, SUPINE, 23 HR OBS, IRAJ CEMENTED PERSONA performed by Adalid Garcia MD at [facility] OR    TOTAL KNEE ARTHROPLASTY Right 2020    RIGHT TOTAL KNEE  ARTHROPLASTY performed by Adalid Garcia MD at [facility] OR    US BREAST BIOPSY W LOC DEVICE 1ST LESION RIGHT Right 2024    US BREAST

## 2025-03-12 NOTE — TELEPHONE ENCOUNTER
Prior authorization has been started on Cover My Meds pregabalin (Lyrica) 50 mg capsules, clinical uploaded, authorization pending (Key: [id number])  PA Case ID #: [id number]  Rx #: [id number]    Why was coverage for this drug denied? We denied coverage for this drug because: The information provided by your prescriber did not meet the requirements for covering this medication (prior authorization). Your plan does not allow coverage of this medication based on your prescriber answering No to the following question(s): Is the requested drug being prescribed as adjunctive therapy for treatment of partial onset seizures (focalonset seizures)? Is the requested drug being prescribed for any of the following: A) Management of fibromyalgia, B) Management of neuropathic pain associated with spinal cord injury, C) Cancer-related neuropathic pain, D) Cancer treatment-related neuropathic pain? Is the requested drug being prescribed for any of the following: A) Management of postherpetic neuralgia, B) Management of neuropathic pain associated with diabetic peripheral neuropathy? Share this notice with your prescribing provider and discuss next steps. If your prescribing provider asked for coverage for this drug on your behalf, we already shared this denial notice with them.  How would Dr. So want to proceed?

## 2025-03-12 NOTE — PROGRESS NOTES
diabetes mellitus. Test 1-3 time(s) daily - Ok to substitute per insurance (1 each = 1 box) 1 each 5    ammonium lactate (AMLACTIN) 12 % cream Apply topically daily, avoid applying between the toes. 385 g 5    CPAP Machine MISC by Does not apply route New CPAP 7-10 cm 1 each 0    blood glucose monitor kit and supplies DX: diabetes mellitus. Test 1-3 time(s) daily - Ok to substitute per insurance 1 kit 0    Multiple Vitamins-Minerals (WOMENS MULTIVITAMIN PO) Take 1 tablet by mouth daily      fexofenadine (ALLEGRA) 180 MG tablet Take 1 tablet by mouth daily      Respiratory Therapy Supplies [employer] New CPAP mask and supplies 1 Device 0     No current facility-administered medications for this visit.     Allergies   Allergen Reactions    Molds & Smuts Cough    Adhesive Tape     Other Itching and Swelling     [employer] laundry detergent    Tomato Itching    Influenza Vaccines Other (See Comments)     Severe migraine    Seasonal Other (See Comments)     Tree pollen, mold, dander causes sinus congestion  Other reaction(s): Other - comment required, Runny Nose  Watery eyes  Watery eyes         Review of Systems  Review of Systems   Constitutional: Negative.         Breast cancer Post RT and surgery.   HENT: Negative.     Eyes: Negative.    Respiratory: Negative.     Cardiovascular: Negative.    Gastrointestinal: Negative.    Endocrine: Negative.    Genitourinary: Negative.    Musculoskeletal: Negative.    Skin: Negative.    Allergic/Immunologic: Negative.    Neurological: Negative.    Hematological: Negative.    Psychiatric/Behavioral: Negative.     All other systems reviewed and are negative.       Physical Exam     Temp 97.3 °F (36.3 °C) (Temporal)   Ht 1.575 m (5' 2\")   Wt 77.1 kg (170 lb)   LMP 01/16/2008   BMI 31.09 kg/m²   Physical Exam  Vitals and nursing note reviewed.   Constitutional:       Appearance: Normal appearance.   HENT:      Head: Normocephalic.      Right Ear: Ear canal normal.      Left Ear: Ear canal

## 2025-03-13 DIAGNOSIS — G89.29 CHRONIC RIGHT SHOULDER PAIN: ICD-10-CM

## 2025-03-13 DIAGNOSIS — M25.511 CHRONIC RIGHT SHOULDER PAIN: ICD-10-CM

## 2025-03-13 DIAGNOSIS — G89.29 CHRONIC LEFT-SIDED LOW BACK PAIN WITHOUT SCIATICA: ICD-10-CM

## 2025-03-13 DIAGNOSIS — M54.50 CHRONIC LEFT-SIDED LOW BACK PAIN WITHOUT SCIATICA: ICD-10-CM

## 2025-03-14 RX ORDER — MELOXICAM 15 MG/1
15 TABLET ORAL DAILY
Qty: 90 TABLET | Refills: 0 | Status: SHIPPED | OUTPATIENT
Start: 2025-03-14

## 2025-03-19 ENCOUNTER — OFFICE VISIT (OUTPATIENT)
Age: 75
End: 2025-03-19
Payer: MEDICARE

## 2025-03-19 VITALS
HEART RATE: 58 BPM | TEMPERATURE: 98.6 F | BODY MASS INDEX: 32.1 KG/M2 | OXYGEN SATURATION: 95 % | HEIGHT: 61 IN | WEIGHT: 170 LBS

## 2025-03-19 DIAGNOSIS — M54.16 LUMBAR RADICULOPATHY: Primary | ICD-10-CM

## 2025-03-19 PROCEDURE — 1159F MED LIST DOCD IN RCRD: CPT | Performed by: PAIN MEDICINE

## 2025-03-19 PROCEDURE — 99213 OFFICE O/P EST LOW 20 MIN: CPT | Performed by: PAIN MEDICINE

## 2025-03-19 PROCEDURE — 1123F ACP DISCUSS/DSCN MKR DOCD: CPT | Performed by: PAIN MEDICINE

## 2025-03-19 PROCEDURE — 1160F RVW MEDS BY RX/DR IN RCRD: CPT | Performed by: PAIN MEDICINE

## 2025-03-19 PROCEDURE — 99214 OFFICE O/P EST MOD 30 MIN: CPT | Performed by: PAIN MEDICINE

## 2025-03-19 PROCEDURE — 1125F AMNT PAIN NOTED PAIN PRSNT: CPT | Performed by: PAIN MEDICINE

## 2025-03-19 NOTE — PROGRESS NOTES
times daily (Patient taking differently: 2 sprays by Nasal route 2 times daily as needed) 1 each 1   • fluticasone (FLONASE) 50 MCG/ACT nasal spray 1 spray by Nasal route daily (Patient taking differently: 1 spray by Nasal route daily as needed for Allergies) 1 each 0   • Alcohol Swabs PADS DX: diabetes mellitus. Test 1-3 time(s) daily - Ok to substitute per insurance (1 each = 1 box) 1 each 5   • ammonium lactate (AMLACTIN) 12 % cream Apply topically daily, avoid applying between the toes. 385 g 5   • CPAP Machine MISC by Does not apply route New CPAP 7-10 cm 1 each 0   • blood glucose monitor kit and supplies DX: diabetes mellitus. Test 1-3 time(s) daily - Ok to substitute per insurance 1 kit 0   • Multiple Vitamins-Minerals (WOMENS MULTIVITAMIN PO) Take 1 tablet by mouth daily     • fexofenadine (ALLEGRA) 180 MG tablet Take 1 tablet by mouth daily     • Respiratory Therapy Supplies [employer] New CPAP mask and supplies 1 Device 0     No current facility-administered medications for this visit.     Allergies   Allergen Reactions   • Adhesive Tape Hives   • Molds & Smuts Cough   • Other Itching and Swelling     [employer] laundry detergent   • Tomato Itching   • Influenza Vaccines Other (See Comments)     Severe migraine   • Seasonal Other (See Comments)     Tree pollen, mold, dander causes sinus congestion  Other reaction(s): Other - comment required, Runny Nose  Watery eyes  Watery eyes         Review of Systems  Review of Systems   Constitutional: Negative.         Breast cancer Post RT and surgery.   HENT: Negative.     Eyes: Negative.    Respiratory: Negative.     Cardiovascular: Negative.    Gastrointestinal: Negative.    Endocrine: Negative.    Genitourinary: Negative.    Musculoskeletal: Negative.    Skin: Negative.    Allergic/Immunologic: Negative.    Neurological: Negative.    Hematological: Negative.    Psychiatric/Behavioral: Negative.     All other systems reviewed and are negative.       Physical Exam

## 2025-03-24 ENCOUNTER — HOSPITAL ENCOUNTER (OUTPATIENT)
Dept: MRI IMAGING | Age: 75
Discharge: HOME | End: 2025-03-26
Attending: NEUROLOGICAL SURGERY
Payer: MEDICARE

## 2025-03-24 DIAGNOSIS — M48.062 SPINAL STENOSIS OF LUMBAR REGION WITH NEUROGENIC CLAUDICATION: ICD-10-CM

## 2025-03-24 PROCEDURE — 72158 MRI LUMBAR SPINE W/O & W/DYE: CPT

## 2025-03-24 PROCEDURE — 6360000004 HC RX CONTRAST MEDICATION: Performed by: NEUROLOGICAL SURGERY

## 2025-03-24 PROCEDURE — A9577 INJ MULTIHANCE: HCPCS | Performed by: NEUROLOGICAL SURGERY

## 2025-03-24 RX ADMIN — GADOBENATE DIMEGLUMINE 15 ML: 529 INJECTION, SOLUTION INTRAVENOUS at 19:17

## 2025-03-25 ENCOUNTER — TELEPHONE (OUTPATIENT)
Age: 75
End: 2025-03-25

## 2025-03-25 DIAGNOSIS — E11.9 TYPE 2 DIABETES MELLITUS WITHOUT COMPLICATION, WITHOUT LONG-TERM CURRENT USE OF INSULIN: ICD-10-CM

## 2025-03-25 NOTE — TELEPHONE ENCOUNTER
YOUR REQUEST WAS DENIED: LEFT L3-4 TFESI     WHY DID WE DENY YOUR REQUEST?  WE DENIED THE MEDICAL SERVICES/ITEMS LISTED BECAUSE:     YOUR DOCTOR TOLD US THAT YOU HAVE NERVE PAIN COMING FROM YOUR LOW BACK.  A SHOT TO TREAT THIS WAS REQUESTED.  WE CANNOT APPROVE THIS REQUEST.  THE NOTES SENT TO US DO NOT SHOW:     THIS SHOT IS USED TO TREAT PAIN OR WEAKNESS IN YOUR LEG COMING FROM YOUR LOW BACK.  YOUR LEG PAIN MUST BE CAUSED BY EITHER OF THE FOLLOWING PROBLEMS:     PRESSURE ON A NERVE IN YOUR SPINE DUE TO A BULGE IN YOUR DISC.  THIS PAIN WOULD BE PRESENT IN A CERTAIN PATTERN RELATED TO WHAT NERVE WAS COMPRESSED.  NARROWING OF YOUR SPINAL CANAL CAUSING WEAKNESS IN YOUR LEG THAT IS WORSE WHEN YOU STAND OR WALK, BUT IS BETTER WHEN YOU REST OR BEND FORWARD.      DR COLLINS PLEASE ADVISE IF YOU WANT TO ADDEND A NOTE TO INCLUDE WHAT WE NEED FOR APPEAL

## 2025-03-26 RX ORDER — METFORMIN HYDROCHLORIDE 750 MG/1
750 TABLET, EXTENDED RELEASE ORAL
Qty: 360 TABLET | Refills: 0 | Status: SHIPPED | OUTPATIENT
Start: 2025-03-26

## 2025-03-26 NOTE — TELEPHONE ENCOUNTER
Comments:     Last Office Visit (last PCP visit):   2/28/2025    Next Visit Date:  Future Appointments   Date Time Provider Department Center   3/31/2025  9:30 AM [facility]   4/9/2025  1:00 PM Phu Hubbard MD MLORNEUHERMESB [facility]   5/8/2025 10:00 AM SCHEDULE, SHANICE RAD ONC NURSE SHANICE RAD ONC [place] [facility]   5/28/2025 10:00 AM Ludin Woods MD [facility] ECC DEP   6/18/2025 10:30 AM Clifton Wheatley [facility] [place] [facility]   7/9/2025 11:00 AM Angeli Yusuf MD MLOX OBLN [facility]   7/24/2025 10:00 AM [place] MAMMOGRAPHY ROOM 1 [facility]  [facility] RAD         Rx requested:  Requested Prescriptions     Pending Prescriptions Disp Refills    metFORMIN (GLUCOPHAGE-XR) 750 MG extended release tablet 360 tablet 0     Sig: Take 1 tablet by mouth daily (with breakfast) TAKE 1 TABLET BY MOUTH TWICE DAILY IN THE MORNING & AT BEDTIME

## 2025-03-26 NOTE — PROGRESS NOTES
[facility]           Radiation Oncology      [id number] [place], [place] [id number]        O: [phone]        F: [phone]       [facility]                   Dr. Dylon Gillespie MD PhD    FOLLOW-UP NOTE     Date of Service: 2025  Patient ID: Valeria Laird   : 1950  MRN: [id number]   Acct Number: [id number]       NAME:  Valeria Laird    :  1950 74 y.o. adult     PCP: Ludin Woods MD    REFERRING PROVIDER: Paramjit    DIAGNOSIS:  1. Carcinoma of upper-outer quadrant of right breast in female, estrogen receptor positive (HCC)        STAGING: Cancer Staging   Carcinoma of upper-outer quadrant of right breast in female, estrogen receptor positive (HCC)  Staging form: Breast, AJCC 8th Edition  - Clinical: Stage IA (cT1, cN0, cM0, G2, ER+, MO+, HER2-) - Signed by Angeli Yusuf MD on 2024  - Pathologic: Stage IA (pT1b, pN0(sn), cM0, G2, ER+, MO+, HER2-) - Signed by Dylon Gillespie MD on 2025      PRIOR TREATMENT: 2250 cGy in 3 fractions with accelerated partial breast radiation therapy using AISHWARYA based HDR brachytherapy    TIME SINCE TREATMENT: 3 weeks    RECENT HISTORY: Valeria Laird returns for quick check approximately 3 weeks status post completion of adjuvant radiation therapy for the above diagnosis.  She tolerated therapy well and did not require any treatment breaks.  She did have some minimal serous drainage for approximately 2 weeks after completing radiation but this has resolved.  She denies any fevers, chills, night sweats, skin issues, or other complaints at this time.  She started anastrozole 2 weeks ago and is thus far tolerating it well.  She otherwise denies any other complaints at this time.    Past medical, surgical, social and family histories reviewed and updated as indicated.    ALLERGIES:  Adhesive tape, Molds & smuts, Other, Tomato, Influenza vaccines, and Seasonal       MEDICATIONS:   Current Outpatient

## 2025-03-31 ENCOUNTER — OFFICE VISIT (OUTPATIENT)
Age: 75
End: 2025-03-31
Payer: MEDICARE

## 2025-03-31 ENCOUNTER — HOSPITAL ENCOUNTER (OUTPATIENT)
Dept: PHYSICAL THERAPY | Age: 75
Setting detail: THERAPIES SERIES
Discharge: HOME | End: 2025-03-31
Payer: MEDICARE

## 2025-03-31 VITALS
WEIGHT: 170 LBS | BODY MASS INDEX: 31.28 KG/M2 | TEMPERATURE: 96 F | DIASTOLIC BLOOD PRESSURE: 84 MMHG | HEIGHT: 62 IN | SYSTOLIC BLOOD PRESSURE: 148 MMHG

## 2025-03-31 DIAGNOSIS — M54.50 CHRONIC LEFT-SIDED LOW BACK PAIN WITHOUT SCIATICA: ICD-10-CM

## 2025-03-31 DIAGNOSIS — G89.29 CHRONIC LEFT-SIDED LOW BACK PAIN WITHOUT SCIATICA: ICD-10-CM

## 2025-03-31 DIAGNOSIS — G89.29 CHRONIC RIGHT SHOULDER PAIN: ICD-10-CM

## 2025-03-31 DIAGNOSIS — F41.9 ANXIETY: ICD-10-CM

## 2025-03-31 DIAGNOSIS — M54.16 LUMBAR RADICULOPATHY: ICD-10-CM

## 2025-03-31 DIAGNOSIS — M25.511 CHRONIC RIGHT SHOULDER PAIN: ICD-10-CM

## 2025-03-31 PROCEDURE — 97162 PT EVAL MOD COMPLEX 30 MIN: CPT

## 2025-03-31 PROCEDURE — 97110 THERAPEUTIC EXERCISES: CPT

## 2025-03-31 PROCEDURE — 1160F RVW MEDS BY RX/DR IN RCRD: CPT | Performed by: PAIN MEDICINE

## 2025-03-31 PROCEDURE — 1123F ACP DISCUSS/DSCN MKR DOCD: CPT | Performed by: PAIN MEDICINE

## 2025-03-31 PROCEDURE — 1125F AMNT PAIN NOTED PAIN PRSNT: CPT | Performed by: PAIN MEDICINE

## 2025-03-31 PROCEDURE — 1159F MED LIST DOCD IN RCRD: CPT | Performed by: PAIN MEDICINE

## 2025-03-31 PROCEDURE — 99213 OFFICE O/P EST LOW 20 MIN: CPT | Performed by: PAIN MEDICINE

## 2025-03-31 PROCEDURE — 64483 NJX AA&/STRD TFRM EPI L/S 1: CPT | Performed by: PAIN MEDICINE

## 2025-03-31 PROCEDURE — 97530 THERAPEUTIC ACTIVITIES: CPT

## 2025-03-31 RX ORDER — PREGABALIN 50 MG/1
50 CAPSULE ORAL 3 TIMES DAILY
Qty: 90 CAPSULE | Refills: 0 | Status: SHIPPED | OUTPATIENT
Start: 2025-03-31 | End: 2025-04-30

## 2025-03-31 RX ORDER — CITALOPRAM HYDROBROMIDE 20 MG/1
TABLET ORAL
Qty: 90 TABLET | Refills: 4 | Status: SHIPPED | OUTPATIENT
Start: 2025-03-31

## 2025-03-31 RX ORDER — MELOXICAM 15 MG/1
15 TABLET ORAL DAILY
Qty: 90 TABLET | Refills: 0 | Status: SHIPPED | OUTPATIENT
Start: 2025-03-31

## 2025-03-31 ASSESSMENT — PAIN DESCRIPTION - DESCRIPTORS: DESCRIPTORS: SHARP

## 2025-03-31 ASSESSMENT — ENCOUNTER SYMPTOMS
GASTROINTESTINAL NEGATIVE: 1
EYES NEGATIVE: 1
RESPIRATORY NEGATIVE: 1
ALLERGIC/IMMUNOLOGIC NEGATIVE: 1

## 2025-03-31 ASSESSMENT — PAIN DESCRIPTION - ORIENTATION: ORIENTATION: RIGHT;LEFT

## 2025-03-31 ASSESSMENT — PAIN SCALES - GENERAL: PAINLEVEL_OUTOF10: 7

## 2025-03-31 ASSESSMENT — PAIN DESCRIPTION - PAIN TYPE: TYPE: ACUTE PAIN;CHRONIC PAIN

## 2025-03-31 ASSESSMENT — PAIN DESCRIPTION - LOCATION: LOCATION: BACK;BUTTOCKS

## 2025-03-31 NOTE — PROGRESS NOTES
Dimas So MD        Physician Comments: _______________________________________________    Please sign and return to [facility] PHYSICAL THERAPY.  Please fax to the location listed below. THANK YOU for this referral!    [facility] PHYSICAL THERAPY  [address] [place] [id number]  Dept: [phone]  Dept Fax: [phone]  Loc: [phone]       POC NOTE

## 2025-03-31 NOTE — TELEPHONE ENCOUNTER
Comments:     Last Office Visit (last PCP visit):   2/28/2025    Next Visit Date:  Future Appointments   Date Time Provider Department Center   3/31/2025  9:30 AM [facility]   3/31/2025 11:30 AM Dimas So MD MLO[facility]PMP[facility] [place]   4/9/2025  1:00 PM Phu Hubbard MD MLORNEUROPB [facility]   5/8/2025 10:00 AM SCHEDULE, SHANICE RAD ONC NURSE [facility] [place] [facility]   5/28/2025 10:00 AM Ludin Woods MD [facility] ECC DEP   6/18/2025 10:30 AM [facility]   7/9/2025 11:00 AM Angeli Yusuf MD MLOX OBLN [facility]   7/24/2025 10:00 AM [place] MAMMOGRAPHY ROOM 1 MALZ  [facility] RAD         Rx requested:  Requested Prescriptions     Pending Prescriptions Disp Refills    citalopram (CELEXA) 20 MG tablet 90 tablet 4     Sig: TAKE 1 TABLET DAILY    meloxicam (MOBIC) 15 MG tablet 90 tablet 0     Sig: Take 1 tablet by mouth daily

## 2025-03-31 NOTE — PROGRESS NOTES
Behavior: Behavior normal.         Thought Content: Thought content normal.         Judgment: Judgment normal.           Plan   Restart PT.  Tried ASA, NSAID, acetaminophen, and Muscle relaxors and PT Tried Lyrica that is helping. Will continue the same  Trial of TFESI at L3-4 Right side.

## 2025-04-03 ENCOUNTER — HOSPITAL ENCOUNTER (OUTPATIENT)
Dept: PHYSICAL THERAPY | Age: 75
Setting detail: THERAPIES SERIES
Discharge: HOME | End: 2025-04-03
Payer: MEDICARE

## 2025-04-03 PROCEDURE — 97140 MANUAL THERAPY 1/> REGIONS: CPT

## 2025-04-03 PROCEDURE — 97012 MECHANICAL TRACTION THERAPY: CPT

## 2025-04-03 PROCEDURE — 97110 THERAPEUTIC EXERCISES: CPT

## 2025-04-03 ASSESSMENT — PAIN SCALES - GENERAL: PAINLEVEL_OUTOF10: 4

## 2025-04-03 NOTE — PROGRESS NOTES
Physical Therapy: Daily Note   Patient: Valeria Laird (74 y.o. adult)   Examination Date: 2025  Plan of Care/Certification Expiration Date: 25    Progress Note Counter: DC to HEP on 25     :  1950 # of Visits since SOC:   2   MRN: [id number]  CSN: [id number] Start of Care Date:   3/31/2025   Insurance: Payor: [facility] MEDICARE / Plan: [facility] MEDICARE-ADVANTAGE PPO / Product Type: Medicare /   Insurance ID: [id number] - (Medicare Managed) Secondary Insurance (if applicable):    Referring Physician: Dimas So MD Dr. Kareti   PCP: Ludin Woods MD Visits to Date/Visits Approved:     No Show/Cancelled Appts: 0      Medical Diagnosis: No admission diagnoses are documented for this encounter.    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 Pt reports pain 4/10 upon arrival for therapy across low back/glutes. Pt reports pain at ischial tuberosity mostly and down back of legs but not past knees.    HEP Compliance: Good            TREATMENT     Exercises:      Treatment Reasoning    Exercise 1: Supine HS stretch hold 30 sec x 3  Exercise 3: *pelvic tilt x 10H 5 sec  Exercise 5: LTR H 5-10 sec x 4-5  Exercise 6: pelvic tilt with SLR; H3'' x 10  Exercise 7: bridge with pelvic tilt and abduction H3'' x 10  Exercise 8: SLR H3'' x 10 with pelvic tilt     Inc core str and dec strain on spine                     Manual Therapy: (CPT 47210) Treatment Reasoning     Manual Traction: Manual lumbar traction which alleviated pain in B LE's during and post  Soft Tissue Mobilizaton: MFR/STM to lumbar thoracic area to dec tightness prior to mechanical traction to inc response to therapy. Limitations addressed: Tissue

## 2025-04-07 ENCOUNTER — TELEPHONE (OUTPATIENT)
Age: 75
End: 2025-04-07

## 2025-04-07 ENCOUNTER — HOSPITAL ENCOUNTER (OUTPATIENT)
Dept: PHYSICAL THERAPY | Age: 75
Setting detail: THERAPIES SERIES
Discharge: HOME | End: 2025-04-07
Payer: MEDICARE

## 2025-04-07 PROCEDURE — 97140 MANUAL THERAPY 1/> REGIONS: CPT

## 2025-04-07 PROCEDURE — 97012 MECHANICAL TRACTION THERAPY: CPT

## 2025-04-07 PROCEDURE — 97110 THERAPEUTIC EXERCISES: CPT

## 2025-04-07 ASSESSMENT — PAIN SCALES - GENERAL: PAINLEVEL_OUTOF10: 4

## 2025-04-07 NOTE — TELEPHONE ENCOUNTER
REFERRAL # [id number]    RIGHT RANDEE L3-L4     AUTH FROM 4/4/25-10/4/25    OK to schedule procedure approved as above.   Please note sides/levels approved and date range.   (If applicable, sides/levels approved may differ from those ordered)    TO BE SCHEDULED WITH DR COLLINS

## 2025-04-07 NOTE — TELEPHONE ENCOUNTER
Precert Dept received request on [employer] for prior authorization of Pregabalin (Lyrica) 50 mg.  Since this medication was already denied, the request has been archived and marked as not sent to plan.

## 2025-04-07 NOTE — PROGRESS NOTES
Physical Therapy: Daily Note   Patient: Valeria Laird (74 y.o. adult)   Examination Date: 2025  Plan of Care/Certification Expiration Date: 25    Progress Note Counter: DC to HEP on 25     :  1950 # of Visits since SOC:   4   MRN: [id number]  CSN: [id number] Start of Care Date:   3/31/2025   Insurance: Payor: [facility] MEDICARE / Plan: [facility] MEDICARE-ADVANTAGE PPO / Product Type: Medicare /   Insurance ID: [id number] - (Medicare Managed) Secondary Insurance (if applicable):    Referring Physician: Dimas So MD Dr. Kareti   PCP: Ludin Woods MD Visits to Date/Visits Approved: 3 / 12    No Show/Cancelled Appts:      Medical Diagnosis: No admission diagnoses are documented for this encounter.    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 Pt states she has some pain in L4-5 as well as sacrum radiating to hips. Pt to see surgeon 25.    HEP Compliance: Good            TREATMENT     Exercises:      Treatment Reasoning    Exercise 1: Supine HS stretch hold 30 sec x 3  Exercise 6: pelvic tilt with SLR; H5'' x 10  Exercise 7: bridge with pelvic tilt and abduction H5'' x 10  Exercise 8: SLR H3'' x 10 with pelvic tilt  Exercise 9: prone alt hip ext with pillow under abdomen x 10 VC for abd bracing  Exercise 10: B shoulder extension H3'' x 10 GTB                          Manual Therapy: (CPT 21532) Treatment Reasoning     Soft Tissue Mobilizaton: MFR/STM to lumbar thoracic area to dec tightness prior to mechanical traction to inc response to therapy. Limitations addressed: Tissue extensibility, Joint motion                    Modalities  Mechanical Traction: Lumbar  (CPT 22710) Treatment Reasoning

## 2025-04-09 ENCOUNTER — OFFICE VISIT (OUTPATIENT)
Age: 75
End: 2025-04-09
Payer: MEDICARE

## 2025-04-09 ENCOUNTER — HOSPITAL ENCOUNTER (OUTPATIENT)
Dept: GENERAL RADIOLOGY | Age: 75
Discharge: HOME | End: 2025-04-11
Attending: NEUROLOGICAL SURGERY
Payer: MEDICARE

## 2025-04-09 VITALS
DIASTOLIC BLOOD PRESSURE: 82 MMHG | WEIGHT: 170 LBS | BODY MASS INDEX: 31.28 KG/M2 | SYSTOLIC BLOOD PRESSURE: 138 MMHG | HEIGHT: 62 IN

## 2025-04-09 DIAGNOSIS — M48.062 SPINAL STENOSIS OF LUMBAR REGION WITH NEUROGENIC CLAUDICATION: ICD-10-CM

## 2025-04-09 DIAGNOSIS — M48.061 SPINAL STENOSIS OF LUMBAR REGION, UNSPECIFIED WHETHER NEUROGENIC CLAUDICATION PRESENT: ICD-10-CM

## 2025-04-09 DIAGNOSIS — G89.29 CHRONIC LEFT-SIDED LOW BACK PAIN WITHOUT SCIATICA: Primary | ICD-10-CM

## 2025-04-09 DIAGNOSIS — M54.50 CHRONIC LEFT-SIDED LOW BACK PAIN WITHOUT SCIATICA: Primary | ICD-10-CM

## 2025-04-09 DIAGNOSIS — M48.062 SPINAL STENOSIS OF LUMBAR REGION WITH NEUROGENIC CLAUDICATION: Primary | ICD-10-CM

## 2025-04-09 PROCEDURE — 99214 OFFICE O/P EST MOD 30 MIN: CPT | Performed by: NEUROLOGICAL SURGERY

## 2025-04-09 PROCEDURE — 1125F AMNT PAIN NOTED PAIN PRSNT: CPT | Performed by: NEUROLOGICAL SURGERY

## 2025-04-09 PROCEDURE — 1159F MED LIST DOCD IN RCRD: CPT | Performed by: NEUROLOGICAL SURGERY

## 2025-04-09 PROCEDURE — 72110 X-RAY EXAM L-2 SPINE 4/>VWS: CPT

## 2025-04-09 PROCEDURE — 1123F ACP DISCUSS/DSCN MKR DOCD: CPT | Performed by: NEUROLOGICAL SURGERY

## 2025-04-09 ASSESSMENT — ENCOUNTER SYMPTOMS
COUGH: 0
SHORTNESS OF BREATH: 0
TROUBLE SWALLOWING: 0
ABDOMINAL DISTENTION: 0
ABDOMINAL PAIN: 0
BACK PAIN: 1
EYE PAIN: 0

## 2025-04-09 NOTE — PATIENT INSTRUCTIONS
Welcome to our practice and to our [facility] Family! Thank you for choosing us as your health care provider.  In the coming days, you may receive a survey about your visit via text or email. Please take a few minutes to answer these   questions. As our patient, we value your opinion and appreciate your feedback about your [facility] Experience.    The Team That Took Care of you Today:    Care Provider(s): Dr. Hubbard    Associate(s):_____________________________________

## 2025-04-09 NOTE — PROGRESS NOTES
Patient Name: Valeria Laird : 1950        Date: 2025      Type of Appt: Follow up    Reason for appt: follow up with imaging    Pt last seen by  Dr. Hubbard 3/12/2025    Studies done: 2025 MRI Lumbar Spine at [facility]     Conservative Treatments (Have you ever tried any)  Physical Therapy in the last 6 months to a year: Yes   NSAID's: Yes   Narcotics: Yes   Muscle relaxants: Yes   Epidural injections: No     Any Blood Thinners: [] Yes   [x]  No        [] Aspirin   [] Eliquis   [] Xarelto   [] Pletal   [] Plavix    [] Warfarin        Diabetic:  [x] Yes   [] No   If yes, prescribed insulin:  [] Yes   [x]  No     Weight loss medications:   [] Yes  [x] No     [] Ozempic   [] Wegovy    [] Trulicity    [] Mounjaro         Smoking : [] Yes   [x]  No Quit,     
foraminotomies, L3-4 TLIF and pedicle screw fixation and posterolateral fusion, local morselized autograft, morselized allograft and Stealth with O-arm navigation and she wishes to proceed.  I will check lumbar dynamic x-rays to confirm that there is no significant appearing instability at the other levels and as long as I do not see this would proceed with surgery as planned.  She will need general medical clearance before proceeding      There are no diagnoses linked to this encounter.    Procedure: L3-4 laminectomies, medial facetectomies and bilateral foraminotomies, L3-4 TLIF and pedicle screw fixation and posterolateral fusion, local morselized autograft, morselized allograft and Stealth with O-arm navigation     Anesthesia: GETA    Time: 3.5 hrs    Patient Status: Admit After Surgery    Positioning/Frame: Prone on Randell    Company/Implants: [employer] Solera with Expandable TLIF Cages    Equipment: Stealth with O-Arm Navigation and Aquamantis    Imaging Needed Prior to Surgery: Lumbar flexion/extension x-rays    Clearances Needed: Medical    Medications to Hold: None    CPT codes: 78381, 64690, 33203, 47891, 58492, 94065, 02767, 93720        Electronically signed by Phu Hubbard MD on 4/9/2025 at 1:44 PM

## 2025-04-10 ENCOUNTER — HOSPITAL ENCOUNTER (OUTPATIENT)
Dept: PHYSICAL THERAPY | Age: 75
Setting detail: THERAPIES SERIES
Discharge: HOME | End: 2025-04-10
Payer: MEDICARE

## 2025-04-10 ENCOUNTER — TELEPHONE (OUTPATIENT)
Age: 75
End: 2025-04-10

## 2025-04-10 PROCEDURE — 97110 THERAPEUTIC EXERCISES: CPT

## 2025-04-10 PROCEDURE — 97140 MANUAL THERAPY 1/> REGIONS: CPT

## 2025-04-10 PROCEDURE — 97012 MECHANICAL TRACTION THERAPY: CPT

## 2025-04-10 ASSESSMENT — PAIN DESCRIPTION - DESCRIPTORS: DESCRIPTORS: SORE

## 2025-04-10 ASSESSMENT — PAIN DESCRIPTION - ORIENTATION: ORIENTATION: RIGHT;LEFT

## 2025-04-10 ASSESSMENT — PAIN DESCRIPTION - LOCATION: LOCATION: BACK;BUTTOCKS

## 2025-04-10 ASSESSMENT — PAIN SCALES - GENERAL: PAINLEVEL_OUTOF10: 4

## 2025-04-10 ASSESSMENT — PAIN DESCRIPTION - PAIN TYPE: TYPE: ACUTE PAIN;CHRONIC PAIN

## 2025-04-10 NOTE — TELEPHONE ENCOUNTER
Precert Dept received a fax from [employer] [facility] informing us Pregabalin (Lyrica) 50 mg capsule needs prior authorization.  This medication was denied 3-, denial criteria has not been met at this time.  Pharmacy form faxed to [employer] ([phone]) informing them medication was denied.

## 2025-04-10 NOTE — PROGRESS NOTES
Physical Therapy  Physical Therapy: Daily Note   Patient: Valeria Laird (74 y.o. adult)   Examination Date: 04/10/2025  Plan of Care/Certification Expiration Date: 25    Progress Note Counter: DC to HEP on 25     :  1950 # of Visits since SOC:   4   MRN: [id number]  CSN: [id number] Start of Care Date:   3/31/2025   Insurance: Payor: [facility] MEDICARE / Plan: [facility] MEDICARE-ADVANTAGE PPO / Product Type: Medicare /   Insurance ID: [id number] - (Medicare Managed) Secondary Insurance (if applicable):    Referring Physician: Dimas So MD Dr. Kareti   PCP: Ludin Woods MD Visits to Date/Visits Approved:     No Show/Cancelled Appts:      Medical Diagnosis: No admission diagnoses are documented for this encounter.    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 states she saw the surgeon yesterday and is going to go ahead with back surgery but awaiting apporval.  Pt. states she is also needing to go to [place] and reports going to possibly go for 2 weeks to help her pregant daughter.    HEP Compliance: Good        OBJECTIVE EXAMINATION   Restrictions:  No data recorded No data recorded No data recorded              TREATMENT     Exercises:      Treatment Reasoning    Exercise 1: Supine HS stretch hold 30 sec x 3  Exercise 3: *pelvic tilt x 10H 5 sec  Exercise 4: SKTC 20-30 sec x 3  Exercise 5: LTR H 5-10 sec x 4-5    Limitations addressed: Mobility, Strength, Posture                     Manual Therapy: (CPT 28846) Treatment Reasoning     Soft Tissue Mobilizaton: MFR/STM to lumbar thoracic area and lumbosacral area to dec tightness prior to

## 2025-04-11 DIAGNOSIS — E11.9 TYPE 2 DIABETES MELLITUS WITHOUT COMPLICATION, WITHOUT LONG-TERM CURRENT USE OF INSULIN: ICD-10-CM

## 2025-04-11 NOTE — TELEPHONE ENCOUNTER
Comments:     Last Office Visit (last PCP visit):   2/28/2025    Next Visit Date:  Future Appointments   Date Time Provider Department Center   4/14/2025 10:15 AM Mary Bradshaw PTA MALZ  [facility]   4/17/2025 10:15 AM Mary Bradshaw PTA MALZ  [facility]   5/2/2025 10:30 AM Arie Martinez DPM Podiat[facility]   5/8/2025 10:00 AM SCHEDULE, SHANICE RAD ONC NURSE [facility] RAD ONC [facility]   5/28/2025 10:00 AM Ludin Woods MD [facility] ECC DEP   6/18/2025 10:30 AM [facility]ton [facility] [place]   7/9/2025 11:00 AM Angeli Yusuf MD MLOX OBLN [facility]   7/24/2025 10:00 AM [place] MAMMOGRAPHY ROOM 1 [facility] RAD       **If hasn't been seen in over a year OR hasn't followed up according to last diabetes/ADHD visit, make appointment for patient before sending refill to provider.    Rx requested:  Requested Prescriptions     Pending Prescriptions Disp Refills    Lancets MISC 1 each 5     Sig: Inject 1 each into the skin 2 times daily DX: diabetes mellitus. Ok to substitute per insurance (1 each = 1 box)

## 2025-04-12 RX ORDER — AVOBENZONE, HOMOSALATE, OCTISALATE, OCTOCRYLENE 30; 40; 45; 26 MG/ML; MG/ML; MG/ML; MG/ML
1 CREAM TOPICAL 2 TIMES DAILY
Qty: 1 EACH | Refills: 5 | Status: SHIPPED | OUTPATIENT
Start: 2025-04-12

## 2025-04-14 ENCOUNTER — APPOINTMENT (OUTPATIENT)
Dept: PHYSICAL THERAPY | Age: 75
End: 2025-04-14
Payer: MEDICARE

## 2025-04-17 ENCOUNTER — APPOINTMENT (OUTPATIENT)
Dept: PHYSICAL THERAPY | Age: 75
End: 2025-04-17
Payer: MEDICARE

## 2025-04-29 ENCOUNTER — APPOINTMENT (OUTPATIENT)
Dept: ORTHOPEDIC SURGERY | Facility: CLINIC | Age: 75
End: 2025-04-29

## 2025-04-30 DIAGNOSIS — M48.062 SPINAL STENOSIS OF LUMBAR REGION WITH NEUROGENIC CLAUDICATION: Primary | ICD-10-CM

## 2025-04-30 DIAGNOSIS — F17.200 SMOKER: Primary | ICD-10-CM

## 2025-05-05 ENCOUNTER — HOSPITAL ENCOUNTER (OUTPATIENT)
Dept: LAB | Age: 75
Discharge: HOME | End: 2025-05-05
Payer: MEDICARE

## 2025-05-05 DIAGNOSIS — M48.062 SPINAL STENOSIS OF LUMBAR REGION WITH NEUROGENIC CLAUDICATION: ICD-10-CM

## 2025-05-05 PROCEDURE — G0480 DRUG TEST DEF 1-7 CLASSES: HCPCS

## 2025-05-05 PROCEDURE — 36415 COLL VENOUS BLD VENIPUNCTURE: CPT

## 2025-05-08 ENCOUNTER — HOSPITAL ENCOUNTER (OUTPATIENT)
Dept: RADIATION ONCOLOGY | Age: 75
Discharge: HOME | End: 2025-05-08
Payer: MEDICARE

## 2025-05-08 VITALS
RESPIRATION RATE: 18 BRPM | BODY MASS INDEX: 32.7 KG/M2 | HEART RATE: 85 BPM | OXYGEN SATURATION: 95 % | SYSTOLIC BLOOD PRESSURE: 129 MMHG | WEIGHT: 178.8 LBS | DIASTOLIC BLOOD PRESSURE: 75 MMHG

## 2025-05-08 DIAGNOSIS — Z17.0 CARCINOMA OF UPPER-OUTER QUADRANT OF RIGHT BREAST IN FEMALE, ESTROGEN RECEPTOR POSITIVE (HCC): Primary | ICD-10-CM

## 2025-05-08 DIAGNOSIS — C50.411 CARCINOMA OF UPPER-OUTER QUADRANT OF RIGHT BREAST IN FEMALE, ESTROGEN RECEPTOR POSITIVE (HCC): Primary | ICD-10-CM

## 2025-05-08 LAB
COTININE SERPL-MCNC: <5 NG/ML
NICOTINE SERPL-MCNC: <5 NG/ML

## 2025-05-08 PROCEDURE — 99214 OFFICE O/P EST MOD 30 MIN: CPT | Performed by: RADIOLOGY

## 2025-05-08 PROCEDURE — G2211 COMPLEX E/M VISIT ADD ON: HCPCS | Performed by: RADIOLOGY

## 2025-05-08 PROCEDURE — 99212 OFFICE O/P EST SF 10 MIN: CPT | Performed by: RADIOLOGY

## 2025-05-08 NOTE — PROGRESS NOTES
NURSING ASSESSMENT     Date: 5/8/2025        Patient Name: Valeria Laird     YOB: 1950      Age:  74 y.o.      MRN: [id number]       Chaperone [] Yes   [x] No      Advance Directives:   Do you currently have completed advance directives (living will)? [x] Yes   [] No         *If yes, please bring us a copy for your records.  *If no, would you like info or assistance in completing advance directives (living will)?   [] Yes   [x] No    Pain Score:   Pain Score (1-10): 5   Pain Location: lower back through buttocks into back of both legs  Pain Duration: daily  Pain Management/Control: mobic and lyrica give some relief.  Pt is seeing Dr Stoddard       Is pain affecting your ability to take care of yourself or move throughout your home?                        [] Yes   [x] No    General: Fatigue from pain  Patient has gained weight [x] Yes   [] No  Patient has lost weight [] Yes   [x] No  How much weight in pounds and over what length of time: has gained about 10 lb over the past 2-3 months as she quit smoking    Eyes (Ophthalmic): glasses     Skin (Dermatological): No Problems     ENT: No Problems     Respiratory: No Problems has a little nasal congestion past few days     Cardiovascular: No Problems      Device   [] Yes   [x] No   Copy of Card Obtained [] Yes   [x] No    Gastrointestinal: No Problems    Genito-Urinary: No Problems     Breast:   Right breast healed.     Musculoskeletal: Back Pain    Neurological: No Problems      Hematological and Lymphatic: No Problems     Endocrine: takes anastrozole without side effects    Gyn History: No problems    A 10-point review of systems  has been conducted and pertinent positives have been   recorded. All other review of systems are negative    Was the patient admitted during the course of treatment OR within 30 days of treatment? no

## 2025-05-15 ENCOUNTER — PREP FOR PROCEDURE (OUTPATIENT)
Age: 75
End: 2025-05-15

## 2025-05-15 DIAGNOSIS — M48.062 SPINAL STENOSIS OF LUMBAR REGION WITH NEUROGENIC CLAUDICATION: ICD-10-CM

## 2025-05-15 RX ORDER — SODIUM CHLORIDE 9 MG/ML
INJECTION, SOLUTION INTRAVENOUS CONTINUOUS
OUTPATIENT
Start: 2025-05-15

## 2025-05-15 RX ORDER — SODIUM CHLORIDE 0.9 % (FLUSH) 0.9 %
5-40 SYRINGE (ML) INJECTION PRN
OUTPATIENT
Start: 2025-05-15

## 2025-05-15 RX ORDER — SODIUM CHLORIDE 9 MG/ML
INJECTION, SOLUTION INTRAVENOUS PRN
OUTPATIENT
Start: 2025-05-15

## 2025-05-15 RX ORDER — SODIUM CHLORIDE 0.9 % (FLUSH) 0.9 %
5-40 SYRINGE (ML) INJECTION EVERY 12 HOURS SCHEDULED
OUTPATIENT
Start: 2025-05-15

## 2025-05-19 ENCOUNTER — HOSPITAL ENCOUNTER (OUTPATIENT)
Dept: PREADMISSION TESTING | Age: 75
Discharge: HOME | End: 2025-05-23
Payer: MEDICARE

## 2025-05-19 VITALS
BODY MASS INDEX: 32.67 KG/M2 | HEART RATE: 76 BPM | HEIGHT: 63 IN | RESPIRATION RATE: 16 BRPM | WEIGHT: 184.4 LBS | TEMPERATURE: 97.7 F | OXYGEN SATURATION: 95 % | SYSTOLIC BLOOD PRESSURE: 138 MMHG | DIASTOLIC BLOOD PRESSURE: 80 MMHG

## 2025-05-19 DIAGNOSIS — M54.16 LUMBAR RADICULOPATHY: ICD-10-CM

## 2025-05-19 LAB
ABO/RH: NORMAL
ANION GAP SERPL CALCULATED.3IONS-SCNC: 10 MEQ/L (ref 9–15)
ANTIBODY SCREEN: NORMAL
APTT PPP: 24.8 SEC (ref 24.4–36.8)
BASOPHILS # BLD: 0 K/UL (ref 0–0.2)
BASOPHILS NFR BLD: 0.4 %
BUN SERPL-MCNC: 14 MG/DL (ref 8–23)
CALCIUM SERPL-MCNC: 9.3 MG/DL (ref 8.5–9.9)
CHLORIDE SERPL-SCNC: 105 MEQ/L (ref 95–107)
CO2 SERPL-SCNC: 25 MEQ/L (ref 20–31)
CREAT SERPL-MCNC: 0.8 MG/DL (ref 0.5–0.9)
EOSINOPHIL # BLD: 0.2 K/UL (ref 0–0.7)
EOSINOPHIL NFR BLD: 3.3 %
ERYTHROCYTE [DISTWIDTH] IN BLOOD BY AUTOMATED COUNT: 13.9 % (ref 11.5–14.5)
ESTIMATED AVERAGE GLUCOSE: 128 MG/DL
GLUCOSE SERPL-MCNC: 78 MG/DL (ref 70–99)
HBA1C MFR BLD: 6.1 % (ref 4–6)
HCT VFR BLD AUTO: 35.6 % (ref 37–47)
HGB BLD-MCNC: 11.9 G/DL (ref 12–16)
INR PPP: 0.9
LYMPHOCYTES # BLD: 1.1 K/UL (ref 1–4.8)
LYMPHOCYTES NFR BLD: 21.7 %
MCH RBC QN AUTO: 32.7 PG (ref 27–31.3)
MCHC RBC AUTO-ENTMCNC: 33.4 % (ref 33–37)
MCV RBC AUTO: 97.8 FL (ref 79.4–94.8)
MONOCYTES # BLD: 0.5 K/UL (ref 0.2–0.8)
MONOCYTES NFR BLD: 10.4 %
NEUTROPHILS # BLD: 3.2 K/UL (ref 1.4–6.5)
NEUTS SEG NFR BLD: 64 %
PLATELET # BLD AUTO: 200 K/UL (ref 130–400)
POTASSIUM SERPL-SCNC: 4.7 MEQ/L (ref 3.4–4.9)
PROTHROMBIN TIME: 12.8 SEC (ref 12.3–14.9)
RBC # BLD AUTO: 3.64 M/UL (ref 4.2–5.4)
SODIUM SERPL-SCNC: 140 MEQ/L (ref 135–144)
WBC # BLD AUTO: 4.9 K/UL (ref 4.8–10.8)

## 2025-05-19 PROCEDURE — 83036 HEMOGLOBIN GLYCOSYLATED A1C: CPT

## 2025-05-19 PROCEDURE — 85730 THROMBOPLASTIN TIME PARTIAL: CPT

## 2025-05-19 PROCEDURE — 86901 BLOOD TYPING SEROLOGIC RH(D): CPT

## 2025-05-19 PROCEDURE — 87641 MR-STAPH DNA AMP PROBE: CPT

## 2025-05-19 PROCEDURE — 85610 PROTHROMBIN TIME: CPT

## 2025-05-19 PROCEDURE — 80048 BASIC METABOLIC PNL TOTAL CA: CPT

## 2025-05-19 PROCEDURE — 85025 COMPLETE CBC W/AUTO DIFF WBC: CPT

## 2025-05-19 PROCEDURE — 86900 BLOOD TYPING SEROLOGIC ABO: CPT

## 2025-05-19 PROCEDURE — 86850 RBC ANTIBODY SCREEN: CPT

## 2025-05-19 RX ORDER — PREGABALIN 50 MG/1
50 CAPSULE ORAL 3 TIMES DAILY
Qty: 90 CAPSULE | Refills: 0 | OUTPATIENT
Start: 2025-05-19

## 2025-05-19 ASSESSMENT — ENCOUNTER SYMPTOMS
EYE PAIN: 0
SHORTNESS OF BREATH: 0
SORE THROAT: 0
COUGH: 0
TROUBLE SWALLOWING: 0
DIARRHEA: 0
CONSTIPATION: 0
SINUS PRESSURE: 0
NAUSEA: 0
ABDOMINAL DISTENTION: 0
EYE REDNESS: 0
ABDOMINAL PAIN: 0
BLOOD IN STOOL: 0
EYE DISCHARGE: 0
SINUS PAIN: 0
CHEST TIGHTNESS: 0
BACK PAIN: 1
VOMITING: 0
EYE ITCHING: 0
PHOTOPHOBIA: 0
WHEEZING: 0
RHINORRHEA: 0

## 2025-05-19 NOTE — TELEPHONE ENCOUNTER
Requested Prescriptions     Pending Prescriptions Disp Refills    pregabalin (LYRICA) 50 MG capsule [Pharmacy Med Name: pregabalin 50 mg capsule] 90 capsule 0     Sig: Take 1 capsule by mouth 3 times daily.       Patient last seen on:  03/31/2025    Date of last refill:  03/31/2025    Reason for request:  my chart message request     Next office visit date: Visit date not found    Adhesive tape, Molds & smuts, Other/food, Tomato, Environmental/seasonal, and Influenza vaccines.

## 2025-05-19 NOTE — H&P
normal. There is no distension.      Palpations: Abdomen is soft.      Tenderness: There is no abdominal tenderness. There is no guarding.   Genitourinary:     Comments: deferred  Musculoskeletal:         General: Normal range of motion.      Cervical back: Normal range of motion.      Right lower leg: No edema.      Left lower leg: No edema.   Lymphadenopathy:      Cervical: No cervical adenopathy.   Skin:     General: Skin is warm and dry.      Capillary Refill: Capillary refill takes less than 2 seconds.   Neurological:      General: No focal deficit present.      Mental Status: She is alert and oriented to person, place, and time.      Gait: Gait normal.   Psychiatric:         Mood and Affect: Mood normal.         Behavior: Behavior normal.         Thought Content: Thought content normal.         Judgment: Judgment normal.         Assessment:  75 y.o. patient with   Patient Active Problem List   Diagnosis    Hyperlipidemia    AMANDA (obstructive sleep apnea)    Anxiety    Dry skin dermatitis    Current occasional smoker    Elevated glucose    Blood pressure elevated without history of HTN    Family history of diabetes mellitus    Left knee DJD    Vagal reaction    Status post total knee replacement, left    Status post left knee replacement    Simple chronic bronchitis (HCC)    Status post total knee replacement, right    History of total right knee replacement    Lumbar radiculopathy    Cervical radiculopathy    Type 2 diabetes mellitus    Obesity (BMI 30-39.9)    Hammertoe, bilateral    Diabetic polyneuropathy associated with type 2 diabetes mellitus (HCC)    Callus    Hallux abducto valgus, bilateral    Tailor's bunionette, bilateral    Allergic rhinitis    Upper respiratory tract infection    Tobacco abuse    Abnormal CT of the chest    Carcinoma of upper-outer quadrant of right breast in female, estrogen receptor positive (HCC)    Malignant neoplasm of upper-outer quadrant of right breast, estrogen receptor

## 2025-05-19 NOTE — H&P (VIEW-ONLY)
and urgency.   Musculoskeletal:  Positive for back pain (chronic) and gait problem (no assistive devices, no hx falls). Negative for arthralgias, myalgias, neck pain and neck stiffness.   Skin:  Negative for rash and wound.   Allergic/Immunologic: Positive for environmental allergies and food allergies.   Neurological:  Negative for dizziness, tremors, seizures, syncope, weakness, light-headedness, numbness and headaches.   Hematological:  Negative for adenopathy. Does not bruise/bleed easily.   Psychiatric/Behavioral:          Hx anxiety        Vitals:  /80   Pulse 76   Temp 97.7 °F (36.5 °C) (Oral)   Resp 16   Ht 1.595 m (5' 2.8\")   Wt 83.6 kg (184 lb 6.4 oz)   LMP 01/16/2008   SpO2 95%   BMI 32.88 kg/m²       Physical Exam  Constitutional:       General: She is not in acute distress.     Appearance: Normal appearance. She is not ill-appearing, toxic-appearing or diaphoretic.   HENT:      Head: Normocephalic and atraumatic.      Right Ear: Tympanic membrane, ear canal and external ear normal. There is no impacted cerumen.      Left Ear: Tympanic membrane, ear canal and external ear normal. There is no impacted cerumen.      Nose: Nose normal. No congestion or rhinorrhea.      Mouth/Throat:      Mouth: Mucous membranes are moist.      Pharynx: Oropharynx is clear. No oropharyngeal exudate or posterior oropharyngeal erythema.   Eyes:      General:         Right eye: No discharge.         Left eye: No discharge.      Extraocular Movements: Extraocular movements intact.      Conjunctiva/sclera: Conjunctivae normal.      Pupils: Pupils are equal, round, and reactive to light.   Cardiovascular:      Rate and Rhythm: Normal rate and regular rhythm.      Pulses: Normal pulses.      Heart sounds: Normal heart sounds.   Pulmonary:      Effort: Pulmonary effort is normal. No respiratory distress.      Breath sounds: Normal breath sounds. No wheezing or rhonchi.   Abdominal:      General: Bowel sounds are

## 2025-05-21 LAB
MRSA, DNA, NASAL: NEGATIVE
SPECIMEN DESCRIPTION: NORMAL

## 2025-05-23 ENCOUNTER — HOSPITAL ENCOUNTER (OUTPATIENT)
Dept: GENERAL RADIOLOGY | Age: 75
Discharge: HOME | End: 2025-05-25
Attending: FAMILY MEDICINE
Payer: MEDICARE

## 2025-05-23 ENCOUNTER — OFFICE VISIT (OUTPATIENT)
Age: 75
End: 2025-05-23

## 2025-05-23 ENCOUNTER — TELEPHONE (OUTPATIENT)
Age: 75
End: 2025-05-23

## 2025-05-23 ENCOUNTER — HOSPITAL ENCOUNTER (OUTPATIENT)
Age: 75
Discharge: HOME | End: 2025-05-25
Payer: MEDICARE

## 2025-05-23 VITALS
TEMPERATURE: 97.6 F | SYSTOLIC BLOOD PRESSURE: 124 MMHG | OXYGEN SATURATION: 98 % | HEART RATE: 78 BPM | WEIGHT: 183 LBS | BODY MASS INDEX: 32.63 KG/M2 | DIASTOLIC BLOOD PRESSURE: 76 MMHG

## 2025-05-23 DIAGNOSIS — M54.16 LUMBAR RADICULOPATHY: ICD-10-CM

## 2025-05-23 DIAGNOSIS — Z01.818 PRE-OP EVALUATION: Primary | ICD-10-CM

## 2025-05-23 DIAGNOSIS — Z01.818 PRE-OP EVALUATION: ICD-10-CM

## 2025-05-23 PROCEDURE — 71046 X-RAY EXAM CHEST 2 VIEWS: CPT

## 2025-05-23 NOTE — TELEPHONE ENCOUNTER
Dr. Stevie Gage's office states patient is going to have surgery on 5/27/25 and they still do not have clearance. Patient had xr done just today.    Please advise    Sara Gage [phone]

## 2025-05-24 DIAGNOSIS — E11.9 TYPE 2 DIABETES MELLITUS WITHOUT COMPLICATION, WITHOUT LONG-TERM CURRENT USE OF INSULIN (HCC): ICD-10-CM

## 2025-05-25 NOTE — TELEPHONE ENCOUNTER
Comments:     Last Office Visit (last PCP visit):   5/23/2025    Next Visit Date:  Future Appointments   Date Time Provider Department Center   6/4/2025  3:15 PM Arie Martinez DPM Podiatry [facility] [place]   6/18/2025 10:30 AM Clifton Wheatley MD [place] [facility]m [facility] [place]   6/23/2025  3:00 PM Phu Hubbard MD MLORNEUROPB [facility] [place]   7/9/2025 11:00 AM Angeli Yusuf MD MLOX OBLN BS [facility] [place]   7/24/2025 10:00 AM [place] MAMMOGRAPHY ROOM 1 [facility]   1/8/2026 10:00 AM SCHEDULE, SHANICE RAD ONC NURSE [facility] [place] H[facility]         Rx requested:  Requested Prescriptions     Pending Prescriptions Disp Refills    blood glucose test strips (ACCU-CHEK GUIDE) strip [Pharmacy Med Name: Accu-Chek Guide test strips] 100 strip 0     Sig: test blood sugar THREE TIMES DAILY

## 2025-05-25 NOTE — TELEPHONE ENCOUNTER
Comments:     Last Office Visit (last PCP visit):   3/31/2025    Next Visit Date:  Future Appointments   Date Time Provider Department Center   6/4/2025  3:15 PM [facility]   6/18/2025 10:30 AM Clifton Wheatley MD [place] Pulm [facility] [place]   6/23/2025  3:00 PM Phu Hubbard MD MLORNEUROPB [facility] [place]   7/9/2025 11:00 AM Angeli Yusuf MD MLOX OBLN BS [facility] [place]   7/24/2025 10:00 AM [place] MAMMOGRAPHY ROOM 1 [facility]   1/8/2026 10:00 AM SCHEDULE, SHANICE RAD ONC NURSE [facility] [place] HOD         Rx requested:  Requested Prescriptions     Pending Prescriptions Disp Refills    pregabalin (LYRICA) 50 MG capsule 90 capsule 0     Sig: Take 1 capsule by mouth 3 times daily for 30 days. Max Daily Amount: 150 mg

## 2025-05-26 RX ORDER — PREGABALIN 50 MG/1
50 CAPSULE ORAL 3 TIMES DAILY
Qty: 90 CAPSULE | Refills: 0 | OUTPATIENT
Start: 2025-05-26 | End: 2025-06-25

## 2025-05-26 RX ORDER — BLOOD SUGAR DIAGNOSTIC
STRIP MISCELLANEOUS
Qty: 100 STRIP | Refills: 0 | Status: SHIPPED | OUTPATIENT
Start: 2025-05-26

## 2025-05-26 NOTE — PROGRESS NOTES
Preoperative Consultation      Valeria Laird  YOB: 1950    Date of Service:  5/23/2025    Vitals:    05/23/25 1019   BP: 124/76   Pulse: 78   Temp: 97.6 °F (36.4 °C)   TempSrc: Infrared   SpO2: 98%   Weight: 83 kg (183 lb)      Wt Readings from Last 2 Encounters:   05/19/25 83.6 kg (184 lb 6.4 oz)   05/23/25 83 kg (183 lb)     BP Readings from Last 3 Encounters:   05/19/25 138/80   05/23/25 124/76   05/08/25 129/75        Chief Complaint   Patient presents with    Pre-op Exam     Sched for 05/27      Allergies   Allergen Reactions    Adhesive Tape Hives    Molds & Smuts Cough    Other/Food Itching and Swelling     [employer] laundry detergent    Tomato Itching    Environmental/Seasonal Other (See Comments)     Tree pollen, mold, dander causes sinus congestion  Other reaction(s): Other - comment required, Runny Nose  Watery eyes  Watery eyes      Influenza Vaccines Other (See Comments)     Severe migraine     Outpatient Medications Marked as Taking for the 5/23/25 encounter (Office Visit) with Ludin Woods MD   Medication Sig Dispense Refill    citalopram (CELEXA) 20 MG tablet TAKE 1 TABLET DAILY 90 tablet 4    metFORMIN (GLUCOPHAGE-XR) 750 MG extended release tablet Take 1 tablet by mouth daily (with breakfast) TAKE 1 TABLET BY MOUTH TWICE DAILY IN THE MORNING & AT BEDTIME 360 tablet 0    anastrozole (ARIMIDEX) 1 MG tablet Take 1 tablet by mouth daily      rosuvastatin (CRESTOR) 10 MG tablet TAKE 1 TABLET BY MOUTH DAILY 90 tablet 1    vitamin D (VITAMIN D3) 50 MCG (2000 UT) CAPS capsule Take 1 capsule by mouth daily      Multiple Vitamins-Minerals (WOMENS MULTIVITAMIN PO) Take 1 tablet by mouth daily      fexofenadine (ALLEGRA) 180 MG tablet Take 1 tablet by mouth daily         This patient presents to the office today for a preoperative consultation at the request of surgeon, Dr. Stoddard, who plans on performing L3-4 laminectomies, medial facetectomies and bilateral foraminotomies, L3-4 TLIF

## 2025-05-28 ENCOUNTER — RESULTS FOLLOW-UP (OUTPATIENT)
Age: 75
End: 2025-05-28

## 2025-05-29 ENCOUNTER — HOSPITAL ENCOUNTER (INPATIENT)
Age: 75
LOS: 1 days | Discharge: HOME | DRG: 402 | End: 2025-05-30
Attending: NEUROLOGICAL SURGERY | Admitting: NEUROLOGICAL SURGERY
Payer: MEDICARE

## 2025-05-29 ENCOUNTER — APPOINTMENT (OUTPATIENT)
Dept: GENERAL RADIOLOGY | Age: 75
DRG: 402 | End: 2025-05-29
Attending: NEUROLOGICAL SURGERY
Payer: MEDICARE

## 2025-05-29 ENCOUNTER — ANESTHESIA (OUTPATIENT)
Dept: OPERATING ROOM | Age: 75
DRG: 402 | End: 2025-05-29
Payer: MEDICARE

## 2025-05-29 ENCOUNTER — ANESTHESIA EVENT (OUTPATIENT)
Dept: OPERATING ROOM | Age: 75
DRG: 402 | End: 2025-05-29
Payer: MEDICARE

## 2025-05-29 DIAGNOSIS — M48.062 LUMBAR STENOSIS WITH NEUROGENIC CLAUDICATION: Primary | ICD-10-CM

## 2025-05-29 DIAGNOSIS — M48.062 SPINAL STENOSIS OF LUMBAR REGION WITH NEUROGENIC CLAUDICATION: Primary | ICD-10-CM

## 2025-05-29 DIAGNOSIS — M48.062 SPINAL STENOSIS OF LUMBAR REGION WITH NEUROGENIC CLAUDICATION: ICD-10-CM

## 2025-05-29 LAB
CHP ED QC CHECK: NORMAL
GLUCOSE BLD-MCNC: 133 MG/DL
GLUCOSE BLD-MCNC: 133 MG/DL (ref 70–99)
GLUCOSE BLD-MCNC: 187 MG/DL (ref 70–99)
GLUCOSE BLD-MCNC: 204 MG/DL (ref 70–99)
HCT VFR BLD AUTO: 35.5 % (ref 37–47)
HGB BLD-MCNC: 11.8 G/DL (ref 12–16)
PERFORMED ON: ABNORMAL

## 2025-05-29 PROCEDURE — 6360000002 HC RX W HCPCS

## 2025-05-29 PROCEDURE — 2500000003 HC RX 250 WO HCPCS

## 2025-05-29 PROCEDURE — 22633 ARTHRD CMBN 1NTRSPC LUMBAR: CPT | Performed by: NEUROLOGICAL SURGERY

## 2025-05-29 PROCEDURE — 22853 INSJ BIOMECHANICAL DEVICE: CPT | Performed by: NEUROLOGICAL SURGERY

## 2025-05-29 PROCEDURE — 6360000002 HC RX W HCPCS: Performed by: ANESTHESIOLOGY

## 2025-05-29 PROCEDURE — 3600000014 HC SURGERY LEVEL 4 ADDTL 15MIN: Performed by: NEUROLOGICAL SURGERY

## 2025-05-29 PROCEDURE — 85018 HEMOGLOBIN: CPT

## 2025-05-29 PROCEDURE — C9359 IMPLNT,BON VOID FILLER-PUTTY: HCPCS | Performed by: NEUROLOGICAL SURGERY

## 2025-05-29 PROCEDURE — 2720000010 HC SURG SUPPLY STERILE: Performed by: NEUROLOGICAL SURGERY

## 2025-05-29 PROCEDURE — 6360000002 HC RX W HCPCS: Performed by: STUDENT IN AN ORGANIZED HEALTH CARE EDUCATION/TRAINING PROGRAM

## 2025-05-29 PROCEDURE — 3700000001 HC ADD 15 MINUTES (ANESTHESIA): Performed by: NEUROLOGICAL SURGERY

## 2025-05-29 PROCEDURE — 6370000000 HC RX 637 (ALT 250 FOR IP)

## 2025-05-29 PROCEDURE — 2500000003 HC RX 250 WO HCPCS: Performed by: NEUROLOGICAL SURGERY

## 2025-05-29 PROCEDURE — 2580000003 HC RX 258

## 2025-05-29 PROCEDURE — 1210000000 HC MED SURG R&B

## 2025-05-29 PROCEDURE — 7100000001 HC PACU RECOVERY - ADDTL 15 MIN: Performed by: NEUROLOGICAL SURGERY

## 2025-05-29 PROCEDURE — 3700000000 HC ANESTHESIA ATTENDED CARE: Performed by: NEUROLOGICAL SURGERY

## 2025-05-29 PROCEDURE — 63052 LAM FACETC/FRMT ARTHRD LUM 1: CPT

## 2025-05-29 PROCEDURE — 7100000000 HC PACU RECOVERY - FIRST 15 MIN: Performed by: NEUROLOGICAL SURGERY

## 2025-05-29 PROCEDURE — 85014 HEMATOCRIT: CPT

## 2025-05-29 PROCEDURE — C1713 ANCHOR/SCREW BN/BN,TIS/BN: HCPCS | Performed by: NEUROLOGICAL SURGERY

## 2025-05-29 PROCEDURE — 63052 LAM FACETC/FRMT ARTHRD LUM 1: CPT | Performed by: NEUROLOGICAL SURGERY

## 2025-05-29 PROCEDURE — 94150 VITAL CAPACITY TEST: CPT

## 2025-05-29 PROCEDURE — C1889 IMPLANT/INSERT DEVICE, NOC: HCPCS | Performed by: NEUROLOGICAL SURGERY

## 2025-05-29 PROCEDURE — 22633 ARTHRD CMBN 1NTRSPC LUMBAR: CPT

## 2025-05-29 PROCEDURE — 2709999900 HC NON-CHARGEABLE SUPPLY: Performed by: NEUROLOGICAL SURGERY

## 2025-05-29 PROCEDURE — 0SG00AJ FUSION OF LUMBAR VERTEBRAL JOINT WITH INTERBODY FUSION DEVICE, POSTERIOR APPROACH, ANTERIOR COLUMN, OPEN APPROACH: ICD-10-PCS | Performed by: NEUROLOGICAL SURGERY

## 2025-05-29 PROCEDURE — 22840 INSERT SPINE FIXATION DEVICE: CPT

## 2025-05-29 PROCEDURE — 6360000002 HC RX W HCPCS: Performed by: NEUROLOGICAL SURGERY

## 2025-05-29 PROCEDURE — 2500000003 HC RX 250 WO HCPCS: Performed by: ANESTHESIOLOGY

## 2025-05-29 PROCEDURE — 0SG0071 FUSION OF LUMBAR VERTEBRAL JOINT WITH AUTOLOGOUS TISSUE SUBSTITUTE, POSTERIOR APPROACH, POSTERIOR COLUMN, OPEN APPROACH: ICD-10-PCS | Performed by: NEUROLOGICAL SURGERY

## 2025-05-29 PROCEDURE — 22840 INSERT SPINE FIXATION DEVICE: CPT | Performed by: NEUROLOGICAL SURGERY

## 2025-05-29 PROCEDURE — 22853 INSJ BIOMECHANICAL DEVICE: CPT

## 2025-05-29 PROCEDURE — 3600000004 HC SURGERY LEVEL 4 BASE: Performed by: NEUROLOGICAL SURGERY

## 2025-05-29 PROCEDURE — 61783 SCAN PROC SPINAL: CPT | Performed by: NEUROLOGICAL SURGERY

## 2025-05-29 PROCEDURE — 88311 DECALCIFY TISSUE: CPT

## 2025-05-29 PROCEDURE — 88305 TISSUE EXAM BY PATHOLOGIST: CPT

## 2025-05-29 DEVICE — SET SCREW 5540030 5.5 TI NS BRK OFF
Type: IMPLANTABLE DEVICE | Site: SPINE LUMBAR | Status: FUNCTIONAL
Brand: CD HORIZON® SPINAL SYSTEM

## 2025-05-29 DEVICE — SCREW 55850046550 5.5/6.0 SV ATS 6.5X50
Type: IMPLANTABLE DEVICE | Site: SPINE LUMBAR | Status: FUNCTIONAL
Brand: CD HORIZON® SOLERA® VOYAGER™ SPINAL SYSTEM

## 2025-05-29 DEVICE — DBM T43110 10CC GRAFTON PUTTY
Type: IMPLANTABLE DEVICE | Site: SPINE LUMBAR | Status: FUNCTIONAL
Brand: GRAFTON®AND GRAFTON PLUS®DEMINERALIZED BONE MATRIX (DBM)

## 2025-05-29 DEVICE — BONE GRAFT SUBSTITUTE - SILICATE SUBSTITUTED CALCIUM PHOSPHATE - 7.5ML PACK SIZE
Type: IMPLANTABLE DEVICE | Site: SPINE LUMBAR | Status: FUNCTIONAL
Brand: ACTIFUSE MIS

## 2025-05-29 DEVICE — SPACER 6068073 CATALYFT PL SHORT 7MM
Type: IMPLANTABLE DEVICE | Site: SPINE LUMBAR | Status: FUNCTIONAL
Brand: CATALYFT PL EXPANDABLE INTERBODY SYSTEM

## 2025-05-29 RX ORDER — OXYCODONE HYDROCHLORIDE 5 MG/1
5 TABLET ORAL EVERY 4 HOURS PRN
Status: DISCONTINUED | OUTPATIENT
Start: 2025-05-29 | End: 2025-05-30 | Stop reason: HOSPADM

## 2025-05-29 RX ORDER — NALOXONE HYDROCHLORIDE 0.4 MG/ML
INJECTION, SOLUTION INTRAMUSCULAR; INTRAVENOUS; SUBCUTANEOUS PRN
Status: DISCONTINUED | OUTPATIENT
Start: 2025-05-29 | End: 2025-05-29 | Stop reason: HOSPADM

## 2025-05-29 RX ORDER — FENTANYL CITRATE 0.05 MG/ML
50 INJECTION, SOLUTION INTRAMUSCULAR; INTRAVENOUS EVERY 5 MIN PRN
Status: DISCONTINUED | OUTPATIENT
Start: 2025-05-29 | End: 2025-05-29 | Stop reason: HOSPADM

## 2025-05-29 RX ORDER — GLYCOPYRROLATE 0.2 MG/ML
INJECTION INTRAMUSCULAR; INTRAVENOUS
Status: DISCONTINUED | OUTPATIENT
Start: 2025-05-29 | End: 2025-05-29 | Stop reason: SDUPTHER

## 2025-05-29 RX ORDER — BISACODYL 5 MG/1
5 TABLET, DELAYED RELEASE ORAL DAILY
Status: DISCONTINUED | OUTPATIENT
Start: 2025-05-29 | End: 2025-05-30 | Stop reason: HOSPADM

## 2025-05-29 RX ORDER — ONDANSETRON 2 MG/ML
INJECTION INTRAMUSCULAR; INTRAVENOUS
Status: DISCONTINUED | OUTPATIENT
Start: 2025-05-29 | End: 2025-05-29 | Stop reason: SDUPTHER

## 2025-05-29 RX ORDER — MEPERIDINE HYDROCHLORIDE 25 MG/ML
12.5 INJECTION INTRAMUSCULAR; INTRAVENOUS; SUBCUTANEOUS EVERY 5 MIN PRN
Status: DISCONTINUED | OUTPATIENT
Start: 2025-05-29 | End: 2025-05-29 | Stop reason: HOSPADM

## 2025-05-29 RX ORDER — MAGNESIUM HYDROXIDE 1200 MG/15ML
LIQUID ORAL CONTINUOUS PRN
Status: COMPLETED | OUTPATIENT
Start: 2025-05-29 | End: 2025-05-29

## 2025-05-29 RX ORDER — METFORMIN HYDROCHLORIDE 750 MG/1
750 TABLET, EXTENDED RELEASE ORAL
Status: DISCONTINUED | OUTPATIENT
Start: 2025-05-30 | End: 2025-05-30 | Stop reason: HOSPADM

## 2025-05-29 RX ORDER — SODIUM CHLORIDE 0.9 % (FLUSH) 0.9 %
5-40 SYRINGE (ML) INJECTION PRN
Status: DISCONTINUED | OUTPATIENT
Start: 2025-05-29 | End: 2025-05-29 | Stop reason: HOSPADM

## 2025-05-29 RX ORDER — SODIUM CHLORIDE 9 MG/ML
INJECTION, SOLUTION INTRAVENOUS CONTINUOUS
Status: DISCONTINUED | OUTPATIENT
Start: 2025-05-29 | End: 2025-05-29

## 2025-05-29 RX ORDER — SODIUM CHLORIDE 9 MG/ML
INJECTION, SOLUTION INTRAVENOUS PRN
Status: DISCONTINUED | OUTPATIENT
Start: 2025-05-29 | End: 2025-05-29 | Stop reason: HOSPADM

## 2025-05-29 RX ORDER — HYDRALAZINE HYDROCHLORIDE 20 MG/ML
10 INJECTION INTRAMUSCULAR; INTRAVENOUS
Status: DISCONTINUED | OUTPATIENT
Start: 2025-05-29 | End: 2025-05-29 | Stop reason: HOSPADM

## 2025-05-29 RX ORDER — PROPOFOL 10 MG/ML
INJECTION, EMULSION INTRAVENOUS
Status: DISCONTINUED | OUTPATIENT
Start: 2025-05-29 | End: 2025-05-29 | Stop reason: SDUPTHER

## 2025-05-29 RX ORDER — LABETALOL HYDROCHLORIDE 5 MG/ML
10 INJECTION, SOLUTION INTRAVENOUS
Status: DISCONTINUED | OUTPATIENT
Start: 2025-05-29 | End: 2025-05-29 | Stop reason: HOSPADM

## 2025-05-29 RX ORDER — SODIUM CHLORIDE, SODIUM LACTATE, POTASSIUM CHLORIDE, CALCIUM CHLORIDE 600; 310; 30; 20 MG/100ML; MG/100ML; MG/100ML; MG/100ML
INJECTION, SOLUTION INTRAVENOUS CONTINUOUS
Status: DISCONTINUED | OUTPATIENT
Start: 2025-05-29 | End: 2025-05-29 | Stop reason: HOSPADM

## 2025-05-29 RX ORDER — FLUTICASONE PROPIONATE 50 MCG
1 SPRAY, SUSPENSION (ML) NASAL DAILY
Status: DISCONTINUED | OUTPATIENT
Start: 2025-05-29 | End: 2025-05-29

## 2025-05-29 RX ORDER — OXYCODONE HYDROCHLORIDE 5 MG/1
5 TABLET ORAL EVERY 6 HOURS PRN
Status: DISCONTINUED | OUTPATIENT
Start: 2025-05-29 | End: 2025-05-30 | Stop reason: HOSPADM

## 2025-05-29 RX ORDER — DIPHENHYDRAMINE HYDROCHLORIDE 50 MG/ML
12.5 INJECTION, SOLUTION INTRAMUSCULAR; INTRAVENOUS
Status: DISCONTINUED | OUTPATIENT
Start: 2025-05-29 | End: 2025-05-29 | Stop reason: HOSPADM

## 2025-05-29 RX ORDER — OLOPATADINE HYDROCHLORIDE 665 UG/1
2 SPRAY NASAL 2 TIMES DAILY
Status: DISCONTINUED | OUTPATIENT
Start: 2025-05-29 | End: 2025-05-29

## 2025-05-29 RX ORDER — ANASTROZOLE 1 MG/1
1 TABLET ORAL DAILY
Status: DISCONTINUED | OUTPATIENT
Start: 2025-05-29 | End: 2025-05-29

## 2025-05-29 RX ORDER — SODIUM CHLORIDE 9 MG/ML
INJECTION, SOLUTION INTRAVENOUS CONTINUOUS
Status: DISCONTINUED | OUTPATIENT
Start: 2025-05-29 | End: 2025-05-29 | Stop reason: HOSPADM

## 2025-05-29 RX ORDER — POLYETHYLENE GLYCOL 3350 17 G/17G
17 POWDER, FOR SOLUTION ORAL DAILY PRN
Status: DISCONTINUED | OUTPATIENT
Start: 2025-05-29 | End: 2025-05-30 | Stop reason: HOSPADM

## 2025-05-29 RX ORDER — OXYCODONE HYDROCHLORIDE 5 MG/1
10 TABLET ORAL PRN
Status: DISCONTINUED | OUTPATIENT
Start: 2025-05-29 | End: 2025-05-29 | Stop reason: HOSPADM

## 2025-05-29 RX ORDER — CETIRIZINE HYDROCHLORIDE 10 MG/1
10 TABLET ORAL DAILY
Status: DISCONTINUED | OUTPATIENT
Start: 2025-05-29 | End: 2025-05-30 | Stop reason: HOSPADM

## 2025-05-29 RX ORDER — ONDANSETRON 2 MG/ML
4 INJECTION INTRAMUSCULAR; INTRAVENOUS EVERY 6 HOURS PRN
Status: DISCONTINUED | OUTPATIENT
Start: 2025-05-29 | End: 2025-05-30 | Stop reason: HOSPADM

## 2025-05-29 RX ORDER — AMMONIUM LACTATE 12 G/100G
LOTION TOPICAL PRN
Status: DISCONTINUED | OUTPATIENT
Start: 2025-05-29 | End: 2025-05-30 | Stop reason: HOSPADM

## 2025-05-29 RX ORDER — ROCURONIUM BROMIDE 10 MG/ML
INJECTION, SOLUTION INTRAVENOUS
Status: DISCONTINUED | OUTPATIENT
Start: 2025-05-29 | End: 2025-05-29 | Stop reason: SDUPTHER

## 2025-05-29 RX ORDER — SODIUM CHLORIDE 0.9 % (FLUSH) 0.9 %
5-40 SYRINGE (ML) INJECTION PRN
Status: DISCONTINUED | OUTPATIENT
Start: 2025-05-29 | End: 2025-05-30 | Stop reason: HOSPADM

## 2025-05-29 RX ORDER — FENTANYL CITRATE 50 UG/ML
INJECTION, SOLUTION INTRAMUSCULAR; INTRAVENOUS
Status: DISCONTINUED | OUTPATIENT
Start: 2025-05-29 | End: 2025-05-29 | Stop reason: SDUPTHER

## 2025-05-29 RX ORDER — LABETALOL HYDROCHLORIDE 5 MG/ML
10 INJECTION, SOLUTION INTRAVENOUS
Status: DISCONTINUED | OUTPATIENT
Start: 2025-05-29 | End: 2025-05-30 | Stop reason: HOSPADM

## 2025-05-29 RX ORDER — PROCHLORPERAZINE EDISYLATE 5 MG/ML
5 INJECTION INTRAMUSCULAR; INTRAVENOUS
Status: DISCONTINUED | OUTPATIENT
Start: 2025-05-29 | End: 2025-05-29 | Stop reason: HOSPADM

## 2025-05-29 RX ORDER — CITALOPRAM HYDROBROMIDE 20 MG/1
10 TABLET ORAL DAILY
Status: DISCONTINUED | OUTPATIENT
Start: 2025-05-29 | End: 2025-05-30 | Stop reason: HOSPADM

## 2025-05-29 RX ORDER — LIDOCAINE HYDROCHLORIDE 20 MG/ML
INJECTION, SOLUTION EPIDURAL; INFILTRATION; INTRACAUDAL; PERINEURAL
Status: DISCONTINUED | OUTPATIENT
Start: 2025-05-29 | End: 2025-05-29 | Stop reason: SDUPTHER

## 2025-05-29 RX ORDER — FENTANYL CITRATE 0.05 MG/ML
25 INJECTION, SOLUTION INTRAMUSCULAR; INTRAVENOUS EVERY 5 MIN PRN
Status: DISCONTINUED | OUTPATIENT
Start: 2025-05-29 | End: 2025-05-29 | Stop reason: HOSPADM

## 2025-05-29 RX ORDER — OXYCODONE HYDROCHLORIDE 5 MG/1
5 TABLET ORAL PRN
Status: DISCONTINUED | OUTPATIENT
Start: 2025-05-29 | End: 2025-05-29 | Stop reason: HOSPADM

## 2025-05-29 RX ORDER — SODIUM CHLORIDE 0.9 % (FLUSH) 0.9 %
5-40 SYRINGE (ML) INJECTION EVERY 12 HOURS SCHEDULED
Status: DISCONTINUED | OUTPATIENT
Start: 2025-05-29 | End: 2025-05-29 | Stop reason: HOSPADM

## 2025-05-29 RX ORDER — OXYCODONE HYDROCHLORIDE 5 MG/1
5 TABLET ORAL EVERY 6 HOURS PRN
Qty: 28 TABLET | Refills: 0 | Status: SHIPPED | OUTPATIENT
Start: 2025-05-29 | End: 2025-06-05

## 2025-05-29 RX ORDER — ROSUVASTATIN CALCIUM 10 MG/1
10 TABLET, COATED ORAL DAILY
Status: DISCONTINUED | OUTPATIENT
Start: 2025-05-29 | End: 2025-05-30 | Stop reason: HOSPADM

## 2025-05-29 RX ORDER — KETAMINE HYDROCHLORIDE 10 MG/ML
40 INJECTION, SOLUTION INTRAMUSCULAR; INTRAVENOUS ONCE
Status: COMPLETED | OUTPATIENT
Start: 2025-05-29 | End: 2025-05-29

## 2025-05-29 RX ORDER — ACETAMINOPHEN 325 MG/1
650 TABLET ORAL EVERY 6 HOURS
Status: DISCONTINUED | OUTPATIENT
Start: 2025-05-29 | End: 2025-05-30 | Stop reason: HOSPADM

## 2025-05-29 RX ORDER — SODIUM CHLORIDE 0.9 % (FLUSH) 0.9 %
5-40 SYRINGE (ML) INJECTION EVERY 12 HOURS SCHEDULED
Status: DISCONTINUED | OUTPATIENT
Start: 2025-05-29 | End: 2025-05-30 | Stop reason: HOSPADM

## 2025-05-29 RX ORDER — MAGNESIUM SULFATE HEPTAHYDRATE 500 MG/ML
INJECTION, SOLUTION INTRAMUSCULAR; INTRAVENOUS
Status: DISCONTINUED | OUTPATIENT
Start: 2025-05-29 | End: 2025-05-29 | Stop reason: SDUPTHER

## 2025-05-29 RX ORDER — SODIUM CHLORIDE 9 MG/ML
INJECTION, SOLUTION INTRAVENOUS PRN
Status: DISCONTINUED | OUTPATIENT
Start: 2025-05-29 | End: 2025-05-30 | Stop reason: HOSPADM

## 2025-05-29 RX ORDER — PREGABALIN 50 MG/1
50 CAPSULE ORAL 3 TIMES DAILY
Status: DISCONTINUED | OUTPATIENT
Start: 2025-05-29 | End: 2025-05-30 | Stop reason: HOSPADM

## 2025-05-29 RX ORDER — SODIUM CHLORIDE, SODIUM LACTATE, POTASSIUM CHLORIDE, CALCIUM CHLORIDE 600; 310; 30; 20 MG/100ML; MG/100ML; MG/100ML; MG/100ML
INJECTION, SOLUTION INTRAVENOUS
Status: DISCONTINUED | OUTPATIENT
Start: 2025-05-29 | End: 2025-05-29 | Stop reason: SDUPTHER

## 2025-05-29 RX ORDER — DEXAMETHASONE SODIUM PHOSPHATE 10 MG/ML
INJECTION, SOLUTION INTRA-ARTICULAR; INTRALESIONAL; INTRAMUSCULAR; INTRAVENOUS; SOFT TISSUE
Status: DISCONTINUED | OUTPATIENT
Start: 2025-05-29 | End: 2025-05-29 | Stop reason: SDUPTHER

## 2025-05-29 RX ORDER — VANCOMYCIN HYDROCHLORIDE 1 G/20ML
INJECTION, POWDER, LYOPHILIZED, FOR SOLUTION INTRAVENOUS PRN
Status: DISCONTINUED | OUTPATIENT
Start: 2025-05-29 | End: 2025-05-29 | Stop reason: ALTCHOICE

## 2025-05-29 RX ORDER — VITAMIN B COMPLEX
2000 TABLET ORAL DAILY
Status: DISCONTINUED | OUTPATIENT
Start: 2025-05-29 | End: 2025-05-30 | Stop reason: HOSPADM

## 2025-05-29 RX ORDER — BUPIVACAINE HYDROCHLORIDE 2.5 MG/ML
INJECTION, SOLUTION EPIDURAL; INFILTRATION; INTRACAUDAL; PERINEURAL PRN
Status: DISCONTINUED | OUTPATIENT
Start: 2025-05-29 | End: 2025-05-29 | Stop reason: ALTCHOICE

## 2025-05-29 RX ORDER — ONDANSETRON 2 MG/ML
4 INJECTION INTRAMUSCULAR; INTRAVENOUS
Status: DISCONTINUED | OUTPATIENT
Start: 2025-05-29 | End: 2025-05-29 | Stop reason: HOSPADM

## 2025-05-29 RX ORDER — LISINOPRIL 10 MG/1
10 TABLET ORAL DAILY
Status: DISCONTINUED | OUTPATIENT
Start: 2025-05-29 | End: 2025-05-29

## 2025-05-29 RX ADMIN — LABETALOL HYDROCHLORIDE 10 MG: 5 INJECTION, SOLUTION INTRAVENOUS at 13:21

## 2025-05-29 RX ADMIN — PREGABALIN 50 MG: 50 CAPSULE ORAL at 15:12

## 2025-05-29 RX ADMIN — HYDROMORPHONE HYDROCHLORIDE 0.5 MG: 1 INJECTION, SOLUTION INTRAMUSCULAR; INTRAVENOUS; SUBCUTANEOUS at 12:59

## 2025-05-29 RX ADMIN — ONDANSETRON 4 MG: 2 INJECTION, SOLUTION INTRAMUSCULAR; INTRAVENOUS at 11:13

## 2025-05-29 RX ADMIN — CEFAZOLIN 2000 MG: 2 INJECTION, POWDER, FOR SOLUTION INTRAMUSCULAR; INTRAVENOUS at 07:54

## 2025-05-29 RX ADMIN — PHENYLEPHRINE HYDROCHLORIDE 100 MCG: 10 INJECTION INTRAVENOUS at 08:34

## 2025-05-29 RX ADMIN — SODIUM CHLORIDE: 0.9 INJECTION, SOLUTION INTRAVENOUS at 06:39

## 2025-05-29 RX ADMIN — KETAMINE HYDROCHLORIDE 40 MG: 10 INJECTION, SOLUTION INTRAMUSCULAR; INTRAVENOUS at 07:48

## 2025-05-29 RX ADMIN — PREGABALIN 50 MG: 50 CAPSULE ORAL at 20:21

## 2025-05-29 RX ADMIN — ACETAMINOPHEN 650 MG: 325 TABLET ORAL at 20:22

## 2025-05-29 RX ADMIN — HYDROMORPHONE HYDROCHLORIDE 0.5 MG: 1 INJECTION, SOLUTION INTRAMUSCULAR; INTRAVENOUS; SUBCUTANEOUS at 15:04

## 2025-05-29 RX ADMIN — ROCURONIUM BROMIDE 30 MG: 10 INJECTION, SOLUTION INTRAVENOUS at 10:23

## 2025-05-29 RX ADMIN — SODIUM CHLORIDE, PRESERVATIVE FREE 10 ML: 5 INJECTION INTRAVENOUS at 20:22

## 2025-05-29 RX ADMIN — SODIUM CHLORIDE: 0.9 INJECTION, SOLUTION INTRAVENOUS at 15:04

## 2025-05-29 RX ADMIN — FENTANYL CITRATE 25 MCG: 50 INJECTION, SOLUTION INTRAMUSCULAR; INTRAVENOUS at 10:51

## 2025-05-29 RX ADMIN — TIZANIDINE 4 MG: 4 TABLET ORAL at 17:48

## 2025-05-29 RX ADMIN — SODIUM CHLORIDE, POTASSIUM CHLORIDE, SODIUM LACTATE AND CALCIUM CHLORIDE: 600; 310; 30; 20 INJECTION, SOLUTION INTRAVENOUS at 09:34

## 2025-05-29 RX ADMIN — OXYCODONE HYDROCHLORIDE 5 MG: 5 TABLET ORAL at 21:52

## 2025-05-29 RX ADMIN — SUGAMMADEX 200 MG: 100 INJECTION, SOLUTION INTRAVENOUS at 11:35

## 2025-05-29 RX ADMIN — MAGNESIUM SULFATE HEPTAHYDRATE 2 G: 500 INJECTION, SOLUTION INTRAMUSCULAR; INTRAVENOUS at 08:01

## 2025-05-29 RX ADMIN — ROCURONIUM BROMIDE 30 MG: 10 INJECTION, SOLUTION INTRAVENOUS at 09:12

## 2025-05-29 RX ADMIN — PROPOFOL 20 MG: 10 INJECTION, EMULSION INTRAVENOUS at 11:45

## 2025-05-29 RX ADMIN — WATER 2000 MG: 1 INJECTION INTRAMUSCULAR; INTRAVENOUS; SUBCUTANEOUS at 16:25

## 2025-05-29 RX ADMIN — FENTANYL CITRATE 25 MCG: 50 INJECTION, SOLUTION INTRAMUSCULAR; INTRAVENOUS at 11:44

## 2025-05-29 RX ADMIN — PROPOFOL 160 MG: 10 INJECTION, EMULSION INTRAVENOUS at 07:41

## 2025-05-29 RX ADMIN — HYDROMORPHONE HYDROCHLORIDE 0.5 MG: 1 INJECTION, SOLUTION INTRAMUSCULAR; INTRAVENOUS; SUBCUTANEOUS at 20:22

## 2025-05-29 RX ADMIN — CETIRIZINE HYDROCHLORIDE 10 MG: 10 TABLET, FILM COATED ORAL at 16:25

## 2025-05-29 RX ADMIN — LIDOCAINE HYDROCHLORIDE 80 MG: 20 INJECTION, SOLUTION EPIDURAL; INFILTRATION; INTRACAUDAL; PERINEURAL at 07:41

## 2025-05-29 RX ADMIN — FENTANYL CITRATE 25 MCG: 50 INJECTION, SOLUTION INTRAMUSCULAR; INTRAVENOUS at 10:00

## 2025-05-29 RX ADMIN — GLYCOPYRROLATE 0.2 MG: 0.2 INJECTION INTRAMUSCULAR; INTRAVENOUS at 07:41

## 2025-05-29 RX ADMIN — ROSUVASTATIN CALCIUM 10 MG: 10 TABLET, FILM COATED ORAL at 16:25

## 2025-05-29 RX ADMIN — FENTANYL CITRATE 50 MCG: 50 INJECTION, SOLUTION INTRAMUSCULAR; INTRAVENOUS at 10:34

## 2025-05-29 RX ADMIN — ACETAMINOPHEN 650 MG: 325 TABLET ORAL at 15:06

## 2025-05-29 RX ADMIN — PROPOFOL 20 MG: 10 INJECTION, EMULSION INTRAVENOUS at 11:10

## 2025-05-29 RX ADMIN — DEXAMETHASONE SODIUM PHOSPHATE 10 MG: 10 INJECTION INTRAMUSCULAR; INTRAVENOUS at 07:41

## 2025-05-29 RX ADMIN — ROCURONIUM BROMIDE 40 MG: 10 INJECTION, SOLUTION INTRAVENOUS at 07:41

## 2025-05-29 RX ADMIN — FENTANYL CITRATE 75 MCG: 50 INJECTION, SOLUTION INTRAMUSCULAR; INTRAVENOUS at 07:41

## 2025-05-29 RX ADMIN — CITALOPRAM HYDROBROMIDE 10 MG: 20 TABLET ORAL at 15:06

## 2025-05-29 RX ADMIN — PHENYLEPHRINE HYDROCHLORIDE 100 MCG: 10 INJECTION INTRAVENOUS at 08:30

## 2025-05-29 RX ADMIN — OXYCODONE 5 MG: 5 TABLET ORAL at 17:48

## 2025-05-29 RX ADMIN — BISACODYL 5 MG: 5 TABLET, COATED ORAL at 16:25

## 2025-05-29 RX ADMIN — FENTANYL CITRATE 50 MCG: 0.05 INJECTION, SOLUTION INTRAMUSCULAR; INTRAVENOUS at 12:25

## 2025-05-29 ASSESSMENT — PAIN DESCRIPTION - LOCATION
LOCATION: BACK
LOCATION: BACK
LOCATION: INCISION;BACK
LOCATION: BACK

## 2025-05-29 ASSESSMENT — PAIN SCALES - GENERAL
PAINLEVEL_OUTOF10: 7
PAINLEVEL_OUTOF10: 7
PAINLEVEL_OUTOF10: 2
PAINLEVEL_OUTOF10: 7
PAINLEVEL_OUTOF10: 5
PAINLEVEL_OUTOF10: 7
PAINLEVEL_OUTOF10: 4
PAINLEVEL_OUTOF10: 0
PAINLEVEL_OUTOF10: 8
PAINLEVEL_OUTOF10: 7
PAINLEVEL_OUTOF10: 2
PAINLEVEL_OUTOF10: 6
PAINLEVEL_OUTOF10: 0

## 2025-05-29 ASSESSMENT — PAIN DESCRIPTION - DESCRIPTORS
DESCRIPTORS: ACHING;SHARP
DESCRIPTORS: PATIENT UNABLE TO DESCRIBE
DESCRIPTORS: ACHING
DESCRIPTORS: PATIENT UNABLE TO DESCRIBE
DESCRIPTORS: SHARP
DESCRIPTORS: ACHING;SHARP

## 2025-05-29 ASSESSMENT — PAIN DESCRIPTION - PAIN TYPE
TYPE: SURGICAL PAIN
TYPE: CHRONIC PAIN

## 2025-05-29 ASSESSMENT — PAIN DESCRIPTION - ORIENTATION
ORIENTATION: LOWER

## 2025-05-29 ASSESSMENT — PAIN - FUNCTIONAL ASSESSMENT: PAIN_FUNCTIONAL_ASSESSMENT: PREVENTS OR INTERFERES SOME ACTIVE ACTIVITIES AND ADLS

## 2025-05-29 NOTE — ANESTHESIA PRE PROCEDURE
Department of Anesthesiology  Preprocedure Note       Name:  Valeria Laird   Age:  75 y.o.  :  1950                                          MRN:  [id number]         Date:  2025      Surgeon: Surgeon(s):  Phu Hubbard MD    Procedure: Procedure(s):  L3-4 laminectomies, medial facetectomies and bilateral foraminotomies, L3-4 TLIF (Transforaminal Lumbar Interbody Fusion)  and pedicle screw fixation and posterolateral fusion, local morselized autograft, morselized allograft /  3.5 hrs / Stealth with O-Arm Navigation and Aquamantis / Prone on Randell / Medtronic Solera with Expandable TLIF Cages  / Clearances Needed: Medical (Dr. Woods)/ Medications to Hold: mobic e-mailed Sohail Soriano aware    Medications prior to admission:   Prior to Admission medications    Medication Sig Start Date End Date Taking? Authorizing Provider   citalopram (CELEXA) 20 MG tablet TAKE 1 TABLET DAILY 3/31/25  Yes Ludin Woods MD   pregabalin (LYRICA) 50 MG capsule Take 1 capsule by mouth 3 times daily for 30 days. Max Daily Amount: 150 mg 3/31/25 5/29/25 Yes Dimas So MD   metFORMIN (GLUCOPHAGE-XR) 750 MG extended release tablet Take 1 tablet by mouth daily (with breakfast) TAKE 1 TABLET BY MOUTH TWICE DAILY IN THE MORNING & AT BEDTIME 3/26/25  Yes Ludin Woods MD   anastrozole (ARIMIDEX) 1 MG tablet Take 1 tablet by mouth daily 25  Yes ProviderAndrea MD   rosuvastatin (CRESTOR) 10 MG tablet TAKE 1 TABLET BY MOUTH DAILY 25  Yes Ludin Woods MD   vitamin D (VITAMIN D3) 50 MCG (2000) CAPS capsule Take 1 capsule by mouth daily   Yes Andrea Albert MD   ammonium lactate (AMLACTIN) 12 % cream Apply topically daily, avoid applying between the toes. 22  Yes Arie Martinez DPM   CPAP Machine MISC by Does not apply route New CPAP 7-10 cm 3/10/22  Yes Clifton Wheatley MD   Multiple Vitamins-Minerals (WOMENS MULTIVITAMIN PO) Take 1 tablet by mouth daily   Yes Andrea Albert MD

## 2025-05-29 NOTE — PROGRESS NOTES
Patient arrives to  at this time from PACU via transporter. Upon arrival, pt is A&O x4, NAD noted, skin w/p/d, resp reg, easy, unlabored. VSS. Patient resting comfortably in bed, call light within reach, bed locked and in lowest position.

## 2025-05-29 NOTE — OP NOTE
Department of Neurosurgery and  Spine Service  Operative Note    Patient: Valeria Laird  YOB: 1950  MRN: [id number]    Date of Procedure: 5/29/2025    Pre-Op Diagnosis Codes:      * Spinal stenosis of lumbar region with neurogenic claudication [M48.062]    Post-Op Diagnosis: Same       Procedure(s):  L3-4 laminectomies, medial facetectomies and bilateral foraminotomies, L3-4 TLIF (Transforaminal Lumbar Interbody Fusion)  and pedicle screw fixation and posterolateral fusion, local morselized autograft, morselized allograft.    Surgeon(s):  Phu Hubbard MD    Assistant:   Physician Assistant: Jemma Elder PA-C    Anesthesia: General    Estimated Blood Loss (mL): 150 cc    Complications: None    Specimens:   ID Type Source Tests Collected by Time Destination   A : EPIDURAL SOFT TISSUE Tissue Spine SURGICAL PATHOLOGY Phu Hubbard MD 5/29/2025 0951        Implants:  Implant Name Type Inv. Item Serial No.  Lot No. LRB No. Used Action   GRAFT BNE PTTY 1-2 MM 7.5 CC SYNTH KT APPL ACTIFUSE MIS - [id number]  GRAFT BNE PTTY 1-2 MM 7.5 CC SYNTH KT APPL ACTIFUSE MIS  BOO APATECH-WD [id number] N/A 1 Implanted   GRAFT BNE SUB 10CC SACROILIAC PTTY DEMIN MTRX JR FOR RIALTO - [id number]  GRAFT BNE SUB 10CC SACROILIAC PTTY DEMIN MTRX JR FOR RIALTO [id number] [employer] SPINALGRAFT TECH-WD  N/A 1 Implanted   SCREW SPNL L50MM [id number] SV AWL TAP HI STRENGTH LO PROF FOR - [id number]  SCREW SPNL L50MM [id number] SV AWL TAP HI STRENGTH LO PROF FOR  [employer] SOFAMOR DANEK-WD  N/A 3 Implanted   SCREW SPNL L45MM [id number] SV AWL TAP HI STRENGTH LO PROF FOR - [id number]  SCREW SPNL L45MM [id number] SV AWL TAP HI STRENGTH LO PROF FOR  [employer] SOFAMOR DANEK-WD  N/A 1 Implanted   SET SCR SPNL L6MM DIA5.5MM TI BRK OFF MANUEL W/ DETACH CDH - [id number]  SET SCR SPNL L6MM DIA5.5MM TI BRK OFF MANUEL W/ DETACH [facility]  [employer] SOFAMOR DANEK-WD  N/A 4 Implanted   SPACER SPNL SHT 7 MM HANG COLON -

## 2025-05-29 NOTE — ANESTHESIA POSTPROCEDURE EVALUATION
Department of Anesthesiology  Postprocedure Note    Patient: Valeria Laird  MRN: [id number]  YOB: 1950  Date of evaluation: 5/29/2025    Procedure Summary       Date: 05/29/25 Room / Location: [address]    Anesthesia Start: 0732 Anesthesia Stop: 1151    Procedure: L3-4 laminectomies, medial facetectomies and bilateral foraminotomies, L3-4 TLIF (Transforaminal Lumbar Interbody Fusion)  and pedicle screw fixation and posterolateral fusion, local morselized autograft, morselized allograft. (Spine Lumbar) Diagnosis:       Spinal stenosis of lumbar region with neurogenic claudication      (Spinal stenosis of lumbar region with neurogenic claudication [M48.062])    Surgeons: Phu Hubbard MD Responsible Provider: Carmen Stanley MD    Anesthesia Type: General ASA Status: 3            Anesthesia Type: General    Chandana Phase I: Chandana Score: 10    Chandana Phase II:      Anesthesia Post Evaluation    Patient location during evaluation: PACU  Patient participation: complete - patient participated  Level of consciousness: awake and alert  Airway patency: patent  Nausea & Vomiting: no nausea and no vomiting  Cardiovascular status: hemodynamically stable  Respiratory status: spontaneous ventilation  Hydration status: euvolemic  Pain management: adequate        No notable events documented.

## 2025-05-29 NOTE — PROGRESS NOTES
CLINICAL PHARMACY NOTE: MEDS TO BEDS    Total # of Prescriptions Filled: 2   The following medications were delivered to the patient:  Oxycodone 5 mg Tab  Tizanidine 4 mg Tab    Additional Documentation:

## 2025-05-29 NOTE — CONSULTS
Physician consult note    5/29/2025   3:24 PM    Name:  Valeria Laird  MRN:    [id number]     IP Day: 0     Admit Date: 5/29/2025  5:41 AM  PCP: Ludin Woods MD    Code Status:  Full Code    Consult requested by neurosurgeon for medical management of hypertension, diabetes    Assessment and Plan:        1.  Lumbar stenosis s/p laminectomy:  - Postoperative care including pain control and DVT prophylaxis per surgeon    Home medications for the following conditions have been resumed:  Hypertension  Type 2 diabetes well-controlled last A1c 6.1  AMANDA  Anxiety  History of breast cancer     Diet: ADULT DIET; Regular  Full Code      Electronically signed by Edd Elizabeth DO on 5/29/2025 at 3:24 PM    Subjective:     She is having back pain after surgery.  She reports history of hypertension, diabetes, anxiety, breast cancer.  She denies any known history of heart disease, lung disease, prior CVA/TIA.  She was a smoker but quit prior to the surgery.  She denies alcohol or illicit drug use.    Current Facility-Administered Medications   Medication Dose Route Frequency Provider Last Rate Last Admin    ammonium lactate (LAC-HYDRIN) 12 % lotion   Topical PRN Jeffery Pennington PA-C        Vitamin D (CHOLECALCIFEROL) tablet 2,000 Units  2,000 Units Oral Daily Jeffery Pennington PA-C        rosuvastatin (CRESTOR) tablet 10 mg  10 mg Oral Daily Jeffery Pennington PA-C        [START ON 5/30/2025] metFORMIN (GLUCOPHAGE-XR) extended release tablet 750 mg  750 mg Oral Daily with breakfast Jeffery Pennington PA-C        citalopram (CELEXA) tablet 10 mg  10 mg Oral Daily Jeffery Pennington PA-C   10 mg at 05/29/25 1506    pregabalin (LYRICA) capsule 50 mg  50 mg Oral TID Jeffery Pennington PA-C   50 mg at 05/29/25 1512    tiZANidine (ZANAFLEX) tablet 4 mg  4 mg Oral TID PRN Jeffery Pennington PA-C        cetirizine (ZYRTEC) tablet 10 mg  10 mg Oral Daily Jeffery Pennington PA-C        sodium chloride flush 0.9 % injection 5-40 mL  5-40 mL IntraVENous 2 times per day

## 2025-05-29 NOTE — DISCHARGE INSTRUCTIONS
No lifting greater than 30 pounds x 3 weeks.  May shower immediately, wait 1 week to soak in tub.   To follow-up with Dr. Hubbard in 3 weeks. Please get lumbar x-ray imaging before appointment.

## 2025-05-29 NOTE — INTERVAL H&P NOTE
Update History & Physical    The patient's History and Physical  was reviewed with the patient and I examined the patient. There was no change. The surgical site was confirmed by the patient and me.     Plan: The risks, benefits, expected outcome, and alternative to the recommended procedure have been discussed with the patient. Patient understands and wants to proceed with the procedure.     Electronically signed by Phu Hubbard MD on 5/29/2025 at 7:27 AM

## 2025-05-29 NOTE — PROGRESS NOTES
Oxycodone, Tizanadine rx sent to [facility].  Phu Hubbard MD

## 2025-05-29 NOTE — PROGRESS NOTES
VIC Pennington PA notified of drainage amount of 110 mL since arrival to floor. PA to bedside at same time. No new orders received. Will continue to monitor.

## 2025-05-30 VITALS
SYSTOLIC BLOOD PRESSURE: 161 MMHG | BODY MASS INDEX: 32.43 KG/M2 | HEART RATE: 103 BPM | WEIGHT: 183 LBS | OXYGEN SATURATION: 94 % | HEIGHT: 63 IN | DIASTOLIC BLOOD PRESSURE: 73 MMHG | RESPIRATION RATE: 18 BRPM | TEMPERATURE: 99.5 F

## 2025-05-30 DIAGNOSIS — M48.062 SPINAL STENOSIS OF LUMBAR REGION WITH NEUROGENIC CLAUDICATION: Primary | ICD-10-CM

## 2025-05-30 DIAGNOSIS — R11.0 NAUSEA: ICD-10-CM

## 2025-05-30 LAB
GLUCOSE BLD-MCNC: 140 MG/DL (ref 70–99)
GLUCOSE BLD-MCNC: 160 MG/DL (ref 70–99)
GLUCOSE BLD-MCNC: 163 MG/DL (ref 70–99)
PERFORMED ON: ABNORMAL

## 2025-05-30 PROCEDURE — 97535 SELF CARE MNGMENT TRAINING: CPT

## 2025-05-30 PROCEDURE — 97165 OT EVAL LOW COMPLEX 30 MIN: CPT

## 2025-05-30 PROCEDURE — 99024 POSTOP FOLLOW-UP VISIT: CPT

## 2025-05-30 PROCEDURE — 97116 GAIT TRAINING THERAPY: CPT

## 2025-05-30 PROCEDURE — 6360000002 HC RX W HCPCS

## 2025-05-30 PROCEDURE — 6370000000 HC RX 637 (ALT 250 FOR IP)

## 2025-05-30 PROCEDURE — 2500000003 HC RX 250 WO HCPCS

## 2025-05-30 PROCEDURE — 97161 PT EVAL LOW COMPLEX 20 MIN: CPT

## 2025-05-30 RX ORDER — ONDANSETRON 4 MG/1
4 TABLET, FILM COATED ORAL 3 TIMES DAILY PRN
Qty: 15 TABLET | Refills: 0 | Status: SHIPPED | OUTPATIENT
Start: 2025-05-30

## 2025-05-30 RX ORDER — PREGABALIN 50 MG/1
50 CAPSULE ORAL 3 TIMES DAILY
Qty: 93 CAPSULE | Refills: 0 | Status: SHIPPED | OUTPATIENT
Start: 2025-05-30 | End: 2025-06-30

## 2025-05-30 RX ADMIN — BISACODYL 5 MG: 5 TABLET, COATED ORAL at 07:54

## 2025-05-30 RX ADMIN — CITALOPRAM HYDROBROMIDE 10 MG: 20 TABLET ORAL at 08:00

## 2025-05-30 RX ADMIN — TIZANIDINE 4 MG: 4 TABLET ORAL at 09:31

## 2025-05-30 RX ADMIN — WATER 2000 MG: 1 INJECTION INTRAMUSCULAR; INTRAVENOUS; SUBCUTANEOUS at 00:38

## 2025-05-30 RX ADMIN — ONDANSETRON 4 MG: 2 INJECTION, SOLUTION INTRAMUSCULAR; INTRAVENOUS at 09:31

## 2025-05-30 RX ADMIN — OXYCODONE 5 MG: 5 TABLET ORAL at 03:16

## 2025-05-30 RX ADMIN — HYDROMORPHONE HYDROCHLORIDE 0.5 MG: 1 INJECTION, SOLUTION INTRAMUSCULAR; INTRAVENOUS; SUBCUTANEOUS at 00:38

## 2025-05-30 RX ADMIN — METFORMIN HYDROCHLORIDE 750 MG: 750 TABLET, EXTENDED RELEASE ORAL at 07:54

## 2025-05-30 RX ADMIN — CETIRIZINE HYDROCHLORIDE 10 MG: 10 TABLET, FILM COATED ORAL at 07:55

## 2025-05-30 RX ADMIN — TIZANIDINE 4 MG: 4 TABLET ORAL at 03:16

## 2025-05-30 RX ADMIN — HYDROMORPHONE HYDROCHLORIDE 0.5 MG: 1 INJECTION, SOLUTION INTRAMUSCULAR; INTRAVENOUS; SUBCUTANEOUS at 05:25

## 2025-05-30 RX ADMIN — SODIUM CHLORIDE, PRESERVATIVE FREE 10 ML: 5 INJECTION INTRAVENOUS at 07:56

## 2025-05-30 RX ADMIN — PREGABALIN 50 MG: 50 CAPSULE ORAL at 07:54

## 2025-05-30 RX ADMIN — OXYCODONE 5 MG: 5 TABLET ORAL at 12:15

## 2025-05-30 RX ADMIN — ACETAMINOPHEN 650 MG: 325 TABLET ORAL at 00:38

## 2025-05-30 RX ADMIN — ROSUVASTATIN CALCIUM 10 MG: 10 TABLET, FILM COATED ORAL at 07:54

## 2025-05-30 RX ADMIN — ONDANSETRON 4 MG: 2 INJECTION, SOLUTION INTRAMUSCULAR; INTRAVENOUS at 15:26

## 2025-05-30 RX ADMIN — OXYCODONE 5 MG: 5 TABLET ORAL at 07:55

## 2025-05-30 RX ADMIN — Medication 2000 UNITS: at 07:54

## 2025-05-30 RX ADMIN — OXYCODONE 5 MG: 5 TABLET ORAL at 16:36

## 2025-05-30 RX ADMIN — ACETAMINOPHEN 650 MG: 325 TABLET ORAL at 14:40

## 2025-05-30 RX ADMIN — PREGABALIN 50 MG: 50 CAPSULE ORAL at 14:40

## 2025-05-30 RX ADMIN — ACETAMINOPHEN 650 MG: 325 TABLET ORAL at 07:54

## 2025-05-30 ASSESSMENT — PAIN SCALES - GENERAL
PAINLEVEL_OUTOF10: 4
PAINLEVEL_OUTOF10: 4
PAINLEVEL_OUTOF10: 7
PAINLEVEL_OUTOF10: 4

## 2025-05-30 ASSESSMENT — PAIN DESCRIPTION - LOCATION: LOCATION: BACK;LEG

## 2025-05-30 NOTE — PROGRESS NOTES
Physical Therapy Med Surg Initial Assessment  Facility/Department: [address] ORTHO TELE  [facility]       NAME: Valeria Laird  : 1950 (75 y.o.)  MRN: [id number]  CODE STATUS: Full Code    Date of Service: 2025    Patient Diagnosis(es): Spinal stenosis of lumbar region with neurogenic claudication [M48.062]  Lumbar stenosis with neurogenic claudication [M48.062]   No chief complaint on file.    Patient Active Problem List    Diagnosis Date Noted    Hammertoe, bilateral 2022    Diabetic polyneuropathy associated with type 2 diabetes mellitus (HCC) 2022    Callus 2022    Hallux abducto valgus, bilateral 2022    Tailor's bunionette, bilateral 2022    Lumbar stenosis with neurogenic claudication 2025    Spinal stenosis of lumbar region with neurogenic claudication 05/15/2025    Carcinoma of upper-outer quadrant of right breast in female, estrogen receptor positive (HCC) 2024    Malignant neoplasm of upper-outer quadrant of right breast, estrogen receptor positive (HCC) 2024    Tobacco abuse 2024    Abnormal CT of the chest 2024    Allergic rhinitis 2024    Upper respiratory tract infection 2024    Type 2 diabetes mellitus 2022    Obesity (BMI 30-39.9) 2022    Lumbar radiculopathy 2021    Cervical radiculopathy 2021    History of total right knee replacement 2020    Status post total knee replacement, right 2020    Simple chronic bronchitis (HCC) 2020    Status post left knee replacement 2019    Status post total knee replacement, left 2019    Left knee DJD 2019    Vagal reaction 2019    Current occasional smoker 2018    Elevated glucose 2018    Blood pressure elevated without history of HTN 2018    Family history of diabetes mellitus 2018    Dry skin dermatitis 2018    Hyperlipidemia     AMANDA (obstructive sleep apnea)     Anxiety        Timed Code Treatment Minutes: 8 Minutes (gait 8min)       Mariam Rivera PT, 05/30/25 at 12:58 PM         Definitions for assistance levels  Independent = pt does not require any physical supervision or assistance from another person for activity completion. Device may be needed.  Stand by assistance = pt requires verbal cues or instructions from another person, close to but not touching, to perform the activity  Minimal assistance= pt performs 75% or more of the activity; assistance is required to complete the activity  Moderate assistance= pt performs 50% of the activity; assistance is required to complete the activity  Maximal assistance = pt performs 25% of the activity; assistance is required to complete the activity  Dependent = pt requires total physical assistance to accomplish the task

## 2025-05-30 NOTE — PROGRESS NOTES
Doing overall 60% better, some anterior leg pain to knees. Tolerating PO and voiding. Walked the halls. Drain removed.  LE 5/5, sens intact LT  Drain out, incision C/D/I  A/P POD 1 L3-4 TLIF, doing well and improved with LE sx. Will D/C home.  Phu Hubbard MD

## 2025-05-30 NOTE — PLAN OF CARE
Problem: Discharge Planning  Goal: Discharge to home or other facility with appropriate resources  Outcome: Progressing     Problem: Chronic Conditions and Co-morbidities  Goal: Patient's chronic conditions and co-morbidity symptoms are monitored and maintained or improved  Outcome: Progressing     Problem: Pain  Goal: Verbalizes/displays adequate comfort level or baseline comfort level  Outcome: Progressing     Problem: ABCDS Injury Assessment  Goal: Absence of physical injury  Outcome: Progressing     Problem: Safety - Adult  Goal: Free from fall injury  Outcome: Progressing     Problem: Neurosensory - Adult  Goal: Achieves stable or improved neurological status  Outcome: Progressing  Goal: Achieves maximal functionality and self care  Outcome: Progressing     Problem: Skin/Tissue Integrity - Adult  Goal: Skin integrity remains intact  Outcome: Progressing  Goal: Incisions, wounds, or drain sites healing without S/S of infection  Outcome: Progressing  Goal: Oral mucous membranes remain intact  Outcome: Progressing     Problem: Musculoskeletal - Adult  Goal: Return mobility to safest level of function  Outcome: Progressing  Goal: Maintain proper alignment of affected body part  Outcome: Progressing  Goal: Return ADL status to a safe level of function  Outcome: Progressing

## 2025-05-30 NOTE — PROGRESS NOTES
Spiritual Health History and Assessment/Progress Note  [facility]    Initial Encounter, Spiritual/Emotional Needs,  , Adjustment to illness, Life Adjustments,      Name: Valeria Laird MRN: [id number]    Age: 75 y.o.     Sex: adult   Language: English   Episcopal: Quaker   Spinal stenosis of lumbar region with neurogenic claudication     Date: 5/30/2025            Total Time Calculated: 15 min              Spiritual Assessment began in [facility] ORTHO TELE        Referral/Consult From: Physician   Encounter Overview/Reason: Initial Encounter, Spiritual/Emotional Needs  Service Provided For: Patient    REFERRAL:  Rounding     ASSESSMENT: The pt was sitting up on the bed, the pt was somewhat alert, no family was at bedside, the pt expressed her concerns, and did request for prayers.     INTERVENTION:  The  provided supportive presence, active listening, explored the pt's concerns, fears, and feelings. The  provided emotional and spiritual support through prayer and theological reflection.     OUTCOME:  Both the pt and spouse seem to be more relaxed, less anxious, and were appreciative for the visit, support, and prayers.      PLAN: The  will continue to provide support as it is needed.    Melia, Belief, Meaning:   Patient identifies as spiritual, is connected with a melia tradition or spiritual practice, and has beliefs or practices that help with coping during difficult times  Family/Friends No family/friends present      Importance and Influence:  Patient has spiritual/personal beliefs that influence decisions regarding their health  Family/Friends No family/friends present    Community:  Patient is connected with a spiritual community and feels well-supported. Support system includes: Extended family  Family/Friends No family/friends present    Assessment and Plan of Care:     Patient Interventions include: Facilitated expression of thoughts and feelings, Explored

## 2025-05-30 NOTE — PROGRESS NOTES
POD #1 L3-4 decompression fusion.  Patient progressing well postoperatively.  Rates back pain as a 7 out of 10 this morning.  Her preoperative bilateral posterior leg pain to the knees has completely resolved.  Having new mild bilateral anterior leg pain to the knees. Denies any LE numbness. Has ambulated, voided, and is tolerating p.o. She would like to work with PT before deciding discharge planning.    Physical Exam:  Awake and alert  Motor 5/5 LE  Sensation intact to light touch BLE  Incision C/D/I   Drain output last shift: 270, 200    Assessment/Plan:  POD #1 L3-4 decompression fusion.  Patient is having expected postoperative back pain.  Her preoperative posterior radicular pain has resolved. She is having some mild new anterior leg pain.  She understands this is not abnormal, can be from nerve irritation and healing postoperatively.  Drainage from last shift was 200.  Will plan to keep drain in and remove this morning if output is less than 10 cc per hour.  We will have patient work with PT and likely discharge home this afternoon if pain is under reasonable control.  She is to follow-up as outpatient in 3 weeks and obtain postop lumbar x-ray before appointment.    Jeffery Pennington PA-C

## 2025-05-30 NOTE — PROGRESS NOTES
Physician consult progress note    5/30/2025   2:44 PM    Name:  Valeria Laird  MRN:    [id number]     IP Day: 1     Admit Date: 5/29/2025  5:41 AM  PCP: Ludin Woods MD    Code Status:  Full Code    Consult requested by neurosurgeon for medical management of hypertension, diabetes    Assessment and Plan:        1.  Lumbar stenosis s/p laminectomy:  - Postoperative care including pain control and DVT prophylaxis per surgeon    Home medications for the following conditions have been resumed:  Hypertension  Type 2 diabetes well-controlled last A1c 6.1  AMANDA  Anxiety  History of breast cancer     Diet: ADULT DIET; Regular  Full Code    Okay for discharge once cleared by surgeon    Electronically signed by Edd Elizabeth DO on 5/30/2025 at 2:44 PM    Subjective:     Continues to have back pain.  Anticipating discharge home this evening    Current Facility-Administered Medications   Medication Dose Route Frequency Provider Last Rate Last Admin    ammonium lactate (LAC-HYDRIN) 12 % lotion   Topical PRN Jeffery Pennington PA-C        Vitamin D (CHOLECALCIFEROL) tablet 2,000 Units  2,000 Units Oral Daily Jeffery Pennington PA-C   2,000 Units at 05/30/25 0754    rosuvastatin (CRESTOR) tablet 10 mg  10 mg Oral Daily Jeffery Pennington PA-C   10 mg at 05/30/25 0754    metFORMIN (GLUCOPHAGE-XR) extended release tablet 750 mg  750 mg Oral Daily with breakfast Jeffery Pennington PA-C   750 mg at 05/30/25 0754    citalopram (CELEXA) tablet 10 mg  10 mg Oral Daily Jeffery Pennington PA-C   10 mg at 05/30/25 0800    pregabalin (LYRICA) capsule 50 mg  50 mg Oral TID Jeffery Pennington PA-C   50 mg at 05/30/25 1440    tiZANidine (ZANAFLEX) tablet 4 mg  4 mg Oral TID PRN Jeffery Pennington PA-C   4 mg at 05/30/25 0931    cetirizine (ZYRTEC) tablet 10 mg  10 mg Oral Daily Jeffery Pennington PA-MÓNICA   10 mg at 05/30/25 0755    sodium chloride flush 0.9 % injection 5-40 mL  5-40 mL IntraVENous 2 times per day Jeffery Pennington PA-C   10 mL at 05/30/25 0756    sodium chloride  flush 0.9 % injection 5-40 mL  5-40 mL IntraVENous PRN Jeffery Pennington PA-C        0.9 % sodium chloride infusion   IntraVENous PRN Jeffery Pennington PA-C        acetaminophen (TYLENOL) tablet 650 mg  650 mg Oral Q6H Jeffery Pennington PA-C   650 mg at 25 1440    oxyCODONE (ROXICODONE) immediate release tablet 5 mg  5 mg Oral Q6H PRN Jeffery Pennington PA-C   5 mg at 25 2152    Or    oxyCODONE (ROXICODONE) immediate release tablet 5 mg  5 mg Oral Q4H PRN Jeffery Pennington PA-C   5 mg at 25 1215    HYDROmorphone (DILAUDID) injection 0.25 mg  0.25 mg IntraVENous Q3H PRN Jeffery Pennington PA-C        Or    HYDROmorphone (DILAUDID) injection 0.5 mg  0.5 mg IntraVENous Q3H PRN Jeffery Pennington PA-C   0.5 mg at 25 0525    ondansetron (ZOFRAN) injection 4 mg  4 mg IntraVENous Q6H PRN Jeffery Pennington PA-C   4 mg at 25 0931    bisacodyl (DULCOLAX) EC tablet 5 mg  5 mg Oral Daily Jeffery Pennington PA-C   5 mg at 25 0754    magnesium hydroxide (MILK OF MAGNESIA) 400 MG/5ML suspension 30 mL  30 mL Oral Daily PRN Jeffery Pennington PA-C        polyethylene glycol (GLYCOLAX) packet 17 g  17 g Oral Daily PRN Jeffery Pennington PA-C        labetalol (NORMODYNE;TRANDATE) injection 10 mg  10 mg IntraVENous Q20 Min PRN Carmen Stanley MD   10 mg at 25 1321       Physical Examination:      Vitals:  BP (!) 161/73   Pulse (!) 103   Temp 99.5 °F (37.5 °C) (Oral)   Resp 18   Ht 1.595 m (5' 2.8\")   Wt 83 kg (183 lb)   LMP 2008   SpO2 94%   BMI 32.62 kg/m²   Temp (24hrs), Av.2 °F (36.8 °C), Min:97.2 °F (36.2 °C), Max:99.5 °F (37.5 °C)      General appearance: alert, cooperative and no distress  Mental Status: oriented to person, place and time and normal affect  Lungs: clear to auscultation bilaterally, normal effort  Heart: regular rate and rhythm, no murmur  Abdomen: soft, nontender, nondistended, bowel sounds present, no masses  Extremities: no edema, redness, tenderness in the calves. Cap refill <2s  Skin: no gross

## 2025-05-30 NOTE — DISCHARGE SUMMARY
Discharge Summary    Valeria Laird  :  1950  MRN:  [id number]    ADMIT DATE:  2025  DISCHARGE DATE:  2025    PRIMARY CARE PHYSICIAN:  Ludin Woods MD    VISIT STATUS: Admission    CODE STATUS:  Full Code    DISCHARGE DIAGNOSES:  Principal Problem:    Spinal stenosis of lumbar region with neurogenic claudication  Active Problems:    Lumbar stenosis with neurogenic claudication  Resolved Problems:    * No resolved hospital problems. *      SURGICAL PROCEDURES:  L3-4 laminectomies, medial facetectomies and bilateral foraminotomies, L3-4 TLIF (Transforaminal Lumbar Interbody Fusion) and pedicle screw fixation and posterolateral fusion, local morselized autograft, morselized allograft.     HOSPITAL COURSE:  The patient was admitted to the hospital on the day of surgery and underwent the above noted procedure. Post-operatively, the patient was transferred to the PACU in stable condition and then to [facility]. They received 24 hours of prophylactic intravenous antibiotics. DVT prophylaxis included SCDs and early ambulation. Diet was advanced, patient was ambulated and able to urinate, and pain was reasonably controlled. Drain was removed POD1. The patient progressed well throughout the hospitalization and was deemed stable for discharge to home on the above listed date.     CONSULTANTS:  Hospitalist  PT/OT    DISCHARGE MEDICATIONS:         Medication List        START taking these medications      oxyCODONE 5 MG immediate release tablet  Commonly known as: Roxicodone  Take 1 tablet by mouth every 6 hours as needed for Pain for up to 7 days. Intended supply: 7 days. Take lowest dose possible to manage pain Max Daily Amount: 20 mg     tiZANidine 4 MG tablet  Commonly known as: ZANAFLEX  Take 1 tablet by mouth 3 times daily as needed (muscle spasm)            CONTINUE taking these medications      Accu-Chek Guide strip  Generic drug: blood glucose test strips  test blood sugar THREE TIMES DAILY     Alcohol

## 2025-05-30 NOTE — ACP (ADVANCE CARE PLANNING)
Advance Care Planning   Healthcare Decision Maker:    Primary Decision Maker: Tea Funez - [facility] - [phone]    Click here to complete Healthcare Decision Makers including selection of the Healthcare Decision Maker Relationship (ie \"Primary\").  Today we documented Decision Maker(s) consistent with Legal Next of Kin hierarchy.

## 2025-05-30 NOTE — CARE COORDINATION
Case Management Assessment  Initial Evaluation    Date/Time of Evaluation: 5/30/2025 10:46 AM  Assessment Completed by: Ave Mcnair RN    If patient is discharged prior to next notation, then this note serves as note for discharge by case management.    Patient Name: Valeria Laird                   YOB: 1950  Diagnosis: Spinal stenosis of lumbar region with neurogenic claudication [M48.062]  Lumbar stenosis with neurogenic claudication [M48.062]                   Date / Time: 5/29/2025  5:41 AM    Patient Admission Status: Inpatient   Readmission Risk (Low < 19, Mod (19-27), High > 27): Readmission Risk Score: 7.1    Current PCP: Ludin Woods MD  PCP verified by CM? Yes    Chart Reviewed: Yes      History Provided by: Patient  Patient Orientation: Alert and Oriented, Person, Place, Situation, Self    Patient Cognition: Alert    Hospitalization in the last 30 days (Readmission):  No    If yes, Readmission Assessment in CM Navigator will be completed.    Advance Directives:      Code Status: Full Code   Patient's Primary Decision Maker is:      Primary Decision Maker: Armin,Hanna - Child - [phone]    Discharge Planning:    Patient lives with: Alone Type of Home: Apartment  Primary Care Giver: Self  Patient Support Systems include: Children   Current Financial resources:    Current community resources:    Current services prior to admission: None            Current DME:              Type of Home Care services:  None    ADLS  Prior functional level: Independent in ADLs/IADLs  Current functional level: Independent in ADLs/IADLs, Mobility, Other (see comment) (POST OP PT/OT PENDING)    PT AM-PAC:   /24  OT AM-PAC:   /24    Family can provide assistance at DC: Yes  Would you like Case Management to discuss the discharge plan with any other family members/significant others, and if so, who? Yes (DTR ESTHER)  Plans to Return to Present Housing: Yes  Other Identified Issues/Barriers to

## 2025-05-30 NOTE — PLAN OF CARE
See OT evaluation for all goals and OT POC. Electronically signed by Tasha Robb OTR/L on 5/30/2025 at 12:37 PM

## 2025-05-30 NOTE — PROGRESS NOTES
[facility] OCCUPATIONAL THERAPY EVALUATION - ACUTE     NAME: Valeria Laird  : 1950 (75 y.o.)  MRN: [id number]  CODE STATUS: Full Code  Room: W265/W265-01    Date of Service: 2025    Patient Diagnosis(es): Spinal stenosis of lumbar region with neurogenic claudication [M48.062]  Lumbar stenosis with neurogenic claudication [M48.062]   Patient Active Problem List    Diagnosis Date Noted    Hammertoe, bilateral 2022    Diabetic polyneuropathy associated with type 2 diabetes mellitus (HCC) 2022    Callus 2022    Hallux abducto valgus, bilateral 2022    Tailor's bunionette, bilateral 2022    Lumbar stenosis with neurogenic claudication 2025    Spinal stenosis of lumbar region with neurogenic claudication 05/15/2025    Carcinoma of upper-outer quadrant of right breast in female, estrogen receptor positive (HCC) 2024    Malignant neoplasm of upper-outer quadrant of right breast, estrogen receptor positive (HCC) 2024    Tobacco abuse 2024    Abnormal CT of the chest 2024    Allergic rhinitis 2024    Upper respiratory tract infection 2024    Type 2 diabetes mellitus 2022    Obesity (BMI 30-39.9) 2022    Lumbar radiculopathy 2021    Cervical radiculopathy 2021    History of total right knee replacement 2020    Status post total knee replacement, right 2020    Simple chronic bronchitis (HCC) 2020    Status post left knee replacement 2019    Status post total knee replacement, left 2019    Left knee DJD 2019    Vagal reaction 2019    Current occasional smoker 2018    Elevated glucose 2018    Blood pressure elevated without history of HTN 2018    Family history of diabetes mellitus 2018    Dry skin dermatitis 2018    Hyperlipidemia     AMANDA (obstructive sleep apnea)     Anxiety       Lumbar stenosis with neurogenic claudication  Spinal stenosis of

## 2025-06-02 ENCOUNTER — CARE COORDINATION (OUTPATIENT)
Dept: CARE COORDINATION | Age: 75
End: 2025-06-02

## 2025-06-02 NOTE — CARE COORDINATION
Care Transitions Note    Initial Call - Call within 2 business days of discharge: Yes    Attempted to reach patient for transitions of care follow up. Unable to reach patient.    Outreach Attempts:   HIPAA compliant voicemail left for patient.     Patient: Valeria Laird    Patient : 1950   MRN: [id number]    Reason for Admission: Spinal stenosis, lumbar region with neurogenic claudication  Discharge Date: 25  RURS: Readmission Risk Score: 7.5    Last Discharge Facility       Date Complaint Diagnosis Description Type Department Provider    25  Lumbar stenosis with neurogenic claudication ... Admission (Discharged) Phu Marie MD            Was this an external facility discharge? No    Attempted to contact patient today 25 for initial MIO/hospital discharge (low risk) follow up. Left message on home/mobile number requesting a return call back to CTN and provided contact information.     RAOUL Chandra

## 2025-06-03 ENCOUNTER — CARE COORDINATION (OUTPATIENT)
Dept: CARE COORDINATION | Age: 75
End: 2025-06-03

## 2025-06-03 NOTE — CARE COORDINATION
Care Transitions Note    Initial Call - Call within 2 business days of discharge: Yes    Attempted to reach patient for transitions of care follow up. Unable to reach patient.    Outreach Attempts:   Multiple attempts to contact patient at phone numbers on file.     Patient: Valeria Laird    Patient : 1950   MRN: [id number]    Reason for Admission: Spinal stenosis, lumbar region with neurogenic claudication  Discharge Date: 25  RURS: Readmission Risk Score: 7.5    Last Discharge Facility       Date Complaint Diagnosis Description Type Department Provider    25  Lumbar stenosis with neurogenic claudication ... Admission (Discharged) Phu Marie MD            Was this an external facility discharge? No    Second attempt made today 6/3/25 to contact patient for initial MIO/hospital discharge (low risk) follow up. Left message on home/mobile number requesting a return call back to CTN and provided contact information.     Noted patient is scheduled for a post op follow up with Dr. Hubbard (neurosurgery) on 25. CTN signing off if no return call and will sent to patient unable to reach lettr via [employer].    RAOUL Chandra

## 2025-06-09 ENCOUNTER — TELEPHONE (OUTPATIENT)
Age: 75
End: 2025-06-09

## 2025-06-09 ENCOUNTER — OFFICE VISIT (OUTPATIENT)
Age: 75
End: 2025-06-09
Payer: MEDICARE

## 2025-06-09 VITALS
SYSTOLIC BLOOD PRESSURE: 116 MMHG | WEIGHT: 183 LBS | TEMPERATURE: 97.5 F | OXYGEN SATURATION: 98 % | BODY MASS INDEX: 32.62 KG/M2 | DIASTOLIC BLOOD PRESSURE: 70 MMHG | HEART RATE: 88 BPM

## 2025-06-09 DIAGNOSIS — G89.18 ACUTE POST-OPERATIVE PAIN: Primary | ICD-10-CM

## 2025-06-09 PROCEDURE — 1123F ACP DISCUSS/DSCN MKR DOCD: CPT | Performed by: FAMILY MEDICINE

## 2025-06-09 PROCEDURE — 99213 OFFICE O/P EST LOW 20 MIN: CPT | Performed by: FAMILY MEDICINE

## 2025-06-09 RX ORDER — OXYCODONE HYDROCHLORIDE 5 MG/1
2.5 TABLET ORAL EVERY 8 HOURS PRN
Qty: 10 TABLET | Refills: 0 | Status: SHIPPED | OUTPATIENT
Start: 2025-06-09 | End: 2025-06-16

## 2025-06-09 NOTE — TELEPHONE ENCOUNTER
PATIENT'S DAUGHTER LEFT A VOICEMAIL ON BEHALF OF HER MOTHER STATING A PRESCRIPTION FOR OXYCODONE WAS SUPPOSE TO BE SENT TO THE PHARMACY ON FRIDAY BUT WAS NEVER SENT.

## 2025-06-09 NOTE — TELEPHONE ENCOUNTER
Just and FYI This was sent by Dr Woods office         L3-4 laminectomies, medial facetectomies and bilateral foraminotomies, L3-4 TLIF (Transforaminal Lumbar Interbody Fusion)  and pedicle screw fixation and posterolateral fusion, local morselized autograft, morselized allograft.

## 2025-06-11 ENCOUNTER — OFFICE VISIT (OUTPATIENT)
Age: 75
End: 2025-06-11

## 2025-06-11 VITALS — TEMPERATURE: 97.6 F | BODY MASS INDEX: 32.43 KG/M2 | WEIGHT: 183 LBS | RESPIRATION RATE: 16 BRPM | HEIGHT: 63 IN

## 2025-06-11 DIAGNOSIS — M48.062 SPINAL STENOSIS OF LUMBAR REGION WITH NEUROGENIC CLAUDICATION: Primary | ICD-10-CM

## 2025-06-11 PROCEDURE — 99024 POSTOP FOLLOW-UP VISIT: CPT | Performed by: NEUROLOGICAL SURGERY

## 2025-06-11 ASSESSMENT — ENCOUNTER SYMPTOMS
BACK PAIN: 1
DIARRHEA: 0
NAUSEA: 0
SHORTNESS OF BREATH: 0
CONSTIPATION: 0

## 2025-06-11 NOTE — PROGRESS NOTES
Patient Name: Valeria Laird : 1950        Date: 2025      Type of Appt: Post Op    Reason for appt: 25 - L3-4 laminectomies, medial facetectomies and bilateral foraminotomies, L3-4 TLIF and pedicle screw fixation and posterolateral fusion     Pt last seen by  Dr Hubbard 2025    Studies done: TBD Xray Lumbar Spine

## 2025-06-11 NOTE — PROGRESS NOTES
NEUROSURGERY and SPINE POST-OP NOTE      Patient Name: Valeria Laird  Patient : 1950  MRN: [id number]       PCP: Ludin Woods MD      History of Present Ilness: Patient presents for postop.  She is improving after surgery.  Biggest complaint is bilateral knee pain.  She has moderate amounts of back pain that is improving daily.  She is continuing to have some mild bilateral anterior leg pain.  She did have a fall in the past week after her legs gave out on her.     She has not obtained lumbar x-rays yet.    Past Medical History:        Diagnosis Date    Allergic rhinitis     Anxiety     Arthritis     Cancer (HCC)     right breast s/p lumpectomy 2025    History of blood transfusion     blood transfusions with c section x 3    Hyperlipidemia     meds > 2 yrs    Hypertension     Sleep apnea     Type 2 diabetes mellitus 2022       Past Surgical History:        Procedure Laterality Date    ANKLE SURGERY Right     tendon repair    BREAST BIOPSY Right 2025    Right Breast Needle Localized Lumpectomy & [place] Lymph Node Biopsy performed by Angeli Yusuf MD at [facility] OR     SECTION   &  &     pt has had 3     COLONOSCOPY N/A 2020    COLONOSCOPY performed by Zandra Workman MD at [facility] OR    COLONOSCOPY N/A 10/12/2024    COLORECTAL CANCER SCREENING, NOT HIGH RISK performed by Zandra Workman MD at [facility] OR    LUMBAR FUSION N/A 2025    L3-4 laminectomies, medial facetectomies and bilateral foraminotomies, L3-4 TLIF (Transforaminal Lumbar Interbody Fusion)  and pedicle screw fixation and posterolateral fusion, local morselized autograft, morselized allograft. performed by Phu Hubbard MD at [facility] OR    TOTAL KNEE ARTHROPLASTY Left 2019    LEFT TOTAL KNEE ARTHROPLASTY, SUPINE, 23 HR OBS, IRAJ CEMENTED PERSONA performed by Adalid Garcia MD at [facility] OR    TOTAL KNEE ARTHROPLASTY Right 2020    RIGHT TOTAL KNEE  ARTHROPLASTY performed by

## 2025-06-12 ASSESSMENT — ENCOUNTER SYMPTOMS
CHEST TIGHTNESS: 0
COUGH: 0
APNEA: 0
NAUSEA: 0
ABDOMINAL PAIN: 0
CONSTIPATION: 0
VOMITING: 0
SHORTNESS OF BREATH: 0
BACK PAIN: 1
BLOOD IN STOOL: 0
DIARRHEA: 0

## 2025-06-13 DIAGNOSIS — F41.9 ANXIETY: ICD-10-CM

## 2025-06-13 DIAGNOSIS — E11.9 TYPE 2 DIABETES MELLITUS WITHOUT COMPLICATION, WITHOUT LONG-TERM CURRENT USE OF INSULIN (HCC): ICD-10-CM

## 2025-06-13 DIAGNOSIS — E78.2 MIXED HYPERLIPIDEMIA: ICD-10-CM

## 2025-06-13 NOTE — PROGRESS NOTES
Subjective:      Patient ID: Valeria Laird is a 75 y.o. adult who presents today for:  History of Present Illness  The patient presents for evaluation of back pain.    She reports experiencing severe back pain, rated as 9 out of 10, persisting for 5.5 days. This pain has been disrupting her sleep. She describes the pain as throbbing and constant, with no associated numbness, tingling, or weakness. She also reports no difficulty in controlling her bowels or bladder, except for the initial few days post-surgery. She reports no balance issues and notes that her pain has been improving overall. She does not believe there is a connection between her back and knee pain. She has been managing the pain with oxycodone at night, which has allowed her to sleep for the past two nights. She has also been taking Tylenol 650 mg every 8 hours. She recalls an incident where she mistook 8 PM for 8 AM and nearly consumed an entire bottle of medication, which she attributes to the effects of oxycodone. She expresses a desire to sleep to facilitate healing.    She has been applying CBD ointment on her knees, which she finds beneficial. She experiences nausea and dizziness when lying down or turning over, but these symptoms are transient. Despite the pain, she is able to walk and has been doing so frequently since her surgery. She has been taking Lyrica three times a day and tizanidine, but not concurrently with oxycodone. She discontinued the muscle relaxer after 3 or 4 days. She reports tenderness in her knees, but not while on medication. She denies any swelling in her knees. She was advised to avoid anti-inflammatory medications for 28 days post-surgery. She has been using MiraLAX for bowel movements and reports a decreased appetite. She has quit smoking.    SOCIAL HISTORY  She quit smoking recently.    Chief Complaint   Patient presents with    Post-op Problem     B/l knee pain since surgery, having issues sleeping,

## 2025-06-15 NOTE — TELEPHONE ENCOUNTER
Comments:     Last Office Visit (last PCP visit):   6/9/2025    Next Visit Date:  Future Appointments   Date Time Provider Department Center   6/18/2025 10:30 AM Clifton Wheatley MD [place] [facility]   6/23/2025  9:15 AM Arie Martinez DPM Podiatry [facility]   6/23/2025  3:00 PM Phu Hubbard MD MLORNEUROPB [facility]   6/24/2025 11:00 AM Ludin Woods MD [facility] ECC DEP   7/9/2025 11:00 AM Angeli Yusuf MD MLOX OBLN [facility]   7/24/2025 10:00 AM [facility] MAMMOGRAPHY ROOM 1 [facility]  WOMENS [facility] Fac RAD   7/24/2025 10:45 AM [facility] ULTRASOUND ROOM 1 [facility]  ULTRA [facility] RAD   1/8/2026 10:00 AM SCHEDULE, SHAN[facility] RAD ONC NURSE [facility] RAD ONC [place] H[facility]         Rx requested:  Requested Prescriptions     Pending Prescriptions Disp Refills    rosuvastatin (CRESTOR) 10 MG tablet 90 tablet 1     Sig: Take 1 tablet by mouth daily    citalopram (CELEXA) 20 MG tablet 90 tablet 4     Sig: TAKE 1 TABLET DAILY

## 2025-06-15 NOTE — TELEPHONE ENCOUNTER
Comments:     Last Office Visit (last PCP visit):   6/9/2025    Next Visit Date:  Future Appointments   Date Time Provider Department Center   6/18/2025 10:30 AM Clifton Wheatley MD [place] [facility]   6/23/2025  9:15 AM Arie Martinez DPM Podiatry [facility]   6/23/2025  3:00 PM Phu Hubbard MD MLORNEUROPB [facility]   6/24/2025 11:00 AM Ludin Woods MD [facility] ECC DEP   7/9/2025 11:00 AM Angeli Yusuf MD MLOX OBLN [facility]   7/24/2025 10:00 AM [facility] MAMMOGRAPHY ROOM 1 [facility]  WOMENS [facility] RAD   7/24/2025 10:45 AM [facility] ULTRASOUND ROOM 1 [facility]  ULTRA [facility] RAD   1/8/2026 10:00 AM SCHEDULE, SHANICE RAD ONC NURSE [facility] RAD ONC [place] HOD         Rx requested:  Requested Prescriptions     Pending Prescriptions Disp Refills    metFORMIN (GLUCOPHAGE-XR) 750 MG extended release tablet 360 tablet 0     Sig: Take 1 tablet by mouth daily (with breakfast) TAKE 1 TABLET BY MOUTH TWICE DAILY IN THE MORNING & AT BEDTIME

## 2025-06-16 ENCOUNTER — APPOINTMENT (OUTPATIENT)
Dept: ORTHOPEDIC SURGERY | Facility: CLINIC | Age: 75
End: 2025-06-16
Payer: MEDICARE

## 2025-06-16 ENCOUNTER — HOSPITAL ENCOUNTER (OUTPATIENT)
Dept: RADIOLOGY | Facility: HOSPITAL | Age: 75
Discharge: HOME | End: 2025-06-16
Payer: MEDICARE

## 2025-06-16 DIAGNOSIS — Z96.651 TOTAL KNEE REPLACEMENT STATUS, RIGHT: ICD-10-CM

## 2025-06-16 DIAGNOSIS — M25.562 ACUTE PAIN OF BOTH KNEES: ICD-10-CM

## 2025-06-16 DIAGNOSIS — M25.561 ACUTE PAIN OF BOTH KNEES: ICD-10-CM

## 2025-06-16 DIAGNOSIS — M17.0 PRIMARY OSTEOARTHRITIS OF BOTH KNEES: ICD-10-CM

## 2025-06-16 DIAGNOSIS — Z96.652 TOTAL KNEE REPLACEMENT STATUS, LEFT: ICD-10-CM

## 2025-06-16 PROCEDURE — 73560 X-RAY EXAM OF KNEE 1 OR 2: CPT | Mod: 50

## 2025-06-16 PROCEDURE — 99214 OFFICE O/P EST MOD 30 MIN: CPT | Performed by: ORTHOPAEDIC SURGERY

## 2025-06-16 PROCEDURE — 73560 X-RAY EXAM OF KNEE 1 OR 2: CPT | Mod: BILATERAL PROCEDURE | Performed by: RADIOLOGY

## 2025-06-16 RX ORDER — PREDNISONE 10 MG/1
TABLET ORAL
Qty: 30 TABLET | Refills: 0 | Status: SHIPPED | OUTPATIENT
Start: 2025-06-16

## 2025-06-16 RX ORDER — METFORMIN HYDROCHLORIDE 750 MG/1
750 TABLET, EXTENDED RELEASE ORAL
Qty: 360 TABLET | Refills: 0 | Status: SHIPPED | OUTPATIENT
Start: 2025-06-16

## 2025-06-16 RX ORDER — ROSUVASTATIN CALCIUM 10 MG/1
10 TABLET, COATED ORAL DAILY
Qty: 90 TABLET | Refills: 1 | Status: SHIPPED | OUTPATIENT
Start: 2025-06-16

## 2025-06-16 RX ORDER — CITALOPRAM HYDROBROMIDE 20 MG/1
TABLET ORAL
Qty: 90 TABLET | Refills: 4 | Status: SHIPPED | OUTPATIENT
Start: 2025-06-16

## 2025-06-16 NOTE — PROGRESS NOTES
History of present illness: History of bilateral knee replacements approximately 4 to 5 years ago    She recently had a long 3-hour surgery in the prone position and has had some bilateral patellofemoral syndrome since    Physical exam:    General: No acute distress or breathing difficulty or discomfort, pleasant and cooperative with the examination.    Extremities: Both knees were examined well    Both knees extend and flex well    Both knees are stable to stress test at 0 and 30 and 60 and 90 degrees    There is no posterior instability    Patellar tracking is excellent but irritability around the medial patellofemoral joint is reproduced    At this time there is no redness no warmth incisions are clean and dry motor intact neuro intact extensor mechanism is intact she has good flexion extension there is no radiculopathy she is healing nicely from her back surgery which she is in therapy for    Diagnostic studies: 2 view x-rays show well-positioned total knees bilaterally    Impression: Bilateral total knee with patellofemoral syndrome    Plan: PT therapy ultrasound stem and quad strengthening can be very beneficial    Will put her on a temporary 5-day steroid Dosepak    At this point her tracking is pretty good so we do not need bracing    I will see her back in 5 to 6 weeks    If she still has symptoms bilateral sunrise view will be obtained of both knees and a bone scan may be obtained of both knees as well

## 2025-06-18 ENCOUNTER — OFFICE VISIT (OUTPATIENT)
Dept: PULMONOLOGY | Age: 75
End: 2025-06-18
Payer: MEDICARE

## 2025-06-18 ENCOUNTER — HOSPITAL ENCOUNTER (OUTPATIENT)
Age: 75
Discharge: HOME | End: 2025-06-20
Payer: MEDICARE

## 2025-06-18 ENCOUNTER — HOSPITAL ENCOUNTER (OUTPATIENT)
Dept: GENERAL RADIOLOGY | Age: 75
Discharge: HOME | End: 2025-06-20
Payer: MEDICARE

## 2025-06-18 ENCOUNTER — TELEPHONE (OUTPATIENT)
Age: 75
End: 2025-06-18

## 2025-06-18 VITALS
DIASTOLIC BLOOD PRESSURE: 70 MMHG | SYSTOLIC BLOOD PRESSURE: 126 MMHG | HEART RATE: 98 BPM | WEIGHT: 174 LBS | BODY MASS INDEX: 31.02 KG/M2 | OXYGEN SATURATION: 96 %

## 2025-06-18 DIAGNOSIS — E66.9 OBESITY (BMI 30-39.9): ICD-10-CM

## 2025-06-18 DIAGNOSIS — Z87.891 PERSONAL HISTORY OF TOBACCO USE: ICD-10-CM

## 2025-06-18 DIAGNOSIS — R93.89 ABNORMAL CT OF THE CHEST: ICD-10-CM

## 2025-06-18 DIAGNOSIS — M48.062 LUMBAR STENOSIS WITH NEUROGENIC CLAUDICATION: ICD-10-CM

## 2025-06-18 DIAGNOSIS — G47.33 OSA (OBSTRUCTIVE SLEEP APNEA): Primary | ICD-10-CM

## 2025-06-18 PROCEDURE — 1123F ACP DISCUSS/DSCN MKR DOCD: CPT | Performed by: INTERNAL MEDICINE

## 2025-06-18 PROCEDURE — 72100 X-RAY EXAM L-S SPINE 2/3 VWS: CPT

## 2025-06-18 PROCEDURE — 1159F MED LIST DOCD IN RCRD: CPT | Performed by: INTERNAL MEDICINE

## 2025-06-18 PROCEDURE — 99214 OFFICE O/P EST MOD 30 MIN: CPT | Performed by: INTERNAL MEDICINE

## 2025-06-18 RX ORDER — PREDNISONE 10 MG/1
TABLET ORAL
COMMUNITY
Start: 2025-06-16

## 2025-06-18 ASSESSMENT — ENCOUNTER SYMPTOMS
WHEEZING: 0
SHORTNESS OF BREATH: 0
ABDOMINAL PAIN: 0
VOMITING: 0
BACK PAIN: 1
COUGH: 0
EYE ITCHING: 0
SORE THROAT: 0
DIARRHEA: 0
RHINORRHEA: 0
VOICE CHANGE: 0
NAUSEA: 0
CHEST TIGHTNESS: 0

## 2025-06-18 NOTE — TELEPHONE ENCOUNTER
LM for patient that her appointment scheduled for 7/9/2025 was r/s to 7/23/2025 at 11:00 am as the provider will be out of the office on the original date.

## 2025-06-18 NOTE — PROGRESS NOTES
Subjective:             Valeria Laird is a 75 y.o. adult who complains today of:     Chief Complaint   Patient presents with    Follow-up     6m f/u on AMANDA       HPI  She had back surgery 5/29/25 , she said she will start PT     She is using CPAP with  7-10 centimeters of H2O with heated humidity.  She is using CPAP for about  7-8  hours every night.  She is using CPAP with full face Mask.  She said  sleep is restful with the CPAP use.  She is compliant with CPAP therapy and benefiting with CPAP use.  No snoring with CPAP use.  No complaint of daytime sleepiness or tiredness with CPAP use.  She denies taking naps.  No sleepiness with driving.   She denies difficulty falling asleep or staying asleep.     I reviewed compliance report with patient regarding CPAP therapy. She is using  CPAP for 25 days out of 30 days. Average usage of days used is 7 hours and 37 min , average AHI 1.8 with CPAP use.      She  was smoking 1/2 ppd . quit smoking 3/12/25   CXR 5/23/25 no active disease    No C/o shortness of breath No Wheezing. no C/o Cough . No Hemoptysis. No Chest tightness. No Chest pain with radiation  or pleuritic pain.  No  leg edema. No Fever or chills. no  C/o  Rhinorrhea and postnasal drip          F/u CT chest not done     Ct chest 6/8/24  IMPRESSION:  Chronic changes as described above without acute pulmonary process.  No  isolated dominant nodule or mass.     LUNG RADS:  Lung rads category 1 benign appearance follow-up recommended CT chest 12  months or annual screening.  11/22  There is no pulmonary infiltrate, mass or suspicious pulmonary nodule.Stable nodule seen within the right lower lobe, right middle lobe and lingula when compared with the patient's prior study.  All the nodules measure less than 5 mm.    Allergies:  Adhesive tape, Molds & smuts, Other, Tomato, Environmental/seasonal, and Influenza vaccines  Past Medical History:   Diagnosis Date    Allergic rhinitis     Anxiety     Arthritis

## 2025-06-19 DIAGNOSIS — E11.9 TYPE 2 DIABETES MELLITUS WITHOUT COMPLICATION, WITHOUT LONG-TERM CURRENT USE OF INSULIN (HCC): ICD-10-CM

## 2025-06-19 RX ORDER — BLOOD SUGAR DIAGNOSTIC
STRIP MISCELLANEOUS
Qty: 100 STRIP | Refills: 5 | Status: SHIPPED | OUTPATIENT
Start: 2025-06-19

## 2025-06-19 NOTE — TELEPHONE ENCOUNTER
Comments:     Last Office Visit (last PCP visit):   6/9/2025    Next Visit Date:  Future Appointments   Date Time Provider Department Center   6/23/2025  9:15 AM Arie Martinez DPM Podiatry [facility]   6/23/2025  3:00 PM Phu Hubbard MD MLORNEUROPB [facility]   6/24/2025 11:00 AM Ludin Woods MD [facility]   6/30/2025  9:30 AM Jamee Mckeon, PT [facility]  PT [facility]   7/23/2025 11:00 AM Angeli Yusuf MD MLOX OBL[facility]   7/24/2025 10:00 AM BENNETT MAMMOGRAPHY ROOM 1 [facility]  WOMENS [facility] RAD   7/24/2025 10:45 AM BENNETT ULTRASOUND ROOM 1 [facility]  ULTRA [facility]   11/19/2025  9:45 AM [facility]   1/8/2026 10:00 AM SCHEDULE, SHANICE RAD ONC NURSE SHANICE RAD ONC [place] [facility]         Rx requested:  Requested Prescriptions     Pending Prescriptions Disp Refills    ACCU-CHEK GUIDE strip [Pharmacy Med Name: Accu-Chek Guide test strips] 100 strip 0     Sig: test blood sugar THREE TIMES DAILY

## 2025-06-23 ENCOUNTER — OFFICE VISIT (OUTPATIENT)
Age: 75
End: 2025-06-23
Payer: MEDICARE

## 2025-06-23 ENCOUNTER — OFFICE VISIT (OUTPATIENT)
Age: 75
End: 2025-06-23

## 2025-06-23 VITALS
OXYGEN SATURATION: 97 % | HEIGHT: 62 IN | HEART RATE: 81 BPM | DIASTOLIC BLOOD PRESSURE: 66 MMHG | SYSTOLIC BLOOD PRESSURE: 136 MMHG | BODY MASS INDEX: 32.02 KG/M2 | TEMPERATURE: 97.1 F | WEIGHT: 174 LBS

## 2025-06-23 VITALS — RESPIRATION RATE: 18 BRPM | BODY MASS INDEX: 32.02 KG/M2 | WEIGHT: 174 LBS | HEIGHT: 62 IN

## 2025-06-23 DIAGNOSIS — M20.41 HAMMER TOES OF BOTH FEET: ICD-10-CM

## 2025-06-23 DIAGNOSIS — E11.42 DIABETIC POLYNEUROPATHY ASSOCIATED WITH TYPE 2 DIABETES MELLITUS (HCC): Primary | ICD-10-CM

## 2025-06-23 DIAGNOSIS — M21.622 TAILOR'S BUNIONETTE, BILATERAL: ICD-10-CM

## 2025-06-23 DIAGNOSIS — M20.42 HAMMER TOES OF BOTH FEET: ICD-10-CM

## 2025-06-23 DIAGNOSIS — M48.062 SPINAL STENOSIS OF LUMBAR REGION WITH NEUROGENIC CLAUDICATION: Primary | ICD-10-CM

## 2025-06-23 DIAGNOSIS — L84 CALLUS: ICD-10-CM

## 2025-06-23 DIAGNOSIS — L60.3 NAIL DYSTROPHY: ICD-10-CM

## 2025-06-23 DIAGNOSIS — M21.621 TAILOR'S BUNIONETTE, BILATERAL: ICD-10-CM

## 2025-06-23 DIAGNOSIS — M20.12 HALLUX ABDUCTO VALGUS, BILATERAL: ICD-10-CM

## 2025-06-23 DIAGNOSIS — M20.11 HALLUX ABDUCTO VALGUS, BILATERAL: ICD-10-CM

## 2025-06-23 PROCEDURE — 1123F ACP DISCUSS/DSCN MKR DOCD: CPT | Performed by: PODIATRIST

## 2025-06-23 PROCEDURE — 99024 POSTOP FOLLOW-UP VISIT: CPT | Performed by: NEUROLOGICAL SURGERY

## 2025-06-23 PROCEDURE — 3044F HG A1C LEVEL LT 7.0%: CPT | Performed by: PODIATRIST

## 2025-06-23 PROCEDURE — 99213 OFFICE O/P EST LOW 20 MIN: CPT | Performed by: PODIATRIST

## 2025-06-23 PROCEDURE — 1125F AMNT PAIN NOTED PAIN PRSNT: CPT | Performed by: PODIATRIST

## 2025-06-23 PROCEDURE — 1159F MED LIST DOCD IN RCRD: CPT | Performed by: PODIATRIST

## 2025-06-23 ASSESSMENT — ENCOUNTER SYMPTOMS
BACK PAIN: 1
VOMITING: 0
SHORTNESS OF BREATH: 0
NAUSEA: 0

## 2025-06-23 NOTE — PROGRESS NOTES
[facility] PHYSICIANS [place] [facility], [facility] PODIATRY  [address] [place], [place] [address]  Dept: [phone]  Loc: [phone]       Valeria Laird  (1950)    6/23/25    Subjective     Valeria Laird is 75 y.o. adult who complains today of:    Chief Complaint   Patient presents with    Foot Pain     Both        Foot Pain   Pertinent negatives include no fever or numbness.       History of Present Illness  The patient presents for a diabetic foot exam.    She reports a significant improvement in her neuropathy since having back surgery, with a notable reduction in the tingling and burning sensations previously experienced in her knees. She does not experience any numbness, tingling, or burning sensations in her feet. She has been diligent in applying a prescribed cream to her feet daily. She is currently contemplating whether to seek professional assistance for toenail care due to her recent back surgery. She also expresses concern about a potential nail infection and queries about the possibility of having hammertoes.  Patient's most recent hemoglobin A1c was 6.1%.  Patient denies a history of diabetic foot ulceration.  Patient states that her current diabetic shoes are worn in 2 years old, she requested prescription for a new pair.    She was last seen on 03/2025 for back surgery. Her knees are inflamed, but she had shocks all the time, deep pains, and could not sit, which have all resolved. Her knees are sore and swollen.    PAST SURGICAL HISTORY:  Back surgery on 03/2025.    SOCIAL HISTORY  Tobacco: Does not smoke.    Review of Systems   Constitutional:  Negative for chills and fever.   HENT:  Negative for hearing loss.    Respiratory:  Negative for shortness of breath.    Cardiovascular:  Negative for chest pain.   Gastrointestinal:  Negative for nausea and vomiting.   Genitourinary:  Negative for difficulty urinating.   Musculoskeletal:

## 2025-06-23 NOTE — PROGRESS NOTES
Patient Name: Valeria Laird : 1950        Date: 2025      Reason for appt: 25 - L3-4 laminectomies, medial facetectomies and bilateral foraminotomies, L3-4 TLIF and pedicle screw fixation and posterolateral fusion      Pt last seen by  Dr Hubbard 25    Studies done: 25  Xray of Lumbar Spine           
Sister     Allergy (Severe) Sister     Arthritis Sister     Depression Sister     Arthritis Sister     Depression Sister     Diabetes Sister     Arthritis Sister     Depression Sister     Arthritis Sister     Arthritis Sister     Arthritis Sister     Diabetes Brother     Arthritis Brother     Other Brother         leiden factor 5    Arthritis Brother     Arthritis Brother     Arthritis Brother     Cancer Paternal Aunt         ovarian cancer    Ovarian Cancer Paternal Aunt     Cancer Paternal Uncle         prostate    Prostate Cancer Paternal Uncle     Colon Cancer Maternal Grandmother     Thyroid Disease Daughter     No Known Problems Son         Review of Systems:       Review of Systems    Physical Examination:    Vitals:    06/23/25 1001   BP: 136/66   Pulse: 81   Temp: 97.1 °F (36.2 °C)   SpO2: 97%       Physical Exam     Neurological Exam    Awake and alert  Motor 5/5 UE and LE  Sens intact LT  Incision C/D/I         Results  Labs:  Last 24hrs  No results found for this or any previous visit (from the past 24 hours).    Radiology Personal review:  Lumbar x-rays from June 18, 2025 reveal adequate interbody spacer and hardware at L3-4  Pathology of epidural soft tissue revealed osteo cartilage with reactive changes and attached synovial tissue with calcification and cystic changes.    ASSESSMENT / PLAN :     75-year-old 3 weeks status post L3-4 TLIF, she is doing great with resolution of her radiating pain down the legs and minimal low back pain.  She is on a steroid taper from orthopedics for knee pain and has had bilateral knee replacements in the past.  We discussed avoidance of steroids and NSAIDs due to the lumbar fusion and she understands and is weaning off of steroids.  We discussed weaning off of Lyrica which she wishes to do.  She is going to see physical therapy.  Her lumbar incision is healing well.  Lumbar x-rays reveal adequate hardware at L3-4.  Pathology of epidural soft tissue did not reveal any

## 2025-06-24 ENCOUNTER — HOSPITAL ENCOUNTER (OUTPATIENT)
Dept: PHYSICAL THERAPY | Age: 75
Setting detail: THERAPIES SERIES
Discharge: HOME | End: 2025-06-24
Payer: MEDICARE

## 2025-06-24 ENCOUNTER — OFFICE VISIT (OUTPATIENT)
Age: 75
End: 2025-06-24

## 2025-06-24 VITALS
TEMPERATURE: 97.5 F | HEART RATE: 84 BPM | SYSTOLIC BLOOD PRESSURE: 134 MMHG | OXYGEN SATURATION: 98 % | BODY MASS INDEX: 32.02 KG/M2 | DIASTOLIC BLOOD PRESSURE: 70 MMHG | HEIGHT: 62 IN | WEIGHT: 174 LBS

## 2025-06-24 DIAGNOSIS — E78.2 MIXED HYPERLIPIDEMIA: ICD-10-CM

## 2025-06-24 DIAGNOSIS — Z09 HOSPITAL DISCHARGE FOLLOW-UP: ICD-10-CM

## 2025-06-24 DIAGNOSIS — Z00.00 MEDICARE ANNUAL WELLNESS VISIT, SUBSEQUENT: Primary | ICD-10-CM

## 2025-06-24 DIAGNOSIS — M25.562 ACUTE BILATERAL KNEE PAIN: Primary | ICD-10-CM

## 2025-06-24 DIAGNOSIS — F41.9 ANXIETY: ICD-10-CM

## 2025-06-24 DIAGNOSIS — M25.561 ACUTE BILATERAL KNEE PAIN: Primary | ICD-10-CM

## 2025-06-24 DIAGNOSIS — E11.9 TYPE 2 DIABETES MELLITUS WITHOUT COMPLICATION, WITHOUT LONG-TERM CURRENT USE OF INSULIN (HCC): ICD-10-CM

## 2025-06-24 PROCEDURE — 97140 MANUAL THERAPY 1/> REGIONS: CPT

## 2025-06-24 PROCEDURE — 97110 THERAPEUTIC EXERCISES: CPT

## 2025-06-24 PROCEDURE — 97162 PT EVAL MOD COMPLEX 30 MIN: CPT

## 2025-06-24 RX ORDER — METFORMIN HYDROCHLORIDE 750 MG/1
750 TABLET, EXTENDED RELEASE ORAL
Qty: 360 TABLET | Refills: 0 | Status: SHIPPED | OUTPATIENT
Start: 2025-06-24

## 2025-06-24 RX ORDER — ROSUVASTATIN CALCIUM 10 MG/1
10 TABLET, COATED ORAL DAILY
Qty: 90 TABLET | Refills: 1 | Status: SHIPPED | OUTPATIENT
Start: 2025-06-24

## 2025-06-24 RX ORDER — CITALOPRAM HYDROBROMIDE 20 MG/1
TABLET ORAL
Qty: 90 TABLET | Refills: 4 | Status: SHIPPED | OUTPATIENT
Start: 2025-06-24

## 2025-06-24 ASSESSMENT — PATIENT HEALTH QUESTIONNAIRE - PHQ9
8. MOVING OR SPEAKING SO SLOWLY THAT OTHER PEOPLE COULD HAVE NOTICED. OR THE OPPOSITE, BEING SO FIGETY OR RESTLESS THAT YOU HAVE BEEN MOVING AROUND A LOT MORE THAN USUAL: NOT AT ALL
10. IF YOU CHECKED OFF ANY PROBLEMS, HOW DIFFICULT HAVE THESE PROBLEMS MADE IT FOR YOU TO DO YOUR WORK, TAKE CARE OF THINGS AT HOME, OR GET ALONG WITH OTHER PEOPLE: NOT DIFFICULT AT ALL
1. LITTLE INTEREST OR PLEASURE IN DOING THINGS: NOT AT ALL
SUM OF ALL RESPONSES TO PHQ QUESTIONS 1-9: 0
4. FEELING TIRED OR HAVING LITTLE ENERGY: NOT AT ALL
SUM OF ALL RESPONSES TO PHQ QUESTIONS 1-9: 0
7. TROUBLE CONCENTRATING ON THINGS, SUCH AS READING THE NEWSPAPER OR WATCHING TELEVISION: NOT AT ALL
9. THOUGHTS THAT YOU WOULD BE BETTER OFF DEAD, OR OF HURTING YOURSELF: NOT AT ALL
SUM OF ALL RESPONSES TO PHQ QUESTIONS 1-9: 0
6. FEELING BAD ABOUT YOURSELF - OR THAT YOU ARE A FAILURE OR HAVE LET YOURSELF OR YOUR FAMILY DOWN: NOT AT ALL
3. TROUBLE FALLING OR STAYING ASLEEP: NOT AT ALL
5. POOR APPETITE OR OVEREATING: NOT AT ALL
SUM OF ALL RESPONSES TO PHQ QUESTIONS 1-9: 0
2. FEELING DOWN, DEPRESSED OR HOPELESS: NOT AT ALL

## 2025-06-24 ASSESSMENT — PAIN SCALES - GENERAL: PAINLEVEL_OUTOF10: 4

## 2025-06-24 ASSESSMENT — LIFESTYLE VARIABLES
HOW OFTEN DO YOU HAVE A DRINK CONTAINING ALCOHOL: NEVER
HOW MANY STANDARD DRINKS CONTAINING ALCOHOL DO YOU HAVE ON A TYPICAL DAY: PATIENT DOES NOT DRINK

## 2025-06-24 ASSESSMENT — PAIN DESCRIPTION - PAIN TYPE: TYPE: ACUTE PAIN

## 2025-06-24 ASSESSMENT — PAIN DESCRIPTION - LOCATION: LOCATION: KNEE

## 2025-06-24 NOTE — PROGRESS NOTES
Increased pain, Decreased posture    Statement of Medical Necessity: Physical Therapy is both indicated and medically necessary as outlined in the POC to increase the likelihood of meeting the functionally related goals stated below.     Patient's Activity Tolerance:        Patient's rehabilitation potential/prognosis is considered to be: Good    Factors which may impact rehabilitation potential include:          GOALS   Patient Goal(s): \"Get my knees back to normal and get rid of pain in knees\"  Short Term Goals Completed by 1-2 weeks from date of evaluation Goal Status   Pt report any decrease in knee pain during stairclimbing, sit to stand transition, and at night     Pt demonstrate I with HEP and report compliance with HEP 5/7 days per wk or more freqently without increased pain                                                         Long Term Goals Completed by 4-6 weeks from date of evaluation Goal Status   Pt reports having 1-2/10 or less knee during ADLs and less pain at night allowing sleep 75% of the night or greater without disruption     Improve Mod LEFS from 28% day of IE to 10% or less at time of D/C     Improve AROM bilat knees 0-115 deg without pain and improve hamstring flexilibilitly by 10 deg or greater (supine 90/90 lacking 40 deg knee ext R and L)     Improve bilat LE strength 4+/5 or greater to improve stairclibing and squatting ability, be able to ascend and descend flight of steps with rail reciprocally with 2/10 knee pain or less     Pt I with advanced HEP to further improve LE strength and overall mobility                                        TREATMENT PLAN       Requires PT Follow-Up: Yes    Pt. actively involved in establishing Plan of Care and Goals: Yes  Patient/ Caregiver education and instruction: Goals, PT Role, Plan of Care, Evaluative findings, Home Exercise Program, Lifting mechanics, Posture, Body mechanics    Allergic to KT        Treatment may include any combination of the

## 2025-06-24 NOTE — PROGRESS NOTES
Medicare Annual Wellness Visit    Valeria Laird is here for Follow-Up from Hospital (Pt states she just started PT states she is 80% better in 3 weeks discuss d/c lyrica, )    Assessment & Plan      Medicare annual wellness visit, subsequent  -     CBC with Auto Differential; Future  -     Comprehensive Metabolic Panel; Future  -     Lipid, Fasting; Future  -     Hemoglobin A1C; Future  Anxiety  -     citalopram (CELEXA) 20 MG tablet; TAKE 1 TABLET DAILY, Disp-90 tablet, R-4Normal  Type 2 diabetes mellitus without complication, without long-term current use of insulin (HCC)  -     metFORMIN (GLUCOPHAGE-XR) 750 MG extended release tablet; Take 1 tablet by mouth daily (with breakfast) TAKE 1 TABLET BY MOUTH TWICE DAILY IN THE MORNING & AT BEDTIME, Disp-360 tablet, R-0Normal  -     Hemoglobin A1C; Future  Mixed hyperlipidemia  -     rosuvastatin (CRESTOR) 10 MG tablet; Take 1 tablet by mouth daily, Disp-90 tablet, R-1This prescription was filled on 10/23/2024. Any refills authorized will be placed on file.Normal  -     Lipid, Fasting; Future  Hospital discharge follow-up  -     IL DISCHARGE MEDS RECONCILED W/ CURRENT OUTPATIENT MED LIST    Results         Return in 6 months (on 12/24/2025).     Subjective     History of Present Illness  The patient presents for an annual Medicare wellness visit.    She reports a significant improvement in her overall health status, attributing this to her cessation of smoking approximately 3.5 weeks ago. She has been experiencing knee discomfort, which she believes is due to tightness. She has sought physical therapy at [facility] and has been engaging in prescribed exercises. Her physician has given her clearance to resume swimming and walking activities. She is currently not taking Mobic as per her doctor's advice and is nearing the end of her steroid course for knee inflammation. She is also in the process of discontinuing Lyrica, having already stopped the midday dose and
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
## 2025-07-01 ENCOUNTER — TRANSCRIBE ORDERS (OUTPATIENT)
Dept: ADMINISTRATIVE | Age: 75
End: 2025-07-01

## 2025-07-01 DIAGNOSIS — Z78.0 MENOPAUSE: Primary | ICD-10-CM

## 2025-07-03 ENCOUNTER — HOSPITAL ENCOUNTER (OUTPATIENT)
Dept: PHYSICAL THERAPY | Age: 75
Setting detail: THERAPIES SERIES
Discharge: HOME | End: 2025-07-03
Payer: MEDICARE

## 2025-07-03 PROCEDURE — 97530 THERAPEUTIC ACTIVITIES: CPT

## 2025-07-03 PROCEDURE — 97140 MANUAL THERAPY 1/> REGIONS: CPT

## 2025-07-03 PROCEDURE — 97110 THERAPEUTIC EXERCISES: CPT

## 2025-07-03 NOTE — PROGRESS NOTES
Physical Therapy: Daily Note   Patient: Valeria Laird (75 y.o. adult)   Examination Date: 2025  Plan of Care/Certification Expiration Date: 25    Progress Note Counter: DC to HEP on 25     :  1950 # of Visits since SOC:   2   MRN: [id number]  CSN: [id number] Start of Care Date:   2025   Insurance: Payor: T MEDICARE / Plan: [employer] MEDICARE-ADVANTAGE PPO / Product Type: Medicare /   Insurance ID: [id number] - (Medicare Managed) Secondary Insurance (if applicable):    Referring Physician: Adalid Garcia MD Dr. 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  Pt requesting for new compression tube a  to use B knees. CP and aspercreme pt uses at home for some pain relief. pt is 5 weeks post op lumbar surgery.    HEP Compliance: Good        OBJECTIVE EXAMINATION   Restrictions:  Restrictions/Precautions: Fall Risk   No data recorded No data recorded              TREATMENT     Exercises:  Therapeutic exercise (CPT 38088)   Treatment Reasoning    Exercise 1: seated hamstring stretch H 15-20 sec x 3 r and L  Exercise 3: hooklying SLR with abdominal bracing 2x5- small lift  Exercise 4: SAQ x 10 B LE one at a time with 5sec H  Exercise 5: SAQ w/ slight ER x10 with 5 sec H to increase VMO contraction  Exercise 6: seated ball squeezes x10 3 sec H    Limitations addressed:

## 2025-07-08 ENCOUNTER — HOSPITAL ENCOUNTER (OUTPATIENT)
Dept: PHYSICAL THERAPY | Age: 75
Setting detail: THERAPIES SERIES
Discharge: HOME | End: 2025-07-08
Payer: MEDICARE

## 2025-07-08 PROCEDURE — 97140 MANUAL THERAPY 1/> REGIONS: CPT

## 2025-07-08 PROCEDURE — 97110 THERAPEUTIC EXERCISES: CPT

## 2025-07-08 ASSESSMENT — PAIN DESCRIPTION - PAIN TYPE: TYPE: ACUTE PAIN

## 2025-07-08 ASSESSMENT — PAIN DESCRIPTION - LOCATION: LOCATION: KNEE

## 2025-07-08 ASSESSMENT — PAIN DESCRIPTION - DESCRIPTORS: DESCRIPTORS: ACHING

## 2025-07-08 ASSESSMENT — PAIN DESCRIPTION - ORIENTATION: ORIENTATION: RIGHT;LEFT

## 2025-07-08 ASSESSMENT — PAIN SCALES - GENERAL: PAINLEVEL_OUTOF10: 4

## 2025-07-08 NOTE — PROGRESS NOTES
Physical Therapy: Daily Note   Patient: Valeria Laird (75 y.o. adult)   Examination Date: 2025  Plan of Care/Certification Expiration Date: 25    Progress Note Counter: DC to HEP on 25     :  1950 # of Visits since SOC:   3   MRN: [id number]  CSN: [id number] Start of Care Date:   2025   Insurance: Payor: [facility] MEDICARE / Plan: AETNA MEDICARE-ADVANTAGE PPO / Product Type: Medicare /   Insurance ID: [id number] - (Medicare Managed) Secondary Insurance (if applicable):    Referring Physician: Adalid Garcia MD Dr. 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 Pt states she is off all medicine except Tylenol.    HEP Compliance: Good  Any changes to allergies, medications, or have you had any medical procedures/ER visits since your last visit?: No     OBJECTIVE EXAMINATION   Restrictions:  Restrictions/Precautions: Fall Risk   No data recorded No data recorded          TREATMENT     Exercises:      Treatment Reasoning    Exercise 1: seated hamstring stretch H 15-20 sec x 3 r and L  Exercise 2: supine heel slides 2x5 H 5-10  Exercise 3: hooklying SLR with abdominal bracing 2x5- small lift, 3 secH  Exercise 4: SAQ x 10 B LE one at a time with 5sec

## 2025-07-10 ENCOUNTER — TELEPHONE (OUTPATIENT)
Age: 75
End: 2025-07-10

## 2025-07-10 ENCOUNTER — HOSPITAL ENCOUNTER (OUTPATIENT)
Dept: PHYSICAL THERAPY | Age: 75
Setting detail: THERAPIES SERIES
Discharge: HOME | End: 2025-07-10
Payer: MEDICARE

## 2025-07-10 PROCEDURE — 97110 THERAPEUTIC EXERCISES: CPT

## 2025-07-10 PROCEDURE — 97140 MANUAL THERAPY 1/> REGIONS: CPT

## 2025-07-10 ASSESSMENT — PAIN DESCRIPTION - ORIENTATION: ORIENTATION: RIGHT;LEFT;LOWER

## 2025-07-10 ASSESSMENT — PAIN DESCRIPTION - PAIN TYPE: TYPE: ACUTE PAIN

## 2025-07-10 ASSESSMENT — PAIN SCALES - GENERAL: PAINLEVEL_OUTOF10: 3

## 2025-07-10 ASSESSMENT — PAIN DESCRIPTION - DESCRIPTORS: DESCRIPTORS: ACHING

## 2025-07-10 ASSESSMENT — PAIN DESCRIPTION - LOCATION: LOCATION: KNEE;BACK

## 2025-07-10 NOTE — TELEPHONE ENCOUNTER
Pt was calling to say that Sarah Frankel never received the script for the patient to receive diabetic shoes.

## 2025-07-10 NOTE — PROGRESS NOTES
Physical Therapy: Daily Note   Patient: Valeria Laird (75 y.o. adult)   Examination Date: 07/10/2025  Plan of Care/Certification Expiration Date: 25    Progress Note Counter: DC to HEP on 25     :  1950 # of Visits since SOC:   11   MRN: [id number]  CSN: [id number] Start of Care Date:   2024   Insurance: Payor: [facility] MEDICARE / Plan: AET MEDICARE-ADVANTAGE PPO / Product Type: Medicare /   Insurance ID: [id number] - (Medicare Managed) Secondary Insurance (if applicable):    Referring Physician: Adalid Garcia MD Dr. 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    HEP Compliance: Good  Any changes to allergies, medications, or have you had any medical procedures/ER visits since your last visit?: No     OBJECTIVE EXAMINATION   Restrictions:  Restrictions/Precautions: Fall Risk   No data recorded No data recorded        Left AROM  Right AROM       0-120 supine AAROM  0-118 AAROM       TREATMENT     Exercises:      Treatment Reasoning    Exercise 1: Supine HS stretch x 3 30 sec H  Exercise 2: supine heel slides 2x5 H 5-10  Exercise 3: hooklying SLR with abdominal bracing 2x5- small lift, 3 secH  Exercise 4: SAQ x 10 B LE one at a time with 5sec H  Exercise

## 2025-07-15 ENCOUNTER — HOSPITAL ENCOUNTER (OUTPATIENT)
Dept: PHYSICAL THERAPY | Age: 75
Setting detail: THERAPIES SERIES
Discharge: HOME | End: 2025-07-15
Payer: MEDICARE

## 2025-07-15 PROCEDURE — 97110 THERAPEUTIC EXERCISES: CPT

## 2025-07-15 PROCEDURE — 97140 MANUAL THERAPY 1/> REGIONS: CPT

## 2025-07-15 ASSESSMENT — PAIN SCALES - GENERAL: PAINLEVEL_OUTOF10: 3

## 2025-07-15 ASSESSMENT — PAIN DESCRIPTION - ORIENTATION: ORIENTATION: RIGHT;LEFT;LOWER

## 2025-07-15 ASSESSMENT — PAIN DESCRIPTION - LOCATION: LOCATION: KNEE

## 2025-07-15 ASSESSMENT — PAIN DESCRIPTION - PAIN TYPE: TYPE: SURGICAL PAIN

## 2025-07-15 NOTE — PROGRESS NOTES
Physical Therapy: Daily Note   Patient: Valeria Laird (75 y.o. adult)   Examination Date: 07/15/2025  Plan of Care/Certification Expiration Date: 25    Progress Note Counter: DC to HEP on 25     :  1950 # of Visits since SOC:   12   MRN: [id number]  CSN: [id number] Start of Care Date:   2024   Insurance: Payor: T MEDICARE / Plan: AET MEDICARE-ADVANTAGE PPO / Product Type: Medicare /   Insurance ID: [id number] - (Medicare Managed) Secondary Insurance (if applicable):    Referring Physician: Adalid Garcia MD Dr. 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 She states 3/10 R knee an 2/10 L knee pain and 2/10 LB pain. She has appt w/ Néstor Garcia tomorrow (25) for follow up.    HEP Compliance: Good  Any changes to allergies, medications, or have you had any medical procedures/ER visits since your last visit?: No     OBJECTIVE EXAMINATION   Restrictions:  Restrictions/Precautions: Fall Risk   No data recorded No data recorded        Left AROM  Right AROM       0-118 AAROM in supine  0-125 AAROM in supine       TREATMENT     Exercises:      Treatment Reasoning    Exercise 2: supine heel slides 2x5 H 5-10  Exercise 3: hooklying SLR with abdominal bracing x 10-

## 2025-07-16 ENCOUNTER — OFFICE VISIT (OUTPATIENT)
Dept: ORTHOPEDIC SURGERY | Facility: CLINIC | Age: 75
End: 2025-07-16
Payer: MEDICARE

## 2025-07-16 ENCOUNTER — APPOINTMENT (OUTPATIENT)
Dept: ORTHOPEDIC SURGERY | Facility: CLINIC | Age: 75
End: 2025-07-16
Payer: MEDICARE

## 2025-07-16 DIAGNOSIS — Z96.651 TOTAL KNEE REPLACEMENT STATUS, RIGHT: Primary | ICD-10-CM

## 2025-07-16 PROCEDURE — 99212 OFFICE O/P EST SF 10 MIN: CPT | Performed by: ORTHOPAEDIC SURGERY

## 2025-07-16 PROCEDURE — 99213 OFFICE O/P EST LOW 20 MIN: CPT | Performed by: ORTHOPAEDIC SURGERY

## 2025-07-16 NOTE — PROGRESS NOTES
History of present illness: History of bilateral total knees some patellofemoral irritation    She did PT therapy and a steroid Dosepak she said the therapy helped significantly more than anything    Physical exam:    General: No acute distress or breathing difficulty or discomfort, pleasant and cooperative with the examination.    Extremities: Each knee is examined there are some mild medial swelling but much better pain no pain getting up and down good patellar tracking full extension flexion to 135    The knees feel stable bilaterally to light stress testing there is no redness no warmth there is no obvious inflammation at this point in time except for a little bit of swelling over the medial tibial eminence bilaterally    Diagnostic studies: No new    Impression: Total knee replacements bilaterally with some mild patellofemoral irritation but now resolving with therapy    Plan: Follow-up as needed    If pain returns we would need sunrise x-rays bone scans and more evaluation but at this point she is willing to live with it she said she feels much better with the compression sleeve that they gave her at therapy

## 2025-07-22 ENCOUNTER — HOSPITAL ENCOUNTER (OUTPATIENT)
Dept: PHYSICAL THERAPY | Age: 75
Setting detail: THERAPIES SERIES
Discharge: HOME | End: 2025-07-22
Payer: MEDICARE

## 2025-07-22 PROCEDURE — 97110 THERAPEUTIC EXERCISES: CPT

## 2025-07-22 NOTE — PROGRESS NOTES
Physical Therapy  Physical Therapy: Daily Note   Patient: Valeria Laird (75 y.o. adult)   Examination Date: 2025  Plan of Care/Certification Expiration Date: 25    Progress Note Counter: DC to HEP on 25     :  1950 # of Visits since SOC:   13   MRN: [id number]  CSN: [id number] Start of Care Date:   2024   Insurance: Payor: T MEDICARE / Plan: AETNA MEDICARE-ADVANTAGE PPO / Product Type: Medicare /   Insurance ID: [id number] - (Medicare Managed) Secondary Insurance (if applicable):    Referring Physician: Adalid Garcia MD Dr. 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 states back is getting stronger and reports bursitis in knees is getting better.  Pt. states stairs is still difficult.    HEP Compliance: Good        OBJECTIVE EXAMINATION   Restrictions:  Restrictions/Precautions: Fall Risk   No data recorded No data recorded          Left AROM  Right AROM      AROM LLE (degrees)  L Knee Flexion (0-145): 0-116 no pain HS lacking 33 deg AROM RLE (degrees)  R Knee Flexion (0-145): 5-115 deg mild ERP flexion HS lacking 40 deg       TREATMENT     Exercises:      Treatment Reasoning    Exercise 1: Supine HS stretch x 3 30 sec H  Exercise 2: supine heel slides x10 H 5-10  Exercise 3: hooklying SLR with abdominal bracing 2x 10- small lift, 3 secH  Exercise 6: supine ball squeezes with bridge x 10 3 sec H  Exercise 15: wall

## 2025-07-23 ENCOUNTER — OFFICE VISIT (OUTPATIENT)
Dept: SURGERY | Age: 75
End: 2025-07-23
Payer: MEDICARE

## 2025-07-23 VITALS
HEIGHT: 62 IN | DIASTOLIC BLOOD PRESSURE: 66 MMHG | BODY MASS INDEX: 32.42 KG/M2 | RESPIRATION RATE: 16 BRPM | SYSTOLIC BLOOD PRESSURE: 138 MMHG | OXYGEN SATURATION: 97 % | HEART RATE: 96 BPM | TEMPERATURE: 97.2 F | WEIGHT: 176.2 LBS

## 2025-07-23 DIAGNOSIS — C50.411 CARCINOMA OF UPPER-OUTER QUADRANT OF RIGHT BREAST IN FEMALE, ESTROGEN RECEPTOR POSITIVE (HCC): Primary | ICD-10-CM

## 2025-07-23 DIAGNOSIS — E66.811 OBESITY, CLASS I, BMI 30-34.9: ICD-10-CM

## 2025-07-23 DIAGNOSIS — Z17.0 CARCINOMA OF UPPER-OUTER QUADRANT OF RIGHT BREAST IN FEMALE, ESTROGEN RECEPTOR POSITIVE (HCC): Primary | ICD-10-CM

## 2025-07-23 PROCEDURE — 1126F AMNT PAIN NOTED NONE PRSNT: CPT | Performed by: SURGERY

## 2025-07-23 PROCEDURE — 99214 OFFICE O/P EST MOD 30 MIN: CPT | Performed by: SURGERY

## 2025-07-23 PROCEDURE — 1123F ACP DISCUSS/DSCN MKR DOCD: CPT | Performed by: SURGERY

## 2025-07-23 PROCEDURE — 1159F MED LIST DOCD IN RCRD: CPT | Performed by: SURGERY

## 2025-07-23 ASSESSMENT — ENCOUNTER SYMPTOMS
NAUSEA: 0
CHEST TIGHTNESS: 0
COLOR CHANGE: 0
VOMITING: 0
COUGH: 0
SHORTNESS OF BREATH: 0
ABDOMINAL PAIN: 0

## 2025-07-23 NOTE — PROGRESS NOTES
PATIENT:    Valeria Laird     DATE:      7/23/2025     TIME: 11:46 AM     Subjective:     Valeria Laird presents for follow-up of breast cancer. She is recovering from a back surgery.     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  She denies breast pain, masses, skin changes, nipple discharge, nipple retraction, or recent breast trauma bilaterally.      Patient's medications, allergies, past medical, surgical, social and family histories were reviewed and updated as appropriate.    Current Outpatient Medications on File Prior to Visit   Medication Sig Dispense Refill    Acetaminophen (TYLENOL PO) Take 100 mg by mouth in the morning and 100 mg at noon and 100 mg in the evening.      citalopram (CELEXA) 20 MG tablet TAKE 1 TABLET DAILY 90 tablet 4    metFORMIN (GLUCOPHAGE-XR) 750 MG extended release tablet Take 1 tablet by mouth daily (with breakfast) TAKE 1 TABLET BY MOUTH TWICE DAILY IN THE MORNING & AT BEDTIME 360 tablet 0    rosuvastatin (CRESTOR) 10 MG tablet Take 1 tablet by mouth daily 90 tablet 1    blood glucose test strips (ACCU-CHEK GUIDE) strip test blood sugar THREE TIMES DAILY 100 strip 5    Lancets MISC Inject 1 each into the skin 2 times daily DX: diabetes mellitus. Ok to substitute per insurance (1 each = 1 box) 1 each 5    anastrozole (ARIMIDEX) 1 MG tablet Take 1 tablet by mouth daily      vitamin D (VITAMIN D3) 50 MCG (2000 UT) CAPS capsule Take 1 capsule by mouth daily      olopatadine (PATANASE) 0.6 % SOLN nassl soln 2 sprays by Nasal route 2 times daily 1 each 1    fluticasone (FLONASE) 50 MCG/ACT nasal spray

## 2025-07-23 NOTE — PROGRESS NOTES
General Information   Patient Name: Valeria Laird  Patient : 1950    Patient phone:[phone]  Email: [email or web address]    Health Care Providers (Including Names, Institution)   Primary Care Provider: Ludin Woods MD    Surgeon: Angeli Yusuf MD [facility]   Radiation Oncologist: Dr. 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, estrogen receptor positive (HCC)  Staging form: Breast, AJCC 8th Edition  - Clinical: Stage IA (cT1, cN0, cM0, G2, ER+, AL+, HER2-) - Signed by Angeli Yusuf MD on 2024  - Pathologic: Stage IA (pT1b, pN0(sn), cM0, G2, ER+, AL+, HER2-) - Signed by Dylon Gillespie MD on 2025       Diagnosis Year:    Treatment Completed   Surgery: [x] Yes   []No Surgery Date(s) (year): 2025   Surgical procedure/findings: Right Breast Needle LOC Lumpectomy and SLNB    RECONSTRUCTION OUTCOMES: N/A    Lymph node removal: []Axillary Dissection [x] [place] Biopsy   Radiation: Yes Body area treated: Breast AISHWARYA End Date (year):2025   Systemic Therapy (chemotherapy, hormonal therapy, other): Yes  [] Before surgery [] After surgery   Persistent symptoms or side effects at completion of treatment:  Fatigue: Yes                                                                Menopausal symptoms: No      Numbness:  No                                                          Pain: No      Psychosocial/Depression: No                                    Other (enter type(s)):     Familial Cancer Risk Assessment   Breast and or ovarian cancer in 1st or 2nd degree relatives:  Yes        Received Genetic counseling:  Yes    Genetic testing: Yes  Genetic testing results:negative       Follow-up Care Plan   Your follow-up care plan is design to inform you and primary care providers regarding the recommended and required follow-up, cancer screening and routine health

## 2025-07-24 ENCOUNTER — HOSPITAL ENCOUNTER (OUTPATIENT)
Dept: WOMENS IMAGING | Age: 75
Discharge: HOME | End: 2025-07-26
Attending: SURGERY
Payer: MEDICARE

## 2025-07-24 ENCOUNTER — HOSPITAL ENCOUNTER (OUTPATIENT)
Dept: PHYSICAL THERAPY | Age: 75
Setting detail: THERAPIES SERIES
Discharge: HOME | End: 2025-07-24
Payer: MEDICARE

## 2025-07-24 ENCOUNTER — HOSPITAL ENCOUNTER (OUTPATIENT)
Dept: ULTRASOUND IMAGING | Age: 75
End: 2025-07-24
Attending: SURGERY
Payer: MEDICARE

## 2025-07-24 DIAGNOSIS — Z17.0 CARCINOMA OF UPPER-OUTER QUADRANT OF RIGHT BREAST IN FEMALE, ESTROGEN RECEPTOR POSITIVE (HCC): ICD-10-CM

## 2025-07-24 DIAGNOSIS — Z78.0 MENOPAUSE: ICD-10-CM

## 2025-07-24 DIAGNOSIS — C50.411 CARCINOMA OF UPPER-OUTER QUADRANT OF RIGHT BREAST IN FEMALE, ESTROGEN RECEPTOR POSITIVE (HCC): ICD-10-CM

## 2025-07-24 DIAGNOSIS — N64.9 BREAST DISORDER: ICD-10-CM

## 2025-07-24 PROCEDURE — 97110 THERAPEUTIC EXERCISES: CPT

## 2025-07-24 PROCEDURE — 97530 THERAPEUTIC ACTIVITIES: CPT

## 2025-07-24 PROCEDURE — 77080 DXA BONE DENSITY AXIAL: CPT

## 2025-07-24 PROCEDURE — G0279 TOMOSYNTHESIS, MAMMO: HCPCS

## 2025-07-24 ASSESSMENT — PAIN SCALES - GENERAL: PAINLEVEL_OUTOF10: 3

## 2025-07-24 NOTE — PROGRESS NOTES
Physical Therapy: Monthly Recheck   Patient: Valeria Laird (75 y.o. adult)   Examination Date: 2025  Plan of Care/Certification Expiration Date: 25    Progress Note Counter: DC to HEP on 25     :  1950 # of Visits since SOC:   5   MRN: [id number]  CSN: [id number] Start of Care Date:   2025   Insurance: Payor: [facility] MEDICARE / Plan: AET MEDICARE-ADVANTAGE PPO / Product Type: Medicare /   Insurance ID: [id number] - (Medicare Managed) Secondary Insurance (if applicable):    Referring Physician: Adalid Garcia MD Dr. R Zanotti   PCP: Ludin Woods MD Visits to Date/Visits Approved:     No Show/Cancelled Appts: 0 / 0     Medical Diagnosis: Pain in right knee [M25.561]  Pain in left knee [M25.562]  Bilateral primary osteoarthritis of knee [M17.0]  Presence of left artificial knee joint [Z96.652]  Presence of right artificial knee joint [Z96.651]  Bilateral knee pain [M25.561, M25.562] bilat knee OA  Treatment Diagnosis: knee pain bilat post lumbar surgery        SUBJECTIVE EXAMINATION   Pain Level: Pain Screening  Pain Assessment: 0-10  Pain Level: 3    Patient Comments: Subjective: Pt reports some improvement however slower than what she would like to see. Pt feels weakness in back and legs, difficulty getting up from the floor had to crawl about 3 feet last week when cleaning a spot on her rug. Pt reports 3/10 on ave during ADLs    HEP Compliance: Good        OBJECTIVE EXAMINATION   Restrictions:  Restrictions/Precautions: Fall Risk   No data recorded No data recorded        Ambulation/Gait (if applicable):   Ambulation  Surface: Level tile  Device: No Device  Assistance: Independent  Quality of Gait: Good pace, good posture, near equal step length, good heel strike, 0 back pain, 1/10 bilat knee pain during amb  Distance: 500 feet  Stairs  # Steps : 10  Rails: Left ascending  Assistance: Modified independent   Comment: Pt ascended and descended steps demonstrating reciprocal

## 2025-07-28 ENCOUNTER — APPOINTMENT (OUTPATIENT)
Dept: PHYSICAL THERAPY | Age: 75
End: 2025-07-28
Payer: MEDICARE

## 2025-07-31 ENCOUNTER — HOSPITAL ENCOUNTER (OUTPATIENT)
Dept: PHYSICAL THERAPY | Age: 75
Setting detail: THERAPIES SERIES
Discharge: HOME | End: 2025-07-31
Payer: MEDICARE

## 2025-07-31 PROCEDURE — 97140 MANUAL THERAPY 1/> REGIONS: CPT

## 2025-07-31 PROCEDURE — 97110 THERAPEUTIC EXERCISES: CPT

## 2025-07-31 ASSESSMENT — PAIN SCALES - GENERAL: PAINLEVEL_OUTOF10: 3

## 2025-07-31 NOTE — PROGRESS NOTES
Physical Therapy: Daily Note   Patient: Valeria Laird (75 y.o. adult)   Examination Date: 2025  Plan of Care/Certification Expiration Date: 25    Progress Note Counter: DC to HEP on 25     :  1950 # of Visits since SOC:   14   MRN: [id number]  CSN: [id number] Start of Care Date:   2024   Insurance: Payor: T MEDICARE / Plan: AET[employer] MEDICARE-ADVANTAGE PPO / Product Type: Medicare /   Insurance ID: [id number] - (Medicare Managed) Secondary Insurance (if applicable):    Referring Physician: Adalid Garcia MD Dr. R Zanotti   PCP: Ludin Woods MD Visits to Date/Visits Approved:     No Show/Cancelled Appts: 0 / 0     Medical Diagnosis: Pain in right knee [M25.561]  Pain in left knee [M25.562]  Bilateral primary osteoarthritis of knee [M17.0]  Presence of left artificial knee joint [Z96.652]  Presence of right artificial knee joint [Z96.651]    Treatment Diagnosis: knee pain bilat post lumbar surgery        SUBJECTIVE EXAMINATION   Pain Level: Pain Screening  Patient Currently in Pain: Yes  Pain Assessment: 0-10  Pain Level: 3    Patient Comments: Subjective: Pt states she has some new upper trap/rhomboid pain 3/10. Pt states B knees 3/10.    HEP Compliance: Good          TREATMENT     Exercises:      Treatment Reasoning    Exercise 9: standing hip extension alternating LE's x 10 H3''  Exercise 12: mid rows RTB x 10 hold 3 sec VC for scap setting and abdominal bracing  Exercise 13: B shd ext YTB x 10 hold 3 sec VC for scap setting and abdominal bracing.  Exercise 17: capital D's x 10  Exercise 18: scap retractions H5'' x 10  Exercise 19: doorway stretch mid level; H20'' x 2    Limitations addressed: Strength, Mobility                     Manual Therapy: (CPT 94946) Treatment Reasoning     Joint Mobilization: R scap mobs to dec tightness with focus on upward rotation to dec pain and inc activity mary ellen Limitations addressed: Tissue extensibility, Painful spasm, Joint

## 2025-08-14 ENCOUNTER — HOSPITAL ENCOUNTER (OUTPATIENT)
Dept: PHYSICAL THERAPY | Age: 75
Setting detail: THERAPIES SERIES
Discharge: HOME | End: 2025-08-14
Payer: MEDICARE

## 2025-08-14 PROCEDURE — 97110 THERAPEUTIC EXERCISES: CPT

## 2025-08-14 ASSESSMENT — PAIN SCALES - GENERAL: PAINLEVEL_OUTOF10: 4

## 2025-08-21 ENCOUNTER — HOSPITAL ENCOUNTER (OUTPATIENT)
Dept: PHYSICAL THERAPY | Age: 75
Setting detail: THERAPIES SERIES
Discharge: HOME | End: 2025-08-21
Payer: MEDICARE

## 2025-08-21 PROCEDURE — 97140 MANUAL THERAPY 1/> REGIONS: CPT

## 2025-08-21 PROCEDURE — 97110 THERAPEUTIC EXERCISES: CPT

## (undated) DEVICE — STRIP,CLOSURE,WOUND,MEDI-STRIP,1/2X4: Brand: MEDLINE

## (undated) DEVICE — 4-PORT MANIFOLD: Brand: NEPTUNE 2

## (undated) DEVICE — FLEXIBLE YANKAUER,LARGE TIP, NO VACUUM CONTROL: Brand: ARGYLE

## (undated) DEVICE — T4 HOOD

## (undated) DEVICE — NEURO: Brand: MEDLINE INDUSTRIES, INC.

## (undated) DEVICE — NEEDLE HYPO 25GA L1.5IN BLU POLYPR HUB S STL REG BVL STR

## (undated) DEVICE — TUBING IRRIGATION 140/160/180/190 SER GI ENDOSCP SMARTCAP

## (undated) DEVICE — GAUZE,SPONGE,4"X4",16PLY,XRAY,STRL,LF: Brand: MEDLINE

## (undated) DEVICE — TRAYS TRANSPORT SCOPE OASIS W/LID

## (undated) DEVICE — GLOVE SURG SZ 65 THK91MIL LTX FREE SYN POLYISOPRENE

## (undated) DEVICE — SHEET,DRAPE,53X77,STERILE: Brand: MEDLINE

## (undated) DEVICE — SYRINGE MED 30ML STD CLR PLAS LUERLOCK TIP N CTRL DISP

## (undated) DEVICE — COVER LT HNDL BLU PLAS

## (undated) DEVICE — UNDERCAST PADDING: Brand: DEROYAL

## (undated) DEVICE — LABEL MED MINI W/ MARKER

## (undated) DEVICE — LIQUIBAND RAPID ADHESIVE 36/CS 0.8ML: Brand: MEDLINE

## (undated) DEVICE — RADIOGRAPHY DEVICE SPECIMEN TRANSPEC

## (undated) DEVICE — 3M™ IOBAN™ 2 ANTIMICROBIAL INCISE DRAPE 6650EZ: Brand: IOBAN™ 2

## (undated) DEVICE — KIT EVAC 400CC PVC RADPQ Y CONN

## (undated) DEVICE — HYPODERMIC SAFETY NEEDLE: Brand: MAGELLAN

## (undated) DEVICE — 3M™ STERI-DRAPE™ U-DRAPE 1015: Brand: STERI-DRAPE™

## (undated) DEVICE — SPLINT ORTH 22IN KNEE BASIC

## (undated) DEVICE — COVER,TABLE,44X90,STERILE: Brand: MEDLINE

## (undated) DEVICE — GLOVE ORANGE PI 8   MSG9080

## (undated) DEVICE — SPONGE,NEURO,1"X1",XR,STRL,LF,10/PK: Brand: MEDLINE

## (undated) DEVICE — ELECTRODE PT RET AD L9FT HI MOIST COND ADH HYDRGEL CORDED

## (undated) DEVICE — PLASMABLADE X PS210-030S-LIGHT 3.0SL: Brand: PLASMABLADE™ X

## (undated) DEVICE — SUTURE VICRYL + SZ 3-0 L27IN ABSRB UD L26MM SH 1/2 CIR VCP416H

## (undated) DEVICE — PAD THER XL AD MULTIUSE W E STRP WRAPON

## (undated) DEVICE — BLADE RMR L35MM PAT PILOT H

## (undated) DEVICE — FLOSEAL WITH RECOTHROM - 10ML.: Brand: FLOSEAL HEMOSTATIC MATRIX

## (undated) DEVICE — MAT FLR SURG QUICKWICK 28X54 IN DISP

## (undated) DEVICE — Device: Brand: STABLECUT®

## (undated) DEVICE — SUTURE ABSRB BRAID COAT UD CT NO 1 36IN VCRL J959H

## (undated) DEVICE — ENDO CARRY-ON PROCEDURE KIT INCLUDES LUBRICANT, DEFENDO OLYMPUS AIR, WATER, SUCTION, BIOPSY VALVE KIT, ENZYMATIC SPONGE, AND BASIN.: Brand: ENDO CARRY-ON PROCEDURE KIT

## (undated) DEVICE — BANDAGE COMPR W6INXL9FT BLU 1 LAYR NO CLSR ESMARCH

## (undated) DEVICE — KIT COLLCTN L2.5IN OD1.8IN BONE DUST S STL DISP SHEEHY MESH

## (undated) DEVICE — GLOVE ORANGE PI 7 1/2   MSG9075

## (undated) DEVICE — GAUZE,SPONGE,FLUFF,6"X6.75",STRL,10/TRAY: Brand: MEDLINE

## (undated) DEVICE — FORCEPS BX L240CM JAW DIA2.4MM ORNG L CAP W/ NDL DISP RAD

## (undated) DEVICE — NEEDLE SPNL 18GA L3.5IN W/ QNCKE SHARPER BVL DURA CLICK

## (undated) DEVICE — ELECTRODE ES L275IN 275IN BLDE TIP COAT PTFE TEF W EVAC

## (undated) DEVICE — TAPE MED W3XL2.5YD STRETCHED ELASTIKON

## (undated) DEVICE — TABLE COVER: Brand: CONVERTORS

## (undated) DEVICE — GLOVE ORANGE PI 7   MSG9070

## (undated) DEVICE — TUBE SET 96 MM 64 MM H2O PERISTALTIC STD AUX CHANNEL

## (undated) DEVICE — BANDAGE COMPR M W6INXL10YD WHT BGE VELC E MTRX HK AND LOOP

## (undated) DEVICE — GLOVE SURG SZ 8 L12IN FNGR THK94MIL TRNSLUC YEL LTX HYDRGEL

## (undated) DEVICE — SUTURE VICRYL + SZ 2-0 L18IN ABSRB UD CP-2 L26MM 1/2 CIR REV VCP762D

## (undated) DEVICE — SUTURE VCRL SZ 2-0 L36IN ABSRB UD L40MM CT 1/2 CIR J957H

## (undated) DEVICE — 35 ML SYRINGE LUER-LOCK TIP: Brand: MONOJECT

## (undated) DEVICE — BIPOLAR SEALER 23-112-1 AQM 6.0: Brand: AQUAMANTYS ®

## (undated) DEVICE — GOWN,SIRUS,POLYRNF,BRTHSLV,LG,30/CS: Brand: MEDLINE

## (undated) DEVICE — DISPOSABLE REFLECTIVE SPHERES ATTACHED TO REFERENCE FRAMES AND SURGICAL INSTRUMENTS FOR USE DURING SURGICAL NAVIGATION IN IMAGE GUIDED SURGERY. ONE RETAIL CARTON IS MADE UP OF 5 SPHERES PER TRAY IN A POUCH. THERE ARE 12 TRAY-IN-POUCHES FOR A TOTAL OF 60 SPHERES.: Brand: NDI PASSIVE SPHERE

## (undated) DEVICE — MEDI-VAC NON-CONDUCTIVE SUCTION TUBING: Brand: CARDINAL HEALTH

## (undated) DEVICE — KIT JACK TBL PT CARE

## (undated) DEVICE — NEPTUNE E-SEP SMOKE EVACUATION PENCIL, COATED, 70MM BLADE, PUSH BUTTON SWITCH: Brand: NEPTUNE E-SEP

## (undated) DEVICE — DRAPE SURG EXT FEN REINF ST W O FLD PCH STD

## (undated) DEVICE — GENERAL MINOR: Brand: MEDLINE INDUSTRIES, INC.

## (undated) DEVICE — TUBE ENDOSCP COLON CHANNEL

## (undated) DEVICE — SUTURE MONOCRYL SZ 4-0 L27IN ABSRB UD L19MM PS-2 1/2 CIR PRIM Y426H

## (undated) DEVICE — SPLINT KNEE UNIV FOR LESS THAN 36IN L20IN FOAM LAM E CNTCT

## (undated) DEVICE — CHLORAPREP 26ML ORANGE

## (undated) DEVICE — Z DISCONTINUED PER MEDLINE USE 2741944 DRESSING AQUACEL 12 IN SURG W9XL30CM SIL CVR WTRPRF VIR BACT BARR ANTIMIC

## (undated) DEVICE — PROVE COVER: Brand: UNBRANDED

## (undated) DEVICE — PAD N ADH W3XL4IN POLY COT SFT PERF FLM EASILY CUT ABSRB

## (undated) DEVICE — BLADE RMR L38MM PAT PILOT H

## (undated) DEVICE — Device: Brand: ENDO SMARTCAP

## (undated) DEVICE — ADAPTER FLSH PMP FLD MGMT GI IRRIG OFP 2 DISPOSABLE

## (undated) DEVICE — Z DISCONTINUED USE 2744636  DRESSING AQUACEL 14 IN ALG W3.5XL14IN POLYUR FLM CVR W/ HYDRCOLL

## (undated) DEVICE — ENDOSCOPIC TRAY TRNSPRT 20.5X16.5X4.1 IN RECYCL SUGAR PULP

## (undated) DEVICE — BLADE SAW W125XL70MM THK064MM CUT THK094MM REPL RECIP DBL

## (undated) DEVICE — SYRINGE MED 10ML LUERLOCK TIP W/O SFTY DISP

## (undated) DEVICE — Device

## (undated) DEVICE — STRAP SECUREMENT L AD W1.25XL2.75IN CATH ADH CATH-SECURE

## (undated) DEVICE — BW-412T DISP COMBO CLEANING BRUSH: Brand: SINGLE USE COMBINATION CLEANING BRUSH

## (undated) DEVICE — APPLICATOR MEDICATED 10.5 CC SOLUTION HI LT ORNG CHLORAPREP

## (undated) DEVICE — TOOL MR8-14MH30 MR8 14CM MATCH 3MM: Brand: MIDAS REX MR8

## (undated) DEVICE — BLADE CLIPPER GEN PURP NS

## (undated) DEVICE — 2000CC GUARDIAN II: Brand: GUARDIAN

## (undated) DEVICE — GEL US 20GM NONIRRITATING OVERWRAPPED FILE PCH TRNSMIT

## (undated) DEVICE — STERILE PATIENT PROTECTIVE PAD FOR IMP® KNEE POSITIONERS & COHESIVE WRAP (10 / CASE): Brand: DE MAYO KNEE POSITIONER®

## (undated) DEVICE — SUTURE NRLN SZ 0 L18IN NONABSORBABLE BLK L36MM CT-1 1/2 CIR C521D

## (undated) DEVICE — CUFF REPROCESS TOURNIQUET 24 DUAL BLADDER DUAL PORT

## (undated) DEVICE — TUBING, SUCTION, 9/32" X 12', STRAIGHT: Brand: MEDLINE INDUSTRIES, INC.

## (undated) DEVICE — ELASTIC BANDAGE: Brand: DEROYAL

## (undated) DEVICE — TOWEL,OR,DSP,ST,BLUE,STD,4/PK,20PK/CS: Brand: MEDLINE

## (undated) DEVICE — APPLICATOR  COTTON-TIPPED 6 IN WOOD STRL

## (undated) DEVICE — SPONGE DRN W4XL4IN RAYON/POLYESTER 6 PLY NONWOVEN PRECUT 2 PER PK

## (undated) DEVICE — TTL1LYR 16FR10ML 100%SIL TMPST TR: Brand: MEDLINE

## (undated) DEVICE — STERILE-Z SURGICAL PATIENT COVERS CLEAR POLYETHYLENE STERILE UNIVERSAL FIT 10 PER CASE: Brand: STERILE-Z

## (undated) DEVICE — AGENT HEMSTAT SZ 50 W8XL6.25CM THK10MM PORCINE GEL ABSRB

## (undated) DEVICE — GAUZE,SPONGE,2"X2",8PLY,STERILE,LF,2'S: Brand: MEDLINE